# Patient Record
Sex: FEMALE | Race: WHITE | NOT HISPANIC OR LATINO | Employment: OTHER | URBAN - METROPOLITAN AREA
[De-identification: names, ages, dates, MRNs, and addresses within clinical notes are randomized per-mention and may not be internally consistent; named-entity substitution may affect disease eponyms.]

---

## 2017-03-25 DIAGNOSIS — G89.29 CHRONIC BILATERAL LOW BACK PAIN WITHOUT SCIATICA: ICD-10-CM

## 2017-03-25 DIAGNOSIS — M54.50 CHRONIC BILATERAL LOW BACK PAIN WITHOUT SCIATICA: ICD-10-CM

## 2017-03-27 RX ORDER — CELECOXIB 200 MG/1
CAPSULE ORAL
Qty: 90 CAPSULE | Refills: 1 | Status: SHIPPED | OUTPATIENT
Start: 2017-03-27 | End: 2017-09-28

## 2017-03-27 NOTE — TELEPHONE ENCOUNTER
Prescription approved per [facility] Refill Protocol.  Nadia Bass, RN  Triage Nurse

## 2017-03-27 NOTE — TELEPHONE ENCOUNTER
CELEBREX 200MG      Last Written Prescription Date: 10/3/2016  Last Quantity: 90, # refills: 1  Last Office Visit with [facility], [facility] or [facility] prescribing provider: 3/25/2017  Next 5 appointments (look out 90 days)     Apr 13, 2017  8:40 AM CDT   MyChart Long with Griselda Yoder MD   [facility] ([facility]    [address] [place] [id number] [phone]                   Creatinine   Date Value Ref Range Status   10/03/2016 0.68 0.52 - 1.04 mg/dL Final     Lab Results   Component Value Date    AST 13 04/18/2016     Lab Results   Component Value Date    ALT 36 04/18/2016     BP Readings from Last 3 Encounters:   12/27/16 142/80   11/23/16 126/78   10/16/16 128/80

## 2017-04-05 ENCOUNTER — TELEPHONE (OUTPATIENT)
Dept: PEDIATRICS | Facility: CLINIC | Age: 52
End: 2017-04-05

## 2017-04-05 NOTE — TELEPHONE ENCOUNTER
Third attempt to reach patient to schedule Colonoscopy. Not Scheduled at [facility]. Left Messages.

## 2017-04-11 DIAGNOSIS — I10 BENIGN HYPERTENSION: ICD-10-CM

## 2017-04-11 NOTE — TELEPHONE ENCOUNTER
LOSARTAN 50MG      Last Written Prescription Date: 6/7/2016  Last Fill Quantity: 180, # refills: 2  Last Office Visit with G, P or [facility] prescribing provider: 12/28/2016  Next 5 appointments (look out 90 days)     Apr 13, 2017  8:40 AM CDT   MyChart Long with Griselda Yoder MD   [facility] ([facility]    [address] [place] [id number] [phone]                   Potassium   Date Value Ref Range Status   10/03/2016 3.9 3.4 - 5.3 mmol/L Final     Creatinine   Date Value Ref Range Status   10/03/2016 0.68 0.52 - 1.04 mg/dL Final     BP Readings from Last 3 Encounters:   12/27/16 142/80   11/23/16 126/78   10/16/16 128/80

## 2017-04-12 RX ORDER — LOSARTAN POTASSIUM 50 MG/1
TABLET ORAL
Qty: 180 TABLET | Refills: 1 | Status: SHIPPED | OUTPATIENT
Start: 2017-04-12 | End: 2017-07-06

## 2017-04-12 NOTE — TELEPHONE ENCOUNTER
Prescription approved per [facility] Refill Protocol.  Nadia Bass, RN  Triage Nurse

## 2017-04-13 ENCOUNTER — OFFICE VISIT (OUTPATIENT)
Dept: PEDIATRICS | Facility: CLINIC | Age: 52
End: 2017-04-13
Payer: COMMERCIAL

## 2017-04-13 VITALS
DIASTOLIC BLOOD PRESSURE: 97 MMHG | TEMPERATURE: 98 F | WEIGHT: 286.2 LBS | HEART RATE: 69 BPM | BODY MASS INDEX: 46.19 KG/M2 | SYSTOLIC BLOOD PRESSURE: 152 MMHG

## 2017-04-13 DIAGNOSIS — Z79.4 TYPE 2 DIABETES MELLITUS WITHOUT COMPLICATION, WITH LONG-TERM CURRENT USE OF INSULIN (H): Primary | ICD-10-CM

## 2017-04-13 DIAGNOSIS — E78.5 HYPERLIPIDEMIA LDL GOAL <100: ICD-10-CM

## 2017-04-13 DIAGNOSIS — E11.9 TYPE 2 DIABETES MELLITUS WITHOUT COMPLICATION, WITH LONG-TERM CURRENT USE OF INSULIN (H): Primary | ICD-10-CM

## 2017-04-13 DIAGNOSIS — Z12.11 SCREEN FOR COLON CANCER: ICD-10-CM

## 2017-04-13 DIAGNOSIS — E66.01 MORBID OBESITY WITH BMI OF 40.0-44.9, ADULT (H): ICD-10-CM

## 2017-04-13 DIAGNOSIS — I10 ESSENTIAL HYPERTENSION: ICD-10-CM

## 2017-04-13 DIAGNOSIS — F33.0 MAJOR DEPRESSIVE DISORDER, RECURRENT EPISODE, MILD (H): ICD-10-CM

## 2017-04-13 DIAGNOSIS — F41.9 ANXIETY: ICD-10-CM

## 2017-04-13 LAB
ALBUMIN SERPL-MCNC: 3.4 G/DL (ref 3.4–5)
ALP SERPL-CCNC: 81 U/L (ref 40–150)
ALT SERPL W P-5'-P-CCNC: 26 U/L (ref 0–50)
ANION GAP SERPL CALCULATED.3IONS-SCNC: 9 MMOL/L (ref 3–14)
AST SERPL W P-5'-P-CCNC: 11 U/L (ref 0–45)
BILIRUB SERPL-MCNC: 0.4 MG/DL (ref 0.2–1.3)
BUN SERPL-MCNC: 9 MG/DL (ref 7–30)
CALCIUM SERPL-MCNC: 8.7 MG/DL (ref 8.5–10.1)
CHLORIDE SERPL-SCNC: 106 MMOL/L (ref 94–109)
CHOLEST SERPL-MCNC: 166 MG/DL
CO2 SERPL-SCNC: 26 MMOL/L (ref 20–32)
CREAT SERPL-MCNC: 0.55 MG/DL (ref 0.52–1.04)
CREAT UR-MCNC: 126 MG/DL
GFR SERPL CREATININE-BSD FRML MDRD: ABNORMAL ML/MIN/1.7M2
GLUCOSE SERPL-MCNC: 141 MG/DL (ref 70–99)
HBA1C MFR BLD: 8.4 % (ref 4.3–6)
HDLC SERPL-MCNC: 44 MG/DL
LDLC SERPL CALC-MCNC: 104 MG/DL
MICROALBUMIN UR-MCNC: 7 MG/L
MICROALBUMIN/CREAT UR: 5.93 MG/G CR (ref 0–25)
NONHDLC SERPL-MCNC: 122 MG/DL
POTASSIUM SERPL-SCNC: 3.9 MMOL/L (ref 3.4–5.3)
PROT SERPL-MCNC: 6.9 G/DL (ref 6.8–8.8)
SODIUM SERPL-SCNC: 141 MMOL/L (ref 133–144)
TRIGL SERPL-MCNC: 92 MG/DL

## 2017-04-13 PROCEDURE — 99207 C FOOT EXAM  NO CHARGE: CPT | Performed by: INTERNAL MEDICINE

## 2017-04-13 PROCEDURE — 83036 HEMOGLOBIN GLYCOSYLATED A1C: CPT | Performed by: INTERNAL MEDICINE

## 2017-04-13 PROCEDURE — 36415 COLL VENOUS BLD VENIPUNCTURE: CPT | Performed by: INTERNAL MEDICINE

## 2017-04-13 PROCEDURE — 80053 COMPREHEN METABOLIC PANEL: CPT | Performed by: INTERNAL MEDICINE

## 2017-04-13 PROCEDURE — 99214 OFFICE O/P EST MOD 30 MIN: CPT | Performed by: INTERNAL MEDICINE

## 2017-04-13 PROCEDURE — 80061 LIPID PANEL: CPT | Performed by: INTERNAL MEDICINE

## 2017-04-13 PROCEDURE — 82043 UR ALBUMIN QUANTITATIVE: CPT | Performed by: INTERNAL MEDICINE

## 2017-04-13 NOTE — PROGRESS NOTES
"  SUBJECTIVE:                                                    Johnna Caballero is a 51 year old female who presents to clinic today for the following health issues    Diabetes Follow-up      Patient is checking blood sugars: not at all    Diabetic concerns: None     Symptoms of hypoglycemia (low blood sugar): none     Paresthesias (numbness or burning in feet) or sores: No     Date of last diabetic eye exam: May 2016     Hypertension Follow-up      Outpatient blood pressures are not being checked.    Low Salt Diet: no added salt       Amount of exercise or physical activity: 4-5 days/week for an average of 30-60 minutes    Problems taking medications regularly: No    Medication side effects: none    Diet: low salt    1) DM:  Not checking sugars at home.  Hasn't been walking as much as usual due to arthritis.  Diet has been \"so so\".  Has not switched to the basaglar, just picked it up.  Has been using lantus.  No low blood sugars.    2) mood has been much better, has been off lexapro and trazodone.  No significant anxiety or depression symptoms.    3) htn:  Not checking bp at home.  No headaches.  No chest pain or shortness of breath.      Problem list and histories reviewed & adjusted, as indicated.  Additional history: as documented    Patient Active Problem List   Diagnosis     Allergic rhinitis     Esophageal reflux     Hyperlipidemia LDL goal <100     Long term current use of insulin (H)     Morbid obesity with BMI of 40.0-44.9, adult (H)     Hematoma - postoperative     Surgery aftercare     Vitamin D deficiency disease     S/P total knee arthroplasty     Type 2 diabetes mellitus without complication (H)     Essential hypertension     Bilateral low back pain without sciatica     Major depressive disorder, recurrent episode, mild (H)     Anxiety     Latex allergy status     Chronic pain syndrome     Intermittent asthma, uncomplicated     Past Surgical History:   Procedure Laterality Date     ARTHROPLASTY KNEE  " 7/15/2014    Procedure: ARTHROPLASTY KNEE;  Surgeon: Chapin Paul MD;  Location: [facility] OR     BACK SURGERY       C NONSPECIFIC PROCEDURE      laparoscopy x6     C NONSPECIFIC PROCEDURE  93    laparotomy x2 ovarian cyst     C NONSPECIFIC PROCEDURE  0293    oophorectomy lt side - cyst     C NONSPECIFIC PROCEDURE  0395    laminectomy L4-5. S1 herniated disc     C NONSPECIFIC PROCEDURE  96    cholecystectomy     C NONSPECIFIC PROCEDURE      ganglion cysts l wrist, rt palm     C NONSPECIFIC PROCEDURE      cyst removal back x2     C NONSPECIFIC PROCEDURE  10/02    rt thumb fusion, ganglion cyst removal     C NONSPECIFIC PROCEDURE  1/08    remove heel spur, excise exostosis     CHOLECYSTECTOMY       EXCISE LESION LOWER EXTREMITY  11/20/2012    Procedure: EXCISE LESION LOWER EXTREMITY;  EXCISION LIPOMA RIGHT HIP ;  Surgeon: Jazmin Bautista MD;  Location:  [facility]     EXCISE LESION LOWER EXTREMITY  11/23/2012    Procedure: EXCISE LESION LOWER EXTREMITY;  EXCISE LESION LOWER EXTREMITY  EVACUATE RIGHT HIP HEMATOMA;  Surgeon: Jazmin Bautista MD;  Location: [facility] OR     GYN SURGERY       HC EXPLORATION ANKLE JOINT  1/2006     HC PART EXCIS PLANTAR FASCIA  12/2006     IRRIGATION AND DEBRIDEMENT HIP, COMBINED  12/15/2012    Procedure: COMBINED IRRIGATION AND DEBRIDEMENT HIP;   evacuation hematoma, scar revision right hip;  Surgeon: Jazmin Bautista MD;  Location:  OR     wisdom teeth[         Social History   Substance Use Topics     Smoking status: Never Smoker     Smokeless tobacco: Never Used     Alcohol use 0.0 oz/week     0 Standard drinks or equivalent per week      Comment: 1 drink per year or less     Family History   Problem Relation Age of Onset     Adopted: Yes     Respiratory Brother      1/2 sib     Psychotic Disorder Mother      depression, chronic fatigue     Psychotic Disorder Brother      bipolar     C.A.D. Maternal Grandfather      Breast Cancer Maternal Grandmother      DIABETES Maternal  Uncle            Reviewed and updated as needed this visit by clinical staff       Reviewed and updated as needed this visit by Provider         ROS:  Constitutional, HEENT, cardiovascular, pulmonary, gi and gu systems are negative, except as otherwise noted.    OBJECTIVE:                                                    BP (!) 152/97  Pulse 69  Temp 98  F (36.7  C) (Tympanic)  Wt 286 lb 3.2 oz (129.8 kg)  BMI 46.19 kg/m2  Body mass index is 46.19 kg/(m^2).  GENERAL: healthy, alert and no distress  EYES: Eyes grossly normal to inspection, PERRL and conjunctivae and sclerae normal  HENT: ear canals and TM's normal, nose and mouth without ulcers or lesions  NECK: no adenopathy, no asymmetry, masses, or scars and thyroid normal to palpation  RESP: lungs clear to auscultation - no rales, rhonchi or wheezes  CV: regular rate and rhythm, normal S1 S2, no S3 or S4, no murmur, click or rub, no peripheral edema and peripheral pulses strong  ABDOMEN: soft, nontender, no hepatosplenomegaly, no masses and bowel sounds normal  MS: no gross musculoskeletal defects noted, no edema  Diabetic foot exam: normal DP and PT pulses, no trophic changes or ulcerative lesions, normal sensory exam and normal monofilament exam    Diagnostic Test Results:  none      ASSESSMENT/PLAN:                                                    (E11.9,  Z79.4) Type 2 diabetes mellitus without complication, with long-term current use of insulin (H)  (primary encounter diagnosis)  -a1c elevated  -increase basaglar by 2 units q3 days to get fasting sugars below 140  -call with final dose of basaglar- may need early refill  -no hypoglycemia  -on ARB, metformin, victoza, basaglar, asa  -pt refuses statin   Plan: FOOT EXAM, Hemoglobin A1c, TSH with free T4         reflex          (I10) Essential hypertension  -bp elevated today, recheck by me slightly improved (158/84) but still elevated  -pt to check bp at home and send me her log in 2 weeks   -if still  high will add hctz to losartan     (E78.5) Hyperlipidemia LDL goal <100  -fasting labs today  -pt has refused statin in the past    (F33.0) Major depressive disorder, recurrent episode, mild (H)  -controlled, not on medications     (F41.9) Anxiety  -controlled, not on medications     (E66.01,  Z68.41) Morbid obesity with BMI of 40.0-44.9, adult (H)  -discussed diet, exercise and weight loss     (Z12.11) Screen for colon cancer  Plan: GASTROENTEROLOGY ADULT REF PROCEDURE ONLY        FUTURE APPOINTMENTS:       - Follow-up visit in 3 months, scheduled.      Griselda Yoder MD  [facility]

## 2017-04-13 NOTE — PATIENT INSTRUCTIONS
Increase basaglar to 16 U for 3 days.  If fasting blood sugars are not below 140 then go up by another 2 u every 3 days until fasting sugars are 140.      Check bps at home and send me log in 2 weeks.

## 2017-04-13 NOTE — NURSING NOTE
"Chief Complaint   Patient presents with     Diabetes       Initial BP (!) 152/97  Pulse 69  Temp 98  F (36.7  C) (Tympanic)  Wt 286 lb 3.2 oz (129.8 kg)  BMI 46.19 kg/m2 Estimated body mass index is 46.19 kg/(m^2) as calculated from the following:    Height as of 12/27/16: 5' 6\" (1.676 m).    Weight as of this encounter: 286 lb 3.2 oz (129.8 kg).  Medication Reconciliation: complete    "

## 2017-04-13 NOTE — MR AVS SNAPSHOT
After Visit Summary   4/13/2017    Johnna Caballero    MRN: [id number]           Patient Information     Date Of Birth          1965        Visit Information        Provider Department      4/13/2017 8:40 AM Griselda Yoder MD [facility] [place]        Today's Diagnoses     Type 2 diabetes mellitus without complication, with long-term current use of insulin (H)    -  1    Essential hypertension        Hyperlipidemia LDL goal <100        Major depressive disorder, recurrent episode, mild (H)        Anxiety        Morbid obesity with BMI of 40.0-44.9, adult (H)        Screen for colon cancer          Care Instructions    Increase basaglar to 16 U for 3 days.  If fasting blood sugars are not below 140 then go up by another 2 u every 3 days until fasting sugars are 140.      Check bps at home and send me log in 2 weeks.           Follow-ups after your visit        Additional Services     GASTROENTEROLOGY ADULT REF PROCEDURE ONLY       Last Lab Result: Creatinine (mg/dL)       Date                     Value                 10/03/2016               0.68             ----------  Body mass index is 46.19 kg/(m^2).      Patient will be contacted to schedule procedure.     Please be aware that coverage of these services is subject to the terms and limitations of your health insurance plan.  Call member services at your health plan with any benefit or coverage questions.  Any procedures must be performed at a [place] facility OR coordinated by your clinic's referral office.    Please bring the following with you to your appointment:    (1) Any X-Rays, CTs or MRIs which have been performed.  Contact the facility where they were done to arrange for  prior to your scheduled appointment.    (2) List of current medications   (3) This referral request   (4) Any documents/labs given to you for this referral                  Your next 10 appointments already scheduled     Apr 26, 2017   Procedure  with Chapin Leal MD   [facility] ([facility])    [address]  [facility] [address] [phone]           [facility] is located at [address]              Future tests that were ordered for you today     Open Future Orders        Priority Expected Expires Ordered    Hemoglobin A1c Routine  8/13/2017 4/13/2017    TSH with free T4 reflex Routine  8/13/2017 4/13/2017            Who to contact     If you have questions or need follow up information about today's clinic visit or your schedule please contact [facility] directly at [phone].  Normal or non-critical lab and imaging results will be communicated to you by MyChart, letter or phone within 4 business days after the clinic has received the results. If you do not hear from us within 7 days, please contact the clinic through [employer] or phone. If you have a critical or abnormal lab result, we will notify you by phone as soon as possible.  Submit refill requests through [employer] or call your pharmacy and they will forward the refill request to us. Please allow 3 business days for your refill to be completed.          Additional Information About Your Visit        [employer] Information     [employer] gives you secure access to your electronic health record. If you see a primary care provider, you can also send messages to your care team and make appointments. If you have questions, please call your primary care clinic.  If you do not have a primary care provider, please call [phone] and they will assist you.        Care EveryWhere ID     This is your Care EveryWhere ID. This could be used by other organizations to access your [place] medical records  [id number]        Your Vitals Were     Pulse Temperature BMI (Body Mass Index)             69 98  F (36.7  C) (Tympanic) 46.19 kg/m2          Blood Pressure from Last 3 Encounters:   04/13/17 (!) 152/97   12/27/16 142/80    11/23/16 126/78    Weight from Last 3 Encounters:   04/13/17 286 lb 3.2 oz (129.8 kg)   12/27/16 270 lb 9 oz (122.7 kg)   11/23/16 270 lb (122.5 kg)              We Performed the Following     Comprehensive metabolic panel     FOOT EXAM     GASTROENTEROLOGY ADULT REF PROCEDURE ONLY     Hemoglobin A1c     Lipid panel reflex to direct LDL     Microalbumin quantitative random urine        Primary Care Provider Office Phone # Fax #    Griselda Yoder [phone]       [facility] [address] [place] [address]        Thank you!     Thank you for choosing [facility]  for your care. Our goal is always to provide you with excellent care. Hearing back from our patients is one way we can continue to improve our services. Please take a few minutes to complete the written survey that you may receive in the mail after your visit with us. Thank you!             Your Updated Medication List - Protect others around you: Learn how to safely use, store and throw away your medicines at www.disposemymeds.org.          This list is accurate as of: 4/13/17  9:10 AM.  Always use your most recent med list.                   Brand Name Dispense Instructions for use    albuterol 108 (90 BASE) MCG/ACT Inhaler    PROAIR HFA/PROVENTIL HFA/VENTOLIN HFA    1 Inhaler    Inhale 2 puffs into the lungs every 4 hours as needed for shortness of breath / dyspnea or wheezing       aspirin 81 MG tablet     30 tablet    Take 1 tablet (81 mg) by mouth daily       blood glucose monitoring lancets     2 Box    Use to test blood sugar 1-2 times daily or as directed.       blood glucose monitoring meter device kit    no brand specified    1 kit    Use to test blood sugar 1-2 times daily or as directed.       blood glucose monitoring test strip    ACCU-CHEK GABI    200 each    Use to test blood sugars 1-2x daily or as directed.       BREO ELLIPTA 100-25 MCG/INH oral inhaler   Generic drug:   fluticasone-vilanterol      Inhale 1 puff into the lungs daily       celecoxib 200 MG capsule    celeBREX    90 capsule    TAKE ONE CAPSULE BY MOUTH EVERY DAY       cetirizine 10 MG tablet    zyrTEC    30 tablet    Take 1 tablet (10 mg) by mouth 2 times daily       EPINEPHrine 0.3 MG/0.3ML injection     2 mL    Inject 0.3 mLs (0.3 mg) into the muscle once as needed for anaphylaxis       hydrocortisone 2.5 % cream    PROCTOCREAM-HC    60 g    Apply 2 x daily for not more than 14 days       * insulin glargine 100 UNIT/ML injection    LANTUS SOLOSTAR    3 Month    Inject 12 Units Subcutaneous At Bedtime       * insulin glargine 100 UNIT/ML injection     3 mL    Inject 14 Units Subcutaneous At Bedtime       Insulin Pen Needle 31G X 4 MM Misc     180 each    1 each 2 times daily Uses with Lantus and Victoza twice daily       KRILL OIL PO      Take 1 tablet by mouth       LANsoprazole 15 MG CR capsule    PREVACID    90 capsule    Take 1 capsule (15 mg) by mouth daily Take 30-60 minutes before a meal.       liraglutide 18 MG/3ML soln    VICTOZA    3 Month    Inject 1.8 mg Subcutaneous daily       LORazepam 0.5 MG tablet    ATIVAN    30 tablet    Take 1-2 tablets (0.5-1 mg) by mouth every 4 hours as needed for anxiety       losartan 50 MG tablet    COZAAR    180 tablet    TAKE TWO TABLETS BY MOUTH EVERY DAY       metFORMIN 1000 MG tablet    GLUCOPHAGE    180 tablet    Take 1 tablet (1,000 mg) by mouth 2 times daily (with meals)       montelukast 10 MG tablet    SINGULAIR    90 tablet    Take 1 tablet (10 mg) by mouth At Bedtime       multivitamin, therapeutic with minerals Tabs tablet      Take 1 tablet by mouth 2 times daily       order for DME     1 each    Equipment being ordered: TENS unit       oxyCODONE-acetaminophen 5-325 MG per tablet    PERCOCET    90 tablet    Take 1-2 tablets by mouth every 4 hours as needed for pain       STATIN NOT PRESCRIBED (INTENTIONAL)     0 each    1 each 2 times daily Statin not  prescribed intentionally due to Refusal by patient       traZODone 50 MG tablet    DESYREL    180 tablet    Take 1-2 tablets ( mg) by mouth nightly as needed for sleep       vitamin B complex with vitamin C Tabs tablet      Take 1 tablet by mouth daily       vitamin D 2000 UNITS tablet     30 tablet    Take 1 tablet by mouth daily       * Notice:  This list has 2 medication(s) that are the same as other medications prescribed for you. Read the directions carefully, and ask your doctor or other care provider to review them with you.

## 2017-04-26 ENCOUNTER — HOSPITAL ENCOUNTER (OUTPATIENT)
Facility: CLINIC | Age: 52
Discharge: HOME | End: 2017-04-26
Attending: INTERNAL MEDICINE | Admitting: INTERNAL MEDICINE
Payer: COMMERCIAL

## 2017-04-26 VITALS
HEIGHT: 67 IN | RESPIRATION RATE: 14 BRPM | SYSTOLIC BLOOD PRESSURE: 155 MMHG | DIASTOLIC BLOOD PRESSURE: 91 MMHG | OXYGEN SATURATION: 99 %

## 2017-04-26 LAB
COLONOSCOPY: NORMAL
GLUCOSE BLDC GLUCOMTR-MCNC: 154 MG/DL (ref 70–99)

## 2017-04-26 PROCEDURE — 25000125 ZZHC RX 250: Performed by: INTERNAL MEDICINE

## 2017-04-26 PROCEDURE — 25000128 H RX IP 250 OP 636: Performed by: INTERNAL MEDICINE

## 2017-04-26 PROCEDURE — G0121 COLON CA SCRN NOT HI RSK IND: HCPCS | Performed by: INTERNAL MEDICINE

## 2017-04-26 PROCEDURE — 45378 DIAGNOSTIC COLONOSCOPY: CPT | Performed by: INTERNAL MEDICINE

## 2017-04-26 PROCEDURE — G0500 MOD SEDAT ENDO SERVICE >5YRS: HCPCS | Performed by: INTERNAL MEDICINE

## 2017-04-26 PROCEDURE — 82962 GLUCOSE BLOOD TEST: CPT

## 2017-04-26 RX ORDER — FENTANYL CITRATE 50 UG/ML
INJECTION, SOLUTION INTRAMUSCULAR; INTRAVENOUS PRN
Status: DISCONTINUED | OUTPATIENT
Start: 2017-04-26 | End: 2017-04-26 | Stop reason: HOSPADM

## 2017-04-26 RX ORDER — ONDANSETRON 2 MG/ML
4 INJECTION INTRAMUSCULAR; INTRAVENOUS EVERY 6 HOURS PRN
Status: DISCONTINUED | OUTPATIENT
Start: 2017-04-26 | End: 2017-04-26 | Stop reason: HOSPADM

## 2017-04-26 RX ORDER — FLUMAZENIL 0.1 MG/ML
0.2 INJECTION, SOLUTION INTRAVENOUS
Status: DISCONTINUED | OUTPATIENT
Start: 2017-04-26 | End: 2017-04-26 | Stop reason: HOSPADM

## 2017-04-26 RX ORDER — ONDANSETRON 4 MG/1
4 TABLET, ORALLY DISINTEGRATING ORAL EVERY 6 HOURS PRN
Status: DISCONTINUED | OUTPATIENT
Start: 2017-04-26 | End: 2017-04-26 | Stop reason: HOSPADM

## 2017-04-26 RX ORDER — ONDANSETRON 2 MG/ML
4 INJECTION INTRAMUSCULAR; INTRAVENOUS
Status: DISCONTINUED | OUTPATIENT
Start: 2017-04-26 | End: 2017-04-26 | Stop reason: HOSPADM

## 2017-04-26 RX ORDER — NALOXONE HYDROCHLORIDE 0.4 MG/ML
.1-.4 INJECTION, SOLUTION INTRAMUSCULAR; INTRAVENOUS; SUBCUTANEOUS
Status: DISCONTINUED | OUTPATIENT
Start: 2017-04-26 | End: 2017-04-26 | Stop reason: HOSPADM

## 2017-04-26 RX ORDER — LIDOCAINE 40 MG/G
CREAM TOPICAL
Status: DISCONTINUED | OUTPATIENT
Start: 2017-04-26 | End: 2017-04-26 | Stop reason: HOSPADM

## 2017-04-26 NOTE — DISCHARGE INSTRUCTIONS
Understanding Diverticulosis and Diverticulitis     Pouches or diverticula usually occur in the lower part of the colon called the sigmoid.      Diverticulitis occurs when the pouches become inflamed.     The colon (large intestine) is the last part of the digestive tract. It absorbs water from stool and changes it from a liquid to a solid. In certain cases, small pouches called diverticula can form in the colon wall. This condition is called diverticulosis. The pouches can become infected. If this happens, it becomes a more serious problem called diverticulitis. These problems can be painful. But they can be managed.   Managing Your Condition  Diet changes or taking medications are often tried first. These may be enough to bring relief. If the case is bad, surgery may be done. You and your doctor can discuss the plan that is best for you.  If You Have Diverticulosis  Diet changes are often enough to control symptoms. The main changes are adding fiber (roughage) and drinking more water. Fiber absorbs water as it travels through your colon. This helps your stool stay soft and move smoothly. Water helps this process. If needed, you may be told to take over-the-counter stool softeners. To help relieve pain, antispasmodic medications may be prescribed.  If You Have Diverticulitis  Treatment depends on how bad your symptoms are.  For mild symptoms: You may be put on a liquid diet for a short time. You may also be prescribed antibiotics. If these two steps relieve your symptoms, you may then be prescribed a high-fiber diet. If you still have symptoms, your doctor will discuss further treatment options with you.  For severe symptoms: You may need to be admitted to the hospital. There, you can be given IV antibiotics and fluids. Once symptoms are under control, the above treatments may be tried. If these don t control your condition, your doctor may discuss the option of having surgery with you.  [place] to Colon  Health  Help keep your colon healthy with a diet that includes plenty of high-fiber fruits, vegetables, and whole grains. Drink plenty of liquids like water and juice. Your doctor may also recommend avoiding seeds and nuts.          [id number] Randal Viveros, [address] [place] [address], [place] [address]. All rights reserved. This information is not intended as a substitute for professional medical care. Always follow your healthcare professional's instructions.      HIGH FIBER DIET  Fiber is present in all fruits, vegetables, cereals and grains. Fiber passes through the body undigested. A high fiber diet helps food move through the intestinal tract. The added bulk is helpful in preventing constipation. In people with diverticulosis it serves to clean out the pouches along the colon wall while preventing new ones from forming. A high fiber diet also reduces the risk of colon cancer, decreases blood cholesterol and prevents high blood sugar in people with diabetes.    The foods listed below are high in fiber and should be included in your diet. If you are not used to high fiber foods, start with 1 or 2 foods from this list. Every 3-4 days add a new one to your diet until you are eating 4 high fiber foods per day. This should give you 20-35 Gm of fiber/day. It is also important to drink a lot of water when you are on this diet (6-8 glasses a day). Water causes the fiber to swell and increases the benefit.    FOODS HIGH IN DIETARY FIBER:  BREADS: Made with 100% whole wheat flour; deepika, wheat or rye crackers; tortillas, bran muffins  CEREALS: Whole grain cereal with bran (Chex, Raisin Bran, Corn Bran), oatmeal, rolled oats, granola, wheat flakes, brown rice  NUTS: Any nuts  FRUITS: All fresh fruits along with edible skins, (bananas, citrus fruit, mangoes, pears, prunes, raisins, apples, pineapple, apricot, melon, jams and marmalades), fruit juices (especially prune juice)  VEGETABLES: All types, preferably raw or lightly  cooked: especially, celery, eggplant, potatoes, spinach, broccoli, brussel sprouts, winter squash, carrots, cauliflower, soybeans, lentils, fresh and dried beans of all kinds  OTHER: Popcorn, any spices      [id number] Randal Viveros, [address] [place] [address], [place] [address]. All rights reserved. This information is not intended as a substitute for professional medical care. Always follow your healthcare professional's instructions.

## 2017-04-26 NOTE — H&P
Pre-Endoscopy History and Physical     Johnna Caballero MRN# [id number]   YOB: 1965 Age: 51 year old     Date of Procedure: 4/26/2017  Primary care provider: Griselda Yoder  Type of Endoscopy: Colonoscopy with possible biopsy, possible polypectomy  Reason for Procedure: screen  Type of Anesthesia Anticipated: Conscious Sedation    HPI:    Johnna is a 51 year old female who will be undergoing the above procedure.      A history and physical has been performed. The patient's medications and allergies have been reviewed. The risks and benefits of the procedure and the sedation options and risks were discussed with the patient.  All questions were answered and informed consent was obtained.      She denies a personal or family history of anesthesia complications or bleeding disorders.     Patient Active Problem List   Diagnosis     Allergic rhinitis     Esophageal reflux     Hyperlipidemia LDL goal <100     Long term current use of insulin (H)     Morbid obesity with BMI of 40.0-44.9, adult (H)     Hematoma - postoperative     Surgery aftercare     Vitamin D deficiency disease     S/P total knee arthroplasty     Type 2 diabetes mellitus without complication (H)     Essential hypertension     Bilateral low back pain without sciatica     Major depressive disorder, recurrent episode, mild (H)     Anxiety     Latex allergy status     Chronic pain syndrome     Intermittent asthma, uncomplicated        Past Medical History:   Diagnosis Date     Actinic keratosis      Allergic rhinitis, cause unspecified      Anemia      Arthritis      Asthma     no meds x2 yrs     chronic back pain      Chronic infection     MRSA-nasal     Chronic pain     back     Esophageal reflux      fibrocystic breast changes      Gastro-oesophageal reflux disease      Heart murmur     mitral insuffiency     Hx of Fen-Phen use      Hypertension      migraines      Mild intermittent asthma     rare, with dog or exercise      Moderate major depression (H) 3/28/2012     MRSA (methicillin resistant Staphylococcus aureus) 6/27/2014    Nares     Need for desensitization to allergens      Obesity, unspecified      Other and unspecified hyperlipidemia      Temporomandibular joint disorders, unspecified      Thrombosis of leg     incisional area right hip     Type II or unspecified type diabetes mellitus without mention of complication, not stated as uncontrolled         Past Surgical History:   Procedure Laterality Date     ARTHROPLASTY KNEE  7/15/2014    Procedure: ARTHROPLASTY KNEE;  Surgeon: Chapin Paul MD;  Location: [facility] OR     BACK SURGERY       C NONSPECIFIC PROCEDURE      laparoscopy x6     C NONSPECIFIC PROCEDURE  93    laparotomy x2 ovarian cyst     C NONSPECIFIC PROCEDURE  0293    oophorectomy lt side - cyst     C NONSPECIFIC PROCEDURE  0395    laminectomy L4-5. S1 herniated disc     C NONSPECIFIC PROCEDURE  96    cholecystectomy     C NONSPECIFIC PROCEDURE      ganglion cysts l wrist, rt palm     C NONSPECIFIC PROCEDURE      cyst removal back x2     C NONSPECIFIC PROCEDURE  10/02    rt thumb fusion, ganglion cyst removal     C NONSPECIFIC PROCEDURE  1/08    remove heel spur, excise exostosis     CHOLECYSTECTOMY       EXCISE LESION LOWER EXTREMITY  11/20/2012    Procedure: EXCISE LESION LOWER EXTREMITY;  EXCISION LIPOMA RIGHT HIP ;  Surgeon: Jazmin Bautista MD;  Location: [facility]     EXCISE LESION LOWER EXTREMITY  11/23/2012    Procedure: EXCISE LESION LOWER EXTREMITY;  EXCISE LESION LOWER EXTREMITY  EVACUATE RIGHT HIP HEMATOMA;  Surgeon: Jazmin Bautista MD;  Location:  OR     GYN SURGERY       HC EXPLORATION ANKLE JOINT  1/2006     HC PART EXCIS PLANTAR FASCIA  12/2006     IRRIGATION AND DEBRIDEMENT HIP, COMBINED  12/15/2012    Procedure: COMBINED IRRIGATION AND DEBRIDEMENT HIP;   evacuation hematoma, scar revision right hip;  Surgeon: Jazmin Bautista MD;  Location:  OR     wisdom teeth[          Social History   Substance Use Topics     Smoking status: Never Smoker     Smokeless tobacco: Never Used     Alcohol use 0.0 oz/week     0 Standard drinks or equivalent per week      Comment: 1 drink per year or less       Family History   Problem Relation Age of Onset     Adopted: Yes     Respiratory Brother      1/2 sib     Psychotic Disorder Mother      depression, chronic fatigue     Psychotic Disorder Brother      bipolar     C.A.D. Maternal Grandfather      Breast Cancer Maternal Grandmother      DIABETES Maternal Uncle        Prior to Admission medications    Medication Sig Start Date End Date Taking? Authorizing Provider   losartan (COZAAR) 50 MG tablet TAKE TWO TABLETS BY MOUTH EVERY DAY 4/12/17  Yes Griselda Yoder MD   celecoxib (CELEBREX) 200 MG capsule TAKE ONE CAPSULE BY MOUTH EVERY DAY 3/27/17  Yes Linda Durant MD   insulin glargine (BASAGLAR) 100 UNIT/ML injection Inject 14 Units Subcutaneous At Bedtime 1/6/17  Yes Griselda Yoder MD   oxyCODONE-acetaminophen (PERCOCET) 5-325 MG per tablet Take 1-2 tablets by mouth every 4 hours as needed for pain 12/28/16  Yes Griselda Yoder MD   aspirin 81 MG tablet Take 1 tablet (81 mg) by mouth daily 11/23/16  Yes Griselda Yoder MD   KRILL OIL PO Take 1 tablet by mouth   Yes Reported, Patient   vitamin  B complex with vitamin C (VITAMIN  B COMPLEX) TABS Take 1 tablet by mouth daily 9/19/16  Yes    montelukast (SINGULAIR) 10 MG tablet Take 1 tablet (10 mg) by mouth At Bedtime 9/19/16  Yes    cetirizine (ZYRTEC) 10 MG tablet Take 1 tablet (10 mg) by mouth 2 times daily 9/19/16  Yes    metFORMIN (GLUCOPHAGE) 1000 MG tablet Take 1 tablet (1,000 mg) by mouth 2 times daily (with meals) 7/28/16  Yes Griselda Yoder MD   liraglutide (VICTOZA) 18 MG/3ML soln Inject 1.8 mg Subcutaneous daily 7/28/16  Yes Griselda Yoder MD   LANsoprazole (PREVACID) 15 MG capsule Take 1 capsule (15 mg) by mouth  daily Take 30-60 minutes before a meal. 7/28/16  Yes Griselda Yoder MD   hydrocortisone (PROCTOCREAM-HC) 2.5 % rectal cream Apply 2 x daily for not more than 14 days 10/13/14  Yes Linda Durant MD   multivitamin, therapeutic with minerals (THERA-VIT-M) TABS Take 1 tablet by mouth 2 times daily    Yes Unknown, Entered By History   fluticasone - vilanterol (BREO ELLIPTA) 100-25 MCG/INH oral inhaler Inhale 1 puff into the lungs daily    Reported, Patient   albuterol (PROAIR HFA/PROVENTIL HFA/VENTOLIN HFA) 108 (90 BASE) MCG/ACT Inhaler Inhale 2 puffs into the lungs every 4 hours as needed for shortness of breath / dyspnea or wheezing 12/28/16   Griselda Yoder MD   blood glucose monitoring (ACCU-CHEK GABI) test strip Use to test blood sugars 1-2x daily or as directed. 12/28/16   Griselda Yoder MD   EPINEPHrine 0.3 MG/0.3ML injection Inject 0.3 mLs (0.3 mg) into the muscle once as needed for anaphylaxis 12/28/16   Griselda Yoder MD   traZODone (DESYREL) 50 MG tablet Take 1-2 tablets ( mg) by mouth nightly as needed for sleep 10/14/16   Griselda Yoder MD   Cholecalciferol (VITAMIN D) 2000 UNITS tablet Take 1 tablet by mouth daily 9/19/16      LORazepam (ATIVAN) 0.5 MG tablet Take 1-2 tablets (0.5-1 mg) by mouth every 4 hours as needed for anxiety 9/14/16   Griselda Yoder MD   Insulin Pen Needle 31G X 4 MM MISC 1 each 2 times daily Uses with Lantus and Victoza twice daily 7/28/16   Griselda Yoder MD   blood glucose monitoring (NO BRAND SPECIFIED) meter device kit Use to test blood sugar 1-2 times daily or as directed. 4/29/16   Griselda Yoder MD   blood glucose monitoring (ACCU-CHEK MULTICLIX) lancets Use to test blood sugar 1-2 times daily or as directed. 2/11/16   Griselda Yoder MD   STATIN NOT PRESCRIBED, INTENTIONAL, 1 each 2 times daily Statin not prescribed intentionally due to Refusal by patient 1/14/15    "Griselda Yoder MD   ORDER FOR DME Equipment being ordered: TENS unit 12/24/14   Griselda Yoder MD       Allergies   Allergen Reactions     Eggs      Allergy found in testing     Hornets      wasps     Latex Anaphylaxis     hives  -  able to use BP cuff     Lipitor [Hmg-Coa-R Inhibitors] Cramps     Muscle cramps with statin     Metoclopramide Hcl Difficulty breathing     Neurontin [Gabapentin] Hives        REVIEW OF SYSTEMS:   5 point ROS negative except as noted above in HPI, including Gen., Resp., CV, GI &  system review.    PHYSICAL EXAM:   Ht 1.702 m (5' 7\") Estimated body mass index is 46.19 kg/(m^2) as calculated from the following:    Height as of 12/27/16: 1.676 m (5' 6\").    Weight as of 4/13/17: 129.8 kg (286 lb 3.2 oz).   GENERAL APPEARANCE: alert, and oriented  MENTAL STATUS: alert  AIRWAY EXAM: Mallampatti Class I (visualization of the soft palate, fauces, uvula, anterior and posterior pillars)  RESP: lungs clear to auscultation - no rales, rhonchi or wheezes  CV: regular rates and rhythm  DIAGNOSTICS:    Not indicated    IMPRESSION   ASA Class 2 - Mild systemic disease    PLAN:   Plan for Colonoscopy with possible biopsy, possible polypectomy. We discussed the risks, benefits and alternatives and the patient wished to proceed.    The above has been forwarded to the consulting provider.      Signed Electronically by: Chapin Leal  April 26, 2017          "

## 2017-04-26 NOTE — LETTER
April 5, 2017      Johnna Caballero  [address]   [facility] [id number]          Dear Johnna Caballero,      Thank you for choosing [facility]. You are scheduled for the following service(s).   Date:      Wednesday, April 26    Procedure:   Colonoscopy   Doctor:       Mel      Arrival Time:    9am   *check in at Emergency/Endoscopy desk*  Procedure Time:    9:30am  Location:   [facility]       Endoscopy Department, First Floor (Enter through ER Doors) *        [address] [place], [place] [address]     [phone] or [phone] () to reschedule      Colonoscopy is the most accurate test to detect colon polyps and colon cancer, and the only test where polyps can be removed. During this procedure, a doctor examines the lining of your large intestine and rectum through a flexible tube.  MIRALAX GATORADE PREP  Purchase the following supplies at your local pharmacy:  2 ( two)  - Bisacodyl tablets   (Dulcolax  laxative NOT Dulcolax  stool softener) each tablet contains 5 mg of bisacodyl  1 (one) -- 8.3 ounce bottle of Polyethylene Glycol (PEG) 3350 Powder   (MiraLAX , SmoothLAX , ClearLAX  or generic equivalent)  64 oz. Gatorade  (No red colored flavors)  Regular Gatorade, Gatorade G2 , Powerade , Powerade Zero  or Pedialyte  are acceptable. Red flavors are not allowed; all other colors (yellow, green, orange, purple, blue) are okay. It is also okay to buy two 2.12 oz packets of powdered Gatorade that can be mixed with water to a total volume of 64 oz of liquid.  1 (one) - 10 oz. bottle Magnesium Citrate (No red colored flavors)  It is also okay for you to use a 0.5 ounce package of powdered magnesium citrate (17 grams) mixed with 10 ounces of water.  PREPARATION FOR COLONOSCOPY  Transportation:  You must arrange for a ride for the day of your procedure with a responsible adult. A taxi ride is not an option unless you are accompanied by a responsible  adult. If you fail to arrange transportation with a responsible adult, your procedure will be cancelled and rescheduled.  7 days before:    Discontinue fiber supplements and medications containing iron. This includes multivitamins with iron, Metamucil and Fibercon.   3 days before:    Begin a Low-Fiber Diet. A low fiber diet helps make the cleanout more effective.     Examples of a low fiber diet include (but are not limited to): White bread, white rice, pasta, crackers, fish, chicken, eggs, ground beef, creamy peanut butter, cooked/steamed/boiled vegetables, canned fruit, bananas, melons, milk, plain yogurt cheese, salad dressing and other condiments.     The following are not allowed on a low fiber diet: Seeds, nuts, popcorn, bran, whole wheat, corn, quinoa, raw fruits and vegetables, berries and dried fruit, beans and lentils.  2 days before:    Continue Low Fiber Diet.     Drink at least 8 glasses of water throughout the day.     Stop eating solid foods at 11:45 pm.  1 day before:    Begin Clear Liquid Diet. (Clear liquids include things you can see through).     Examples of a clear liquid diet include: Water, tea (no milk or non-dairy creamer), clear broth or bouillon, Gatorade, Pedialyte or Powerade, carbonated and non-carbonated soft drinks(Sprite , 7-Up , Gingerale ), strained fruit juices without pulp (apple, white grape, white cranberry), Jello-O  and popsicles     The following are not allowed on a clear liquid diet: Red liquids, alcoholic beverages, coffee, dairy products, protein shakes, cream broths, juice with pulp and chewing tobacco.    At noon: Take 2(two) Bisacodyl (Dulcolax) tablets     Between 4-6pm: Drink Miralax - Gatorade preparation, mix 1(one) bottle of Miralax with 64 oz. of Gatorade in a large pitcher.    Drink 1(one) - 8 oz. glass every 15 minutes thereafter until the mixture is gone.  Colon Cleansing Tips: Drink adequate amounts of fluid before and after your colon cleansing to prevent  dehydration. Stay near a toilet because you will have diarrhea. Even if you are sitting on the toilet, continue to drink the cleansing solution every 15 minutes. If you feel nauseous or vomit, rinse your mouth with water, take a 15 to 30-minute break and then continue drinking the solution. You will be uncomfortable until the stool has flushed from your colon (in about 2-4 hours). You may feel chilled.        Day of your procedure  You may take all of your morning medications including blood pressure medications, blood thinners (if you have not been instructed to stop these by our office), methadone, anti-seizure medications with sips of water 3 hours prior to your procedure or earlier. Do not take insulin or vitamins prior to your procedure.  Continue the Clear Liquid Diet. Avoid red liquids, alcoholic beverages, coffee, dairy products, protein shakes, and chewing tobacco.    4 hours prior: Drink 10 oz magnesium citrate    3 hours prior:     STOP consuming all liquids     Do not take anything by mouth during this time.     Allow extra time to travel to your procedure as you may need to stop and use a restroom along the way.  You are ready for the procedure, if you followed all instructions and your stool is no longer formed, but clear or yellow liquid. If you are unsure whether your colon is clean, please call our office at [phone] you leave for your appointment.  Canceling or Rescheduling  your appointment:   If you must cancel or reschedule your appointment, please call [phone] as soon as possible.  Bring the following to your procedure:    Insurance Card / Photo ID     List of Current Medications including over-the-counter medications and supplements     Bring your rescue inhaler if you currently use one to control asthma                          DIRECTIONS  From the north ([place], [place], [place])  Take [address] south, exit to [address]. Get into the left hand isaías, turn left (east),  go one-half mile to [address]. Turn left (north) on [address]. Go north to first stoplight, take a right on the newly constructed road, [address] and follow it to the Emergency entrance.    From the south ([facility])  Take 35 north to the east split, [address], and exit to [address]. Turn left (west) on [address] to [address]. Turn right (north) on [address]. Go north to first stoplight, take a right on the newly constructed road, [address] and follow it to the Emergency entrance.    From the east via [address] ([facility])  Take [address] south to [address] exit. Turn right on [address]. Go west to [address]. Turn right (north) on [address], go to the first stoplight, take a right and follow the newly constructed road, [address], to the Emergency entrance.    From the east via [address] ([facility])  Take [address] west to [address]. Turn left (south) on [address] to [address]. Turn left (east) on [address] and follow the newly constructed road to the Emergency entrance.    From the west via [address] ([place])  ? Take [address] east to [address]. Turn right (south) on [address] to [address].  Turn left (east) on [address] and follow the newly constructed road to the Emergency entrance.          PRE-PROCEDURE CHECKLIST    If you have diabetes, ask your regular doctor for diet and medication restrictions.  If you take a medication to thin your blood (such as Coumadin or Lovenox) and have not already discussed this please call your primary doctor to advice on holding this medication  If you take aspirin or Plavix,  you may continue to do so.  If you are or may be pregnant, please discuss the risks and benefits of this procedure with your doctor.  You must arrange for a ride for the day of your exam. If you fail to arrange transportation with a responsible adult, your procedure will need  to be cancelled and rescheduled. Taxi ,Bus and Medical transport are not acceptable unless you have a responsible adult that you know & trust with you.  Please arrange for this  to be able to pick you up in our department, approximately one hour after your scheduled procedure, if they are not able to stay with you.  *If you must cancel or reschedule your appointment, please call [facility] at [phone].*      What happens during a colonoscopy?   Plan to spend up to two hours, starting at registration time, at the endoscopy center the day of your procedure. The exam itself takes about 15 minutes to complete, and recovery 30 minutes.      Before the exam:    You will change into a gown.    Your medical history will be reviewed with you and you will be given a consent form to sign.     A nurse will insert an intravenous (IV) line into your hand or arm.  During the exam:     Medicine will be given through the IV line to help you relax and feel drowsy.     Your heart rate and oxygen levels will be monitored. If your blood pressure is low, you may be given fluids through the IV line.     The doctor will insert a flexible hollow tube, called a colonoscope, into your rectum. The scope will be advanced slowly through the large intestine (colon).    You may have a feeling of pressure or fullness.     If an abnormal tissue, or a polyp are found, the doctor may remove it through the endoscope for closer examination, or biopsy. Tissue removal is painless.      What happens after the exam?         Your doctor will talk with you about the initial results of your exam.     The doctor will prepare a full report for the physician who referred you for the colonoscopy.     You may feel bloated after the procedure. This is normal.     Medication given during the exam will prohibit you from driving for the rest of the day.     Following the exam, you may resume your normal diet. Your first meal should be light, no  greasy foods. Avoid alcohol until the next day.     You may resume your regular activities the day after the procedure.     A nurse will provide you with complete discharge instructions before you leave the endoscopy center. Be sure to ask the nurse for specific instructions if you take blood thinners such as aspirin, Coumadin or Plavix.     Any tissue samples removed during the exam will be sent to a lab for evaluation. It may take 5-7 working days for you to be notified of the results.       Low-Fiber Diet      A low-fiber diet limits the amount of food waste that has to move through the large intestine.    Foods Recommended Foods to Avoid   Breads, Cereal, Rice and Pasta:   White bread, rolls, biscuits, and croissant, mely toast   Waffles, Algerian toast, and pancakes   White rice, noodles, pasta, macaroni and peeled cooked potatoes   Plain crackers, Saltines   Cooked cereals: farina, Cream of Rice   Cold cereals: Puffed Rice, Rice Krispies, Corn Flakes, and Special K Breads, Cereal, Rice and Pasta:   Breads or rolls with nuts, seeds or fruit   Whole wheat, pumpernickel, rye breads and cornbread   Potatoes with skin, brown or wild rice, and kasha (buckwheat)     Vegetables:    Tender cooked and canned vegetables without seeds: carrots, asparagus tips, green or wax beans, pumpkin, spinach, lima beans Vegetables:   Raw or steamed vegetables   Vegetables with seeds   Sauerkraut   Winter squash, peas, broccoli, [place] sprouts, cabbage, onions, cauliflower, baked beans, peas and corn     Fruits:   Strained fruit juice   canned fruit, except pineapple   ripe bananas   melons Fruits:   prunes and prune juice   raw or dried fruit   all berries, figs, dates and raisins     Milk/Dairy:   milk, plain or flavored   yogurt, custard, and ice cream   cheese and cottage cheese Milk/Dairy:   Yogurt with nuts or seeds   Meat and other proteins:   ground, wellcooked tender beef, lamb, ham, veal, pork, fish, poultry, and organ  meats   eggs   peanut butter without nuts  Fats, Snack, Sweets, Condiments, and Beverages:   Margarine, butter, oils, mayonnaise, sour cream, and salad dressing   Plain gravies   Sugar, clear jelly, honey, and syrup   Spices, cooked herbs, bouillon, broth, and soups made with allowed vegetables   Coffee, tea, and carbonated drinks   Plain cakes and cookies   Gelatin, plain puddings, custard, ice cream, sherbet, Popsicles   Hard candy or pretzels   Ketchup, mustard   Meat and other proteins:   tough, fibrous meats with gristle   dry beans, peas, and lentils   peanut butter with nuts   Tofu  Fats, Snack, Sweets, Condiments, and Beverages:   Nuts, seeds, and coconut   Jam, marmalade, and preserves   Pickles, olives, relish, and horseradish   All desserts containing nuts, seeds, dried fruit, coconut, or made from whole grains or bran   Candy made with nuts or seeds   popcorn

## 2017-04-26 NOTE — LETTER
April 5, 2017      Johnna Caballero  [address]   [place] [facility] [address]        Dear Johnna Caballero,      Thank you for choosing [facility]. You are scheduled for the following service(s).   Date:      Wednesday, April 26    Procedure:   Colonoscopy   Doctor:       Mel       Arrival Time:    9am  *check in at Emergency/Endoscopy desk*  Procedure Time:    9:30am    Location:   [facility]       Endoscopy Department, First Floor (Enter through ER Doors) *        [address] [place], [place] [address]     [phone] or [phone] () to reschedule      Colonoscopy is the most accurate test to detect colon polyps and colon cancer, and the only test where polyps can be removed. During this procedure, a doctor examines the lining of your large intestine and rectum through a flexible tube.            MIRALAX GATORADE PREP  Purchase the following supplies at your local pharmacy:  2 ( two)  - Bisacodyl tablets   (Dulcolax  laxative NOT Dulcolax  stool softener) each tablet contains 5 mg of bisacodyl  1 (one) -- 8.3 ounce bottle of Polyethylene Glycol (PEG) 3350 Powder   (MiraLAX , SmoothLAX , ClearLAX  or generic equivalent)  64 oz. Gatorade  (No red colored flavors)  Regular Gatorade, Gatorade G2 , Powerade , Powerade Zero  or Pedialyte  are acceptable. Red flavors are not allowed; all other colors (yellow, green, orange, purple, blue) are okay. It is also okay to buy two 2.12 oz packets of powdered Gatorade that can be mixed with water to a total volume of 64 oz of liquid.  1 (one) - 10 oz. bottle Magnesium Citrate (No red colored flavors)  It is also okay for you to use a 0.5 ounce package of powdered magnesium citrate (17 grams) mixed with 10 ounces of water.  PREPARATION FOR COLONOSCOPY  Transportation:  You must arrange for a ride for the day of your procedure with a responsible adult. A taxi ride is not an option unless you are accompanied by a  responsible adult. If you fail to arrange transportation with a responsible adult, your procedure will be cancelled and rescheduled.  7 days before:    Discontinue fiber supplements and medications containing iron. This includes multivitamins with iron, Metamucil and Fibercon.   3 days before:    Begin a Low-Fiber Diet. A low fiber diet helps make the cleanout more effective.     Examples of a low fiber diet include (but are not limited to): White bread, white rice, pasta, crackers, fish, chicken, eggs, ground beef, creamy peanut butter, cooked/steamed/boiled vegetables, canned fruit, bananas, melons, milk, plain yogurt cheese, salad dressing and other condiments.     The following are not allowed on a low fiber diet: Seeds, nuts, popcorn, bran, whole wheat, corn, quinoa, raw fruits and vegetables, berries and dried fruit, beans and lentils.      2 days before:    Continue Low Fiber Diet.     Drink at least 8 glasses of water throughout the day.     Stop eating solid foods at 11:45 pm.  1 day before:    Begin Clear Liquid Diet. (Clear liquids include things you can see through).     Examples of a clear liquid diet include: Water, tea (no milk or non-dairy creamer), clear broth or bouillon, Gatorade, Pedialyte or Powerade, carbonated and non-carbonated soft drinks(Sprite , 7-Up , Gingerale ), strained fruit juices without pulp (apple, white grape, white cranberry), Jello-O  and popsicles     The following are not allowed on a clear liquid diet: Red liquids, alcoholic beverages, coffee, dairy products, protein shakes, cream broths, juice with pulp and chewing tobacco.    At noon: Take 2(two) Bisacodyl (Dulcolax) tablets     Between 4-6pm: Drink Miralax - Gatorade preparation, mix 1(one) bottle of Miralax with 64 oz. of Gatorade in a large pitcher.    Drink 1(one) - 8 oz. glass every 15 minutes thereafter until the mixture is gone.  Colon Cleansing Tips: Drink adequate amounts of fluid before and after your colon  cleansing to prevent dehydration. Stay near a toilet because you will have diarrhea. Even if you are sitting on the toilet, continue to drink the cleansing solution every 15 minutes. If you feel nauseous or vomit, rinse your mouth with water, take a 15 to 30-minute break and then continue drinking the solution. You will be uncomfortable until the stool has flushed from your colon (in about 2-4 hours). You may feel chilled.  Day of your procedure  You may take all of your morning medications including blood pressure medications, blood thinners (if you have not been instructed to stop these by our office), methadone, anti-seizure medications with sips of water 3 hours prior to your procedure or earlier. Do not take insulin or vitamins prior to your procedure.  Continue the Clear Liquid Diet. Avoid red liquids, alcoholic beverages, coffee, dairy products, protein shakes, and chewing tobacco.    4 hours prior: Drink 10 oz magnesium citrate    3 hours prior:     STOP consuming all liquids     Do not take anything by mouth during this time.     Allow extra time to travel to your procedure as you may need to stop and use a restroom along the way.  You are ready for the procedure, if you followed all instructions and your stool is no longer formed, but clear or yellow liquid. If you are unsure whether your colon is clean, please call our office at [phone] you leave for your appointment.  Canceling or Rescheduling  your appointment:   If you must cancel or reschedule your appointment, please call [phone] as soon as possible.  Bring the following to your procedure:    Insurance Card / Photo ID     List of Current Medications including over-the-counter medications and supplements     Bring your rescue inhaler if you currently use one to control asthma                              DIRECTIONS  From the north ([place], [place], [place])  Take [address] south, exit [address]. Get into the left hand  isa[address], turn left (east), go one-half mile to [address]. Turn left (north) on [address]. Go north to first stoplight, take a right on the newly constructed road, [address] and follow it to the Emergency entrance.    From the south ([facility])  Take 35 north to the east split, [address], and exit to [address]. Turn left (west) on [address]  to [address]. Turn right (north) on [address]. Go north to first stoplight, take a right on the newly constructed road, [address] and follow it to the Emergency entrance.    From the east via [address] ([facility])  Take [address] south to [address]  exit. Turn right on [address]. Go west to [address]. Turn right (north) on [address], go to the first stoplight, take a right and follow the newly constructed road, [address], to the Emergency entrance.    From the east via [address] ([facility])  Take [address] west to [address]. Turn left (south) on [address] to [address]. Turn left (east) on [address] and follow the newly constructed road to the Emergency entrance.    From the west via [address] ([place])  ? Take [address] east to [address]. Turn right (south) on [address] to [address].  Turn left (east) on [address] and follow the newly constructed road to the Emergency entrance.          PRE-PROCEDURE CHECKLIST    If you have diabetes, ask your regular doctor for diet and medication restrictions.  If you take a medication to thin your blood (such as Coumadin or Lovenox) and have not already discussed this please call your primary doctor to advice on holding this medication  If you take aspirin or Plavix,  you may continue to do so.  If you are or may be pregnant, please discuss the risks and benefits of this procedure with your doctor.  You must arrange for a ride for the day of your exam. If you fail to arrange transportation with a responsible adult,  your procedure will need to be cancelled and rescheduled. Taxi ,Bus and Medical transport are not acceptable unless you have a responsible adult that you know & trust with you.  Please arrange for this  to be able to pick you up in our department, approximately one hour after your scheduled procedure, if they are not able to stay with you.  *If you must cancel or reschedule your appointment, please call Endoscopy [facility] at [phone].*      What happens during a colonoscopy?   Plan to spend up to two hours, starting at registration time, at the endoscopy center the day of your procedure. The exam itself takes about 15 minutes to complete, and recovery 30 minutes.      Before the exam:    You will change into a gown.    Your medical history will be reviewed with you and you will be given a consent form to sign.     A nurse will insert an intravenous (IV) line into your hand or arm.  During the exam:     Medicine will be given through the IV line to help you relax and feel drowsy.     Your heart rate and oxygen levels will be monitored. If your blood pressure is low, you may be given fluids through the IV line.     The doctor will insert a flexible hollow tube, called a colonoscope, into your rectum. The scope will be advanced slowly through the large intestine (colon).    You may have a feeling of pressure or fullness.     If an abnormal tissue, or a polyp are found, the doctor may remove it through the endoscope for closer examination, or biopsy. Tissue removal is painless.      What happens after the exam?         Your doctor will talk with you about the initial results of your exam.     The doctor will prepare a full report for the physician who referred you for the colonoscopy.     You may feel bloated after the procedure. This is normal.     Medication given during the exam will prohibit you from driving for the rest of the day.     Following the exam, you may resume your normal diet. Your first meal  should be light, no greasy foods. Avoid alcohol until the next day.     You may resume your regular activities the day after the procedure.     A nurse will provide you with complete discharge instructions before you leave the endoscopy center. Be sure to ask the nurse for specific instructions if you take blood thinners such as aspirin, Coumadin or Plavix.     Any tissue samples removed during the exam will be sent to a lab for evaluation. It may take 5-7 working days for you to be notified of the results.       Low-Fiber Diet      A low-fiber diet limits the amount of food waste that has to move through the large intestine.    Foods Recommended Foods to Avoid   Breads, Cereal, Rice and Pasta:   White bread, rolls, biscuits, and croissant, mely toast   Waffles, Greek toast, and pancakes   White rice, noodles, pasta, macaroni and peeled cooked potatoes   Plain crackers, Saltines   Cooked cereals: farina, Cream of Rice   Cold cereals: Puffed Rice, Rice Krispies, Corn Flakes, and Special K Breads, Cereal, Rice and Pasta:   Breads or rolls with nuts, seeds or fruit   Whole wheat, pumpernickel, rye breads and cornbread   Potatoes with skin, brown or wild rice, and kasha (buckwheat)     Vegetables:    Tender cooked and canned vegetables without seeds: carrots, asparagus tips, green or wax beans, pumpkin, spinach, lima beans Vegetables:   Raw or steamed vegetables   Vegetables with seeds   Sauerkraut   Winter squash, peas, broccoli, [place] sprouts, cabbage, onions, cauliflower, baked beans, peas and corn     Fruits:   Strained fruit juice   canned fruit, except pineapple   ripe bananas   melons Fruits:   prunes and prune juice   raw or dried fruit   all berries, figs, dates and raisins     Milk/Dairy:   milk, plain or flavored   yogurt, custard, and ice cream   cheese and cottage cheese Milk/Dairy:   Yogurt with nuts or seeds   Meat and other proteins:   ground, wellcooked tender beef, lamb, ham, veal, pork, fish,  poultry, and organ meats   eggs   peanut butter without nuts  Fats, Snack, Sweets, Condiments, and Beverages:   Margarine, butter, oils, mayonnaise, sour cream, and salad dressing   Plain gravies   Sugar, clear jelly, honey, and syrup   Spices, cooked herbs, bouillon, broth, and soups made with allowed vegetables   Coffee, tea, and carbonated drinks   Plain cakes and cookies   Gelatin, plain puddings, custard, ice cream, sherbet, Popsicles   Hard candy or pretzels   Ketchup, mustard   Meat and other proteins:   tough, fibrous meats with gristle   dry beans, peas, and lentils   peanut butter with nuts   Tofu  Fats, Snack, Sweets, Condiments, and Beverages:   Nuts, seeds, and coconut   Jam, marmalade, and preserves   Pickles, olives, relish, and horseradish   All desserts containing nuts, seeds, dried fruit, coconut, or made from whole grains or bran   Candy made with nuts or seeds   popcorn

## 2017-05-30 ENCOUNTER — MYC MEDICAL ADVICE (OUTPATIENT)
Dept: PEDIATRICS | Facility: CLINIC | Age: 52
End: 2017-05-30

## 2017-05-30 DIAGNOSIS — M54.9 UPPER BACK PAIN ON LEFT SIDE: ICD-10-CM

## 2017-05-30 DIAGNOSIS — M54.2 NECK PAIN: ICD-10-CM

## 2017-05-30 DIAGNOSIS — M25.512 ACUTE PAIN OF LEFT SHOULDER: Primary | ICD-10-CM

## 2017-05-30 NOTE — TELEPHONE ENCOUNTER
"Patient is asking for a referral for physical therapy for left shoulder pain, neck and back pain.  This was recommended by her chiropractor since patient has tingling and pain in the left hand and a pinched nerve can be causing it.     This was not addressed at appointment-message to patient asking detailed-duration of symptoms.  Patient asking for a referral to [facility]\" in [place].  Awaiting patient's reply.    Maya Hoff RN  Message handled by Nurse Triage.    "

## 2017-05-31 NOTE — TELEPHONE ENCOUNTER
Patient replied with the fax number.  Routing to station B nurse pool.    Maya Hoff RN  Message handled by Nurse Triage.

## 2017-05-31 NOTE — TELEPHONE ENCOUNTER
"Please review-patient is asking for a referral to \"[facility]\" in [place] for pain in the left shoulder, neck and back x 2 week. States she is doing too much lifting at work and is a side sleepier, which is aggravating the pain.  Ok for the referral?    Maya Hoff RN  Message handled by Nurse Triage.      "

## 2017-06-05 ENCOUNTER — TRANSFERRED RECORDS (OUTPATIENT)
Dept: HEALTH INFORMATION MANAGEMENT | Facility: CLINIC | Age: 52
End: 2017-06-05

## 2017-06-19 ENCOUNTER — MYC MEDICAL ADVICE (OUTPATIENT)
Dept: PEDIATRICS | Facility: CLINIC | Age: 52
End: 2017-06-19

## 2017-06-19 ENCOUNTER — TRANSFERRED RECORDS (OUTPATIENT)
Dept: HEALTH INFORMATION MANAGEMENT | Facility: CLINIC | Age: 52
End: 2017-06-19

## 2017-06-29 NOTE — PROGRESS NOTES
[facility]  [address]  [address]  [place] [address]  [phone]  Dept: [phone]    PRE-OP EVALUATION:  Today's date: 2017    Johnna Caballero (: 1965) presents for pre-operative evaluation assessment as requested by Dr. Hung Mejia.  She requires evaluation and anesthesia risk assessment prior to undergoing surgery/procedure for treatment of knee pain .  Proposed procedure: right knee replacement    Date of Surgery/ Procedure: 2017  Time of Surgery/ Procedure: 2:30 pm  Hospital/Surgical Facility: [facility]  Fax number for surgical facility: [phone]  Primary Physician: Griselda Yoder  Type of Anesthesia Anticipated: General    Patient has a Health Care Directive or Living Will:  YES     Preop Questions 2017   1.  Do you have a history of heart attack, stroke, stent, bypass or surgery on an artery in the head, neck, heart or legs? No   2.  Do you ever have any pain or discomfort in your chest? No   3.  Do you have a history of  Heart Failure? No   4.   Are you troubled by shortness of breath when:  walking on a level surface, or up a slight hill, or at night? No   5.  Do you currently have a cold, bronchitis or other respiratory infection? No   6.  Do you have a cough, shortness of breath, or wheezing? No   7.  Do you sometimes get pains in the calves of your legs when you walk? No   8. Do you or anyone in your family have previous history of blood clots? No   9.  Do you or does anyone in your family have a serious bleeding problem such as prolonged bleeding following surgeries or cuts? No   10. Have you ever had problems with anemia or been told to take iron pills? YES - in the past from 1 surgery   11. Have you had any abnormal blood loss such as black, tarry or bloody stools, or abnormal vaginal bleeding? No   12. Have you ever had a blood transfusion? No   13. Have you or any of your relatives ever had problems with  anesthesia? No   14. Do you have sleep apnea, excessive snoring or daytime drowsiness? No   15. Do you have any prosthetic heart valves? No   16. Do you have prosthetic joints? YES - left knee   17. Is there any chance that you may be pregnant? No         HPI:                                                      Brief HPI related to upcoming procedure: chronic right knee pain       DIABETES - Patient has a longstanding history of DiabetesType Type II . Patient is being treated with oral agents and GLP-1 agonist and denies significant side effects. Control has been fair.  a1c 4/2017 was 8.4. Sugars at home running 120 fasting, 200 post prandial.                                                                                                           .  HYPERTENSION - Patient has longstanding history of mod-severe HTN , currently denies any symptoms referable to elevated blood pressure. Specifically denies chest pain, palpitations, dyspnea, orthopnea, PND or peripheral edema. Blood pressure readings have been in normal range. Current medication regimen is as listed below. Patient denies any side effects of medication.    Bp at home running 140/82, 150/90                                                                                                                                                                                      .  HYPERLIPIDEMIA - Patient has a long history of significant Hyperlipidemia requiring medication for treatment with recent good control. Patient reports no problems or side effects with the medication.                                                                                                                                                       .  DEPRESSION - Patient has a long history of Depression of moderate severity requiring medication for control with recent symptoms being stable.                                                                                                                                                        .  ASTHMA - Patient has a longstanding history of mild Asthma . Patient has been doing well overall  and continues on medication regimen consisting of albuterol without adverse reactions or side effects.                                                                                                                                               .    MEDICAL HISTORY:                                                      Patient Active Problem List    Diagnosis Date Noted     Chronic pain syndrome 11/23/2016     Priority: High     Patient is followed by Griselda Yoder MD for ongoing prescription of pain medication.  All refills should be approved by this provider, or covering partner.    Medication(s): percocet 5/325.   Maximum quantity per month: 90  Clinic visit frequency required: Q 6  months     Controlled substance agreement:  Encounter-Level CSA:     There are no encounter-level csa.        Pain Clinic evaluation in the past: No    DIRE Total Score(s):  No flowsheet data found.    Last [facility] website verification:  none   [email or web address]         Type 2 diabetes mellitus without complication (H) 10/02/2015     Priority: High     Hematoma - postoperative 11/23/2012     Priority: High     Problem list name updated by automated process. Provider to review and confirm       Hyperlipidemia LDL goal <100 02/10/2010     Priority: High     Intermittent asthma, uncomplicated 12/28/2016     Priority: Medium     Latex allergy status 10/03/2016     Priority: Medium     Anxiety 05/10/2016     Priority: Medium     Major depressive disorder, recurrent episode, mild (H) 01/05/2016     Priority: Medium     Typically seasonal     celexa- low libido  wellbutrin- didn't work   paxil- didn't work        Essential hypertension 10/04/2015     Priority: Medium     Morbid obesity with BMI of 40.0-44.9, adult (H) 09/18/2012     Priority: Medium     Allergic rhinitis  03/25/2003     Priority: Medium     Problem list name updated by automated process. Provider to review       Bilateral low back pain without sciatica 10/25/2015     Priority: Low     Vitamin D deficiency disease 03/06/2013     Priority: Low     Surgery aftercare 12/16/2012     Priority: Low     IMO update changed this record. Please review for accuracy       Long term current use of insulin (H) 09/18/2012     Priority: Low     Esophageal reflux 03/25/2003     Priority: Low     S/P total knee arthroplasty 07/15/2014      Past Medical History:   Diagnosis Date     Actinic keratosis      Allergic rhinitis, cause unspecified      Anemia      Arthritis      Asthma     no meds x2 yrs     chronic back pain      Chronic infection     MRSA-nasal     Chronic pain     back     Esophageal reflux      fibrocystic breast changes      Gastro-oesophageal reflux disease      Heart murmur     mitral insuffiency     Hx of Fen-Phen use      Hypertension      migraines      Mild intermittent asthma     rare, with dog or exercise     Moderate major depression (H) 3/28/2012     MRSA (methicillin resistant Staphylococcus aureus) 6/27/2014    Nares     Need for desensitization to allergens      Obesity, unspecified      Other and unspecified hyperlipidemia      Temporomandibular joint disorders, unspecified      Thrombosis of leg     incisional area right hip     Type II or unspecified type diabetes mellitus without mention of complication, not stated as uncontrolled      Past Surgical History:   Procedure Laterality Date     ARTHROPLASTY KNEE  7/15/2014    Procedure: ARTHROPLASTY KNEE;  Surgeon: Chapin Paul MD;  Location: [facility] OR     BACK SURGERY       C NONSPECIFIC PROCEDURE      laparoscopy x6     C NONSPECIFIC PROCEDURE  93    laparotomy x2 ovarian cyst     C NONSPECIFIC PROCEDURE  0293    oophorectomy lt side - cyst     C NONSPECIFIC PROCEDURE  0395    laminectomy L4-5. S1 herniated disc     C NONSPECIFIC PROCEDURE  96     cholecystectomy     C NONSPECIFIC PROCEDURE      ganglion cysts l wrist, rt palm     C NONSPECIFIC PROCEDURE      cyst removal back x2     C NONSPECIFIC PROCEDURE  10/02    rt thumb fusion, ganglion cyst removal     C NONSPECIFIC PROCEDURE  1/08    remove heel spur, excise exostosis     CHOLECYSTECTOMY       COLONOSCOPY N/A 4/26/2017    Procedure: COLONOSCOPY;  COLONOSCOPY;  Surgeon: Chapin Leal MD;  Location: [facility] GI     EXCISE LESION LOWER EXTREMITY  11/20/2012    Procedure: EXCISE LESION LOWER EXTREMITY;  EXCISION LIPOMA RIGHT HIP ;  Surgeon: Jazmin Bautista MD;  Location:  [facility]     EXCISE LESION LOWER EXTREMITY  11/23/2012    Procedure: EXCISE LESION LOWER EXTREMITY;  EXCISE LESION LOWER EXTREMITY  EVACUATE RIGHT HIP HEMATOMA;  Surgeon: Jazmin Bautista MD;  Location: [facility] OR     GYN SURGERY       HC EXPLORATION ANKLE JOINT  1/2006     HC PART EXCIS PLANTAR FASCIA  12/2006     IRRIGATION AND DEBRIDEMENT HIP, COMBINED  12/15/2012    Procedure: COMBINED IRRIGATION AND DEBRIDEMENT HIP;   evacuation hematoma, scar revision right hip;  Surgeon: Jazmin Bautista MD;  Location: [facility] OR     wisdom teeth[       Current Outpatient Prescriptions   Medication Sig Dispense Refill     losartan (COZAAR) 50 MG tablet TAKE TWO TABLETS BY MOUTH EVERY  tablet 1     celecoxib (CELEBREX) 200 MG capsule TAKE ONE CAPSULE BY MOUTH EVERY DAY 90 capsule 1     insulin glargine (BASAGLAR) 100 UNIT/ML injection Inject 14 Units Subcutaneous At Bedtime 3 mL 11     fluticasone - vilanterol (BREO ELLIPTA) 100-25 MCG/INH oral inhaler Inhale 1 puff into the lungs daily       oxyCODONE-acetaminophen (PERCOCET) 5-325 MG per tablet Take 1-2 tablets by mouth every 4 hours as needed for pain 90 tablet 0     albuterol (PROAIR HFA/PROVENTIL HFA/VENTOLIN HFA) 108 (90 BASE) MCG/ACT Inhaler Inhale 2 puffs into the lungs every 4 hours as needed for shortness of breath / dyspnea or wheezing 1 Inhaler 1     blood glucose  monitoring (ACCU-CHEK GABI) test strip Use to test blood sugars 1-2x daily or as directed. 200 each 3     EPINEPHrine 0.3 MG/0.3ML injection Inject 0.3 mLs (0.3 mg) into the muscle once as needed for anaphylaxis 2 mL 0     aspirin 81 MG tablet Take 1 tablet (81 mg) by mouth daily 30 tablet      traZODone (DESYREL) 50 MG tablet Take 1-2 tablets ( mg) by mouth nightly as needed for sleep 180 tablet 3     KRILL OIL PO Take 1 tablet by mouth       vitamin  B complex with vitamin C (VITAMIN  B COMPLEX) TABS Take 1 tablet by mouth daily  0     Cholecalciferol (VITAMIN D) 2000 UNITS tablet Take 1 tablet by mouth daily 30 tablet      montelukast (SINGULAIR) 10 MG tablet Take 1 tablet (10 mg) by mouth At Bedtime 90 tablet 3     cetirizine (ZYRTEC) 10 MG tablet Take 1 tablet (10 mg) by mouth 2 times daily 30 tablet 1     LORazepam (ATIVAN) 0.5 MG tablet Take 1-2 tablets (0.5-1 mg) by mouth every 4 hours as needed for anxiety 30 tablet 0     metFORMIN (GLUCOPHAGE) 1000 MG tablet Take 1 tablet (1,000 mg) by mouth 2 times daily (with meals) 180 tablet 3     liraglutide (VICTOZA) 18 MG/3ML soln Inject 1.8 mg Subcutaneous daily 3 Month 3     Insulin Pen Needle 31G X 4 MM MISC 1 each 2 times daily Uses with Lantus and Victoza twice daily 180 each 3     LANsoprazole (PREVACID) 15 MG capsule Take 1 capsule (15 mg) by mouth daily Take 30-60 minutes before a meal. 90 capsule 3     blood glucose monitoring (NO BRAND SPECIFIED) meter device kit Use to test blood sugar 1-2 times daily or as directed. 1 kit 0     blood glucose monitoring (ACCU-CHEK MULTICLIX) lancets Use to test blood sugar 1-2 times daily or as directed. 2 Box 3     STATIN NOT PRESCRIBED, INTENTIONAL, 1 each 2 times daily Statin not prescribed intentionally due to Refusal by patient 0 each 0     ORDER FOR DME Equipment being ordered: TENS unit 1 each 0     hydrocortisone (PROCTOCREAM-HC) 2.5 % rectal cream Apply 2 x daily for not more than 14 days 60 g 5      "multivitamin, therapeutic with minerals (THERA-VIT-M) TABS Take 1 tablet by mouth 2 times daily        OTC products: Aspirin and NSAIDS    Allergies   Allergen Reactions     Eggs      Allergy found in testing     Hornets      wasps     Latex Anaphylaxis     hives  -  able to use BP cuff     Lipitor [Hmg-Coa-R Inhibitors] Cramps     Muscle cramps with statin     Metoclopramide Hcl Difficulty breathing     Neurontin [Gabapentin] Hives      Latex Allergy: YES: Precautions to take: no contact     Social History   Substance Use Topics     Smoking status: Never Smoker     Smokeless tobacco: Never Used     Alcohol use 0.0 oz/week     0 Standard drinks or equivalent per week      Comment: 1 drink per year or less     History   Drug Use No       REVIEW OF SYSTEMS:                                                    C: NEGATIVE for fever, chills, change in weight  I: NEGATIVE for worrisome rashes, moles or lesions  E: NEGATIVE for vision changes or irritation  E/M: NEGATIVE for ear, mouth and throat problems  R: NEGATIVE for significant cough or SOB  B: NEGATIVE for masses, tenderness or discharge  CV: NEGATIVE for chest pain, palpitations or peripheral edema  GI: NEGATIVE for nausea, abdominal pain, heartburn, or change in bowel habits  : NEGATIVE for frequency, dysuria, or hematuria  M: NEGATIVE for significant arthralgias or myalgia  N: NEGATIVE for weakness, dizziness or paresthesias  E: NEGATIVE for temperature intolerance, skin/hair changes  H: NEGATIVE for bleeding problems  P: NEGATIVE for changes in mood or affect    EXAM:                                                    BP (!) 158/92 (BP Location: Right arm, Patient Position: Chair, Cuff Size: Adult Large)  Pulse 80  Temp 97.7  F (36.5  C) (Tympanic)  Ht 5' 7\" (1.702 m)  Wt 278 lb 1 oz (126.1 kg)  SpO2 99%  BMI 43.55 kg/m2    GENERAL APPEARANCE: healthy, alert and no distress     EYES: EOMI, PERRL     HENT: ear canals and TM's normal and nose and mouth " without ulcers or lesions     NECK: no adenopathy, no asymmetry, masses, or scars and thyroid normal to palpation     RESP: lungs clear to auscultation - no rales, rhonchi or wheezes     CV: regular rates and rhythm, normal S1 S2, no S3 or S4 and no murmur, click or rub     ABDOMEN:  soft, nontender, no HSM or masses and bowel sounds normal     MS: extremities normal- no gross deformities noted, no evidence of inflammation in joints, FROM in all extremities.     SKIN: no suspicious lesions or rashes     NEURO: Normal strength and tone, sensory exam grossly normal, mentation intact and speech normal     PSYCH: mentation appears normal. and affect normal/bright     LYMPHATICS: No axillary, cervical, or supraclavicular nodes    DIAGNOSTICS:                                                      EKG: appears normal, NSR, normal axis, normal intervals, no acute ST/T changes c/w ischemia, no LVH by voltage criteria, unchanged from previous tracings  Hemoglobin (indicated for history of anemia or procedure with significant blood loss such as tonsillectomy, major intraperitoneal surgery, vascular surgery, major spine surgery, total joint replacement)  Serum Potassium  Hemoglobin A1C  Labs Drawn and in Process:   Unresulted Labs Ordered in the Past 30 Days of this Admission     No orders found from 5/7/2017 to 7/7/2017.          Recent Labs   Lab Test  04/13/17   0816  11/23/16   0927  10/16/16   1937  10/03/16   1557   07/15/15   0824   08/05/14   1503  07/18/14   0800  07/17/14   0633   HGB   --    --   13.1   --    --   13.4   --   13.0   --   10.2*   PLT   --    --   299   --    --    --    --   494*   --    --    INR   --    --    --    --    --    --    --    --   1.59*  1.62*   NA  141   --    --   136   < >  137   < >   --    --    --    POTASSIUM  3.9   --    --   3.9   < >  3.9   < >   --    --    --    CR  0.55   --    --   0.68   < >  0.58   < >   --    --    --    A1C  8.4*  7.5*   --    --    < >  7.8*   < >   --     --    --     < > = values in this interval not displayed.        IMPRESSION:                                                    Reason for surgery/procedure: chronic right knee pain   Diagnosis/reason for consult:  Pre op     The proposed surgical procedure is considered INTERMEDIATE risk.    REVISED CARDIAC RISK INDEX  The patient has the following serious cardiovascular risks for perioperative complications such as (MI, PE, VFib and 3  AV Block):  No serious cardiac risks  INTERPRETATION: 0 risks: Class I (very low risk - 0.4% complication rate)    The patient has the following additional risks for perioperative complications:    Moderate  tolerance to opioid analgesics due to chronic oxycodone use      ICD-10-CM    1. Preop general physical exam Z01.818 EKG 12-lead complete w/read - Clinics   2. Chronic pain of right knee M25.561     G89.29    3. Essential hypertension I10    4. Type 2 diabetes mellitus without complication (H) E11.9 metFORMIN (GLUCOPHAGE) 1000 MG tablet     liraglutide (VICTOZA) 18 MG/3ML soln   5. Gastroesophageal reflux disease without esophagitis K21.9 LANsoprazole (PREVACID) 15 MG CR capsule   6. External hemorrhoids K64.4 hydrocortisone (PROCTOCREAM-HC) 2.5 % cream   7. Uncontrolled type 2 diabetes mellitus without complication, with long-term current use of insulin (H) E11.65 blood glucose monitoring (ACCU-CHEK GABI) test strip    Z79.4    8. Bilateral hand pain M79.641 oxyCODONE-acetaminophen (PERCOCET) 5-325 MG per tablet    M79.642        RECOMMENDATIONS:                                                      --Consult hospital rounder / IM to assist post-op medical management    Meds:      Stop celebrex, ibuprofen, advil, naproxyn, aleve and aspirin 7 days prior to surgery    Hold Losartan, oxycodone, lorazepam, metformin, victoza and all vitamins the day of surgery.       Hypertension- bp not controlled.  Change losartan to losartan hctz 100/25 daily.  Recheck bp at diabetes visit  in 1 month.      Latex allergy- latex free room      Pulmonary Risk  Incentive spirometry post op  Respiratory Therapy (Respiratory Care IP Consult)  post op        --Patient is to take all scheduled medications on the day of surgery EXCEPT for modifications listed below.    Diabetes Medication Use    -----Hold usual  oral diabetic meds (e.g. Metformin, Actos, Glipizide) while NPO.       Anticoagulant or Antiplatelet Medication Use  ASPIRIN: Discontinue ASA 7-10 days prior to procedure to reduce bleeding risk.  It should be resumed post-operatively.        ACE Inhibitor or Angiotensin Receptor Blocker (ARB) Use  Ace inhibitor or Angiotensin Receptor Blocker (ARB) and should HOLD this medication for the 24 hours prior to surgery.      APPROVAL GIVEN to proceed with proposed procedure, without further diagnostic evaluation       Signed Electronically by: Griselda Yoder MD    Copy of this evaluation report is provided to requesting physician.    [facility] Preop Guidelines

## 2017-06-29 NOTE — PATIENT INSTRUCTIONS
Stop celebrex, ibuprofen, advil, naproxyn, aleve and aspirin 7 days prior to surgery    Hold Losartan, oxycodone, lorazepam, metformin, victoza and all vitamins the day of surgery.             Before Your Surgery      Call your surgeon if there is any change in your health. This includes signs of a cold or flu (such as a sore throat, runny nose, cough, rash or fever).    Do not smoke, drink alcohol or take over the counter medicine (unless your surgeon or primary care doctor tells you to) for the 24 hours before and after surgery.    If you take prescribed drugs: Follow your doctor s orders about which medicines to take and which to stop until after surgery.    Eating and drinking prior to surgery: follow the instructions from your surgeon    Take a shower or bath the night before surgery. Use the soap your surgeon gave you to gently clean your skin. If you do not have soap from your surgeon, use your regular soap. Do not shave or scrub the surgery site.  Wear clean pajamas and have clean sheets on your bed.

## 2017-07-06 ENCOUNTER — OFFICE VISIT (OUTPATIENT)
Dept: PEDIATRICS | Facility: CLINIC | Age: 52
End: 2017-07-06
Payer: COMMERCIAL

## 2017-07-06 VITALS
HEART RATE: 80 BPM | BODY MASS INDEX: 43.64 KG/M2 | HEIGHT: 67 IN | DIASTOLIC BLOOD PRESSURE: 92 MMHG | TEMPERATURE: 97.7 F | SYSTOLIC BLOOD PRESSURE: 158 MMHG | OXYGEN SATURATION: 99 % | WEIGHT: 278.06 LBS

## 2017-07-06 DIAGNOSIS — K64.4 EXTERNAL HEMORRHOIDS: ICD-10-CM

## 2017-07-06 DIAGNOSIS — Z01.818 PREOP GENERAL PHYSICAL EXAM: Primary | ICD-10-CM

## 2017-07-06 DIAGNOSIS — Z79.4 TYPE 2 DIABETES MELLITUS WITHOUT COMPLICATION, WITH LONG-TERM CURRENT USE OF INSULIN (H): ICD-10-CM

## 2017-07-06 DIAGNOSIS — M79.642 BILATERAL HAND PAIN: ICD-10-CM

## 2017-07-06 DIAGNOSIS — F41.9 ANXIETY: ICD-10-CM

## 2017-07-06 DIAGNOSIS — M79.641 BILATERAL HAND PAIN: ICD-10-CM

## 2017-07-06 DIAGNOSIS — I10 ESSENTIAL HYPERTENSION: ICD-10-CM

## 2017-07-06 DIAGNOSIS — G89.29 CHRONIC PAIN OF RIGHT KNEE: ICD-10-CM

## 2017-07-06 DIAGNOSIS — E11.9 TYPE 2 DIABETES MELLITUS WITHOUT COMPLICATION, WITH LONG-TERM CURRENT USE OF INSULIN (H): ICD-10-CM

## 2017-07-06 DIAGNOSIS — K21.9 GASTROESOPHAGEAL REFLUX DISEASE WITHOUT ESOPHAGITIS: ICD-10-CM

## 2017-07-06 DIAGNOSIS — M25.50 MULTIPLE JOINT PAIN: ICD-10-CM

## 2017-07-06 DIAGNOSIS — M25.561 CHRONIC PAIN OF RIGHT KNEE: ICD-10-CM

## 2017-07-06 LAB
ANION GAP SERPL CALCULATED.3IONS-SCNC: 9 MMOL/L (ref 3–14)
BUN SERPL-MCNC: 7 MG/DL (ref 7–30)
CALCIUM SERPL-MCNC: 8.4 MG/DL (ref 8.5–10.1)
CHLORIDE SERPL-SCNC: 105 MMOL/L (ref 94–109)
CO2 SERPL-SCNC: 24 MMOL/L (ref 20–32)
CREAT SERPL-MCNC: 0.52 MG/DL (ref 0.52–1.04)
CRP SERPL-MCNC: 5.8 MG/L (ref 0–8)
ERYTHROCYTE [SEDIMENTATION RATE] IN BLOOD BY WESTERGREN METHOD: 9 MM/H (ref 0–30)
GFR SERPL CREATININE-BSD FRML MDRD: ABNORMAL ML/MIN/1.7M2
GLUCOSE SERPL-MCNC: 138 MG/DL (ref 70–99)
HBA1C MFR BLD: 7.8 % (ref 4.3–6)
HGB BLD-MCNC: 13.3 G/DL (ref 11.7–15.7)
POTASSIUM SERPL-SCNC: 4 MMOL/L (ref 3.4–5.3)
SODIUM SERPL-SCNC: 138 MMOL/L (ref 133–144)
TSH SERPL DL<=0.005 MIU/L-ACNC: 1.14 MU/L (ref 0.4–4)

## 2017-07-06 PROCEDURE — 84443 ASSAY THYROID STIM HORMONE: CPT | Performed by: INTERNAL MEDICINE

## 2017-07-06 PROCEDURE — 36415 COLL VENOUS BLD VENIPUNCTURE: CPT | Performed by: INTERNAL MEDICINE

## 2017-07-06 PROCEDURE — 83036 HEMOGLOBIN GLYCOSYLATED A1C: CPT | Performed by: INTERNAL MEDICINE

## 2017-07-06 PROCEDURE — 85018 HEMOGLOBIN: CPT | Performed by: INTERNAL MEDICINE

## 2017-07-06 PROCEDURE — 93000 ELECTROCARDIOGRAM COMPLETE: CPT | Performed by: INTERNAL MEDICINE

## 2017-07-06 PROCEDURE — 99215 OFFICE O/P EST HI 40 MIN: CPT | Performed by: INTERNAL MEDICINE

## 2017-07-06 PROCEDURE — 80048 BASIC METABOLIC PNL TOTAL CA: CPT | Performed by: INTERNAL MEDICINE

## 2017-07-06 PROCEDURE — 85652 RBC SED RATE AUTOMATED: CPT | Performed by: INTERNAL MEDICINE

## 2017-07-06 PROCEDURE — 86140 C-REACTIVE PROTEIN: CPT | Performed by: INTERNAL MEDICINE

## 2017-07-06 RX ORDER — OXYCODONE AND ACETAMINOPHEN 5; 325 MG/1; MG/1
1-2 TABLET ORAL EVERY 4 HOURS PRN
Qty: 90 TABLET | Refills: 0 | Status: SHIPPED | OUTPATIENT
Start: 2017-07-06 | End: 2017-10-10

## 2017-07-06 RX ORDER — LORAZEPAM 0.5 MG/1
.5-1 TABLET ORAL EVERY 4 HOURS PRN
Qty: 30 TABLET | Refills: 0 | Status: SHIPPED | OUTPATIENT
Start: 2017-07-06 | End: 2018-11-20

## 2017-07-06 RX ORDER — MECOBALAMIN 5000 MCG
15 TABLET,DISINTEGRATING ORAL DAILY
Qty: 90 CAPSULE | Refills: 3 | Status: SHIPPED | OUTPATIENT
Start: 2017-07-06 | End: 2018-04-25

## 2017-07-06 RX ORDER — LOSARTAN POTASSIUM AND HYDROCHLOROTHIAZIDE 25; 100 MG/1; MG/1
1 TABLET ORAL DAILY
Qty: 30 TABLET | Refills: 1 | Status: SHIPPED | OUTPATIENT
Start: 2017-07-06 | End: 2017-08-30

## 2017-07-06 RX ORDER — LIRAGLUTIDE 6 MG/ML
1.8 INJECTION SUBCUTANEOUS DAILY
Status: CANCELLED | OUTPATIENT
Start: 2017-07-06

## 2017-07-06 NOTE — MR AVS SNAPSHOT
After Visit Summary   7/6/2017    Johnna Caballero    MRN: [id number]           Patient Information     Date Of Birth          1965        Visit Information        Provider Department      7/6/2017 8:40 AM Griselda Yoder MD [facility]        Today's Diagnoses     Preop general physical exam    -  1    Chronic pain of right knee        Essential hypertension        Type 2 diabetes mellitus without complication, with long-term current use of insulin (H)        Gastroesophageal reflux disease without esophagitis        External hemorrhoids        Bilateral hand pain        Anxiety        Multiple joint pain          Care Instructions    Stop celebrex, ibuprofen, advil, naproxyn, aleve and aspirin 7 days prior to surgery    Hold Losartan, oxycodone, lorazepam, metformin, victoza and all vitamins the day of surgery.             Before Your Surgery      Call your surgeon if there is any change in your health. This includes signs of a cold or flu (such as a sore throat, runny nose, cough, rash or fever).    Do not smoke, drink alcohol or take over the counter medicine (unless your surgeon or primary care doctor tells you to) for the 24 hours before and after surgery.    If you take prescribed drugs: Follow your doctor s orders about which medicines to take and which to stop until after surgery.    Eating and drinking prior to surgery: follow the instructions from your surgeon    Take a shower or bath the night before surgery. Use the soap your surgeon gave you to gently clean your skin. If you do not have soap from your surgeon, use your regular soap. Do not shave or scrub the surgery site.  Wear clean pajamas and have clean sheets on your bed.           Follow-ups after your visit        Your next 10 appointments already scheduled     Jul 13, 2017  9:00 AM CDT   [facility]    [address]  [address]   [phone]           Please do not eat 10-12 hours before your appointment if you are coming in fasting for labs on lipids, cholesterol, or glucose (sugar). Does not apply to pregnant women.  Water with medications is okay. Do not drink coffee or other fluids.  If you have concerns about taking  your medications, please send a message by clicking on Secure Messaging, Message Your Care Team.            Jul 13, 2017  9:20 AM CDT   MyChart Long with Linda Durant MD   [facility]    [address] [place] [address]  [address]  [facility] [address] [phone]              Who to contact     If you have questions or need follow up information about today's clinic visit or your schedule please contact [facility] directly at [phone].  Normal or non-critical lab and imaging results will be communicated to you by MyChart, letter or phone within 4 business days after the clinic has received the results. If you do not hear from us within 7 days, please contact the clinic through [employer] or phone. If you have a critical or abnormal lab result, we will notify you by phone as soon as possible.  Submit refill requests through [employer] or call your pharmacy and they will forward the refill request to us. Please allow 3 business days for your refill to be completed.          Additional Information About Your Visit        MyChart Information     [employer] gives you secure access to your electronic health record. If you see a primary care provider, you can also send messages to your care team and make appointments. If you have questions, please call your primary care clinic.  If you do not have a primary care provider, please call [phone] and they will assist you.        Care EveryWhere ID     This is your Care EveryWhere ID. This could be used by other organizations to access your [place] medical records  [id number]        Your Vitals Were     Pulse  "Temperature Height Pulse Oximetry BMI (Body Mass Index)       80 97.7  F (36.5  C) (Tympanic) 5' 7\" (1.702 m) 99% 43.55 kg/m2        Blood Pressure from Last 3 Encounters:   06/29/17 (!) 158/92   04/26/17 (!) 155/91   04/13/17 (!) 152/97    Weight from Last 3 Encounters:   06/29/17 278 lb 1 oz (126.1 kg)   04/13/17 286 lb 3.2 oz (129.8 kg)   12/27/16 270 lb 9 oz (122.7 kg)              We Performed the Following     Asthma Action Plan (AAP)     Basic metabolic panel     CRP inflammation     EKG 12-lead complete w/read - Clinics     Erythrocyte sedimentation rate auto     Hemoglobin A1c     HEMOGLOBIN     TSH with free T4 reflex          Today's Medication Changes          These changes are accurate as of: 7/6/17  9:31 AM.  If you have any questions, ask your nurse or doctor.               Start taking these medicines.        Dose/Directions    losartan-hydrochlorothiazide 100-25 MG per tablet   Commonly known as:  HYZAAR   Used for:  Essential hypertension   Started by:  Griselda Yoder MD        Dose:  1 tablet   Take 1 tablet by mouth daily   Quantity:  30 tablet   Refills:  1         Stop taking these medicines if you haven't already. Please contact your care team if you have questions.     losartan 50 MG tablet   Commonly known as:  COZAAR   Stopped by:  Griselda Yoder MD                Where to get your medicines      These medications were sent to [facility] [address] [address]     Phone:  [phone]     blood glucose monitoring test strip    hydrocortisone 2.5 % cream    LANsoprazole 15 MG CR capsule    losartan-hydrochlorothiazide 100-25 MG per tablet         Some of these will need a paper prescription and others can be bought over the counter.  Ask your nurse if you have questions.     Bring a paper prescription for each of these medications     LORazepam 0.5 MG tablet    oxyCODONE-acetaminophen 5-325 MG per " tablet                Primary Care Provider Office Phone # Fax #    Griselda Yoder [phone]       [facility] [address] [place] [address]        Equal Access to Services     HERMES PARKER : Hadii eddy ku jennifero Sosylvieali, waaxda luqadaha, qaybta kaalmada adeegyada, shola mathew laDilciacolleen campos. So [facility] [place] [phone].    ATENCIÓN: Si habla español, tiene a subramanian disposición servicios gratuitos de asistencia lingüística. Llame al [phone].    We comply with applicable federal civil rights laws and [place] laws. We do not discriminate on the basis of race, color, national origin, age, disability sex, sexual orientation or gender identity.            Thank you!     Thank you for choosing [facility]  for your care. Our goal is always to provide you with excellent care. Hearing back from our patients is one way we can continue to improve our services. Please take a few minutes to complete the written survey that you may receive in the mail after your visit with us. Thank you!             Your Updated Medication List - Protect others around you: Learn how to safely use, store and throw away your medicines at www.disposemymeds.org.          This list is accurate as of: 7/6/17  9:31 AM.  Always use your most recent med list.                   Brand Name Dispense Instructions for use Diagnosis    albuterol 108 (90 BASE) MCG/ACT Inhaler    PROAIR HFA/PROVENTIL HFA/VENTOLIN HFA    1 Inhaler    Inhale 2 puffs into the lungs every 4 hours as needed for shortness of breath / dyspnea or wheezing    Intermittent asthma, uncomplicated       aspirin 81 MG tablet     30 tablet    Take 1 tablet (81 mg) by mouth daily        blood glucose monitoring lancets     2 Box    Use to test blood sugar 1-2 times daily or as directed.    DM (diabetes mellitus), type 2, uncontrolled (H)       blood glucose monitoring meter device kit    no brand specified    1 kit     Use to test blood sugar 1-2 times daily or as directed.    Type 2 diabetes mellitus without complication (H)       blood glucose monitoring test strip    ACCU-CHEK GABI    200 each    Use to test blood sugars 1-2x daily or as directed.    Type 2 diabetes mellitus without complication, with long-term current use of insulin (H)       BREO ELLIPTA 100-25 MCG/INH oral inhaler   Generic drug:  fluticasone-vilanterol      Inhale 1 puff into the lungs daily        celecoxib 200 MG capsule    celeBREX    90 capsule    TAKE ONE CAPSULE BY MOUTH EVERY DAY    Chronic bilateral low back pain without sciatica       cetirizine 10 MG tablet    zyrTEC    30 tablet    Take 1 tablet (10 mg) by mouth 2 times daily        EPINEPHrine 0.3 MG/0.3ML injection     2 mL    Inject 0.3 mLs (0.3 mg) into the muscle once as needed for anaphylaxis    Hornet sting, accidental or unintentional, subsequent encounter       hydrocortisone 2.5 % cream    PROCTOCREAM-HC    60 g    Apply 2 x daily for not more than 14 days    External hemorrhoids       insulin glargine 100 UNIT/ML injection     3 mL    Inject 14 Units Subcutaneous At Bedtime    Type 2 diabetes mellitus without complication, with long-term current use of insulin (H)       Insulin Pen Needle 31G X 4 MM Misc     180 each    1 each 2 times daily Uses with Lantus and Victoza twice daily    Type 2 diabetes mellitus without complication (H)       KRILL OIL PO      Take 1 tablet by mouth        LANsoprazole 15 MG CR capsule    PREVACID    90 capsule    Take 1 capsule (15 mg) by mouth daily Take 30-60 minutes before a meal.    Gastroesophageal reflux disease without esophagitis       liraglutide 18 MG/3ML soln    VICTOZA    3 Month    Inject 1.8 mg Subcutaneous daily    Type 2 diabetes mellitus without complication (H)       LORazepam 0.5 MG tablet    ATIVAN    30 tablet    Take 1-2 tablets (0.5-1 mg) by mouth every 4 hours as needed for anxiety    Anxiety       losartan-hydrochlorothiazide  100-25 MG per tablet    HYZAAR    30 tablet    Take 1 tablet by mouth daily    Essential hypertension       metFORMIN 1000 MG tablet    GLUCOPHAGE    180 tablet    Take 1 tablet (1,000 mg) by mouth 2 times daily (with meals)    Type 2 diabetes mellitus without complication (H)       montelukast 10 MG tablet    SINGULAIR    90 tablet    Take 1 tablet (10 mg) by mouth At Bedtime        multivitamin, therapeutic with minerals Tabs tablet      Take 1 tablet by mouth 2 times daily        order for DME     1 each    Equipment being ordered: TENS unit    Chronic back pain, Bilateral hand pain       oxyCODONE-acetaminophen 5-325 MG per tablet    PERCOCET    90 tablet    Take 1-2 tablets by mouth every 4 hours as needed for pain    Bilateral hand pain       STATIN NOT PRESCRIBED (INTENTIONAL)     0 each    1 each 2 times daily Statin not prescribed intentionally due to Refusal by patient    Hyperlipidemia LDL goal <100       traZODone 50 MG tablet    DESYREL    180 tablet    Take 1-2 tablets ( mg) by mouth nightly as needed for sleep    Persistent insomnia       vitamin B complex with vitamin C Tabs tablet      Take 1 tablet by mouth daily        vitamin D 2000 UNITS tablet     30 tablet    Take 1 tablet by mouth daily

## 2017-07-06 NOTE — NURSING NOTE
"Chief Complaint   Patient presents with     Pre-Op Exam       Initial BP (!) 158/92 (BP Location: Right arm, Patient Position: Chair, Cuff Size: Adult Large)  Pulse 80  Temp 97.7  F (36.5  C) (Tympanic)  Ht 5' 7\" (1.702 m)  Wt 278 lb 1 oz (126.1 kg)  SpO2 99%  BMI 43.55 kg/m2 Estimated body mass index is 43.55 kg/(m^2) as calculated from the following:    Height as of this encounter: 5' 7\" (1.702 m).    Weight as of this encounter: 278 lb 1 oz (126.1 kg).  Medication Reconciliation: complete   Cristal Ramirez CMA    "

## 2017-07-06 NOTE — LETTER
My Asthma Action Plan  Name: Johnna Caballero   YOB: 1965  Date: 7/6/2017   My doctor: Griselda Yoder MD   My clinic: [facility]        My Control Medicine: flonase  My Rescue Medicine: Albuterol (Proair/Ventolin/Proventil) inhaler n/a   My Asthma Severity: intermittent  Avoid your asthma triggers: seasonal               GREEN ZONE   Good Control    I feel good    No cough or wheeze    Can work, sleep and play without asthma symptoms       Take your asthma control medicine every day.     1. If exercise triggers your asthma, take your rescue medication    15 minutes before exercise or sports, and    During exercise if you have asthma symptoms  2. Spacer to use with inhaler: If you have a spacer, make sure to use it with your inhaler             YELLOW ZONE Getting Worse  I have ANY of these:    I do not feel good    Cough or wheeze    Chest feels tight    Wake up at night   1. Keep taking your Green Zone medications  2. Start taking your rescue medicine:    every 20 minutes for up to 1 hour. Then every 4 hours for 24-48 hours.  3. If you stay in the Yellow Zone for more than 12-24 hours, contact your doctor.  4. If you do not return to the Green Zone in 12-24 hours or you get worse, start taking your oral steroid medicine if prescribed by your provider.           RED ZONE Medical Alert - Get Help  I have ANY of these:    I feel awful    Medicine is not helping    Breathing getting harder    Trouble walking or talking    Nose opens wide to breathe       1. Take your rescue medicine NOW  2. If your provider has prescribed an oral steroid medicine, start taking it NOW  3. Call your doctor NOW  4. If you are still in the Red Zone after 20 minutes and you have not reached your doctor:    Take your rescue medicine again and    Call 911 or go to the emergency room right away    See your regular doctor within 2 weeks of an Emergency Room or Urgent Care visit for follow-up treatment.         Electronically signed by: Cristal Ramirez, July 6, 2017    Annual Reminders:  Meet with Asthma Educator,  Flu Shot in the Fall, consider Pneumonia Vaccination for patients with asthma (aged 19 and older).    Pharmacy:    [facility], [facility], [place] - [address]  WRITTEN PRESCRIPTION REQUESTED                    Asthma Triggers  How To Control Things That Make Your Asthma Worse    Triggers are things that make your asthma worse.  Look at the list below to help you find your triggers and what you can do about them.  You can help prevent asthma flare-ups by staying away from your triggers.      Trigger                                                          What you can do   Cigarette Smoke  Tobacco smoke can make asthma worse. Do not allow smoking in your home, car or around you.  Be sure no one smokes at a child s day care or school.  If you smoke, ask your health care provider for ways to help you quit.  Ask family members to quit too.  Ask your health care provider for a referral to Q[employer] Plan to help you quit smoking, or call 8-882-968-PLAN.     Colds, Flu, Bronchitis  These are common triggers of asthma. Wash your hands often.  Don t touch your eyes, nose or mouth.  Get a flu shot every year.     Dust Mites  These are tiny bugs that live in cloth or carpet. They are too small to see. Wash sheets and blankets in hot water every week.   Encase pillows and mattress in dust mite proof covers.  Avoid having carpet if you can. If you have carpet, vacuum weekly.   Use a dust mask and HEPA vacuum.   Pollen and Outdoor Mold  Some people are allergic to trees, grass, or weed pollen, or molds. Try to keep your windows closed.  Limit time out doors when pollen count is high.   Ask you health care provider about taking medicine during allergy season.     Animal Dander  Some people are allergic to skin flakes, urine or saliva from pets with fur or feathers. Keep pets with fur or feathers out of your  home.    If you can t keep the pet outdoors, then keep the pet out of your bedroom.  Keep the bedroom door closed.  Keep pets off cloth furniture and away from stuffed toys.     Mice, Rats, and Cockroaches  Some people are allergic to the waste from these pests.   Cover food and garbage.  Clean up spills and food crumbs.  Store grease in the refrigerator.   Keep food out of the bedroom.   Indoor Mold  This can be a trigger if your home has high moisture. Fix leaking faucets, pipes, or other sources of water.   Clean moldy surfaces.  Dehumidify basement if it is damp and smelly.   Smoke, Strong Odors, and Sprays  These can reduce air quality. Stay away from strong odors and sprays, such as perfume, powder, hair spray, paints, smoke incense, paint, cleaning products, candles and new carpet.   Exercise or Sports  Some people with asthma have this trigger. Be active!  Ask your doctor about taking medicine before sports or exercise to prevent symptoms.    Warm up for 5-10 minutes before and after sports or exercise.     Other Triggers of Asthma  Cold air:  Cover your nose and mouth with a scarf.  Sometimes laughing or crying can be a trigger.  Some medicines and food can trigger asthma.

## 2017-07-13 ENCOUNTER — OFFICE VISIT (OUTPATIENT)
Dept: PEDIATRICS | Facility: CLINIC | Age: 52
End: 2017-07-13
Payer: COMMERCIAL

## 2017-07-13 VITALS
DIASTOLIC BLOOD PRESSURE: 80 MMHG | HEIGHT: 67 IN | TEMPERATURE: 97 F | OXYGEN SATURATION: 98 % | HEART RATE: 86 BPM | BODY MASS INDEX: 42.53 KG/M2 | WEIGHT: 271 LBS | SYSTOLIC BLOOD PRESSURE: 116 MMHG

## 2017-07-13 DIAGNOSIS — E11.65 TYPE 2 DIABETES MELLITUS WITH HYPERGLYCEMIA, WITH LONG-TERM CURRENT USE OF INSULIN (H): Primary | ICD-10-CM

## 2017-07-13 DIAGNOSIS — Z79.4 TYPE 2 DIABETES MELLITUS WITH HYPERGLYCEMIA, WITH LONG-TERM CURRENT USE OF INSULIN (H): Primary | ICD-10-CM

## 2017-07-13 DIAGNOSIS — I10 ESSENTIAL HYPERTENSION: ICD-10-CM

## 2017-07-13 DIAGNOSIS — Z79.4 LONG TERM CURRENT USE OF INSULIN (H): ICD-10-CM

## 2017-07-13 DIAGNOSIS — E66.01 MORBID OBESITY WITH BMI OF 40.0-44.9, ADULT (H): ICD-10-CM

## 2017-07-13 PROCEDURE — 99214 OFFICE O/P EST MOD 30 MIN: CPT | Performed by: PEDIATRICS

## 2017-07-13 NOTE — NURSING NOTE
"Chief Complaint   Patient presents with     Diabetes     Hypertension     Lipids       Initial /80 (BP Location: Right arm, Patient Position: Right side, Cuff Size: Adult Large)  Pulse 86  Temp 97  F (36.1  C) (Tympanic)  Ht 5' 7\" (1.702 m)  Wt 271 lb (122.9 kg)  SpO2 98%  BMI 42.44 kg/m2 Estimated body mass index is 42.44 kg/(m^2) as calculated from the following:    Height as of this encounter: 5' 7\" (1.702 m).    Weight as of this encounter: 271 lb (122.9 kg).  Medication Reconciliation: complete  "

## 2017-07-13 NOTE — PROGRESS NOTES
SUBJECTIVE:                                                    Johnna Caballero is a 51 year old female who presents to clinic today for the following health issues:      Diabetes Follow-up      Patient is checking blood sugars: not at all    Diabetic concerns: None     Symptoms of hypoglycemia (low blood sugar): none     Paresthesias (numbness or burning in feet) or sores: No     Date of last diabetic eye exam: 06/2017    A1C 8.4% last week.   Increased efforts around weight loss and has increased basaglar insulin to 16-18 units each night without side effect.  Lost 7 lbs since last week - working on weight loss.  Limited walking due to knee pain    Planned knee surgery next week.      No recent lows.   Prefers not to check blood sugars.    Busy building a new house.      Cholesterol follow up      Rate your low fat/cholesterol diet?: good    Taking statin?  No    Other lipid medications/supplements?:  None    Declines starting statin    Hypertension Follow-up      Outpatient blood pressures are not being checked.    Low Salt Diet: no added salt      Amount of exercise or physical activity: None     Problems taking medications regularly: No    Medication side effects: none    Diet: low salt and low fat/cholesterol    Blood pressure high at preop last week - changed to losartan-hctz combination with excellent control in bp.  No chest pain, palpitations, new edema.       Allergies - injections since September - have been helping. Allergic to hymenoptera - just got stung.      Problem list and histories reviewed & adjusted, as indicated.  Additional history: as documented    Reviewed and updated as needed this visit by clinical staff  Tobacco  Allergies  Med Hx  Surg Hx  Fam Hx  Soc Hx      Reviewed and updated as needed this visit by Provider         ROS:  Constitutional, HEENT, cardiovascular, pulmonary, gi and  systems are negative, except as otherwise noted.    OBJECTIVE:     /80 (BP Location: Right  "arm, Patient Position: Right side, Cuff Size: Adult Large)  Pulse 86  Temp 97  F (36.1  C) (Tympanic)  Ht 5' 7\" (1.702 m)  Wt 271 lb (122.9 kg)  SpO2 98%  BMI 42.44 kg/m2  Body mass index is 42.44 kg/(m^2).  GENERAL: healthy, alert and no distress  RESP: lungs clear to auscultation - no rales, rhonchi or wheezes  CV: regular rate and rhythm, normal S1 S2, no S3 or S4, no murmur, click or rub, no peripheral edema and peripheral pulses strong    Diagnostic Test Results:  Reviewed from recent preop:  Component      Latest Ref Rng & Units 7/6/2017   Sodium      133 - 144 mmol/L 138   Potassium      3.4 - 5.3 mmol/L 4.0   Chloride      94 - 109 mmol/L 105   Carbon Dioxide      20 - 32 mmol/L 24   Anion Gap      3 - 14 mmol/L 9   Glucose      70 - 99 mg/dL 138 (H)   Urea Nitrogen      7 - 30 mg/dL 7   Creatinine      0.52 - 1.04 mg/dL 0.52   GFR Estimate      >60 mL/min/1.7m2 >90 . . .   GFR Estimate If Black      >60 mL/min/1.7m2 >90 . . .   Calcium      8.5 - 10.1 mg/dL 8.4 (L)   Hemoglobin A1C      4.3 - 6.0 % 7.8 (H)   TSH      0.40 - 4.00 mU/L 1.14   Sed Rate      0 - 30 mm/h 9   CRP Inflammation      0.0 - 8.0 mg/L 5.8   Hemoglobin      11.7 - 15.7 g/dL 13.3       ASSESSMENT/PLAN:       ICD-10-CM    1. Type 2 diabetes mellitus with hyperglycemia, with long-term current use of insulin (H) E11.65 Not under optimal control - basaglar insulin titrated upward.  Further titration difficult to do safely without blood sugar checks.  Continue current higher dose of basaglar for now in addition to victoza and metformin    Declines statin therapy.    Z79.4    2. Long term current use of insulin (H) Z79.4 No lipodystrophy, encouraged blood sugar monitoring   3. Essential hypertension I10 Continue improved on losartan-hctz in place of losartan.  Change made last week.  COntinue new mediation - patient to alert us with need for refills.  BP will also be monitored closely while inpatient for upcoming surgery   4. Morbid " obesity with BMI of 40.0-44.9, adult (H) E66.01 Encouraged patient in her weight loss efforts - down 7 lbs from last week    Z68.41        Follow up in 3 months    Linda Durant MD  [facility]

## 2017-07-13 NOTE — MR AVS SNAPSHOT
After Visit Summary   7/13/2017    Johnna Caballero    MRN: [id number]           Patient Information     Date Of Birth          1965        Visit Information        Provider Department      7/13/2017 9:20 AM Linda Durant MD [facility]        Today's Diagnoses     Type 2 diabetes mellitus without complication, with long-term current use of insulin (H)    -  1    Long term current use of insulin (H)        Essential hypertension          Care Instructions    Continue your current meds - your blood pressure looks great with you new regimen    Keep on the higher dose of lantus - 16 to 18 units at night.  Watch for lows.    Follow up in 3 months.    Let me know when you need refills.          Follow-ups after your visit        Who to contact     If you have questions or need follow up information about today's clinic visit or your schedule please contact [facility] directly at [phone].  Normal or non-critical lab and imaging results will be communicated to you by MyChart, letter or phone within 4 business days after the clinic has received the results. If you do not hear from us within 7 days, please contact the clinic through [employer] or phone. If you have a critical or abnormal lab result, we will notify you by phone as soon as possible.  Submit refill requests through [employer] or call your pharmacy and they will forward the refill request to us. Please allow 3 business days for your refill to be completed.          Additional Information About Your Visit        MyChart Information     [employer] gives you secure access to your electronic health record. If you see a primary care provider, you can also send messages to your care team and make appointments. If you have questions, please call your primary care clinic.  If you do not have a primary care provider, please call [phone] and they will assist you.        Care EveryWhere ID     This is your Care EveryWhere  "ID. This could be used by other organizations to access your [place] medical records  [id number]        Your Vitals Were     Pulse Temperature Height Pulse Oximetry BMI (Body Mass Index)       86 97  F (36.1  C) (Tympanic) 5' 7\" (1.702 m) 98% 42.44 kg/m2        Blood Pressure from Last 3 Encounters:   07/13/17 116/80   06/29/17 (!) 158/92   04/26/17 (!) 155/91    Weight from Last 3 Encounters:   07/13/17 271 lb (122.9 kg)   06/29/17 278 lb 1 oz (126.1 kg)   04/13/17 286 lb 3.2 oz (129.8 kg)              Today, you had the following     No orders found for display       Primary Care Provider Office Phone # Fax #    Griselda Yoder [phone]       [address] [place] [address]        Equal Access to Services     [facility]: Hadii aad ku hadasho Soomaali, waaxda luqadaha, qaybta kaalmada adeegyada, shola schmidtin ramon zaldivar . So [facility] [phone].    ATENCIÓN: Si habla esppaul, tiene a subramanian disposición servicios gratuitos de asistencia lingüística. Llame al [phone].    We comply with applicable federal civil rights laws and [place] laws. We do not discriminate on the basis of race, color, national origin, age, disability sex, sexual orientation or gender identity.            Thank you!     Thank you for choosing [facility]  for your care. Our goal is always to provide you with excellent care. Hearing back from our patients is one way we can continue to improve our services. Please take a few minutes to complete the written survey that you may receive in the mail after your visit with us. Thank you!             Your Updated Medication List - Protect others around you: Learn how to safely use, store and throw away your medicines at www.disposemymeds.org.          This list is accurate as of: 7/13/17 10:06 AM.  Always use your most recent med list.                   Brand Name Dispense Instructions for use Diagnosis    " albuterol 108 (90 BASE) MCG/ACT Inhaler    PROAIR HFA/PROVENTIL HFA/VENTOLIN HFA    1 Inhaler    Inhale 2 puffs into the lungs every 4 hours as needed for shortness of breath / dyspnea or wheezing    Intermittent asthma, uncomplicated       aspirin 81 MG tablet     30 tablet    Take 1 tablet (81 mg) by mouth daily        blood glucose monitoring lancets     2 Box    Use to test blood sugar 1-2 times daily or as directed.    DM (diabetes mellitus), type 2, uncontrolled (H)       blood glucose monitoring meter device kit    no brand specified    1 kit    Use to test blood sugar 1-2 times daily or as directed.    Type 2 diabetes mellitus without complication (H)       blood glucose monitoring test strip    ACCU-CHEK GABI    200 each    Use to test blood sugars 1-2x daily or as directed.    Type 2 diabetes mellitus without complication, with long-term current use of insulin (H)       BREO ELLIPTA 100-25 MCG/INH oral inhaler   Generic drug:  fluticasone-vilanterol      Inhale 1 puff into the lungs daily        celecoxib 200 MG capsule    celeBREX    90 capsule    TAKE ONE CAPSULE BY MOUTH EVERY DAY    Chronic bilateral low back pain without sciatica       cetirizine 10 MG tablet    zyrTEC    30 tablet    Take 1 tablet (10 mg) by mouth 2 times daily        EPINEPHrine 0.3 MG/0.3ML injection     2 mL    Inject 0.3 mLs (0.3 mg) into the muscle once as needed for anaphylaxis    Hornet sting, accidental or unintentional, subsequent encounter       hydrocortisone 2.5 % cream    PROCTOCREAM-HC    60 g    Apply 2 x daily for not more than 14 days    External hemorrhoids       insulin glargine 100 UNIT/ML injection     3 mL    Inject 14 Units Subcutaneous At Bedtime    Type 2 diabetes mellitus without complication, with long-term current use of insulin (H)       Insulin Pen Needle 31G X 4 MM Misc     180 each    1 each 2 times daily Uses with Lantus and Victoza twice daily    Type 2 diabetes mellitus without complication (H)        KRILL OIL PO      Take 1 tablet by mouth        LANsoprazole 15 MG CR capsule    PREVACID    90 capsule    Take 1 capsule (15 mg) by mouth daily Take 30-60 minutes before a meal.    Gastroesophageal reflux disease without esophagitis       liraglutide 18 MG/3ML soln    VICTOZA    3 Month    Inject 1.8 mg Subcutaneous daily    Type 2 diabetes mellitus without complication (H)       LORazepam 0.5 MG tablet    ATIVAN    30 tablet    Take 1-2 tablets (0.5-1 mg) by mouth every 4 hours as needed for anxiety    Anxiety       losartan-hydrochlorothiazide 100-25 MG per tablet    HYZAAR    30 tablet    Take 1 tablet by mouth daily    Essential hypertension       metFORMIN 1000 MG tablet    GLUCOPHAGE    180 tablet    Take 1 tablet (1,000 mg) by mouth 2 times daily (with meals)    Type 2 diabetes mellitus without complication (H)       montelukast 10 MG tablet    SINGULAIR    90 tablet    Take 1 tablet (10 mg) by mouth At Bedtime        multivitamin, therapeutic with minerals Tabs tablet      Take 1 tablet by mouth 2 times daily        order for DME     1 each    Equipment being ordered: TENS unit    Chronic back pain, Bilateral hand pain       oxyCODONE-acetaminophen 5-325 MG per tablet    PERCOCET    90 tablet    Take 1-2 tablets by mouth every 4 hours as needed for pain    Bilateral hand pain       STATIN NOT PRESCRIBED (INTENTIONAL)     0 each    1 each 2 times daily Statin not prescribed intentionally due to Refusal by patient    Hyperlipidemia LDL goal <100       traZODone 50 MG tablet    DESYREL    180 tablet    Take 1-2 tablets ( mg) by mouth nightly as needed for sleep    Persistent insomnia       vitamin B complex with vitamin C Tabs tablet      Take 1 tablet by mouth daily        vitamin D 2000 UNITS tablet     30 tablet    Take 1 tablet by mouth daily

## 2017-07-13 NOTE — PATIENT INSTRUCTIONS
Continue your current meds - your blood pressure looks great with you new regimen    Keep on the higher dose of lantus - 16 to 18 units at night.  Watch for lows.    Follow up in 3 months.    Let me know when you need refills.

## 2017-07-14 ASSESSMENT — ASTHMA QUESTIONNAIRES: ACT_TOTALSCORE: 25

## 2017-07-19 ENCOUNTER — TELEPHONE (OUTPATIENT)
Dept: PEDIATRICS | Facility: CLINIC | Age: 52
End: 2017-07-19

## 2017-07-19 NOTE — TELEPHONE ENCOUNTER
[facility].    Fax#: [phone]    -zrsh    Lorenza CHRIS RN, BSN, PHN  [employer] RN

## 2017-08-01 ENCOUNTER — TRANSFERRED RECORDS (OUTPATIENT)
Dept: HEALTH INFORMATION MANAGEMENT | Facility: CLINIC | Age: 52
End: 2017-08-01

## 2017-08-02 ENCOUNTER — TRANSFERRED RECORDS (OUTPATIENT)
Dept: HEALTH INFORMATION MANAGEMENT | Facility: CLINIC | Age: 52
End: 2017-08-02

## 2017-08-02 LAB
CREAT SERPL-MCNC: 0.78 MG/DL (ref 0.57–1.11)
GFR SERPL CREATININE-BSD FRML MDRD: >60 ML/MIN/1.73M2
GLUCOSE SERPL-MCNC: 287 MG/DL (ref 65–100)
POTASSIUM SERPL-SCNC: 3.2 MMOL/L (ref 3.5–5)

## 2017-08-15 ENCOUNTER — TELEPHONE (OUTPATIENT)
Dept: PEDIATRICS | Facility: CLINIC | Age: 52
End: 2017-08-15

## 2017-08-15 NOTE — TELEPHONE ENCOUNTER
"Patient calling to ask for a PA for a CPM unit.  Ortho provider has refused to order it.  He is recommending another surgery.  Knee was replaced 3 weeks ago.  10 days ago had a \"major hematoma\"  Feels that a CPM at night would improve this.    Call [employer] at [phone]  ID #-[id number]     Dr. Yoder may not order a CPM unit-she would defer to Ortho.  I advised patient that insurance may require this be done thru Ortho.  Patient is requesting that Dr. Yoder review this request.    JULIETA Mascorro RN    "

## 2017-08-15 NOTE — TELEPHONE ENCOUNTER
LM with information below and to contact Ortho.  Advised to all with any other questions or concerns.  JULIETA Mascorro RN

## 2017-08-30 ENCOUNTER — OFFICE VISIT (OUTPATIENT)
Dept: PEDIATRICS | Facility: CLINIC | Age: 52
End: 2017-08-30
Payer: COMMERCIAL

## 2017-08-30 VITALS
TEMPERATURE: 97.9 F | HEIGHT: 67 IN | HEART RATE: 94 BPM | OXYGEN SATURATION: 96 % | SYSTOLIC BLOOD PRESSURE: 118 MMHG | DIASTOLIC BLOOD PRESSURE: 60 MMHG | WEIGHT: 268.8 LBS | BODY MASS INDEX: 42.19 KG/M2

## 2017-08-30 DIAGNOSIS — Z96.651 S/P TOTAL KNEE REPLACEMENT, RIGHT: Primary | ICD-10-CM

## 2017-08-30 DIAGNOSIS — G89.4 CHRONIC PAIN SYNDROME: ICD-10-CM

## 2017-08-30 DIAGNOSIS — E87.6 HYPOKALEMIA: ICD-10-CM

## 2017-08-30 DIAGNOSIS — Z79.4 TYPE 2 DIABETES MELLITUS WITHOUT COMPLICATION, WITH LONG-TERM CURRENT USE OF INSULIN (H): ICD-10-CM

## 2017-08-30 DIAGNOSIS — I10 ESSENTIAL HYPERTENSION: ICD-10-CM

## 2017-08-30 DIAGNOSIS — E11.9 TYPE 2 DIABETES MELLITUS WITHOUT COMPLICATION, WITH LONG-TERM CURRENT USE OF INSULIN (H): ICD-10-CM

## 2017-08-30 PROCEDURE — 99214 OFFICE O/P EST MOD 30 MIN: CPT | Performed by: INTERNAL MEDICINE

## 2017-08-30 PROCEDURE — 80048 BASIC METABOLIC PNL TOTAL CA: CPT | Performed by: INTERNAL MEDICINE

## 2017-08-30 PROCEDURE — 36415 COLL VENOUS BLD VENIPUNCTURE: CPT | Performed by: INTERNAL MEDICINE

## 2017-08-30 RX ORDER — LOSARTAN POTASSIUM AND HYDROCHLOROTHIAZIDE 25; 100 MG/1; MG/1
1 TABLET ORAL DAILY
Qty: 90 TABLET | Refills: 3 | Status: SHIPPED | OUTPATIENT
Start: 2017-08-30 | End: 2018-08-14

## 2017-08-30 NOTE — PROGRESS NOTES
SUBJECTIVE:   Johnan Caballero is a 51 year old female who presents to clinic today for the following health issues:    From 8/3/2017- 8/6/2017 Bhakti was seen at [facility] for evaluation and treatment of post surgical right knee hematoma. She was first treated with Valium 5 mg PO for muscle spasm and oral oxycodone 10 mg PO without improvement. Her symptoms improved after Valium 2.5 mg intravenously. A CT and ultrasound were performed to rule out any abscess, expanding hematoma or DVT. No surgeries or drainage was performed due to risk of infection. Hematoma was noted on CT and patient was prescribed Valium as well as MS contin and oxycodone. She was recommended to keep her knee in an immobilizer for 10 days after discharge and to follow up with her orthopedic doctor.     Bhakti presents into the clinic to follow up after her recent hospitalization for pain management that has lasted for 6 weeks. She still reports to have lots of pain despite taking Oxycodone (5mg every 6 hours) and MS Contin. Oxycodone seemed to work the best in alleviating her pain. She reports finishing these medications about a week ago but still takes the Valium at night to relax her muscles. Patient reports a large amount of scarring has formed in her knee. She does not like touching her knee due to the pain, and she occasionally rubs her knee to alleviate the pain. She would like to talk to a physician about potentially removing the scar tissue. Johnna is still working with physical therapy for her knee replacement and she has been making progress. Her right leg is still larger due to lymphedema.     Hospital Follow-up Visit:    Hospital/Nursing Home/IP Rehab Facility: [facility]  Date of Admission: 7/21/17 (first time) 8/2/17 (second time)  Date of Discharge: 7/24/17 (first time)  8/5/17 (second time)  Reason(s) for Admission: Knee replacement surgery            Problems taking medications regularly:  None        Medication changes since discharge: Valium       Problems adhering to non-medication therapy:  None    Summary of hospitalization: See outside records, reviewed and scanned  Diagnostic Tests/Treatments reviewed.  Follow up needed: none  Other Healthcare Providers Involved in Patient s Care:         Physical Therapy and orthopedics  Update since discharge: improved.     Post Discharge Medication Reconciliation: discharge medications reconciled, continue medications without change.  Plan of care communicated with patient     Coding guidelines for this visit:  Type of Medical   Decision Making Face-to-Face Visit       within 7 Days of discharge Face-to-Face Visit        within 14 days of discharge   Moderate Complexity 17733 [id number]   High Complexity 38295 58828          Problem list and histories reviewed & adjusted, as indicated.  Additional history: as documented    Patient Active Problem List   Diagnosis     Allergic rhinitis     Esophageal reflux     Hyperlipidemia LDL goal <100     Long term current use of insulin (H)     Morbid obesity with BMI of 40.0-44.9, adult (H)     Hematoma - postoperative     Surgery aftercare     Vitamin D deficiency disease     S/P total knee arthroplasty     Type 2 diabetes mellitus without complication (H)     Essential hypertension     Bilateral low back pain without sciatica     Major depressive disorder, recurrent episode, mild (H)     Anxiety     Latex allergy status     Chronic pain syndrome     Intermittent asthma, uncomplicated     Past Surgical History:   Procedure Laterality Date     ARTHROPLASTY KNEE  7/15/2014    Procedure: ARTHROPLASTY KNEE;  Surgeon: Chapin Paul MD;  Location: [facility] OR     BACK SURGERY       C NONSPECIFIC PROCEDURE      laparoscopy x6     C NONSPECIFIC PROCEDURE  93    laparotomy x2 ovarian cyst     C NONSPECIFIC PROCEDURE  0293    oophorectomy lt side - cyst     C NONSPECIFIC PROCEDURE  0395    laminectomy L4-5. S1 herniated disc     C NONSPECIFIC  PROCEDURE  96    cholecystectomy     C NONSPECIFIC PROCEDURE      ganglion cysts l wrist, rt palm     C NONSPECIFIC PROCEDURE      cyst removal back x2     C NONSPECIFIC PROCEDURE  10/02    rt thumb fusion, ganglion cyst removal     C NONSPECIFIC PROCEDURE  1/08    remove heel spur, excise exostosis     CHOLECYSTECTOMY       COLONOSCOPY N/A 4/26/2017    Procedure: COLONOSCOPY;  COLONOSCOPY;  Surgeon: Chapin Leal MD;  Location: [facility] GI     EXCISE LESION LOWER EXTREMITY  11/20/2012    Procedure: EXCISE LESION LOWER EXTREMITY;  EXCISION LIPOMA RIGHT HIP ;  Surgeon: Jazmin Bautista MD;  Location:  [facility]     EXCISE LESION LOWER EXTREMITY  11/23/2012    Procedure: EXCISE LESION LOWER EXTREMITY;  EXCISE LESION LOWER EXTREMITY  EVACUATE RIGHT HIP HEMATOMA;  Surgeon: Jazmin Bautista MD;  Location: [facility] OR     GYN SURGERY       HC EXPLORATION ANKLE JOINT  1/2006     HC PART EXCIS PLANTAR FASCIA  12/2006     IRRIGATION AND DEBRIDEMENT HIP, COMBINED  12/15/2012    Procedure: COMBINED IRRIGATION AND DEBRIDEMENT HIP;   evacuation hematoma, scar revision right hip;  Surgeon: Jazmin Bautista MD;  Location: [facility] OR     wisdom teeth[         Social History   Substance Use Topics     Smoking status: Never Smoker     Smokeless tobacco: Never Used     Alcohol use 0.0 oz/week     0 Standard drinks or equivalent per week      Comment: 1 drink per year or less     Family History   Problem Relation Age of Onset     Adopted: Yes     Respiratory Brother      1/2 sib     Psychotic Disorder Mother      depression, chronic fatigue     Psychotic Disorder Brother      bipolar     C.A.D. Maternal Grandfather      Breast Cancer Maternal Grandmother      DIABETES Maternal Uncle          Current Outpatient Prescriptions   Medication Sig Dispense Refill     losartan-hydrochlorothiazide (HYZAAR) 100-25 MG per tablet Take 1 tablet by mouth daily 90 tablet 3     oxyCODONE-acetaminophen (PERCOCET) 5-325 MG per tablet Take 1-2  tablets by mouth every 4 hours as needed for pain 90 tablet 0     LORazepam (ATIVAN) 0.5 MG tablet Take 1-2 tablets (0.5-1 mg) by mouth every 4 hours as needed for anxiety 30 tablet 0     LANsoprazole (PREVACID) 15 MG CR capsule Take 1 capsule (15 mg) by mouth daily Take 30-60 minutes before a meal. 90 capsule 3     hydrocortisone (PROCTOCREAM-HC) 2.5 % cream Apply 2 x daily for not more than 14 days 60 g 5     blood glucose monitoring (ACCU-CHEK GABI) test strip Use to test blood sugars 1-2x daily or as directed. 200 each 3     celecoxib (CELEBREX) 200 MG capsule TAKE ONE CAPSULE BY MOUTH EVERY DAY 90 capsule 1     insulin glargine (BASAGLAR) 100 UNIT/ML injection Inject 14 Units Subcutaneous At Bedtime 3 mL 11     fluticasone - vilanterol (BREO ELLIPTA) 100-25 MCG/INH oral inhaler Inhale 1 puff into the lungs daily       albuterol (PROAIR HFA/PROVENTIL HFA/VENTOLIN HFA) 108 (90 BASE) MCG/ACT Inhaler Inhale 2 puffs into the lungs every 4 hours as needed for shortness of breath / dyspnea or wheezing 1 Inhaler 1     EPINEPHrine 0.3 MG/0.3ML injection Inject 0.3 mLs (0.3 mg) into the muscle once as needed for anaphylaxis 2 mL 0     aspirin 81 MG tablet Take 1 tablet (81 mg) by mouth daily 30 tablet      traZODone (DESYREL) 50 MG tablet Take 1-2 tablets ( mg) by mouth nightly as needed for sleep 180 tablet 3     KRILL OIL PO Take 1 tablet by mouth       vitamin  B complex with vitamin C (VITAMIN  B COMPLEX) TABS Take 1 tablet by mouth daily  0     Cholecalciferol (VITAMIN D) 2000 UNITS tablet Take 1 tablet by mouth daily 30 tablet      montelukast (SINGULAIR) 10 MG tablet Take 1 tablet (10 mg) by mouth At Bedtime 90 tablet 3     cetirizine (ZYRTEC) 10 MG tablet Take 1 tablet (10 mg) by mouth 2 times daily 30 tablet 1     metFORMIN (GLUCOPHAGE) 1000 MG tablet Take 1 tablet (1,000 mg) by mouth 2 times daily (with meals) 180 tablet 3     liraglutide (VICTOZA) 18 MG/3ML soln Inject 1.8 mg Subcutaneous daily 3 Month  "3     Insulin Pen Needle 31G X 4 MM MISC 1 each 2 times daily Uses with Lantus and Victoza twice daily 180 each 3     blood glucose monitoring (NO BRAND SPECIFIED) meter device kit Use to test blood sugar 1-2 times daily or as directed. 1 kit 0     blood glucose monitoring (ACCU-CHEK MULTICLIX) lancets Use to test blood sugar 1-2 times daily or as directed. 2 Box 3     STATIN NOT PRESCRIBED, INTENTIONAL, 1 each 2 times daily Statin not prescribed intentionally due to Refusal by patient 0 each 0     ORDER FOR DME Equipment being ordered: TENS unit 1 each 0     multivitamin, therapeutic with minerals (THERA-VIT-M) TABS Take 1 tablet by mouth 2 times daily        [DISCONTINUED] losartan-hydrochlorothiazide (HYZAAR) 100-25 MG per tablet Take 1 tablet by mouth daily 30 tablet 1     Allergies   Allergen Reactions     Eggs      Allergy found in testing     Hornets      wasps     Latex Anaphylaxis     hives  -  able to use BP cuff     Lipitor [Hmg-Coa-R Inhibitors] Cramps     Muscle cramps with statin     Metoclopramide Hcl Difficulty breathing     Neurontin [Gabapentin] Hives     Reviewed and updated as needed this visit by clinical staff  Tobacco  Allergies  Med Hx  Surg Hx  Fam Hx  Soc Hx      Reviewed and updated as needed this visit by Provider         ROS:  Constitutional, HEENT, cardiovascular, pulmonary, gi and gu systems are negative, except as otherwise noted.    This document serves as a record of the services and decisions personally performed and made by Griselda Yoder MD. It was created on her behalf by Jenifer Venegas and Lashanda Christianson, trained medical scribes. The creation of this document is based the provider's statements to the medical scribes.  August 30, 20172:30 PM   OBJECTIVE:   /60 (BP Location: Right arm, Patient Position: Chair, Cuff Size: Adult Large)  Pulse 94  Temp 97.9  F (36.6  C) (Oral)  Ht 1.702 m (5' 7\")  Wt 121.9 kg (268 lb 12.8 oz)  SpO2 96%  BMI 42.1 kg/m2  Body mass " index is 42.1 kg/(m^2).  GENERAL: healthy, alert and no distress  RESP: lungs clear to auscultation - no rales, rhonchi or wheezes  CV: 2/6 systolic murmur right upper sternal boarder, no click or rub, no peripheral edema and peripheral pulses strong  MS: no gross musculoskeletal defects noted, well healed scar on right knee; no erythema; not warm to touch   PSYCH: mentation appears normal, affect normal/bright    Diagnostic Test Results:  No results found for this or any previous visit (from the past 24 hour(s)).    ASSESSMENT/PLAN:     (Z96.651) S/P total knee replacement, right  (primary encounter diagnosis)  --Since immobilization of R Leg after hospital stay due to Hematoma, she reports an increase in scar tissue and decreased mobility.    --She saw orthopedic specialist this morning, who did not recommend further surgery at this time.   --She sees a physical therapist, and reports improvement in right knee ROM (89 degrees).  -recommend continuing to work with PT and orthopedist to improve mobility and pain.        (G89.4) Chronic pain syndrome  --She would like to make sure she has pain medications during her upcoming trip to [place].   --she saw orthopedic specialist this morning who sent in a prescription to her pharmacy for oxycodone. Instructed patient to check with her pharmacy to make sure oxycodone prescription has been received. Call me if there is no prescription availible at the pharmacy.  Plan: will not refill percocet while taking oxycodone.  Has prescription at pharmacy for oxycodone.      (I10) Essential hypertension  --She was switched from Losartan to losartan-HCTZ 100-25 mg qday Spring 2017 which has been controlling HTN.  -lab at outside hospital revealed mild hypokalemia with K of 3.2.  (see Hypokalemia below)   --Blood pressure was good today   Plan: Basic metabolic panel,         losartan-hydrochlorothiazide (HYZAAR) 100-25 MG        per tablet          (E87.6) Hypokalemia  --She was  switched from Losartan to losartan-HCTZ 100-25 mg qday Spring 2017.   --Discussed low potassium levels could be due to the addition of the hydrochlorothiazide or it could be due to R knee replacement and hematoma of R leg.    --Potassium levels will be recked today, and if levels are still low we will start patient on a potassium supplement and see if potassium levels improve as her knee continues to heal. If potassium levels are remain low at her next follow up, then we may reevaluate whether or not she should continue on the HCTZ.   Plan: Basic metabolic panel      (E11.9,  Z79.4) Type 2 diabetes mellitus without complication, with long-term current use of insulin (H)  -- Continue with current medications   --Schedule an appointment in October for diabetes follow up   Plan: Recheck A1C     Schedule a visit for October for diabetes follow up with a1c; pt will schedule online.      The information in this document, created by the medical scribe for me, accurately reflects the services I personally performed and the decisions made by me. I have reviewed and approved this document for accuracy prior to leaving the patient care area.  Griselda Yoder MD  [facility]

## 2017-08-30 NOTE — PATIENT INSTRUCTIONS
Check with pharmacy that your oxycodone prescription has been received.     Call me if there is no prescription availible at the pharmacy.    You will have blood work done today (postassium levels).

## 2017-08-30 NOTE — NURSING NOTE
"Chief Complaint   Patient presents with     Hospital F/U       Initial /60 (BP Location: Right arm, Patient Position: Chair, Cuff Size: Adult Large)  Pulse 94  Temp 97.9  F (36.6  C) (Oral)  Ht 5' 7\" (1.702 m)  Wt 268 lb 12.8 oz (121.9 kg)  SpO2 96%  BMI 42.1 kg/m2 Estimated body mass index is 42.1 kg/(m^2) as calculated from the following:    Height as of this encounter: 5' 7\" (1.702 m).    Weight as of this encounter: 268 lb 12.8 oz (121.9 kg).  Medication Reconciliation: complete   Gracie Lyn MA      "

## 2017-08-30 NOTE — MR AVS SNAPSHOT
After Visit Summary   8/30/2017    Johnna Caballero    MRN: [id number]           Patient Information     Date Of Birth          1965        Visit Information        Provider Department      8/30/2017 1:20 PM Griselda Yoder MD [facility]        Today's Diagnoses     S/P total knee replacement, right    -  1    Chronic pain syndrome        Essential hypertension        Hypokalemia        Type 2 diabetes mellitus without complication, with long-term current use of insulin (H)          Care Instructions    Check with pharmacy that your oxycodone prescription has been received.     Call me if there is no prescription availible at the pharmacy.    You will have blood work done today (postassium levels).           Follow-ups after your visit        Who to contact     If you have questions or need follow up information about today's clinic visit or your schedule please contact [facility] directly at [phone].  Normal or non-critical lab and imaging results will be communicated to you by MyChart, letter or phone within 4 business days after the clinic has received the results. If you do not hear from us within 7 days, please contact the clinic through [employer] or phone. If you have a critical or abnormal lab result, we will notify you by phone as soon as possible.  Submit refill requests through [employer] or call your pharmacy and they will forward the refill request to us. Please allow 3 business days for your refill to be completed.          Additional Information About Your Visit        MyChart Information     [employer] gives you secure access to your electronic health record. If you see a primary care provider, you can also send messages to your care team and make appointments. If you have questions, please call your primary care clinic.  If you do not have a primary care provider, please call [phone] and they will assist you.        Care EveryWhere ID     This is  "your Care EveryWhere ID. This could be used by other organizations to access your [facility] medical records  [id number]        Your Vitals Were     Pulse Temperature Height Pulse Oximetry BMI (Body Mass Index)       94 97.9  F (36.6  C) (Oral) 5' 7\" (1.702 m) 96% 42.1 kg/m2        Blood Pressure from Last 3 Encounters:   08/30/17 118/60   07/13/17 116/80   06/29/17 (!) 158/92    Weight from Last 3 Encounters:   08/30/17 268 lb 12.8 oz (121.9 kg)   07/13/17 271 lb (122.9 kg)   06/29/17 278 lb 1 oz (126.1 kg)              We Performed the Following     Basic metabolic panel          Where to get your medicines      These medications were sent to [facility] [address] [facility] [place]  [address] [address]     Phone:  [phone]     losartan-hydrochlorothiazide 100-25 MG per tablet          Primary Care Provider Office Phone # Fax #    Griselda Yoder [phone]       [facility] [address] [place] [address]        Equal Access to Services     [facility]: Hadii eddy rodriguezo Soanshul, waaxda luqadaha, qaybta kaalmada adeegyada, shola campos. So [facility] [phone].    ATENCIÓN: Si habla español, tiene a subramanian disposición servicios gratuitos de asistencia lingüística. Biancaame al [phone].    We comply with applicable federal civil rights laws and [place] laws. We do not discriminate on the basis of race, color, national origin, age, disability sex, sexual orientation or gender identity.            Thank you!     Thank you for choosing [facility]  for your care. Our goal is always to provide you with excellent care. Hearing back from our patients is one way we can continue to improve our services. Please take a few minutes to complete the written survey that you may receive in the mail after your visit with us. Thank you!             Your Updated Medication List - Protect others around you: Learn how " to safely use, store and throw away your medicines at www.disposemymeds.org.          This list is accurate as of: 8/30/17  2:12 PM.  Always use your most recent med list.                   Brand Name Dispense Instructions for use Diagnosis    albuterol 108 (90 BASE) MCG/ACT Inhaler    PROAIR HFA/PROVENTIL HFA/VENTOLIN HFA    1 Inhaler    Inhale 2 puffs into the lungs every 4 hours as needed for shortness of breath / dyspnea or wheezing    Intermittent asthma, uncomplicated       aspirin 81 MG tablet     30 tablet    Take 1 tablet (81 mg) by mouth daily        blood glucose monitoring lancets     2 Box    Use to test blood sugar 1-2 times daily or as directed.    DM (diabetes mellitus), type 2, uncontrolled (H)       blood glucose monitoring meter device kit    no brand specified    1 kit    Use to test blood sugar 1-2 times daily or as directed.    Type 2 diabetes mellitus without complication (H)       blood glucose monitoring test strip    ACCU-CHEK GABI    200 each    Use to test blood sugars 1-2x daily or as directed.    Type 2 diabetes mellitus without complication, with long-term current use of insulin (H)       BREO ELLIPTA 100-25 MCG/INH oral inhaler   Generic drug:  fluticasone-vilanterol      Inhale 1 puff into the lungs daily        celecoxib 200 MG capsule    celeBREX    90 capsule    TAKE ONE CAPSULE BY MOUTH EVERY DAY    Chronic bilateral low back pain without sciatica       cetirizine 10 MG tablet    zyrTEC    30 tablet    Take 1 tablet (10 mg) by mouth 2 times daily        EPINEPHrine 0.3 MG/0.3ML injection 2-pack    EPIPEN/ADRENACLICK/or ANY BX GENERIC EQUIV    2 mL    Inject 0.3 mLs (0.3 mg) into the muscle once as needed for anaphylaxis    Hornet sting, accidental or unintentional, subsequent encounter       hydrocortisone 2.5 % cream    PROCTOCREAM-HC    60 g    Apply 2 x daily for not more than 14 days    External hemorrhoids       insulin glargine 100 UNIT/ML injection     3 mL    Inject 14  Units Subcutaneous At Bedtime    Type 2 diabetes mellitus without complication, with long-term current use of insulin (H)       Insulin Pen Needle 31G X 4 MM Misc     180 each    1 each 2 times daily Uses with Lantus and Victoza twice daily    Type 2 diabetes mellitus without complication (H)       KRILL OIL PO      Take 1 tablet by mouth        LANsoprazole 15 MG CR capsule    PREVACID    90 capsule    Take 1 capsule (15 mg) by mouth daily Take 30-60 minutes before a meal.    Gastroesophageal reflux disease without esophagitis       liraglutide 18 MG/3ML soln    VICTOZA    3 Month    Inject 1.8 mg Subcutaneous daily    Type 2 diabetes mellitus without complication (H)       LORazepam 0.5 MG tablet    ATIVAN    30 tablet    Take 1-2 tablets (0.5-1 mg) by mouth every 4 hours as needed for anxiety    Anxiety       losartan-hydrochlorothiazide 100-25 MG per tablet    HYZAAR    90 tablet    Take 1 tablet by mouth daily    Essential hypertension       metFORMIN 1000 MG tablet    GLUCOPHAGE    180 tablet    Take 1 tablet (1,000 mg) by mouth 2 times daily (with meals)    Type 2 diabetes mellitus without complication (H)       montelukast 10 MG tablet    SINGULAIR    90 tablet    Take 1 tablet (10 mg) by mouth At Bedtime        multivitamin, therapeutic with minerals Tabs tablet      Take 1 tablet by mouth 2 times daily        order for DME     1 each    Equipment being ordered: TENS unit    Chronic back pain, Bilateral hand pain       oxyCODONE-acetaminophen 5-325 MG per tablet    PERCOCET    90 tablet    Take 1-2 tablets by mouth every 4 hours as needed for pain    Bilateral hand pain       STATIN NOT PRESCRIBED (INTENTIONAL)     0 each    1 each 2 times daily Statin not prescribed intentionally due to Refusal by patient    Hyperlipidemia LDL goal <100       traZODone 50 MG tablet    DESYREL    180 tablet    Take 1-2 tablets ( mg) by mouth nightly as needed for sleep    Persistent insomnia       vitamin B complex  with vitamin C Tabs tablet      Take 1 tablet by mouth daily        vitamin D 2000 UNITS tablet     30 tablet    Take 1 tablet by mouth daily

## 2017-08-31 DIAGNOSIS — E11.9 TYPE 2 DIABETES MELLITUS WITHOUT COMPLICATION (H): ICD-10-CM

## 2017-08-31 LAB
ANION GAP SERPL CALCULATED.3IONS-SCNC: 11 MMOL/L (ref 3–14)
BUN SERPL-MCNC: 10 MG/DL (ref 7–30)
CALCIUM SERPL-MCNC: 9 MG/DL (ref 8.5–10.1)
CHLORIDE SERPL-SCNC: 101 MMOL/L (ref 94–109)
CO2 SERPL-SCNC: 25 MMOL/L (ref 20–32)
CREAT SERPL-MCNC: 0.66 MG/DL (ref 0.52–1.04)
GFR SERPL CREATININE-BSD FRML MDRD: >90 ML/MIN/1.7M2
GLUCOSE SERPL-MCNC: 165 MG/DL (ref 70–99)
POTASSIUM SERPL-SCNC: 3.9 MMOL/L (ref 3.4–5.3)
SODIUM SERPL-SCNC: 137 MMOL/L (ref 133–144)

## 2017-09-01 NOTE — TELEPHONE ENCOUNTER
Insulin Pen Needle 31G X 4 MM MISC      Last Written Prescription Date: 7/28/2016  Last Fill Quantity: 180,  # refills: 3   Last Office Visit with [facility], [facility] or [facility] prescribing provider: 8/30/2017

## 2017-09-05 RX ORDER — PEN NEEDLE, DIABETIC 32GX 5/32"
NEEDLE, DISPOSABLE MISCELLANEOUS
Qty: 180 EACH | Refills: 1 | Status: SHIPPED | OUTPATIENT
Start: 2017-09-05 | End: 2018-04-25

## 2017-09-22 ENCOUNTER — OFFICE VISIT (OUTPATIENT)
Dept: PEDIATRICS | Facility: CLINIC | Age: 52
End: 2017-09-22
Payer: COMMERCIAL

## 2017-09-22 VITALS
DIASTOLIC BLOOD PRESSURE: 70 MMHG | TEMPERATURE: 98.1 F | OXYGEN SATURATION: 98 % | HEIGHT: 67 IN | BODY MASS INDEX: 40.81 KG/M2 | HEART RATE: 80 BPM | SYSTOLIC BLOOD PRESSURE: 118 MMHG | WEIGHT: 260 LBS

## 2017-09-22 DIAGNOSIS — M25.511 STERNOCLAVICULAR JOINT PAIN, RIGHT: ICD-10-CM

## 2017-09-22 DIAGNOSIS — Z79.4 TYPE 2 DIABETES MELLITUS WITHOUT COMPLICATION, WITH LONG-TERM CURRENT USE OF INSULIN (H): ICD-10-CM

## 2017-09-22 DIAGNOSIS — E11.9 TYPE 2 DIABETES MELLITUS WITHOUT COMPLICATION, WITH LONG-TERM CURRENT USE OF INSULIN (H): ICD-10-CM

## 2017-09-22 DIAGNOSIS — M25.561 ACUTE PAIN OF RIGHT KNEE: Primary | ICD-10-CM

## 2017-09-22 PROCEDURE — 99214 OFFICE O/P EST MOD 30 MIN: CPT | Performed by: INTERNAL MEDICINE

## 2017-09-22 RX ORDER — CYCLOBENZAPRINE HCL 10 MG
5-10 TABLET ORAL 3 TIMES DAILY PRN
Qty: 30 TABLET | Refills: 1 | Status: SHIPPED | OUTPATIENT
Start: 2017-09-22 | End: 2017-10-10

## 2017-09-22 NOTE — PROGRESS NOTES
SUBJECTIVE:   Johnna Caballero is a 52 year old female who presents to clinic today for the following health issues:    Bhakti presents to the clinic with a PMHx of post op complication hematoma of her right knee for right knee cap pain.  She called Dr. Izaguirre who determined hematoma was gone, told her to rest, elevate and ice and call in one week. Patient reports she fell last Wednesday. Since then pain has been keeping her up at night and pain comes after she stands but relieves after walking about 20 steps. She does not go upstairs. Patient used 2, 5 mg Valium. Tylenol does not alleviate sx. Physical therapy is concerned her knee is warm. She would like her leg amputated.      She has a growth on her left arm and is seeing a shoulder dr about it. She is also concerned about a potential tumor on her collar bone.             Musculoskeletal problem/pain- Hematoma on right leg      Duration: 2 months ago, surgery     Description  Location: right knee, pain radiating all through knee     Intensity:  6/10    Accompanying signs and symptoms: warmth and swelling    History  Previous similar problem: no   Previous evaluation:  none    Precipitating or alleviating factors:  Trauma or overuse: YES- recent surgery   Aggravating factors include: sitting, standing, walking, climbing stairs and exercise    Therapies tried and outcome: Had some Rx meds, which have worked.             Problem list and histories reviewed & adjusted, as indicated.  Additional history: as documented    Patient Active Problem List   Diagnosis     Allergic rhinitis     Esophageal reflux     Hyperlipidemia LDL goal <100     Long term current use of insulin (H)     Morbid obesity with BMI of 40.0-44.9, adult (H)     Hematoma - postoperative     Surgery aftercare     Vitamin D deficiency disease     S/P total knee arthroplasty     Type 2 diabetes mellitus without complication (H)     Essential hypertension     Bilateral low back pain without sciatica      Major depressive disorder, recurrent episode, mild (H)     Anxiety     Latex allergy status     Chronic pain syndrome     Intermittent asthma, uncomplicated     Past Surgical History:   Procedure Laterality Date     ARTHROPLASTY KNEE  7/15/2014    Procedure: ARTHROPLASTY KNEE;  Surgeon: Chapin Paul MD;  Location: [facility] OR     BACK SURGERY       C NONSPECIFIC PROCEDURE      laparoscopy x6     C NONSPECIFIC PROCEDURE  93    laparotomy x2 ovarian cyst     C NONSPECIFIC PROCEDURE  0293    oophorectomy lt side - cyst     C NONSPECIFIC PROCEDURE  0395    laminectomy L4-5. S1 herniated disc     C NONSPECIFIC PROCEDURE  96    cholecystectomy     C NONSPECIFIC PROCEDURE      ganglion cysts l wrist, rt palm     C NONSPECIFIC PROCEDURE      cyst removal back x2     C NONSPECIFIC PROCEDURE  10/02    rt thumb fusion, ganglion cyst removal     C NONSPECIFIC PROCEDURE  1/08    remove heel spur, excise exostosis     CHOLECYSTECTOMY       COLONOSCOPY N/A 4/26/2017    Procedure: COLONOSCOPY;  COLONOSCOPY;  Surgeon: Chapin Leal MD;  Location:  GI     EXCISE LESION LOWER EXTREMITY  11/20/2012    Procedure: EXCISE LESION LOWER EXTREMITY;  EXCISION LIPOMA RIGHT HIP ;  Surgeon: Jazmin Bautista MD;  Location:  [facility]     EXCISE LESION LOWER EXTREMITY  11/23/2012    Procedure: EXCISE LESION LOWER EXTREMITY;  EXCISE LESION LOWER EXTREMITY  EVACUATE RIGHT HIP HEMATOMA;  Surgeon: Jazmin Bautista MD;  Location:  OR     GYN SURGERY       HC EXPLORATION ANKLE JOINT  1/2006     HC PART EXCIS PLANTAR FASCIA  12/2006     IRRIGATION AND DEBRIDEMENT HIP, COMBINED  12/15/2012    Procedure: COMBINED IRRIGATION AND DEBRIDEMENT HIP;   evacuation hematoma, scar revision right hip;  Surgeon: Jazmin Bautista MD;  Location:  OR     wisdom teeth[         Social History   Substance Use Topics     Smoking status: Never Smoker     Smokeless tobacco: Never Used     Alcohol use 0.0 oz/week     0 Standard drinks or  equivalent per week      Comment: 1 drink per year or less     Family History   Problem Relation Age of Onset     Adopted: Yes     Respiratory Brother      1/2 sib     Psychotic Disorder Mother      depression, chronic fatigue     Psychotic Disorder Brother      bipolar     C.A.D. Maternal Grandfather      Breast Cancer Maternal Grandmother      DIABETES Maternal Uncle          Current Outpatient Prescriptions   Medication Sig Dispense Refill     BD VALDO U/F 32G X 4 MM insulin pen needle USE ONE PEN NEEDLE TWICE DAILY (WITH LANTUS AND VICTOZA) 180 each 1     losartan-hydrochlorothiazide (HYZAAR) 100-25 MG per tablet Take 1 tablet by mouth daily 90 tablet 3     oxyCODONE-acetaminophen (PERCOCET) 5-325 MG per tablet Take 1-2 tablets by mouth every 4 hours as needed for pain 90 tablet 0     LORazepam (ATIVAN) 0.5 MG tablet Take 1-2 tablets (0.5-1 mg) by mouth every 4 hours as needed for anxiety 30 tablet 0     LANsoprazole (PREVACID) 15 MG CR capsule Take 1 capsule (15 mg) by mouth daily Take 30-60 minutes before a meal. 90 capsule 3     hydrocortisone (PROCTOCREAM-HC) 2.5 % cream Apply 2 x daily for not more than 14 days 60 g 5     blood glucose monitoring (ACCU-CHEK GABI) test strip Use to test blood sugars 1-2x daily or as directed. 200 each 3     celecoxib (CELEBREX) 200 MG capsule TAKE ONE CAPSULE BY MOUTH EVERY DAY 90 capsule 1     insulin glargine (BASAGLAR) 100 UNIT/ML injection Inject 14 Units Subcutaneous At Bedtime 3 mL 11     fluticasone - vilanterol (BREO ELLIPTA) 100-25 MCG/INH oral inhaler Inhale 1 puff into the lungs daily       albuterol (PROAIR HFA/PROVENTIL HFA/VENTOLIN HFA) 108 (90 BASE) MCG/ACT Inhaler Inhale 2 puffs into the lungs every 4 hours as needed for shortness of breath / dyspnea or wheezing 1 Inhaler 1     EPINEPHrine 0.3 MG/0.3ML injection Inject 0.3 mLs (0.3 mg) into the muscle once as needed for anaphylaxis 2 mL 0     aspirin 81 MG tablet Take 1 tablet (81 mg) by mouth daily 30 tablet       traZODone (DESYREL) 50 MG tablet Take 1-2 tablets ( mg) by mouth nightly as needed for sleep 180 tablet 3     KRILL OIL PO Take 1 tablet by mouth       vitamin  B complex with vitamin C (VITAMIN  B COMPLEX) TABS Take 1 tablet by mouth daily  0     Cholecalciferol (VITAMIN D) 2000 UNITS tablet Take 1 tablet by mouth daily 30 tablet      montelukast (SINGULAIR) 10 MG tablet Take 1 tablet (10 mg) by mouth At Bedtime 90 tablet 3     cetirizine (ZYRTEC) 10 MG tablet Take 1 tablet (10 mg) by mouth 2 times daily 30 tablet 1     metFORMIN (GLUCOPHAGE) 1000 MG tablet Take 1 tablet (1,000 mg) by mouth 2 times daily (with meals) 180 tablet 3     liraglutide (VICTOZA) 18 MG/3ML soln Inject 1.8 mg Subcutaneous daily 3 Month 3     blood glucose monitoring (NO BRAND SPECIFIED) meter device kit Use to test blood sugar 1-2 times daily or as directed. 1 kit 0     blood glucose monitoring (ACCU-CHEK MULTICLIX) lancets Use to test blood sugar 1-2 times daily or as directed. 2 Box 3     STATIN NOT PRESCRIBED, INTENTIONAL, 1 each 2 times daily Statin not prescribed intentionally due to Refusal by patient 0 each 0     ORDER FOR DME Equipment being ordered: TENS unit 1 each 0     multivitamin, therapeutic with minerals (THERA-VIT-M) TABS Take 1 tablet by mouth 2 times daily        Allergies   Allergen Reactions     Eggs      Allergy found in testing     Hornets      wasps     Latex Anaphylaxis     hives  -  able to use BP cuff     Lipitor [Hmg-Coa-R Inhibitors] Cramps     Muscle cramps with statin     Metoclopramide Hcl Difficulty breathing     Neurontin [Gabapentin] Hives     Recent Labs   Lab Test  08/30/17   1412  07/06/17   0935  04/13/17   0816  11/23/16   0927   04/18/16   0717   10/02/15   0931   07/15/15   0824  04/13/15   0803   A1C   --   7.8*  8.4*  7.5*   < >  8.4*   < >   --    < >  7.8*  8.1*   LDL   --    --   104*   --    --   99   --    --    --    --   88   HDL   --    --   44*   --    --   39*   --    --    --  "   --   37*   TRIG   --    --   92   --    --   164*   --    --    --    --   173*   ALT   --    --   26   --    --   36   --   30   --   29  33   CR  0.66  0.52  0.55   --    < >  0.62   --   0.64   --   0.58  0.61   GFRESTIMATED  >90  >90  Non African American GFR Calc    >90  Non  GFR Calc     --    < >  >90  Non  GFR Calc     --   >90  Non  GFR Calc     --   >90  Non  GFR Calc    >90  Non  GFR Calc     GFRESTBLACK  >90  >90  African American GFR Calc    >90   GFR Calc     --    < >  >90   GFR Calc     --   >90   GFR Calc     --   >90   GFR Calc    >90   GFR Calc     POTASSIUM  3.9  4.0  3.9   --    < >  4.2   --   4.4   --   3.9  4.1   TSH   --   1.14   --    --    --    --    --    --    --   1.98   --     < > = values in this interval not displayed.            Reviewed and updated as needed this visit by clinical staffTobacco  Allergies  Med Hx  Surg Hx  Fam Hx  Soc Hx      Reviewed and updated as needed this visit by Provider         ROS:  Constitutional, HEENT, cardiovascular, pulmonary, GI, , musculoskeletal, neuro, skin, endocrine and psych systems are negative, except as otherwise noted.    MS: POSITIVE for right knee pain, hematoma     This document serves as a record of the services and decisions personally performed and made by Griselda Yoder MD. It was created on her behalf by Lashanda Christianson, a trained medical scribe. The creation of this document is based the provider's statements to the medical scribe.    Lashanda Christianson September 22, 2017 10:22 AM  OBJECTIVE:   /70 (BP Location: Right arm, Patient Position: Sitting, Cuff Size: Adult Large)  Pulse 80  Temp 98.1  F (36.7  C) (Tympanic)  Ht 1.702 m (5' 7\")  Wt 117.9 kg (260 lb)  SpO2 98%  BMI 40.72 kg/m2  Body mass index is 40.72 kg/(m^2).  GENERAL: healthy, alert and no " distress  RESP: lungs clear to auscultation - no rales, rhonchi or wheezes  CV: regular rate and rhythm, normal S1 S2, no S3 or S4, no murmur, click or rub  MS: no gross musculoskeletal defects noted, well  healed scar over right knee;  tender over right knee with swelling and mild erythema normal ROM non tender in calf. Pt limping with antalgic gait.  Tender with mild swelling at SC joint on the right, no erythema.        Diagnostic Test Results:  none     ASSESSMENT/PLAN:     (M25.561) Acute pain of right knee  (primary encounter diagnosis)  -discussed with patient that these are post op complications and should be managed by her orthopedist  -pt is very frustrated with her orthopedist and doesn't feel he is listening to her  -discussed options of a second opinion or pushing dr. Izaguirre; she could do both  -will do trial of flexeril instead of valium for muscle spasm  -pt is out of pain medication; would prefer that if she needs pain medication that it come from only one provider   Plan: cyclobenzaprine (FLEXERIL) 10 MG tablet            (M25.511) Sternoclavicular joint pain, right  -discussed benign nature of disease with patient  -continue antiinflammatory- is on celebrex regularly       (E11.9,  Z79.4) Type 2 diabetes mellitus without complication, with long-term current use of insulin (H)  -- Blood work scheduled   -- Follow up in 2 weeks for re check   Plan: HEMOGLOBIN A1C           Follow up on October 10th or as needed.     The information in this document, created by the medical scribe for me, accurately reflects the services I personally performed and the decisions made by me. I have reviewed and approved this document for accuracy prior to leaving the patient care area.  Griselda Yoder MD  [facility]

## 2017-09-22 NOTE — MR AVS SNAPSHOT
After Visit Summary   9/22/2017    Johnna Caballero    MRN: [id number]           Patient Information     Date Of Birth          1965        Visit Information        Provider Department      9/22/2017 10:00 AM Griselda Yoder MD [facility] [place]        Today's Diagnoses     Acute pain of right knee    -  1    Sternoclavicular joint pain, right        Type 2 diabetes mellitus without complication, with long-term current use of insulin (H)          Care Instructions    2 options:     1. Get a second opinion on your knee to determine if they need more imaging or testing. This will be difficult since it will be on a case by case basis.     2. Go back to your regular doctor and continue to push him about what is going on.     You can do both if you would like.     You can put ice on your collarbone to relieve pain.     Scheduled diabetes follow up on October 10th at 9:20 for lab and 9:40 with me.     Take your images to your orthopedist           Follow-ups after your visit        Your next 10 appointments already scheduled     Oct 10, 2017  9:20 AM CDT   LAB with [facility] LAB   [facility] [place] ([facility])    [address] [place] [id number] [phone]           Patient must bring picture ID. Patient should be prepared to give a urine specimen  Please do not eat 10-12 hours before your appointment if you are coming in fasting for labs on lipids, cholesterol, or glucose (sugar). Pregnant women should follow their Care Team instructions. Water with medications is okay. Do not drink coffee or other fluids. If you have concerns about taking  your medications, please ask at office or if scheduling via [employer], send a message by clicking on Secure Messaging, Message Your Care Team.            Oct 10, 2017  9:40 AM CDT   Office Visit with Griselda Yoder MD   [facility] [place] ([facility])    [address] [place] [address]  "Drive  [address]  [place] [address] [phone]           Bring a current list of meds and any records pertaining to this visit. For Physicals, please bring immunization records and any forms needing to be filled out. Please arrive 10 minutes early to complete paperwork.              Future tests that were ordered for you today     Open Future Orders        Priority Expected Expires Ordered    HEMOGLOBIN A1C Routine  10/22/2017 9/22/2017            Who to contact     If you have questions or need follow up information about today's clinic visit or your schedule please contact [facility] directly at [phone].  Normal or non-critical lab and imaging results will be communicated to you by [employer], letter or phone within 4 business days after the clinic has received the results. If you do not hear from us within 7 days, please contact the clinic through [employer] or phone. If you have a critical or abnormal lab result, we will notify you by phone as soon as possible.  Submit refill requests through [employer] or call your pharmacy and they will forward the refill request to us. Please allow 3 business days for your refill to be completed.          Additional Information About Your Visit        MyChart Information     [employer] gives you secure access to your electronic health record. If you see a primary care provider, you can also send messages to your care team and make appointments. If you have questions, please call your primary care clinic.  If you do not have a primary care provider, please call [phone] and they will assist you.        Care EveryWhere ID     This is your Care EveryWhere ID. This could be used by other organizations to access your [facility] [place] medical records  [id number]        Your Vitals Were     Pulse Temperature Height Pulse Oximetry BMI (Body Mass Index)       80 98.1  F (36.7  C) (Tympanic) 5' 7\" (1.702 m) 98% 40.72 kg/m2        Blood Pressure from Last 3 Encounters: "   09/22/17 118/70   08/30/17 118/60   07/13/17 116/80    Weight from Last 3 Encounters:   09/22/17 260 lb (117.9 kg)   08/30/17 268 lb 12.8 oz (121.9 kg)   07/13/17 271 lb (122.9 kg)                 Today's Medication Changes          These changes are accurate as of: 9/22/17 10:45 AM.  If you have any questions, ask your nurse or doctor.               Start taking these medicines.        Dose/Directions    cyclobenzaprine 10 MG tablet   Commonly known as:  FLEXERIL   Used for:  Acute pain of right knee   Started by:  Griselda Yoder MD        Dose:  5-10 mg   Take 0.5-1 tablets (5-10 mg) by mouth 3 times daily as needed for muscle spasms   Quantity:  30 tablet   Refills:  1            Where to get your medicines      These medications were sent to [facility] [address]  [address] [facility] [address]     Phone:  [phone]     cyclobenzaprine 10 MG tablet                Primary Care Provider Office Phone # Fax #    Griselda Yoder [phone] [address] [place] [address]        Equal Access to Services     [facility] AH: Hadii aad ku hadasho Soomaali, waaxda luqadaha, qaybta kaalmada adeegyada, waxay roxanain hayserjion eunice campos. So [facility] [phone].    ATENCIÓN: Si habla español, tiene a subramanian disposición servicios gratuitos de asistencia lingüística. ame al [phone].    We comply with applicable federal civil rights laws and [place] laws. We do not discriminate on the basis of race, color, national origin, age, disability sex, sexual orientation or gender identity.            Thank you!     Thank you for choosing [facility]  for your care. Our goal is always to provide you with excellent care. Hearing back from our patients is one way we can continue to improve our services. Please take a few minutes to complete the written survey that you may receive in the mail after your  visit with us. Thank you!             Your Updated Medication List - Protect others around you: Learn how to safely use, store and throw away your medicines at www.disposemymeds.org.          This list is accurate as of: 9/22/17 10:45 AM.  Always use your most recent med list.                   Brand Name Dispense Instructions for use Diagnosis    albuterol 108 (90 BASE) MCG/ACT Inhaler    PROAIR HFA/PROVENTIL HFA/VENTOLIN HFA    1 Inhaler    Inhale 2 puffs into the lungs every 4 hours as needed for shortness of breath / dyspnea or wheezing    Intermittent asthma, uncomplicated       aspirin 81 MG tablet     30 tablet    Take 1 tablet (81 mg) by mouth daily        BD VALDO U/F 32G X 4 MM   Generic drug:  insulin pen needle     180 each    USE ONE PEN NEEDLE TWICE DAILY (WITH LANTUS AND VICTOZA)    Type 2 diabetes mellitus without complication (H)       blood glucose monitoring lancets     2 Box    Use to test blood sugar 1-2 times daily or as directed.    DM (diabetes mellitus), type 2, uncontrolled (H)       blood glucose monitoring meter device kit    no brand specified    1 kit    Use to test blood sugar 1-2 times daily or as directed.    Type 2 diabetes mellitus without complication (H)       blood glucose monitoring test strip    ACCU-CHEK GABI    200 each    Use to test blood sugars 1-2x daily or as directed.    Type 2 diabetes mellitus without complication, with long-term current use of insulin (H)       BREO ELLIPTA 100-25 MCG/INH oral inhaler   Generic drug:  fluticasone-vilanterol      Inhale 1 puff into the lungs daily        celecoxib 200 MG capsule    celeBREX    90 capsule    TAKE ONE CAPSULE BY MOUTH EVERY DAY    Chronic bilateral low back pain without sciatica       cetirizine 10 MG tablet    zyrTEC    30 tablet    Take 1 tablet (10 mg) by mouth 2 times daily        cyclobenzaprine 10 MG tablet    FLEXERIL    30 tablet    Take 0.5-1 tablets (5-10 mg) by mouth 3 times daily as needed for muscle spasms     Acute pain of right knee       EPINEPHrine 0.3 MG/0.3ML injection 2-pack    EPIPEN/ADRENACLICK/or ANY BX GENERIC EQUIV    2 mL    Inject 0.3 mLs (0.3 mg) into the muscle once as needed for anaphylaxis    Hornet sting, accidental or unintentional, subsequent encounter       hydrocortisone 2.5 % cream    PROCTOCREAM-HC    60 g    Apply 2 x daily for not more than 14 days    External hemorrhoids       insulin glargine 100 UNIT/ML injection     3 mL    Inject 14 Units Subcutaneous At Bedtime    Type 2 diabetes mellitus without complication, with long-term current use of insulin (H)       KRILL OIL PO      Take 1 tablet by mouth        LANsoprazole 15 MG CR capsule    PREVACID    90 capsule    Take 1 capsule (15 mg) by mouth daily Take 30-60 minutes before a meal.    Gastroesophageal reflux disease without esophagitis       liraglutide 18 MG/3ML soln    VICTOZA    3 Month    Inject 1.8 mg Subcutaneous daily    Type 2 diabetes mellitus without complication (H)       LORazepam 0.5 MG tablet    ATIVAN    30 tablet    Take 1-2 tablets (0.5-1 mg) by mouth every 4 hours as needed for anxiety    Anxiety       losartan-hydrochlorothiazide 100-25 MG per tablet    HYZAAR    90 tablet    Take 1 tablet by mouth daily    Essential hypertension       metFORMIN 1000 MG tablet    GLUCOPHAGE    180 tablet    Take 1 tablet (1,000 mg) by mouth 2 times daily (with meals)    Type 2 diabetes mellitus without complication (H)       montelukast 10 MG tablet    SINGULAIR    90 tablet    Take 1 tablet (10 mg) by mouth At Bedtime        multivitamin, therapeutic with minerals Tabs tablet      Take 1 tablet by mouth 2 times daily        order for DME     1 each    Equipment being ordered: TENS unit    Chronic back pain, Bilateral hand pain       oxyCODONE-acetaminophen 5-325 MG per tablet    PERCOCET    90 tablet    Take 1-2 tablets by mouth every 4 hours as needed for pain    Bilateral hand pain       STATIN NOT PRESCRIBED (INTENTIONAL)     0  each    1 each 2 times daily Statin not prescribed intentionally due to Refusal by patient    Hyperlipidemia LDL goal <100       traZODone 50 MG tablet    DESYREL    180 tablet    Take 1-2 tablets ( mg) by mouth nightly as needed for sleep    Persistent insomnia       vitamin B complex with vitamin C Tabs tablet      Take 1 tablet by mouth daily        vitamin D 2000 UNITS tablet     30 tablet    Take 1 tablet by mouth daily

## 2017-09-22 NOTE — PATIENT INSTRUCTIONS
2 options:     1. Get a second opinion on your knee to determine if they need more imaging or testing. This will be difficult since it will be on a case by case basis.     2. Go back to your regular doctor and continue to push him about what is going on.     You can do both if you would like.     You can put ice on your collarbone to relieve pain.     Scheduled diabetes follow up on October 10th at 9:20 for lab and 9:40 with me.     Take your images to your orthopedist

## 2017-09-22 NOTE — NURSING NOTE
"Chief Complaint   Patient presents with     Leg Pain       Initial /70 (BP Location: Right arm, Patient Position: Sitting, Cuff Size: Adult Large)  Pulse 80  Temp 98.1  F (36.7  C) (Tympanic)  Ht 5' 7\" (1.702 m)  Wt 260 lb (117.9 kg)  SpO2 98%  BMI 40.72 kg/m2 Estimated body mass index is 40.72 kg/(m^2) as calculated from the following:    Height as of this encounter: 5' 7\" (1.702 m).    Weight as of this encounter: 260 lb (117.9 kg).  Medication Reconciliation: complete     Janice Castrejon      "

## 2017-09-24 DIAGNOSIS — E11.9 TYPE 2 DIABETES MELLITUS WITHOUT COMPLICATION, WITH LONG-TERM CURRENT USE OF INSULIN (H): ICD-10-CM

## 2017-09-24 DIAGNOSIS — Z79.4 TYPE 2 DIABETES MELLITUS WITHOUT COMPLICATION, WITH LONG-TERM CURRENT USE OF INSULIN (H): ICD-10-CM

## 2017-09-24 DIAGNOSIS — E11.9 TYPE 2 DIABETES MELLITUS WITHOUT COMPLICATION (H): ICD-10-CM

## 2017-09-25 NOTE — TELEPHONE ENCOUNTER
liraglutide (VICTOZA) 18 MG/3ML soln         Last Written Prescription Date: 7/28/2016  Last Fill Quantity: 3, # refills: 3  Last Office Visit with G, P or [facility] prescribing provider:  9/22/2017   Next 5 appointments (look out 90 days)     Oct 10, 2017  9:40 AM CDT   Office Visit with Griselda Yoder MD   [facility]    [address] [place] [address]  [address]  [facility] [id number] [phone]                   BP Readings from Last 3 Encounters:   09/22/17 118/70   08/30/17 118/60   07/13/17 116/80     Lab Results   Component Value Date    MICROL 7 04/13/2017     Lab Results   Component Value Date    UMALCR 5.93 04/13/2017     Creatinine   Date Value Ref Range Status   08/30/2017 0.66 0.52 - 1.04 mg/dL Final   ]  GFR Estimate   Date Value Ref Range Status   08/30/2017 >90 >60 mL/min/1.7m2 Final     Comment:     Non  GFR Calc   07/06/2017 >90  Non  GFR Calc   >60 mL/min/1.7m2 Final   04/13/2017 >90  Non  GFR Calc   >60 mL/min/1.7m2 Final     GFR Estimate If Black   Date Value Ref Range Status   08/30/2017 >90 >60 mL/min/1.7m2 Final     Comment:      GFR Calc   07/06/2017 >90   GFR Calc   >60 mL/min/1.7m2 Final   04/13/2017 >90   GFR Calc   >60 mL/min/1.7m2 Final     Lab Results   Component Value Date    CHOL 166 04/13/2017     Lab Results   Component Value Date    HDL 44 04/13/2017     Lab Results   Component Value Date     04/13/2017     Lab Results   Component Value Date    TRIG 92 04/13/2017     Lab Results   Component Value Date    CHOLHDLRATIO 4.3 04/13/2015     Lab Results   Component Value Date    AST 11 04/13/2017     Lab Results   Component Value Date    ALT 26 04/13/2017     Lab Results   Component Value Date    A1C 7.8 07/06/2017    A1C 8.4 04/13/2017    A1C 7.5 11/23/2016    A1C 7.6 07/28/2016    A1C 8.4 04/18/2016     Potassium   Date Value Ref  Range Status   08/30/2017 3.9 3.4 - 5.3 mmol/L Final         Duplicate.     insulin glargine (BASAGLAR) 100 UNIT/ML injection was filled on /6/2017, qty 3 ml with 11 refills.

## 2017-09-26 RX ORDER — LIRAGLUTIDE 6 MG/ML
INJECTION SUBCUTANEOUS
Qty: 27 ML | Refills: 0 | Status: SHIPPED | OUTPATIENT
Start: 2017-09-26 | End: 2017-10-10

## 2017-09-26 RX ORDER — INSULIN GLARGINE 100 [IU]/ML
INJECTION, SOLUTION SUBCUTANEOUS
Qty: 15 ML | Refills: 0 | Status: SHIPPED | OUTPATIENT
Start: 2017-09-26 | End: 2017-10-10

## 2017-09-28 DIAGNOSIS — M54.50 CHRONIC BILATERAL LOW BACK PAIN WITHOUT SCIATICA: ICD-10-CM

## 2017-09-28 DIAGNOSIS — G89.29 CHRONIC BILATERAL LOW BACK PAIN WITHOUT SCIATICA: ICD-10-CM

## 2017-09-29 RX ORDER — CELECOXIB 200 MG/1
CAPSULE ORAL
Qty: 90 CAPSULE | Refills: 1 | Status: SHIPPED | OUTPATIENT
Start: 2017-09-29 | End: 2018-03-25

## 2017-09-29 NOTE — TELEPHONE ENCOUNTER
celecoxib (CELEBREX) 200 MG capsule      Last Written Prescription Date: 3/27/2017  Last Quantity: 90, # refills: 1  Last Office Visit with G, P or [facility] prescribing provider: 9/22/2017  Next 5 appointments (look out 90 days)     Oct 10, 2017  9:40 AM CDT   Office Visit with Griselda Yoder MD   [facility] ([facility])    [address] [place] [id number] [phone]                   Creatinine   Date Value Ref Range Status   08/30/2017 0.66 0.52 - 1.04 mg/dL Final     Lab Results   Component Value Date    AST 11 04/13/2017     Lab Results   Component Value Date    ALT 26 04/13/2017     BP Readings from Last 3 Encounters:   09/22/17 118/70   08/30/17 118/60   07/13/17 116/80

## 2017-10-01 DIAGNOSIS — E11.9 TYPE 2 DIABETES MELLITUS WITHOUT COMPLICATION (H): ICD-10-CM

## 2017-10-01 NOTE — TELEPHONE ENCOUNTER
metFORMIN (GLUCOPHAGE) 1000 MG tablet     Last Written Prescription Date: 07/28/2016  Last Quantity: 180, # refills: 3  Last Office Visit with G, P or [facility] prescribing provider: 09/22/2017   Next 5 appointments (look out 90 days)     Oct 10, 2017  9:40 AM CDT   Office Visit with Griselda Yoder MD   [facility] ([facility]    [address] [place] [id number] [phone]                   TSH   Date Value Ref Range Status   07/06/2017 1.14 0.40 - 4.00 mU/L Final

## 2017-10-03 ENCOUNTER — TRANSFERRED RECORDS (OUTPATIENT)
Dept: HEALTH INFORMATION MANAGEMENT | Facility: CLINIC | Age: 52
End: 2017-10-03

## 2017-10-03 NOTE — TELEPHONE ENCOUNTER
Prescription approved per [facility] Refill Protocol.  Upcoming appointment with PCP.  Nadia Bass, RN  Triage Nurse

## 2017-10-06 ENCOUNTER — TRANSFERRED RECORDS (OUTPATIENT)
Dept: HEALTH INFORMATION MANAGEMENT | Facility: CLINIC | Age: 52
End: 2017-10-06

## 2017-10-10 ENCOUNTER — OFFICE VISIT (OUTPATIENT)
Dept: PEDIATRICS | Facility: CLINIC | Age: 52
End: 2017-10-10
Payer: COMMERCIAL

## 2017-10-10 VITALS
DIASTOLIC BLOOD PRESSURE: 78 MMHG | BODY MASS INDEX: 40.95 KG/M2 | HEART RATE: 84 BPM | TEMPERATURE: 96.8 F | SYSTOLIC BLOOD PRESSURE: 112 MMHG | OXYGEN SATURATION: 98 % | HEIGHT: 67 IN | WEIGHT: 260.9 LBS

## 2017-10-10 DIAGNOSIS — E78.5 HYPERLIPIDEMIA LDL GOAL <100: ICD-10-CM

## 2017-10-10 DIAGNOSIS — M79.642 BILATERAL HAND PAIN: ICD-10-CM

## 2017-10-10 DIAGNOSIS — M79.641 BILATERAL HAND PAIN: ICD-10-CM

## 2017-10-10 DIAGNOSIS — J45.20 INTERMITTENT ASTHMA, UNCOMPLICATED: ICD-10-CM

## 2017-10-10 DIAGNOSIS — G47.00 PERSISTENT INSOMNIA: ICD-10-CM

## 2017-10-10 DIAGNOSIS — Z79.4 TYPE 2 DIABETES MELLITUS WITHOUT COMPLICATION, WITH LONG-TERM CURRENT USE OF INSULIN (H): Primary | ICD-10-CM

## 2017-10-10 DIAGNOSIS — E11.9 TYPE 2 DIABETES MELLITUS WITHOUT COMPLICATION, WITH LONG-TERM CURRENT USE OF INSULIN (H): ICD-10-CM

## 2017-10-10 DIAGNOSIS — Z79.4 TYPE 2 DIABETES MELLITUS WITHOUT COMPLICATION, WITH LONG-TERM CURRENT USE OF INSULIN (H): ICD-10-CM

## 2017-10-10 DIAGNOSIS — E11.9 TYPE 2 DIABETES MELLITUS WITHOUT COMPLICATION, WITH LONG-TERM CURRENT USE OF INSULIN (H): Primary | ICD-10-CM

## 2017-10-10 DIAGNOSIS — I10 ESSENTIAL HYPERTENSION: ICD-10-CM

## 2017-10-10 LAB — HBA1C MFR BLD: 7.8 % (ref 4.3–6)

## 2017-10-10 PROCEDURE — 36415 COLL VENOUS BLD VENIPUNCTURE: CPT | Performed by: INTERNAL MEDICINE

## 2017-10-10 PROCEDURE — 83036 HEMOGLOBIN GLYCOSYLATED A1C: CPT | Performed by: INTERNAL MEDICINE

## 2017-10-10 PROCEDURE — 99214 OFFICE O/P EST MOD 30 MIN: CPT | Performed by: INTERNAL MEDICINE

## 2017-10-10 RX ORDER — LIRAGLUTIDE 6 MG/ML
1.8 INJECTION SUBCUTANEOUS DAILY
Qty: 27 ML | Refills: 3 | Status: SHIPPED | OUTPATIENT
Start: 2017-10-10 | End: 2018-04-25

## 2017-10-10 RX ORDER — OXYCODONE AND ACETAMINOPHEN 5; 325 MG/1; MG/1
1-2 TABLET ORAL EVERY 4 HOURS PRN
Qty: 90 TABLET | Refills: 0 | Status: SHIPPED | OUTPATIENT
Start: 2017-10-10 | End: 2018-04-25

## 2017-10-10 RX ORDER — INSULIN GLARGINE 100 [IU]/ML
INJECTION, SOLUTION SUBCUTANEOUS
Qty: 15 ML | Refills: 3 | Status: SHIPPED | OUTPATIENT
Start: 2017-10-10 | End: 2018-04-25

## 2017-10-10 RX ORDER — TRAZODONE HYDROCHLORIDE 50 MG/1
50-100 TABLET, FILM COATED ORAL
Qty: 180 TABLET | Refills: 3 | Status: SHIPPED | OUTPATIENT
Start: 2017-10-10 | End: 2018-09-10

## 2017-10-10 RX ORDER — MONTELUKAST SODIUM 10 MG/1
10 TABLET ORAL AT BEDTIME
Qty: 90 TABLET | Refills: 3 | Status: SHIPPED | OUTPATIENT
Start: 2017-10-10 | End: 2018-11-06

## 2017-10-10 ASSESSMENT — PATIENT HEALTH QUESTIONNAIRE - PHQ9: SUM OF ALL RESPONSES TO PHQ QUESTIONS 1-9: 2

## 2017-10-10 NOTE — MR AVS SNAPSHOT
After Visit Summary   10/10/2017    Johnna Caballero    MRN: [id number]           Patient Information     Date Of Birth          1965        Visit Information        Provider Department      10/10/2017 9:40 AM Griselda Yoder MD [facility]        Today's Diagnoses     Type 2 diabetes mellitus without complication, with long-term current use of insulin (H)    -  1    Essential hypertension        Persistent insomnia        Bilateral hand pain        Intermittent asthma, uncomplicated        Hyperlipidemia LDL goal <100          Care Instructions    I need you to start checking your sugars at home and  send me you fasting sugars and sugars after eating largest meal for 2 weeks; go up on your [place] and  tell me if your sugars get low.     Exercise regularly 4-5 days a week, do this in the pool and bike on the off days.     While you are on vacation I need you to have your A1C done in 3 months. Send me their fax number and I will send in the order.     You may want to think about having a sleep study done for sleep apnea.     Appointment scheduled for April 10th at 9:00 AM; lab appointment on April 6th at 8:30 AM- you need to be fasting.             Follow-ups after your visit        Your next 10 appointments already scheduled     Apr 06, 2018  8:30 AM CDT   LAB with [facility]   [facility]    [address] [address] [phone]           Patient must bring picture ID. Patient should be prepared to give a urine specimen  Please do not eat 10-12 hours before your appointment if you are coming in fasting for labs on lipids, cholesterol, or glucose (sugar). Pregnant women should follow their Care Team instructions. Water with medications is okay. Do not drink coffee or other fluids. If you have concerns about taking  your medications, please ask at office or if scheduling via Outspark, send a message by clicking on  Secure Messaging, Message Your Care Team.            Apr 10, 2018  9:00 AM CDT   Office Visit with Griselda Yoder MD   [facility] [place] ([facility]    [address]  [address]  [place] [address] [phone]           Bring a current list of meds and any records pertaining to this visit. For Physicals, please bring immunization records and any forms needing to be filled out. Please arrive 10 minutes early to complete paperwork.              Future tests that were ordered for you today     Open Future Orders        Priority Expected Expires Ordered    **Lipid panel reflex to direct LDL FUTURE 1yr Routine 9/10/2018 10/10/2018 10/10/2017    Comprehensive metabolic panel Routine  5/10/2018 10/10/2017    Albumin Random Urine Quantitative with Creat Ratio Routine  5/10/2018 10/10/2017    Hemoglobin A1c Routine  5/10/2018 10/10/2017    Hemoglobin A1c Routine  2/7/2018 10/10/2017            Who to contact     If you have questions or need follow up information about today's clinic visit or your schedule please contact [facility] directly at [phone].  Normal or non-critical lab and imaging results will be communicated to you by Lingodahart, letter or phone within 4 business days after the clinic has received the results. If you do not hear from us within 7 days, please contact the clinic through SIPXt or phone. If you have a critical or abnormal lab result, we will notify you by phone as soon as possible.  Submit refill requests through Itineris or call your pharmacy and they will forward the refill request to us. Please allow 3 business days for your refill to be completed.          Additional Information About Your Visit        Lingodahart Information     Itineris gives you secure access to your electronic health record. If you see a primary care provider, you can also send messages to your care team and make appointments. If you have questions, please call your  "primary care clinic.  If you do not have a primary care provider, please call [phone] and they will assist you.        Care EveryWhere ID     This is your Care EveryWhere ID. This could be used by other organizations to access your [facility] medical records  [id number]        Your Vitals Were     Pulse Temperature Height Pulse Oximetry BMI (Body Mass Index)       84 96.8  F (36  C) (Tympanic) 5' 7\" (1.702 m) 98% 40.86 kg/m2        Blood Pressure from Last 3 Encounters:   10/10/17 112/78   09/22/17 118/70   08/30/17 118/60    Weight from Last 3 Encounters:   10/10/17 260 lb 14.4 oz (118.3 kg)   09/22/17 260 lb (117.9 kg)   08/30/17 268 lb 12.8 oz (121.9 kg)                 Today's Medication Changes          These changes are accurate as of: 10/10/17 10:41 AM.  If you have any questions, ask your nurse or doctor.               These medicines have changed or have updated prescriptions.        Dose/Directions    BASAGLAR 100 UNIT/ML injection   This may have changed:  See the new instructions.   Used for:  Type 2 diabetes mellitus without complication, with long-term current use of insulin (H)   Changed by:  Griselda Yoder MD        INJECT 16 UNITS SUBCUTANEOUSLY EVERY EVENING   Quantity:  15 mL   Refills:  3       liraglutide 18 MG/3ML soln   Commonly known as:  VICTOZA PEN   This may have changed:  See the new instructions.   Used for:  Type 2 diabetes mellitus without complication, with long-term current use of insulin (H)   Changed by:  Griselda Yoder MD        Dose:  1.8 mg   Inject 1.8 mg Subcutaneous daily   Quantity:  27 mL   Refills:  3         Stop taking these medicines if you haven't already. Please contact your care team if you have questions.     cyclobenzaprine 10 MG tablet   Commonly known as:  FLEXERIL   Stopped by:  Griselda Yoder MD                Where to get your medicines      These medications were sent to [facility], [place] - [place] - " [address] [place] [address]     Phone:  [phone]     BASAGLAR 100 UNIT/ML injection    liraglutide 18 MG/3ML soln    montelukast 10 MG tablet    traZODone 50 MG tablet         Some of these will need a paper prescription and others can be bought over the counter.  Ask your nurse if you have questions.     Bring a paper prescription for each of these medications     oxyCODONE-acetaminophen 5-325 MG per tablet                Primary Care Provider Office Phone # Fax #    Griselda Yoder [phone] [address] [place] [address]        Equal Access to Services     [facility]: Hadii eddy Joshi, waaxda luac, qaybta kaalmada willard, shola zaldivar . So [facility] [phone].    ATENCIÓN: Si habla español, tiene a subramanian disposición servicios gratuitos de asistencia lingüística. [facility] [phone].    We comply with applicable federal civil rights laws and [place] laws. We do not discriminate on the basis of race, color, national origin, age, disability, sex, sexual orientation, or gender identity.            Thank you!     Thank you for choosing [facility]  for your care. Our goal is always to provide you with excellent care. Hearing back from our patients is one way we can continue to improve our services. Please take a few minutes to complete the written survey that you may receive in the mail after your visit with us. Thank you!             Your Updated Medication List - Protect others around you: Learn how to safely use, store and throw away your medicines at www.disposemymeds.org.          This list is accurate as of: 10/10/17 10:41 AM.  Always use your most recent med list.                   Brand Name Dispense Instructions for use Diagnosis    albuterol 108 (90 BASE) MCG/ACT Inhaler    PROAIR HFA/PROVENTIL HFA/VENTOLIN HFA    1 Inhaler    Inhale 2 puffs into the lungs every 4  hours as needed for shortness of breath / dyspnea or wheezing    Intermittent asthma, uncomplicated       aspirin 81 MG tablet     30 tablet    Take 1 tablet (81 mg) by mouth daily        BASAGLAR 100 UNIT/ML injection     15 mL    INJECT 16 UNITS SUBCUTANEOUSLY EVERY EVENING    Type 2 diabetes mellitus without complication, with long-term current use of insulin (H)       BD VALDO U/F 32G X 4 MM   Generic drug:  insulin pen needle     180 each    USE ONE PEN NEEDLE TWICE DAILY (WITH LANTUS AND VICTOZA)    Type 2 diabetes mellitus without complication (H)       blood glucose monitoring lancets     2 Box    Use to test blood sugar 1-2 times daily or as directed.    DM (diabetes mellitus), type 2, uncontrolled (H)       blood glucose monitoring meter device kit    no brand specified    1 kit    Use to test blood sugar 1-2 times daily or as directed.    Type 2 diabetes mellitus without complication (H)       blood glucose monitoring test strip    ACCU-CHEK GABI    200 each    Use to test blood sugars 1-2x daily or as directed.    Type 2 diabetes mellitus without complication, with long-term current use of insulin (H)       BREO ELLIPTA 100-25 MCG/INH oral inhaler   Generic drug:  fluticasone-vilanterol      Inhale 1 puff into the lungs daily        celecoxib 200 MG capsule    celeBREX    90 capsule    TAKE ONE CAPSULE BY MOUTH EVERY DAY    Chronic bilateral low back pain without sciatica       cetirizine 10 MG tablet    zyrTEC    30 tablet    Take 1 tablet (10 mg) by mouth 2 times daily        diazepam 5 MG tablet    VALIUM    30 tablet    Take 1 tablet (5 mg) by mouth every 6 hours as needed for anxiety or sleep    Muscle spasm       EPINEPHrine 0.3 MG/0.3ML injection 2-pack    EPIPEN/ADRENACLICK/or ANY BX GENERIC EQUIV    2 mL    Inject 0.3 mLs (0.3 mg) into the muscle once as needed for anaphylaxis    Hornet sting, accidental or unintentional, subsequent encounter       hydrocortisone 2.5 % cream    [facility]     60 g    Apply 2 x daily for not more than 14 days    External hemorrhoids       KRILL OIL PO      Take 1 tablet by mouth        LANsoprazole 15 MG CR capsule    PREVACID    90 capsule    Take 1 capsule (15 mg) by mouth daily Take 30-60 minutes before a meal.    Gastroesophageal reflux disease without esophagitis       liraglutide 18 MG/3ML soln    VICTOZA PEN    27 mL    Inject 1.8 mg Subcutaneous daily    Type 2 diabetes mellitus without complication, with long-term current use of insulin (H)       LORazepam 0.5 MG tablet    ATIVAN    30 tablet    Take 1-2 tablets (0.5-1 mg) by mouth every 4 hours as needed for anxiety    Anxiety       losartan-hydrochlorothiazide 100-25 MG per tablet    HYZAAR    90 tablet    Take 1 tablet by mouth daily    Essential hypertension       metFORMIN 1000 MG tablet    GLUCOPHAGE    180 tablet    TAKE ONE TABLET BY MOUTH TWICE A DAY WITH MEALS    Type 2 diabetes mellitus without complication (H)       montelukast 10 MG tablet    SINGULAIR    90 tablet    Take 1 tablet (10 mg) by mouth At Bedtime    Intermittent asthma, uncomplicated       multivitamin, therapeutic with minerals Tabs tablet      Take 1 tablet by mouth 2 times daily        order for DME     1 each    Equipment being ordered: TENS unit    Chronic back pain, Bilateral hand pain       oxyCODONE-acetaminophen 5-325 MG per tablet    PERCOCET    90 tablet    Take 1-2 tablets by mouth every 4 hours as needed for pain    Bilateral hand pain       STATIN NOT PRESCRIBED (INTENTIONAL)     0 each    1 each 2 times daily Statin not prescribed intentionally due to Refusal by patient    Hyperlipidemia LDL goal <100       traZODone 50 MG tablet    DESYREL    180 tablet    Take 1-2 tablets ( mg) by mouth nightly as needed for sleep    Persistent insomnia       vitamin B complex with vitamin C Tabs tablet      Take 1 tablet by mouth daily        vitamin D 2000 UNITS tablet     30 tablet    Take 1 tablet by mouth daily

## 2017-10-10 NOTE — PROGRESS NOTES
"  SUBJECTIVE:   Johnna Caballero is a 52 year old female who presents to clinic today for the following health issues:    Bhakti has a complex PMHx who presents to the clinic for a diabetes follow up. She is currently taking Metformin 1000 MG BID, Victoza 18 MG q.d., and Basaglar 100 unit/mL for diabetes control.     Patient reports since her last visit she is still having issues with Dr. Mejia. Reports she has  been trying to schedule an appointment and was advised to go to . She went to  for an MRI who told her she needed to see Dr. Mejia. MRI results revealed a minor intralateral tendon strain, tendinitis and a endochondroma. She believes she has another endochondroma in her left arm that has doubled in size in the past 6 years; her hematoma has completely healed.      She reports she has not been checking her sugars at home and does not want to start. States she has not had a good past 3 months and attributes this to her high A1c (7.8). Reports only a few episodes of low blood sugars. She saw a diabetes educator 2 years ago and does not want to see a diabetes educator.     BP was 112/78 in clinic. Patient reports no side effects of BP medication. Weight was 260 lbs in clinic. She states she does water aerobics 3 days a week for exercise. Of note, patient is leaving to [place] for 4 months in 6 weeks, she only has an allergist there.     Patient reports that when she takes Trazodone in [place] she stops taking it since she gets \"cotton mouth\". She is wondering why she needs to be on an Asprin and the difference between Percocet and Oxycodone.       Diabetes Follow-up      Patient is checking blood sugars: not at all    Diabetic concerns: None     Symptoms of hypoglycemia (low blood sugar): shaky, dizzy, weak     Paresthesias (numbness or burning in feet) or sores: No     Date of last diabetic eye exam: 06/05/2017        Amount of exercise or physical activity: None    Problems taking medications regularly: " No    Medication side effects: none  Diet: carbohydrate counting          Problem list and histories reviewed & adjusted, as indicated.  Additional history: as documented    Patient Active Problem List   Diagnosis     Allergic rhinitis     Esophageal reflux     Hyperlipidemia LDL goal <100     Long term current use of insulin (H)     Morbid obesity with BMI of 40.0-44.9, adult (H)     Hematoma - postoperative     Surgery aftercare     Vitamin D deficiency disease     S/P total knee arthroplasty     Type 2 diabetes mellitus without complication (H)     Essential hypertension     Bilateral low back pain without sciatica     Major depressive disorder, recurrent episode, mild (H)     Anxiety     Latex allergy status     Chronic pain syndrome     Intermittent asthma, uncomplicated     Past Surgical History:   Procedure Laterality Date     ARTHROPLASTY KNEE  7/15/2014    Procedure: ARTHROPLASTY KNEE;  Surgeon: Chapin Paul MD;  Location: [facility] OR     BACK SURGERY       C NONSPECIFIC PROCEDURE      laparoscopy x6     C NONSPECIFIC PROCEDURE  93    laparotomy x2 ovarian cyst     C NONSPECIFIC PROCEDURE  0293    oophorectomy lt side - cyst     C NONSPECIFIC PROCEDURE  0395    laminectomy L4-5. S1 herniated disc     C NONSPECIFIC PROCEDURE  96    cholecystectomy     C NONSPECIFIC PROCEDURE      ganglion cysts l wrist, rt palm     C NONSPECIFIC PROCEDURE      cyst removal back x2     C NONSPECIFIC PROCEDURE  10/02    rt thumb fusion, ganglion cyst removal     C NONSPECIFIC PROCEDURE  1/08    remove heel spur, excise exostosis     CHOLECYSTECTOMY       COLONOSCOPY N/A 4/26/2017    Procedure: COLONOSCOPY;  COLONOSCOPY;  Surgeon: Chapin Leal MD;  Location: [facility]     EXCISE LESION LOWER EXTREMITY  11/20/2012    Procedure: EXCISE LESION LOWER EXTREMITY;  EXCISION LIPOMA RIGHT HIP ;  Surgeon: Jazmin Bautista MD;  Location:  [facility]     EXCISE LESION LOWER EXTREMITY  11/23/2012    Procedure: EXCISE LESION LOWER  EXTREMITY;  EXCISE LESION LOWER EXTREMITY  EVACUATE RIGHT HIP HEMATOMA;  Surgeon: Jazmin Bautista MD;  Location: [facility] OR     GYN SURGERY       HC EXPLORATION ANKLE JOINT  1/2006     HC PART EXCIS PLANTAR FASCIA  12/2006     IRRIGATION AND DEBRIDEMENT HIP, COMBINED  12/15/2012    Procedure: COMBINED IRRIGATION AND DEBRIDEMENT HIP;   evacuation hematoma, scar revision right hip;  Surgeon: Jazmin Bautista MD;  Location: [facility] OR     wisdom teeth[         Social History   Substance Use Topics     Smoking status: Never Smoker     Smokeless tobacco: Never Used     Alcohol use 0.0 oz/week     0 Standard drinks or equivalent per week      Comment: 1 drink per year or less     Family History   Problem Relation Age of Onset     Adopted: Yes     Respiratory Brother      1/2 sib     Psychotic Disorder Mother      depression, chronic fatigue     Psychotic Disorder Brother      bipolar     C.A.D. Maternal Grandfather      Breast Cancer Maternal Grandmother      DIABETES Maternal Uncle          Current Outpatient Prescriptions   Medication Sig Dispense Refill     metFORMIN (GLUCOPHAGE) 1000 MG tablet TAKE ONE TABLET BY MOUTH TWICE A DAY WITH MEALS 180 tablet 1     diazepam (VALIUM) 5 MG tablet Take 1 tablet (5 mg) by mouth every 6 hours as needed for anxiety or sleep 30 tablet 0     celecoxib (CELEBREX) 200 MG capsule TAKE ONE CAPSULE BY MOUTH EVERY DAY 90 capsule 1     VICTOZA PEN 18 MG/3ML soln INJECT 1.8MG SUBCUTANEOUSLY DAILY 27 mL 0     BASAGLAR 100 UNIT/ML injection INJECT 14UNITS SUBCUTANEOUSLY EVERY EVENING 15 mL 0     cyclobenzaprine (FLEXERIL) 10 MG tablet Take 0.5-1 tablets (5-10 mg) by mouth 3 times daily as needed for muscle spasms 30 tablet 1     BD VALDO U/F 32G X 4 MM insulin pen needle USE ONE PEN NEEDLE TWICE DAILY (WITH LANTUS AND VICTOZA) 180 each 1     losartan-hydrochlorothiazide (HYZAAR) 100-25 MG per tablet Take 1 tablet by mouth daily 90 tablet 3     oxyCODONE-acetaminophen (PERCOCET) 5-325 MG  per tablet Take 1-2 tablets by mouth every 4 hours as needed for pain 90 tablet 0     LORazepam (ATIVAN) 0.5 MG tablet Take 1-2 tablets (0.5-1 mg) by mouth every 4 hours as needed for anxiety 30 tablet 0     LANsoprazole (PREVACID) 15 MG CR capsule Take 1 capsule (15 mg) by mouth daily Take 30-60 minutes before a meal. 90 capsule 3     hydrocortisone (PROCTOCREAM-HC) 2.5 % cream Apply 2 x daily for not more than 14 days 60 g 5     blood glucose monitoring (ACCU-CHEK GABI) test strip Use to test blood sugars 1-2x daily or as directed. 200 each 3     fluticasone - vilanterol (BREO ELLIPTA) 100-25 MCG/INH oral inhaler Inhale 1 puff into the lungs daily       albuterol (PROAIR HFA/PROVENTIL HFA/VENTOLIN HFA) 108 (90 BASE) MCG/ACT Inhaler Inhale 2 puffs into the lungs every 4 hours as needed for shortness of breath / dyspnea or wheezing 1 Inhaler 1     EPINEPHrine 0.3 MG/0.3ML injection Inject 0.3 mLs (0.3 mg) into the muscle once as needed for anaphylaxis 2 mL 0     aspirin 81 MG tablet Take 1 tablet (81 mg) by mouth daily 30 tablet      traZODone (DESYREL) 50 MG tablet Take 1-2 tablets ( mg) by mouth nightly as needed for sleep 180 tablet 3     KRILL OIL PO Take 1 tablet by mouth       vitamin  B complex with vitamin C (VITAMIN  B COMPLEX) TABS Take 1 tablet by mouth daily  0     Cholecalciferol (VITAMIN D) 2000 UNITS tablet Take 1 tablet by mouth daily 30 tablet      montelukast (SINGULAIR) 10 MG tablet Take 1 tablet (10 mg) by mouth At Bedtime 90 tablet 3     cetirizine (ZYRTEC) 10 MG tablet Take 1 tablet (10 mg) by mouth 2 times daily 30 tablet 1     blood glucose monitoring (NO BRAND SPECIFIED) meter device kit Use to test blood sugar 1-2 times daily or as directed. 1 kit 0     blood glucose monitoring (ACCU-CHEK MULTICLIX) lancets Use to test blood sugar 1-2 times daily or as directed. 2 Box 3     STATIN NOT PRESCRIBED, INTENTIONAL, 1 each 2 times daily Statin not prescribed intentionally due to Refusal by  patient 0 each 0     ORDER FOR DME Equipment being ordered: TENS unit 1 each 0     multivitamin, therapeutic with minerals (THERA-VIT-M) TABS Take 1 tablet by mouth 2 times daily        Allergies   Allergen Reactions     Eggs      Allergy found in testing     Hornets      wasps     Latex Anaphylaxis     hives  -  able to use BP cuff     Lipitor [Hmg-Coa-R Inhibitors] Cramps     Muscle cramps with statin     Metoclopramide Hcl Difficulty breathing     Neurontin [Gabapentin] Hives     Recent Labs   Lab Test  10/10/17   0925  08/30/17   1412 08/02/17 07/06/17   0935  04/13/17   0816   04/18/16   0717   10/02/15   0931   07/15/15   0824  04/13/15   0803   A1C  7.8*   --    --   7.8*  8.4*   < >  8.4*   < >   --    < >  7.8*  8.1*   LDL   --    --    --    --   104*   --   99   --    --    --    --   88   HDL   --    --    --    --   44*   --   39*   --    --    --    --   37*   TRIG   --    --    --    --   92   --   164*   --    --    --    --   173*   ALT   --    --    --    --   26   --   36   --   30   --   29  33   CR   --   0.66  0.78  0.52  0.55   < >  0.62   --   0.64   --   0.58  0.61   GFRESTIMATED   --   >90  >60  >90  Non  GFR Calc    >90  Non  GFR Calc     < >  >90  Non  GFR Calc     --   >90  Non  GFR Calc     --   >90  Non  GFR Calc    >90  Non  GFR Calc     GFRESTBLACK   --   >90  >60  >90  African American GFR Calc    >90   GFR Calc     < >  >90   GFR Calc     --   >90   GFR Calc     --   >90   GFR Calc    >90   GFR Calc     POTASSIUM   --   3.9  3.2*  4.0  3.9   < >  4.2   --   4.4   --   3.9  4.1   TSH   --    --    --   1.14   --    --    --    --    --    --   1.98   --     < > = values in this interval not displayed.      BP Readings from Last 3 Encounters:   10/10/17 112/78   09/22/17 118/70   08/30/17 118/60    Wt Readings  "from Last 3 Encounters:   10/10/17 118.3 kg (260 lb 14.4 oz)   09/22/17 117.9 kg (260 lb)   08/30/17 121.9 kg (268 lb 12.8 oz)            Reviewed and updated as needed this visit by clinical staff     Reviewed and updated as needed this visit by Provider         ROS:  Constitutional, HEENT, cardiovascular, pulmonary, GI, , musculoskeletal, neuro, skin, endocrine and psych systems are negative, except as otherwise noted.      This document serves as a record of the services and decisions personally performed and made by Griselda Yoder MD. It was created on her behalf by Lashanda Christianson, a trained medical scribe. The creation of this document is based the provider's statements to the medical scribe.    Lashanda Christianson October 10, 2017 10:10 AM  OBJECTIVE:   /78 (BP Location: Right arm, Patient Position: Sitting, Cuff Size: Adult Large)  Pulse 84  Temp 96.8  F (36  C) (Tympanic)  Ht 1.702 m (5' 7\")  Wt 118.3 kg (260 lb 14.4 oz)  SpO2 98%  BMI 40.86 kg/m2  Body mass index is 40.86 kg/(m^2).  GENERAL: healthy, alert and no distress  HENT: ear canals and TM's normal, nose and mouth without ulcers or lesions  RESP: lungs clear to auscultation - no rales, rhonchi or wheezes  CV: regular rate and rhythm, normal S1 S2, no S3 or S4, no murmur, click or rub, no peripheral edema   ABDOMEN: soft, nontender, no hepatosplenomegaly, no masses and bowel sounds normal  PSYCH: mentation appears normal, affect normal/bright  Diabetic foot exam: normal DP and PT pulses, no trophic changes or ulcerative lesions, normal sensory exam and normal monofilament exam    ASSESSMENT/PLAN:   (E11.9,  Z79.4) Type 2 diabetes mellitus without complication, with long-term current use of insulin (H)  (primary encounter diagnosis)  -on victoza, basaglar and metformin.  Also on ARB and ASA.  Has refused statin despite discussion of guidelines and need for CV risk reduction.   -- discussed the need to control sugars; goal a1c is under 7; " pt has not been there in a couple of years  -- patient will start checking blood sugars and home and send to me in 2 weeks.  -- Increase basaglar to 16 units   -- discussed increased exercise, in the pool and on the bike  -- patient declined flu shot today   Plan: liraglutide (VICTOZA PEN) 18 MG/3ML soln,         BASAGLAR 100 UNIT/ML injection           (I10) Essential hypertension  -- BP in clinic was normal   -on ARB and hctz, tolerating well.  Renal function normal.  No change to medications     (G47.00) Persistent insomnia  -- patient will discontinue medication when in [place] due to dry mouth  -- discussed there are no other options due to medication interactions  -- advised patient to think about sleep study for GORGE- pt refusing at this time  -- patient will continue with trazadone  Plan: traZODone (DESYREL) 50 MG tablet           (M79.641,  M79.642) Bilateral hand pain  -- on chronic narcotics, has narcotic agreement on file   Plan: oxyCODONE-acetaminophen (PERCOCET) 5-325 MG per        tablet           (J45.20) Intermittent asthma, uncomplicated  -- well controlled currently  -no change to medications   Plan: montelukast (SINGULAIR) 10 MG tablet               Follow up with me via mychart with sugars, as pt going to [place] for 4 months.  Will need a1c while in [place]- pt to call with fax number for lab so that we can send orders.      The information in this document, created by the medical scribe for me, accurately reflects the services I personally performed and the decisions made by me. I have reviewed and approved this document for accuracy prior to leaving the patient care area.  Griselda Yoder MD  [facility]

## 2017-10-10 NOTE — NURSING NOTE
"Chief Complaint   Patient presents with     Diabetes       Initial /78 (BP Location: Right arm, Patient Position: Sitting, Cuff Size: Adult Large)  Pulse 84  Temp 96.8  F (36  C) (Tympanic)  Ht 5' 7\" (1.702 m)  Wt 260 lb 14.4 oz (118.3 kg)  SpO2 98%  BMI 40.86 kg/m2 Estimated body mass index is 40.86 kg/(m^2) as calculated from the following:    Height as of this encounter: 5' 7\" (1.702 m).    Weight as of this encounter: 260 lb 14.4 oz (118.3 kg).  Medication Reconciliation: complete     Janice Castrejon      "

## 2017-10-10 NOTE — PATIENT INSTRUCTIONS
I need you to start checking your sugars at home and  send me you fasting sugars and sugars after eating largest meal for 2 weeks; go up on your [place] and  tell me if your sugars get low.     Exercise regularly 4-5 days a week, do this in the pool and bike on the off days.     While you are on vacation I need you to have your A1C done in 3 months. Send me their fax number and I will send in the order.     You may want to think about having a sleep study done for sleep apnea.     Appointment scheduled for April 10th at 9:00 AM; lab appointment on April 6th at 8:30 AM- you need to be fasting.

## 2017-11-06 ENCOUNTER — TRANSFERRED RECORDS (OUTPATIENT)
Dept: HEALTH INFORMATION MANAGEMENT | Facility: CLINIC | Age: 52
End: 2017-11-06

## 2017-11-09 ENCOUNTER — TRANSFERRED RECORDS (OUTPATIENT)
Dept: HEALTH INFORMATION MANAGEMENT | Facility: CLINIC | Age: 52
End: 2017-11-09

## 2017-11-14 ENCOUNTER — TRANSFERRED RECORDS (OUTPATIENT)
Dept: HEALTH INFORMATION MANAGEMENT | Facility: CLINIC | Age: 52
End: 2017-11-14

## 2017-11-20 ENCOUNTER — TRANSFERRED RECORDS (OUTPATIENT)
Dept: HEALTH INFORMATION MANAGEMENT | Facility: CLINIC | Age: 52
End: 2017-11-20

## 2017-11-27 ENCOUNTER — TRANSFERRED RECORDS (OUTPATIENT)
Dept: HEALTH INFORMATION MANAGEMENT | Facility: CLINIC | Age: 52
End: 2017-11-27

## 2017-12-04 ENCOUNTER — TELEPHONE (OUTPATIENT)
Dept: PHARMACY | Facility: CLINIC | Age: 52
End: 2017-12-04

## 2017-12-04 NOTE — TELEPHONE ENCOUNTER
We have attempted to contact this patient two times to set up a MTM follow up appointment and were unsuccessful. Contact attempts were made via [employer]. We will no longer continue to contact this patient to schedule a visit at this time. Please refer back to MTM if you believe this patient would continue to benefit from our services.     Thank you!    Montse Prieto, PharmD [facility]  Medication Therapy Management Practitioner   #[phone]

## 2017-12-19 DIAGNOSIS — Z79.4 TYPE 2 DIABETES MELLITUS WITHOUT COMPLICATION, WITH LONG-TERM CURRENT USE OF INSULIN (H): ICD-10-CM

## 2017-12-19 DIAGNOSIS — E11.9 TYPE 2 DIABETES MELLITUS WITHOUT COMPLICATION (H): ICD-10-CM

## 2017-12-19 DIAGNOSIS — E11.9 TYPE 2 DIABETES MELLITUS WITHOUT COMPLICATION, WITH LONG-TERM CURRENT USE OF INSULIN (H): ICD-10-CM

## 2017-12-21 RX ORDER — LIRAGLUTIDE 6 MG/ML
INJECTION SUBCUTANEOUS
Qty: 27 ML | Refills: 3 | Status: SHIPPED | OUTPATIENT
Start: 2017-12-21 | End: 2018-06-25

## 2017-12-21 RX ORDER — INSULIN GLARGINE 100 [IU]/ML
INJECTION, SOLUTION SUBCUTANEOUS
Qty: 15 ML | Refills: 3 | Status: SHIPPED | OUTPATIENT
Start: 2017-12-21 | End: 2018-04-25

## 2017-12-21 NOTE — TELEPHONE ENCOUNTER
Requested Prescriptions   Signed Prescriptions Disp Refills     BASAGLAR 100 UNIT/ML injection 15 mL 3     Sig: INJECT 14UNITS SUBCUTANEOUSLY EVERY EVENING    Long Acting Insulin Protocol Passed    12/20/2017 11:35 AM       Passed - Blood pressure less than 140/90 in past 6 months    BP Readings from Last 3 Encounters:   10/10/17 112/78   09/22/17 118/70   08/30/17 118/60            Passed - LDL on file in past 12 months    Recent Labs   Lab Test  04/13/17   0816   LDL  104*            Passed - Microalbumin on file in past 12 months    Recent Labs   Lab Test  04/13/17   0816   MICROL  7   UMALCR  5.93            Passed - Serum creatinine on file in past 12 months    Recent Labs   Lab Test  08/30/17   1412   CR  0.66            Passed - HgbA1C in past 3 or 6 months    Recent Labs   Lab Test  10/10/17   0925   A1C  7.8*            Passed - Patient is age 18 or older       Passed - Recent visit with authorizing provider's specialty in past 6 months    Patient had office visit in the last 6 months or has a visit in the next 30 days with authorizing provider.  See chart review.             VICTOZA PEN 18 MG/3ML soln 27 mL 3     Sig: INJECT 1.8MG SUBCUTANEOUSLY DAILY    GLP-1 Agonists Protocol Passed    12/20/2017 11:35 AM       Passed - Blood pressure less than 140/90 in past 6 months    BP Readings from Last 3 Encounters:   10/10/17 112/78   09/22/17 118/70   08/30/17 118/60          Passed - LDL on file in past 12 months    Recent Labs   Lab Test  04/13/17   0816   LDL  104*            Passed - Microalbumin on file in past 12 months    Recent Labs   Lab Test  04/13/17   0816   MICROL  7   UMALCR  5.93            Passed - HgbA1C in past 3 or 6 months    Recent Labs   Lab Test  10/10/17   0925   A1C  7.8*            Passed - Patient is age 18 or older       Passed - No active pregnancy on record       Passed - A normal serum creatinine on file in past 12 months    Recent Labs   Lab Test  08/30/17   1412   CR  0.66             Passed - No positive pregnancy test in past 12 months       Passed - Recent visit with authorizing provider's specialty in past 6 months    IV to PO - Antibiotics     None              Prescription approved per [facility] Refill Protocol.  Mail order pharmacy requesting.   Nadia Bass, RN  Triage Nurse

## 2018-01-20 ENCOUNTER — HEALTH MAINTENANCE LETTER (OUTPATIENT)
Age: 53
End: 2018-01-20

## 2018-03-13 DIAGNOSIS — E11.9 TYPE 2 DIABETES MELLITUS WITHOUT COMPLICATION (H): ICD-10-CM

## 2018-03-13 NOTE — TELEPHONE ENCOUNTER
Requested Prescriptions   Pending Prescriptions Disp Refills     metFORMIN (GLUCOPHAGE) 1000 MG tablet [Pharmacy Med Name: METFORMIN HCL 1000MG TABS]    Last Written Prescription Date:  10/3/2017  Last Fill Quantity: 180,  # refills: 1   Last office visit: 10/10/2017 with prescribing provider:  Griselda Yoder     Future Office Visit:   Next 5 appointments (look out 90 days)     Apr 10, 2018  9:00 AM CDT   Pre-Op physical with Griselda Yoder MD   [facility]    [address] [place] [address]  [address]  [facility] [address]   [phone]                  180 tablet 0     Sig: TAKE ONE TABLET BY MOUTH TWICE A DAY WITH MEALS    Biguanide Agents Passed    3/13/2018 10:24 AM       Passed - Blood pressure less than 140/90 in past 6 months    BP Readings from Last 3 Encounters:   10/10/17 112/78   09/22/17 118/70   08/30/17 118/60                Passed - Patient has documented LDL within the past 12 mos.    Recent Labs   Lab Test  04/13/17   0816   LDL  104*            Passed - Patient has had a Microalbumin in the past 12 mos.    Recent Labs   Lab Test  04/13/17   0816   MICROL  7   UMALCR  5.93            Passed - Patient is age 10 or older       Passed - Patient has documented A1c within the specified period of time.    Recent Labs   Lab Test  10/10/17   0925   A1C  7.8*            Passed - Patient's CR is NOT>1.4 OR Patient's EGFR is NOT<45 within past 12 mos.    Recent Labs   Lab Test  08/30/17   1412   GFRESTIMATED  >90   GFRESTBLACK  >90       Recent Labs   Lab Test  08/30/17   1412   CR  0.66            Passed - Patient does NOT have a diagnosis of CHF.       Passed - Patient is not pregnant       Passed - Patient has not had a positive pregnancy test within the past 12 mos.        Passed - Recent (6 mo) or future (30 days) visit within the authorizing provider's specialty    Patient had office visit in the last 6 months or has a visit in the next 30 days  "with authorizing provider or within the authorizing provider's specialty.  See \"Patient Info\" tab in inbasket, or \"Choose Columns\" in Meds & Orders section of the refill encounter.              "

## 2018-03-19 NOTE — TELEPHONE ENCOUNTER
Medication is being filled for 1 time refill only due to:  Patient needs to be seen because needs recheck.   Jodi Andrews RN

## 2018-03-25 DIAGNOSIS — G89.29 CHRONIC BILATERAL LOW BACK PAIN WITHOUT SCIATICA: ICD-10-CM

## 2018-03-25 DIAGNOSIS — M54.50 CHRONIC BILATERAL LOW BACK PAIN WITHOUT SCIATICA: ICD-10-CM

## 2018-03-27 NOTE — TELEPHONE ENCOUNTER
Routing refill request to provider for review/approval because:  Labs not current:  Due for cbc and will be due for alt/ast - has appt scheduled for 4/10/18

## 2018-03-28 RX ORDER — CELECOXIB 200 MG/1
CAPSULE ORAL
Qty: 30 CAPSULE | Refills: 0 | Status: SHIPPED | OUTPATIENT
Start: 2018-03-28 | End: 2019-02-12

## 2018-04-06 DIAGNOSIS — E11.9 TYPE 2 DIABETES MELLITUS WITHOUT COMPLICATION, WITH LONG-TERM CURRENT USE OF INSULIN (H): ICD-10-CM

## 2018-04-06 DIAGNOSIS — I10 ESSENTIAL HYPERTENSION: ICD-10-CM

## 2018-04-06 DIAGNOSIS — Z79.4 TYPE 2 DIABETES MELLITUS WITHOUT COMPLICATION, WITH LONG-TERM CURRENT USE OF INSULIN (H): ICD-10-CM

## 2018-04-06 DIAGNOSIS — E78.5 HYPERLIPIDEMIA LDL GOAL <100: ICD-10-CM

## 2018-04-06 LAB
ALBUMIN SERPL-MCNC: 3.4 G/DL (ref 3.4–5)
ALP SERPL-CCNC: 77 U/L (ref 40–150)
ALT SERPL W P-5'-P-CCNC: 27 U/L (ref 0–50)
ANION GAP SERPL CALCULATED.3IONS-SCNC: 8 MMOL/L (ref 3–14)
AST SERPL W P-5'-P-CCNC: 22 U/L (ref 0–45)
BILIRUB SERPL-MCNC: 0.4 MG/DL (ref 0.2–1.3)
BUN SERPL-MCNC: 6 MG/DL (ref 7–30)
CALCIUM SERPL-MCNC: 9 MG/DL (ref 8.5–10.1)
CHLORIDE SERPL-SCNC: 102 MMOL/L (ref 94–109)
CHOLEST SERPL-MCNC: 167 MG/DL
CO2 SERPL-SCNC: 28 MMOL/L (ref 20–32)
CREAT SERPL-MCNC: 0.58 MG/DL (ref 0.52–1.04)
GFR SERPL CREATININE-BSD FRML MDRD: >90 ML/MIN/1.7M2
GLUCOSE SERPL-MCNC: 175 MG/DL (ref 70–99)
HBA1C MFR BLD: 8.5 % (ref 0–6.4)
HDLC SERPL-MCNC: 31 MG/DL
LDLC SERPL CALC-MCNC: 104 MG/DL
NONHDLC SERPL-MCNC: 136 MG/DL
POTASSIUM SERPL-SCNC: 4 MMOL/L (ref 3.4–5.3)
PROT SERPL-MCNC: 7 G/DL (ref 6.8–8.8)
SODIUM SERPL-SCNC: 138 MMOL/L (ref 133–144)
TRIGL SERPL-MCNC: 160 MG/DL

## 2018-04-06 PROCEDURE — 36415 COLL VENOUS BLD VENIPUNCTURE: CPT | Performed by: FAMILY MEDICINE

## 2018-04-06 PROCEDURE — 80061 LIPID PANEL: CPT | Performed by: FAMILY MEDICINE

## 2018-04-06 PROCEDURE — 80053 COMPREHEN METABOLIC PANEL: CPT | Performed by: FAMILY MEDICINE

## 2018-04-06 PROCEDURE — 83036 HEMOGLOBIN GLYCOSYLATED A1C: CPT | Performed by: FAMILY MEDICINE

## 2018-04-11 ENCOUNTER — TRANSFERRED RECORDS (OUTPATIENT)
Dept: HEALTH INFORMATION MANAGEMENT | Facility: CLINIC | Age: 53
End: 2018-04-11

## 2018-04-25 ENCOUNTER — OFFICE VISIT (OUTPATIENT)
Dept: PEDIATRICS | Facility: CLINIC | Age: 53
End: 2018-04-25
Payer: COMMERCIAL

## 2018-04-25 VITALS
HEART RATE: 76 BPM | WEIGHT: 281 LBS | SYSTOLIC BLOOD PRESSURE: 128 MMHG | TEMPERATURE: 98 F | OXYGEN SATURATION: 98 % | BODY MASS INDEX: 44.1 KG/M2 | DIASTOLIC BLOOD PRESSURE: 82 MMHG | HEIGHT: 67 IN

## 2018-04-25 DIAGNOSIS — M79.642 BILATERAL HAND PAIN: ICD-10-CM

## 2018-04-25 DIAGNOSIS — G89.29 CHRONIC BILATERAL LOW BACK PAIN WITHOUT SCIATICA: ICD-10-CM

## 2018-04-25 DIAGNOSIS — M54.50 CHRONIC BILATERAL LOW BACK PAIN WITHOUT SCIATICA: ICD-10-CM

## 2018-04-25 DIAGNOSIS — M79.641 BILATERAL HAND PAIN: ICD-10-CM

## 2018-04-25 DIAGNOSIS — E11.9 TYPE 2 DIABETES MELLITUS WITHOUT COMPLICATION, WITH LONG-TERM CURRENT USE OF INSULIN (H): Primary | ICD-10-CM

## 2018-04-25 DIAGNOSIS — K21.9 GASTROESOPHAGEAL REFLUX DISEASE, ESOPHAGITIS PRESENCE NOT SPECIFIED: ICD-10-CM

## 2018-04-25 DIAGNOSIS — E66.01 MORBID OBESITY WITH BMI OF 40.0-44.9, ADULT (H): ICD-10-CM

## 2018-04-25 DIAGNOSIS — Z12.31 VISIT FOR SCREENING MAMMOGRAM: ICD-10-CM

## 2018-04-25 DIAGNOSIS — K64.4 EXTERNAL HEMORRHOIDS: ICD-10-CM

## 2018-04-25 DIAGNOSIS — Z79.4 TYPE 2 DIABETES MELLITUS WITHOUT COMPLICATION, WITH LONG-TERM CURRENT USE OF INSULIN (H): Primary | ICD-10-CM

## 2018-04-25 DIAGNOSIS — M89.8X8 STERNAL MASS: ICD-10-CM

## 2018-04-25 PROCEDURE — 99214 OFFICE O/P EST MOD 30 MIN: CPT | Performed by: PEDIATRICS

## 2018-04-25 RX ORDER — LANSOPRAZOLE 30 MG/1
30 CAPSULE, DELAYED RELEASE ORAL DAILY
Qty: 90 CAPSULE | Refills: 1 | Status: SHIPPED | OUTPATIENT
Start: 2018-04-25 | End: 2018-10-03

## 2018-04-25 RX ORDER — LIRAGLUTIDE 6 MG/ML
1.8 INJECTION SUBCUTANEOUS DAILY
Qty: 27 ML | Refills: 3 | Status: SHIPPED | OUTPATIENT
Start: 2018-04-25 | End: 2019-04-15

## 2018-04-25 RX ORDER — OXYCODONE AND ACETAMINOPHEN 5; 325 MG/1; MG/1
1-2 TABLET ORAL EVERY 4 HOURS PRN
Qty: 90 TABLET | Refills: 0 | Status: SHIPPED | OUTPATIENT
Start: 2018-04-25 | End: 2018-06-25

## 2018-04-25 RX ORDER — INSULIN GLARGINE 100 [IU]/ML
INJECTION, SOLUTION SUBCUTANEOUS
Qty: 20 ML | Refills: 3 | Status: SHIPPED | OUTPATIENT
Start: 2018-04-25 | End: 2018-06-25

## 2018-04-25 NOTE — PATIENT INSTRUCTIONS
Increase your prevacid dose back to 30mg    Trial of changing basaglar to AM and increase by 2 units every few days to a max of 26 units    Watch for lows - test blood sugars     Refills waiting for you    We meet again in one month

## 2018-04-25 NOTE — MR AVS SNAPSHOT
After Visit Summary   4/25/2018    Johnna Caballero    MRN: [id number]           Patient Information     Date Of Birth          1965        Visit Information        Provider Department      4/25/2018 3:40 PM Linda Durant MD [facility]        Today's Diagnoses     Gastroesophageal reflux disease, esophagitis presence not specified    -  1    External hemorrhoids        Bilateral hand pain        Type 2 diabetes mellitus without complication, with long-term current use of insulin (H)        Chronic bilateral low back pain without sciatica        Visit for screening mammogram        Sternal mass          Care Instructions    Increase your prevacid dose back to 30mg    Trial of changing basaglar to AM and increase by 2 units every few days to a max of 26 units    Watch for lows - test blood sugars     Refills waiting for you    We meet again in one month          Follow-ups after your visit        Your next 10 appointments already scheduled     May 21, 2018  8:00 AM CDT   Pre-Op physical with Linda Durant MD   [facility] ([facility])    [address] [place] [address]  [address]  [facility] [id number] [phone]              Future tests that were ordered for you today     Open Future Orders        Priority Expected Expires Ordered    CT Chest w/o Contrast Routine  4/25/2019 4/25/2018    *MA Screening Digital Bilateral Routine  4/25/2019 4/25/2018            Who to contact     If you have questions or need follow up information about today's clinic visit or your schedule please contact [facility] directly at [phone].  Normal or non-critical lab and imaging results will be communicated to you by MyChart, letter or phone within 4 business days after the clinic has received the results. If you do not hear from us within 7 days, please contact the clinic through MyChart or phone. If you have a critical or abnormal lab result, we will  "notify you by phone as soon as possible.  Submit refill requests through [employer] or call your pharmacy and they will forward the refill request to us. Please allow 3 business days for your refill to be completed.          Additional Information About Your Visit        [facility] Information     [employer] gives you secure access to your electronic health record. If you see a primary care provider, you can also send messages to your care team and make appointments. If you have questions, please call your primary care clinic.  If you do not have a primary care provider, please call [phone] and they will assist you.        Care EveryWhere ID     This is your Care EveryWhere ID. This could be used by other organizations to access your [facility] medical records  [id number]        Your Vitals Were     Pulse Temperature Height Pulse Oximetry BMI (Body Mass Index)       76 98  F (36.7  C) (Tympanic) 5' 7\" (1.702 m) 98% 44.01 kg/m2        Blood Pressure from Last 3 Encounters:   04/25/18 128/82   10/10/17 112/78   09/22/17 118/70    Weight from Last 3 Encounters:   04/25/18 281 lb (127.5 kg)   10/10/17 260 lb 14.4 oz (118.3 kg)   09/22/17 260 lb (117.9 kg)                 Today's Medication Changes          These changes are accurate as of 4/25/18  4:23 PM.  If you have any questions, ask your nurse or doctor.               These medicines have changed or have updated prescriptions.        Dose/Directions    BASAGLAR 100 UNIT/ML injection   This may have changed:  additional instructions   Used for:  Type 2 diabetes mellitus without complication, with long-term current use of insulin (H)   Changed by:  Linda Durant MD        INJECT 26 UNITS subcutaneously every morning   Quantity:  20 mL   Refills:  3       insulin pen needle 32G X 4 MM   Commonly known as:  BD VALDO U/F   This may have changed:  See the new instructions.   Used for:  Type 2 diabetes mellitus without complication, with long-term current use of " insulin (H)   Changed by:  Linda Durant MD        Use two pen needles daily or as directed.   Quantity:  180 each   Refills:  1       LANsoprazole 30 MG CR capsule   Commonly known as:  PREVACID   This may have changed:    - medication strength  - how much to take   Used for:  Gastroesophageal reflux disease, esophagitis presence not specified   Changed by:  Linda Durant MD        Dose:  30 mg   Take 1 capsule (30 mg) by mouth daily Take 30-60 minutes before a meal.   Quantity:  90 capsule   Refills:  1       metFORMIN 1000 MG tablet   Commonly known as:  GLUCOPHAGE   This may have changed:  See the new instructions.   Used for:  Type 2 diabetes mellitus without complication, with long-term current use of insulin (H)   Changed by:  Linda Durant MD        Dose:  1000 mg   Take 1 tablet (1,000 mg) by mouth 2 times daily (with meals)   Quantity:  180 tablet   Refills:  3            Where to get your medicines      These medications were sent to [facility] [place] [facility] [address] [address]  [address] [address], [facility] [address]     Phone:  [phone]     BASAGLAR 100 UNIT/ML injection    blood glucose monitoring test strip    liraglutide 18 MG/3ML soln         These medications were sent to [place] - [place] - [address]  [address] [facility] [address]     Phone:  [phone]     hydrocortisone 2.5 % cream    insulin pen needle 32G X 4 MM    LANsoprazole 30 MG CR capsule    metFORMIN 1000 MG tablet         Some of these will need a paper prescription and others can be bought over the counter.  Ask your nurse if you have questions.     Bring a paper prescription for each of these medications     oxyCODONE-acetaminophen 5-325 MG per tablet               Information about OPIOIDS     PRESCRIPTION OPIOIDS: WHAT YOU NEED TO KNOW   You have a prescription for an opioid (narcotic) pain medicine. Opioids can cause  addiction. If you have a history of chemical dependency of any type, you are at a higher risk of becoming addicted to opioids. Only take this medicine after all other options have been tried. Take it for as short a time and as few doses as possible.     Do not:    Drive. If you drive while taking these medicines, you could be arrested for driving under the influence (DUI).    Operate heavy machinery    Do any other dangerous activities while taking these medicines.     Drink any alcohol while taking these medicines.      Take with any other medicines that contain acetaminophen. Read all labels carefully. Look for the word  acetaminophen  or  Tylenol.  Ask your pharmacist if you have questions or are unsure.    Store your pills in a secure place, locked if possible. We will not replace any lost or stolen medicine. If you don t finish your medicine, please throw away (dispose) as directed by your pharmacist. The [place] [employer]ution [employer] has more information about safe disposal: [email or web address]-medications    All opioids tend to cause constipation. Drink plenty of water and eat foods that have a lot of fiber, such as fruits, vegetables, prune juice, apple juice and high-fiber cereal. Take a laxative (Miralax, milk of magnesia, Colace, Senna) if you don t move your bowels at least every other day.          Primary Care Provider Office Phone # Fax #    Griselda Yoder [phone] [address] [place] [address]        Equal Access to Services     [facility] AH: Hadii eddy rodriguezo Soanshul, waaxda luqadaha, qaybta kaalmada willard, shola campos. So [facility] [phone].    ATENCIÓN: Si habla español, tiene a subramanian disposición servicios gratuitos de asistencia lingüística. Llame al [phone].    We comply with applicable federal civil rights laws and [place] laws. We do not discriminate on the  basis of race, color, national origin, age, disability, sex, sexual orientation, or gender identity.            Thank you!     Thank you for choosing [facility]  for your care. Our goal is always to provide you with excellent care. Hearing back from our patients is one way we can continue to improve our services. Please take a few minutes to complete the written survey that you may receive in the mail after your visit with us. Thank you!             Your Updated Medication List - Protect others around you: Learn how to safely use, store and throw away your medicines at www.disposemymeds.org.          This list is accurate as of 4/25/18  4:23 PM.  Always use your most recent med list.                   Brand Name Dispense Instructions for use Diagnosis    albuterol 108 (90 Base) MCG/ACT Inhaler    PROAIR HFA/PROVENTIL HFA/VENTOLIN HFA    1 Inhaler    Inhale 2 puffs into the lungs every 4 hours as needed for shortness of breath / dyspnea or wheezing    Intermittent asthma, uncomplicated       aspirin 81 MG tablet     30 tablet    Take 1 tablet (81 mg) by mouth daily        BASAGLAR 100 UNIT/ML injection     20 mL    INJECT 26 UNITS subcutaneously every morning    Type 2 diabetes mellitus without complication, with long-term current use of insulin (H)       blood glucose monitoring lancets     2 Box    Use to test blood sugar 1-2 times daily or as directed.    DM (diabetes mellitus), type 2, uncontrolled (H)       blood glucose monitoring meter device kit    no brand specified    1 kit    Use to test blood sugar 1-2 times daily or as directed.    Type 2 diabetes mellitus without complication (H)       blood glucose monitoring test strip    ACCU-CHEK GABI    200 each    Use to test blood sugars 1-2x daily or as directed.    Type 2 diabetes mellitus without complication, with long-term current use of insulin (H)       BREO ELLIPTA 100-25 MCG/INH oral inhaler   Generic drug:  fluticasone-vilanterol       Inhale 1 puff into the lungs daily        celecoxib 200 MG capsule    celeBREX    30 capsule    TAKE ONE CAPSULE BY MOUTH EVERY DAY    Chronic bilateral low back pain without sciatica       cetirizine 10 MG tablet    zyrTEC    30 tablet    Take 1 tablet (10 mg) by mouth 2 times daily        diazepam 5 MG tablet    VALIUM    30 tablet    Take 1 tablet (5 mg) by mouth every 6 hours as needed for anxiety or sleep    Muscle spasm       EPINEPHrine 0.3 MG/0.3ML injection 2-pack    EPIPEN/ADRENACLICK/or ANY BX GENERIC EQUIV    2 mL    Inject 0.3 mLs (0.3 mg) into the muscle once as needed for anaphylaxis    Hornet sting, accidental or unintentional, subsequent encounter       hydrocortisone 2.5 % cream    PROCTOCREAM-HC    60 g    Apply 2 x daily for not more than 14 days    External hemorrhoids       insulin pen needle 32G X 4 MM    BD VALDO U/F    180 each    Use two pen needles daily or as directed.    Type 2 diabetes mellitus without complication, with long-term current use of insulin (H)       KRILL OIL PO      Take 1 tablet by mouth        LANsoprazole 30 MG CR capsule    PREVACID    90 capsule    Take 1 capsule (30 mg) by mouth daily Take 30-60 minutes before a meal.    Gastroesophageal reflux disease, esophagitis presence not specified       LORazepam 0.5 MG tablet    ATIVAN    30 tablet    Take 1-2 tablets (0.5-1 mg) by mouth every 4 hours as needed for anxiety    Anxiety       losartan-hydrochlorothiazide 100-25 MG per tablet    HYZAAR    90 tablet    Take 1 tablet by mouth daily    Essential hypertension       metFORMIN 1000 MG tablet    GLUCOPHAGE    180 tablet    Take 1 tablet (1,000 mg) by mouth 2 times daily (with meals)    Type 2 diabetes mellitus without complication, with long-term current use of insulin (H)       montelukast 10 MG tablet    SINGULAIR    90 tablet    Take 1 tablet (10 mg) by mouth At Bedtime    Intermittent asthma, uncomplicated       multivitamin, therapeutic with minerals Tabs tablet       Take 1 tablet by mouth 2 times daily        order for DME     1 each    Equipment being ordered: TENS unit    Chronic back pain, Bilateral hand pain       oxyCODONE-acetaminophen 5-325 MG per tablet    PERCOCET    90 tablet    Take 1-2 tablets by mouth every 4 hours as needed for pain    Bilateral hand pain       STATIN NOT PRESCRIBED (INTENTIONAL)     0 each    1 each 2 times daily Statin not prescribed intentionally due to Refusal by patient    Hyperlipidemia LDL goal <100       traZODone 50 MG tablet    DESYREL    180 tablet    Take 1-2 tablets ( mg) by mouth nightly as needed for sleep    Persistent insomnia       * VICTOZA PEN 18 MG/3ML soln   Generic drug:  liraglutide     27 mL    INJECT 1.8MG SUBCUTANEOUSLY DAILY    Type 2 diabetes mellitus without complication (H)       * liraglutide 18 MG/3ML soln    VICTOZA PEN    27 mL    Inject 1.8 mg Subcutaneous daily    Type 2 diabetes mellitus without complication, with long-term current use of insulin (H)       vitamin B complex with vitamin C Tabs tablet      Take 1 tablet by mouth daily        vitamin D 2000 units tablet     30 tablet    Take 1 tablet by mouth daily        * Notice:  This list has 2 medication(s) that are the same as other medications prescribed for you. Read the directions carefully, and ask your doctor or other care provider to review them with you.

## 2018-04-25 NOTE — PROGRESS NOTES
SUBJECTIVE:   Johnna Caballero is a 52 year old female who presents to clinic today for the following health issues:      Diabetes Follow-up      Patient is checking blood sugars: not at all    Diabetic concerns: None and other - A1C     Symptoms of hypoglycemia (low blood sugar): about once per month     Paresthesias (numbness or burning in feet) or sores: No     Date of last diabetic eye exam: 06/2017    Poor control, declines to check blood sugars.  Has had a low about once per month in the morning before eating, taking her basaglar at night.      Hyperlipidemia Follow-Up      Rate your low fat/cholesterol diet?: poor    Taking statin?  Yes, no muscle aches from statin    Other lipid medications/supplements?:  none    Hypertension Follow-up      Outpatient blood pressures are not being checked.    Low Salt Diet: no added salt    BP Readings from Last 2 Encounters:   04/25/18 128/82   10/10/17 112/78     Hemoglobin A1C (%)   Date Value   04/06/2018 8.5 (H)   10/10/2017 7.8 (H)     LDL Cholesterol Calculated (mg/dL)   Date Value   04/06/2018 104 (H)   04/13/2017 104 (H)         Problems taking medications regularly: No    Medication side effects: none    Diet: low salt and low fat/cholesterol      S/p knee replacement - difficulty with rehab this winter - slowly improving.  Spent winter in [place] - had hoped to do more swimming/water aerobics, but classes there were full.       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    Chronic hand pain - on percoset - last script in October.  Pain agreement on file.  Also using celebrex.    Anticipating shoulder replacement sometime this year, next surgery is for right arm.    HTN - has been well controlled    Right clavicle has been more pronounced and tender over last year - getting larger slowly over time.  No overlying skin changes.      Problem list and histories reviewed &  "adjusted, as indicated.  Additional history: as documented      Reviewed and updated as needed this visit by clinical staff  Tobacco  Allergies  Meds  Med Hx  Surg Hx  Fam Hx  Soc Hx      Reviewed and updated as needed this visit by Provider         ROS:  Constitutional, endo, cardiovascular, pulmonary, gi and msksystems are negative, except as otherwise noted.    OBJECTIVE:     /82 (BP Location: Right arm, Patient Position: Right side, Cuff Size: Adult Large)  Pulse 76  Temp 98  F (36.7  C) (Tympanic)  Ht 5' 7\" (1.702 m)  Wt 281 lb (127.5 kg)  SpO2 98%  BMI 44.01 kg/m2  Body mass index is 44.01 kg/(m^2).  GENERAL: alert, no distress and obese  CHEST: right sternoclavicular joint enlarged and tender with fullness in anterior right chest, no palpable supraclavicular or cervical nodes  RESP: lungs clear to auscultation - no rales, rhonchi or wheezes  CV: regular rate and rhythm, normal S1 S2, no S3 or S4, no murmur, click or rub, no peripheral edema and peripheral pulses strong  Diabetic foot exam: normal DP and PT pulses, no trophic changes or ulcerative lesions and reduced sensation over ball of right foot (after back surgery)    Diagnostic Test Results:  Results for orders placed or performed in visit on 04/06/18   Hemoglobin A1c   Result Value Ref Range    Hemoglobin A1C 8.5 (H) 0 - 6.4 %   **Lipid panel reflex to direct LDL FUTURE 1yr   Result Value Ref Range    Cholesterol 167 <200 mg/dL    Triglycerides 160 (H) <150 mg/dL    HDL Cholesterol 31 (L) >49 mg/dL    LDL Cholesterol Calculated 104 (H) <100 mg/dL    Non HDL Cholesterol 136 (H) <130 mg/dL   Comprehensive metabolic panel   Result Value Ref Range    Sodium 138 133 - 144 mmol/L    Potassium 4.0 3.4 - 5.3 mmol/L    Chloride 102 94 - 109 mmol/L    Carbon Dioxide 28 20 - 32 mmol/L    Anion Gap 8 3 - 14 mmol/L    Glucose 175 (H) 70 - 99 mg/dL    Urea Nitrogen 6 (L) 7 - 30 mg/dL    Creatinine 0.58 0.52 - 1.04 mg/dL    GFR Estimate >90 >60 " mL/min/1.7m2    GFR Estimate If Black >90 >60 mL/min/1.7m2    Calcium 9.0 8.5 - 10.1 mg/dL    Bilirubin Total 0.4 0.2 - 1.3 mg/dL    Albumin 3.4 3.4 - 5.0 g/dL    Protein Total 7.0 6.8 - 8.8 g/dL    Alkaline Phosphatase 77 40 - 150 U/L    ALT 27 0 - 50 U/L    AST 22 0 - 45 U/L       ASSESSMENT/PLAN:         ICD-10-CM    1. Type 2 diabetes mellitus without complication, with long-term current use of insulin (H) E11.9 insulin pen needle (BD VALDO U/F) 32G X 4 MM    Z79.4 metFORMIN (GLUCOPHAGE) 1000 MG tablet     BASAGLAR 100 UNIT/ML injection     liraglutide (VICTOZA PEN) 18 MG/3ML soln     blood glucose monitoring (ACCU-CHEK GABI) test strip    Reviewed importance of testing blood sugars.  Plan increase in basaglar by 2 units every few days to max of 26 units and transition of basaglar to morning dosing based on pattern of lows.  Follow up with me in 1 month (for pre-op).  Continue metformin and victoza.    2. Morbid obesity with BMI of 40.0-44.9, adult (H) E66.01 Weight up over winter - reviewed weight graphs.  Encouraged exercise.    Z68.41    3. Chronic bilateral low back pain without sciatica M54.5 Using percoset intermittently, pain agreement in place    G89.29    4. Bilateral hand pain M79.641 oxyCODONE-acetaminophen (PERCOCET) 5-325 MG per tablet  See above    M79.642    5. Gastroesophageal reflux disease, esophagitis presence not specified K21.9 LANsoprazole (PREVACID) 30 MG CR capsule  Worsening symptoms with weight gain - increase back to 30mg dosing.  Follow up in 1 month - sooner with worsening   6. Visit for screening mammogram Z12.31 *MA Screening Digital Bilateral   7. Sternal mass M89.9 CT Chest w/o Contrast  Plan imaging, most suspicious for arthritis of sternoclavicular joint, with numerous endochondromas, image for this or other mass.   8. External hemorrhoids K64.4 hydrocortisone (PROCTOCREAM-HC) 2.5 % cream       See Patient Instructions    Linda Durant MD  [facility]

## 2018-05-21 ENCOUNTER — RADIANT APPOINTMENT (OUTPATIENT)
Dept: MAMMOGRAPHY | Facility: CLINIC | Age: 53
End: 2018-05-21
Attending: PEDIATRICS
Payer: COMMERCIAL

## 2018-05-21 ENCOUNTER — OFFICE VISIT (OUTPATIENT)
Dept: PEDIATRICS | Facility: CLINIC | Age: 53
End: 2018-05-21
Payer: COMMERCIAL

## 2018-05-21 VITALS
BODY MASS INDEX: 44.1 KG/M2 | TEMPERATURE: 97.6 F | HEART RATE: 79 BPM | WEIGHT: 281 LBS | HEIGHT: 67 IN | OXYGEN SATURATION: 97 % | DIASTOLIC BLOOD PRESSURE: 74 MMHG | SYSTOLIC BLOOD PRESSURE: 124 MMHG

## 2018-05-21 DIAGNOSIS — J01.10 ACUTE NON-RECURRENT FRONTAL SINUSITIS: ICD-10-CM

## 2018-05-21 DIAGNOSIS — E66.01 MORBID OBESITY WITH BMI OF 40.0-44.9, ADULT (H): ICD-10-CM

## 2018-05-21 DIAGNOSIS — Z12.31 VISIT FOR SCREENING MAMMOGRAM: ICD-10-CM

## 2018-05-21 DIAGNOSIS — Z79.4 TYPE 2 DIABETES MELLITUS WITHOUT COMPLICATION, WITH LONG-TERM CURRENT USE OF INSULIN (H): ICD-10-CM

## 2018-05-21 DIAGNOSIS — I10 ESSENTIAL HYPERTENSION: ICD-10-CM

## 2018-05-21 DIAGNOSIS — Z01.818 PREOP GENERAL PHYSICAL EXAM: Primary | ICD-10-CM

## 2018-05-21 DIAGNOSIS — E11.9 TYPE 2 DIABETES MELLITUS WITHOUT COMPLICATION, WITH LONG-TERM CURRENT USE OF INSULIN (H): ICD-10-CM

## 2018-05-21 DIAGNOSIS — Z91.040 LATEX ALLERGY STATUS: ICD-10-CM

## 2018-05-21 DIAGNOSIS — J45.20 INTERMITTENT ASTHMA, UNCOMPLICATED: ICD-10-CM

## 2018-05-21 DIAGNOSIS — F33.0 MAJOR DEPRESSIVE DISORDER, RECURRENT EPISODE, MILD (H): ICD-10-CM

## 2018-05-21 DIAGNOSIS — M77.8 RIGHT ELBOW TENDONITIS: ICD-10-CM

## 2018-05-21 DIAGNOSIS — G56.21 ULNAR NERVE ENTRAPMENT, RIGHT: ICD-10-CM

## 2018-05-21 LAB
ANION GAP SERPL CALCULATED.3IONS-SCNC: 10 MMOL/L (ref 3–14)
BUN SERPL-MCNC: 10 MG/DL (ref 7–30)
CALCIUM SERPL-MCNC: 8.8 MG/DL (ref 8.5–10.1)
CHLORIDE SERPL-SCNC: 100 MMOL/L (ref 94–109)
CO2 SERPL-SCNC: 25 MMOL/L (ref 20–32)
CREAT SERPL-MCNC: 0.69 MG/DL (ref 0.52–1.04)
GFR SERPL CREATININE-BSD FRML MDRD: 89 ML/MIN/1.7M2
GLUCOSE SERPL-MCNC: 238 MG/DL (ref 70–99)
HBA1C MFR BLD: 9 % (ref 0–5.6)
POTASSIUM SERPL-SCNC: 3.6 MMOL/L (ref 3.4–5.3)
SODIUM SERPL-SCNC: 135 MMOL/L (ref 133–144)

## 2018-05-21 PROCEDURE — 77067 SCR MAMMO BI INCL CAD: CPT | Mod: TC

## 2018-05-21 PROCEDURE — 99215 OFFICE O/P EST HI 40 MIN: CPT | Performed by: PEDIATRICS

## 2018-05-21 PROCEDURE — 80048 BASIC METABOLIC PNL TOTAL CA: CPT | Performed by: PEDIATRICS

## 2018-05-21 PROCEDURE — 36415 COLL VENOUS BLD VENIPUNCTURE: CPT | Performed by: PEDIATRICS

## 2018-05-21 PROCEDURE — 83036 HEMOGLOBIN GLYCOSYLATED A1C: CPT | Performed by: PEDIATRICS

## 2018-05-21 NOTE — MR AVS SNAPSHOT
After Visit Summary   5/21/2018    Johnna Caballero    MRN: [id number]           Patient Information     Date Of Birth          1965        Visit Information        Provider Department      5/21/2018 8:00 AM Linda Durant MD [facility]'s Diagnoses     Preop general physical exam    -  1    Ulnar nerve entrapment, right        Right elbow tendonitis        Type 2 diabetes mellitus without complication, with long-term current use of insulin (H)        Essential hypertension        Acute non-recurrent frontal sinusitis          Care Instructions    Stop celebrex (check with [place]), ibuprofen, advil, naproxyn, aleve and aspirin 7 days prior to surgery     Hold Losartan-HCTZ, oxycodone, lorazepam, metformin, victoza and all vitamins the day of surgery.     Change your basaglar dose to 20 units    Labs today    Prescription for augmentin sent to your pharmacy - start if your sinuses are not better or are worse in the next few days    Restart your breo    Before Your Surgery      Call your surgeon if there is any change in your health. This includes signs of a cold or flu (such as a sore throat, runny nose, cough, rash or fever).    Do not smoke, drink alcohol or take over the counter medicine (unless your surgeon or primary care doctor tells you to) for the 24 hours before and after surgery.    If you take prescribed drugs: Follow your doctor s orders about which medicines to take and which to stop until after surgery.    Eating and drinking prior to surgery: follow the instructions from your surgeon    Take a shower or bath the night before surgery. Use the soap your surgeon gave you to gently clean your skin. If you do not have soap from your surgeon, use your regular soap. Do not shave or scrub the surgery site.  Wear clean pajamas and have clean sheets on your bed.           Follow-ups after your visit        Your next 10 appointments already scheduled     May 21,  "2018  9:00 AM CDT   MA SCREENING DIGITAL BILATERAL with EAMA1   [facility] ([facility])    [address] ,[address]  [facility] [address] [phone]           Do not use any powder, lotion or deodorant under your arms or on your breast. If you do, we will ask you to remove it before your exam.  Wear comfortable, two-piece clothing.  If you have any allergies, tell your care team.  Bring any previous mammograms from other facilities or have them mailed to the breast center. Three-dimensional (3D) mammograms are available at [place] locations in [facility], [place], [place], [facility], [place], [place], and [place]. [facility] locations include [place] and [facility] in [place]. Benefits of 3D mammograms include: - Improved rate of cancer detection - Decreases your chance of having to go back for more tests, which means fewer: - \"False-positive\" results (This means that there is an abnormal area but it isn't cancer.) - Invasive testing procedures, such as a biopsy or surgery - Can provide clearer images of the breast if you have dense breast tissue. 3D mammography is an optional exam that anyone can have with a 2D mammogram. It doesn't replace or take the place of a 2D mammogram. 2D mammograms remain an effective screening test for all women.  Not all insurance companies cover the cost of a 3D mammogram. Check with your insurance.            May 21, 2018  2:00 PM CDT   CT CHEST W/O CONTRAST with RSCCCT1   [facility] ([facility])    [address]  [facility] [id number] [phone]           Please bring any scans or X-rays taken at other hospitals, if similar tests were done. Also bring a list of your medicines, including vitamins, minerals and over-the-counter drugs. It is safest to leave personal items at home.  Be sure to tell your doctor:   If you have any allergies.   If " "there s any chance you are pregnant.   If you are breastfeeding.  You do not need to do anything special to prepare for this exam.  Please wear loose clothing, such as a sweat suit or jogging clothes. Avoid snaps, zippers and other metal. We may ask you to undress and put on a hospital gown.              Who to contact     If you have questions or need follow up information about today's clinic visit or your schedule please contact [facility] directly at [phone].  Normal or non-critical lab and imaging results will be communicated to you by qcuehart, letter or phone within 4 business days after the clinic has received the results. If you do not hear from us within 7 days, please contact the clinic through [employer] or phone. If you have a critical or abnormal lab result, we will notify you by phone as soon as possible.  Submit refill requests through [employer] or call your pharmacy and they will forward the refill request to us. Please allow 3 business days for your refill to be completed.          Additional Information About Your Visit        Medicast Information     [employer]ast gives you secure access to your electronic health record. If you see a primary care provider, you can also send messages to your care team and make appointments. If you have questions, please call your primary care clinic.  If you do not have a primary care provider, please call [phone] and they will assist you.        Care EveryWhere ID     This is your Care EveryWhere ID. This could be used by other organizations to access your [facility] medical records  [id number]        Your Vitals Were     Pulse Temperature Height Pulse Oximetry BMI (Body Mass Index)       79 97.6  F (36.4  C) (Tympanic) 5' 7\" (1.702 m) 97% 44.01 kg/m2        Blood Pressure from Last 3 Encounters:   05/21/18 124/74   04/25/18 128/82   10/10/17 112/78    Weight from Last 3 Encounters:   05/21/18 281 lb (127.5 kg)   04/25/18 281 lb (127.5 kg)   10/10/17 260 " lb 14.4 oz (118.3 kg)              We Performed the Following     Basic metabolic panel     Hemoglobin A1c          Today's Medication Changes          These changes are accurate as of 5/21/18  8:35 AM.  If you have any questions, ask your nurse or doctor.               Start taking these medicines.        Dose/Directions    amoxicillin-clavulanate 875-125 MG per tablet   Commonly known as:  AUGMENTIN   Used for:  Acute non-recurrent frontal sinusitis   Started by:  Linda Durant MD        Dose:  1 tablet   Take 1 tablet by mouth 2 times daily   Quantity:  20 tablet   Refills:  0            Where to get your medicines      These medications were sent to [facility], [place] - [address]  [address] [address]     Phone:  [phone]     amoxicillin-clavulanate 875-125 MG per tablet                Primary Care Provider Office Phone # Fax #    Linda Durant [phone] [address] [place] [address]        Equal Access to Services     [facility] AH: Hadii aad ku hadasho Soomaali, waaxda luqadaha, qaybta kaalmada adeegyada, waxay idiin hayaan adeeg kharacristian la'colleen . So [facility] [phone].    ATENCIÓN: Si habla español, tiene a subramanian disposición servicios gratuitos de asistencia lingüística. [facility] [phone].    We comply with applicable federal civil rights laws and [place] laws. We do not discriminate on the basis of race, color, national origin, age, disability, sex, sexual orientation, or gender identity.            Thank you!     Thank you for choosing [facility]  for your care. Our goal is always to provide you with excellent care. Hearing back from our patients is one way we can continue to improve our services. Please take a few minutes to complete the written survey that you may receive in the mail after your visit with us. Thank you!             Your Updated Medication List - Protect others  around you: Learn how to safely use, store and throw away your medicines at www.disposemymeds.org.          This list is accurate as of 5/21/18  8:35 AM.  Always use your most recent med list.                   Brand Name Dispense Instructions for use Diagnosis    albuterol 108 (90 Base) MCG/ACT Inhaler    PROAIR HFA/PROVENTIL HFA/VENTOLIN HFA    1 Inhaler    Inhale 2 puffs into the lungs every 4 hours as needed for shortness of breath / dyspnea or wheezing    Intermittent asthma, uncomplicated       amoxicillin-clavulanate 875-125 MG per tablet    AUGMENTIN    20 tablet    Take 1 tablet by mouth 2 times daily    Acute non-recurrent frontal sinusitis       aspirin 81 MG tablet     30 tablet    Take 1 tablet (81 mg) by mouth daily        BASAGLAR 100 UNIT/ML injection     20 mL    INJECT 26 UNITS subcutaneously every morning    Type 2 diabetes mellitus without complication, with long-term current use of insulin (H)       blood glucose monitoring lancets     2 Box    Use to test blood sugar 1-2 times daily or as directed.    DM (diabetes mellitus), type 2, uncontrolled (H)       blood glucose monitoring meter device kit    no brand specified    1 kit    Use to test blood sugar 1-2 times daily or as directed.    Type 2 diabetes mellitus without complication (H)       blood glucose monitoring test strip    ACCU-CHEK GABI    200 each    Use to test blood sugars 1-2x daily or as directed.    Type 2 diabetes mellitus without complication, with long-term current use of insulin (H)       BREO ELLIPTA 100-25 MCG/INH oral inhaler   Generic drug:  fluticasone-vilanterol      Inhale 1 puff into the lungs daily        celecoxib 200 MG capsule    celeBREX    30 capsule    TAKE ONE CAPSULE BY MOUTH EVERY DAY    Chronic bilateral low back pain without sciatica       cetirizine 10 MG tablet    zyrTEC    30 tablet    Take 1 tablet (10 mg) by mouth 2 times daily        diazepam 5 MG tablet    VALIUM    30 tablet    Take 1 tablet (5  mg) by mouth every 6 hours as needed for anxiety or sleep    Muscle spasm       EPINEPHrine 0.3 MG/0.3ML injection 2-pack    EPIPEN/ADRENACLICK/or ANY BX GENERIC EQUIV    2 mL    Inject 0.3 mLs (0.3 mg) into the muscle once as needed for anaphylaxis    Hornet sting, accidental or unintentional, subsequent encounter       hydrocortisone 2.5 % cream    PROCTOCREAM-HC    60 g    Apply 2 x daily for not more than 14 days    External hemorrhoids       insulin pen needle 32G X 4 MM    BD VALDO U/F    180 each    Use two pen needles daily or as directed.    Type 2 diabetes mellitus without complication, with long-term current use of insulin (H)       KRILL OIL PO      Take 1 tablet by mouth        LANsoprazole 30 MG CR capsule    PREVACID    90 capsule    Take 1 capsule (30 mg) by mouth daily Take 30-60 minutes before a meal.    Gastroesophageal reflux disease, esophagitis presence not specified       LORazepam 0.5 MG tablet    ATIVAN    30 tablet    Take 1-2 tablets (0.5-1 mg) by mouth every 4 hours as needed for anxiety    Anxiety       losartan-hydrochlorothiazide 100-25 MG per tablet    HYZAAR    90 tablet    Take 1 tablet by mouth daily    Essential hypertension       metFORMIN 1000 MG tablet    GLUCOPHAGE    180 tablet    Take 1 tablet (1,000 mg) by mouth 2 times daily (with meals)    Type 2 diabetes mellitus without complication, with long-term current use of insulin (H)       montelukast 10 MG tablet    SINGULAIR    90 tablet    Take 1 tablet (10 mg) by mouth At Bedtime    Intermittent asthma, uncomplicated       multivitamin, therapeutic with minerals Tabs tablet      Take 1 tablet by mouth 2 times daily        order for DME     1 each    Equipment being ordered: TENS unit    Chronic back pain, Bilateral hand pain       oxyCODONE-acetaminophen 5-325 MG per tablet    PERCOCET    90 tablet    Take 1-2 tablets by mouth every 4 hours as needed for pain    Bilateral hand pain       STATIN NOT PRESCRIBED (INTENTIONAL)      0 each    1 each 2 times daily Statin not prescribed intentionally due to Refusal by patient    Hyperlipidemia LDL goal <100       traZODone 50 MG tablet    DESYREL    180 tablet    Take 1-2 tablets ( mg) by mouth nightly as needed for sleep    Persistent insomnia       * VICTOZA PEN 18 MG/3ML soln   Generic drug:  liraglutide     27 mL    INJECT 1.8MG SUBCUTANEOUSLY DAILY    Type 2 diabetes mellitus without complication (H)       * liraglutide 18 MG/3ML soln    VICTOZA PEN    27 mL    Inject 1.8 mg Subcutaneous daily    Type 2 diabetes mellitus without complication, with long-term current use of insulin (H)       vitamin B complex with vitamin C Tabs tablet      Take 1 tablet by mouth daily        vitamin D 2000 units tablet     30 tablet    Take 1 tablet by mouth daily        * Notice:  This list has 2 medication(s) that are the same as other medications prescribed for you. Read the directions carefully, and ask your doctor or other care provider to review them with you.

## 2018-05-21 NOTE — PROGRESS NOTES
[facility]  [address]  [address]  [place] [address]  [phone]  Dept: [phone]    PRE-OP EVALUATION:  Today's date: 2018    Johnna Caballero (: 1965) presents for pre-operative evaluation assessment as requested by Dr. Jean Akhtar.  She requires evaluation and anesthesia risk assessment prior to undergoing surgery/procedure for treatment of Right Elbow chronic injury, nerve entrapment.    Fax number for surgical facility: ([facility])   Primary Physician: Linda Durant  Type of Anesthesia Anticipated: to be determined    Patient has a Health Care Directive or Living Will:  YES on file     Preop Questions 2018   Who is doing your surgery? jean akhtar   What are you having done? elbow clean out   Date of Surgery/Procedure: [id number]   Facility or Hospital where procedure/surgery will be performed: [facility]   1.  Do you have a history of Heart attack, stroke, stent, coronary bypass surgery, or other heart surgery? No   2.  Do you ever have any pain or discomfort in your chest? No   3.  Do you have a history of  Heart Failure? No   4.   Are you troubled by shortness of breath when:  walking on a level surface, or up a slight hill, or at night? No   5.  Do you currently have a cold, bronchitis or other respiratory infection? YES - current cold    6.  Do you have a cough, shortness of breath, or wheezing? YES - cough    7.  Do you sometimes get pains in the calves of your legs when you walk? No   8. Do you or anyone in your family have previous history of blood clots? No   9.  Do you or does anyone in your family have a serious bleeding problem such as prolonged bleeding following surgeries or cuts? No   10. Have you ever had problems with anemia or been told to take iron pills? No   11. Have you had any abnormal blood loss such as black, tarry or bloody stools, or abnormal vaginal bleeding? No   12. Have you ever had a blood transfusion? No    13. Have you or any of your relatives ever had problems with anesthesia? No   14. Do you have sleep apnea, excessive snoring or daytime drowsiness? No   15. Do you have any prosthetic heart valves? No   16. Do you have prosthetic joints? YES - bilateral knees    17. Is there any chance that you may be pregnant? No         HPI:     HPI related to upcoming procedure: right elbow tendonitis and ulnar nerve entrapment requiring surgical management.      DIABETES - Patient has a longstanding history of DiabetesType Type II . Patient is being treated with oral agents, insulin,  and GLP-1 agonist and denies significant side effects. Control has been fair.  A1c 4/2018 was 8.5%.  No recent lows.   Increased dose of basaglar at last visit.  Infrequent blood sugar checks at home.                                                                                                           .  HYPERTENSION - Patient has longstanding history of HTN , currently denies any symptoms referable to elevated blood pressure. Specifically denies chest pain, palpitations, dyspnea, orthopnea, PND or peripheral edema. Blood pressure readings have been in normal range. Current medication regimen is as listed below. Patient denies any side effects of medication.                                                                                                                          .  ASTHMA - Patient has a longstanding history of mild Asthma . Patient has been doing well overall  and continues on medication regimen consisting of albuterol, breo without adverse reactions or side effects.                                                                                                                                               .   Chronic pain, on oxycodone at low dose for joint pain.    URI symptoms over the last 5 days - nasal congestion and sinus pressure, cough - productive.  Feeling feverish.  No difficulty with breathing or wheezing    See  problem list for active medical problems.  Problems all longstanding and stable, except as noted/documented.  See ROS for pertinent symptoms related to these conditions.                                                                                                                                                          .    MEDICAL HISTORY:     Patient Active Problem List    Diagnosis Date Noted     Chronic pain syndrome 11/23/2016     Priority: High     Patient is followed by Griselda Yoder MD for ongoing prescription of pain medication.  All refills should be approved by this provider, or covering partner.    Medication(s): percocet 5/325.   Maximum quantity per month: 90  Clinic visit frequency required: Q 6  months     Controlled substance agreement:  Encounter-Level CSA:     There are no encounter-level csa.        Pain Clinic evaluation in the past: No    DIRE Total Score(s):  No flowsheet data found.    Last [facility] website verification:  none   [email or web address]         Type 2 diabetes mellitus without complication (H) 10/02/2015     Priority: High     Hematoma - postoperative 11/23/2012     Priority: High     Problem list name updated by automated process. Provider to review and confirm       Hyperlipidemia LDL goal <100 02/10/2010     Priority: High     Intermittent asthma, uncomplicated 12/28/2016     Priority: Medium     Latex allergy status 10/03/2016     Priority: Medium     Anxiety 05/10/2016     Priority: Medium     Major depressive disorder, recurrent episode, mild (H) 01/05/2016     Priority: Medium     Typically seasonal     celexa- low libido  wellbutrin- didn't work   paxil- didn't work        Essential hypertension 10/04/2015     Priority: Medium     S/P total knee arthroplasty 07/15/2014     Priority: Medium     Morbid obesity with BMI of 40.0-44.9, adult (H) 09/18/2012     Priority: Medium     Allergic rhinitis 03/25/2003     Priority: Medium     Problem list name updated  by automated process. Provider to review       Bilateral low back pain without sciatica 10/25/2015     Priority: Low     Vitamin D deficiency disease 03/06/2013     Priority: Low     Surgery aftercare 12/16/2012     Priority: Low     IMO update changed this record. Please review for accuracy       Long term current use of insulin (H) 09/18/2012     Priority: Low     Esophageal reflux 03/25/2003     Priority: Low      Past Medical History:   Diagnosis Date     Actinic keratosis      Allergic rhinitis, cause unspecified      Anemia      Arthritis      Asthma     no meds x2 yrs     chronic back pain      Chronic infection     MRSA-nasal     Chronic pain     back     Esophageal reflux      fibrocystic breast changes      Gastro-oesophageal reflux disease      Heart murmur     mitral insuffiency     Hx of Fen-Phen use      Hypertension      migraines      Mild intermittent asthma     rare, with dog or exercise     Moderate major depression (H) 3/28/2012     MRSA (methicillin resistant Staphylococcus aureus) 6/27/2014    Nares     Need for desensitization to allergens      Obesity, unspecified      Other and unspecified hyperlipidemia      Temporomandibular joint disorders, unspecified      Thrombosis of leg     incisional area right hip     Type II or unspecified type diabetes mellitus without mention of complication, not stated as uncontrolled      Past Surgical History:   Procedure Laterality Date     ARTHROPLASTY KNEE  7/15/2014    Procedure: ARTHROPLASTY KNEE;  Surgeon: Chapin Paul MD;  Location: [facility] OR     BACK SURGERY       C NONSPECIFIC PROCEDURE      laparoscopy x6     C NONSPECIFIC PROCEDURE  93    laparotomy x2 ovarian cyst     C NONSPECIFIC PROCEDURE  0293    oophorectomy lt side - cyst     C NONSPECIFIC PROCEDURE  0395    laminectomy L4-5. S1 herniated disc     C NONSPECIFIC PROCEDURE  96    cholecystectomy     C NONSPECIFIC PROCEDURE      ganglion cysts l wrist, rt palm     C NONSPECIFIC PROCEDURE       cyst removal back x2     C NONSPECIFIC PROCEDURE  10/02    rt thumb fusion, ganglion cyst removal     C NONSPECIFIC PROCEDURE  1/08    remove heel spur, excise exostosis     CHOLECYSTECTOMY       COLONOSCOPY N/A 4/26/2017    Procedure: COLONOSCOPY;  COLONOSCOPY;  Surgeon: Chapin Leal MD;  Location: [facility] GI     EXCISE LESION LOWER EXTREMITY  11/20/2012    Procedure: EXCISE LESION LOWER EXTREMITY;  EXCISION LIPOMA RIGHT HIP ;  Surgeon: Jazmin Bautista MD;  Location: [facility]     EXCISE LESION LOWER EXTREMITY  11/23/2012    Procedure: EXCISE LESION LOWER EXTREMITY;  EXCISE LESION LOWER EXTREMITY  EVACUATE RIGHT HIP HEMATOMA;  Surgeon: Jazmin Bautista MD;  Location: [facility] OR     GYN SURGERY       HC EXPLORATION ANKLE JOINT  1/2006     HC PART EXCIS PLANTAR FASCIA  12/2006     IRRIGATION AND DEBRIDEMENT HIP, COMBINED  12/15/2012    Procedure: COMBINED IRRIGATION AND DEBRIDEMENT HIP;   evacuation hematoma, scar revision right hip;  Surgeon: Jazmin Bautista MD;  Location: [facility] OR     wisdom teeth[       Current Outpatient Prescriptions   Medication Sig Dispense Refill     albuterol (PROAIR HFA/PROVENTIL HFA/VENTOLIN HFA) 108 (90 BASE) MCG/ACT Inhaler Inhale 2 puffs into the lungs every 4 hours as needed for shortness of breath / dyspnea or wheezing 1 Inhaler 1     amoxicillin-clavulanate (AUGMENTIN) 875-125 MG per tablet Take 1 tablet by mouth 2 times daily 20 tablet 0     aspirin 81 MG tablet Take 1 tablet (81 mg) by mouth daily 30 tablet      BASAGLAR 100 UNIT/ML injection INJECT 26 UNITS subcutaneously every morning 20 mL 3     blood glucose monitoring (ACCU-CHEK GABI) test strip Use to test blood sugars 1-2x daily or as directed. 200 each 3     blood glucose monitoring (ACCU-CHEK MULTICLIX) lancets Use to test blood sugar 1-2 times daily or as directed. 2 Box 3     blood glucose monitoring (NO BRAND SPECIFIED) meter device kit Use to test blood sugar 1-2 times daily or as directed. 1 kit 0      celecoxib (CELEBREX) 200 MG capsule TAKE ONE CAPSULE BY MOUTH EVERY DAY 30 capsule 0     cetirizine (ZYRTEC) 10 MG tablet Take 1 tablet (10 mg) by mouth 2 times daily 30 tablet 1     Cholecalciferol (VITAMIN D) 2000 UNITS tablet Take 1 tablet by mouth daily 30 tablet      diazepam (VALIUM) 5 MG tablet Take 1 tablet (5 mg) by mouth every 6 hours as needed for anxiety or sleep 30 tablet 0     EPINEPHrine 0.3 MG/0.3ML injection Inject 0.3 mLs (0.3 mg) into the muscle once as needed for anaphylaxis 2 mL 0     fluticasone - vilanterol (BREO ELLIPTA) 100-25 MCG/INH oral inhaler Inhale 1 puff into the lungs daily       hydrocortisone (PROCTOCREAM-HC) 2.5 % cream Apply 2 x daily for not more than 14 days 60 g 5     insulin pen needle (BD VALDO U/F) 32G X 4 MM Use two pen needles daily or as directed. 180 each 1     KRILL OIL PO Take 1 tablet by mouth       LANsoprazole (PREVACID) 30 MG CR capsule Take 1 capsule (30 mg) by mouth daily Take 30-60 minutes before a meal. 90 capsule 1     liraglutide (VICTOZA PEN) 18 MG/3ML soln Inject 1.8 mg Subcutaneous daily 27 mL 3     LORazepam (ATIVAN) 0.5 MG tablet Take 1-2 tablets (0.5-1 mg) by mouth every 4 hours as needed for anxiety 30 tablet 0     losartan-hydrochlorothiazide (HYZAAR) 100-25 MG per tablet Take 1 tablet by mouth daily 90 tablet 3     metFORMIN (GLUCOPHAGE) 1000 MG tablet Take 1 tablet (1,000 mg) by mouth 2 times daily (with meals) 180 tablet 3     montelukast (SINGULAIR) 10 MG tablet Take 1 tablet (10 mg) by mouth At Bedtime 90 tablet 3     multivitamin, therapeutic with minerals (THERA-VIT-M) TABS Take 1 tablet by mouth 2 times daily        ORDER FOR DME Equipment being ordered: TENS unit 1 each 0     oxyCODONE-acetaminophen (PERCOCET) 5-325 MG per tablet Take 1-2 tablets by mouth every 4 hours as needed for pain 90 tablet 0     STATIN NOT PRESCRIBED, INTENTIONAL, 1 each 2 times daily Statin not prescribed intentionally due to Refusal by patient 0 each 0     traZODone  "(DESYREL) 50 MG tablet Take 1-2 tablets ( mg) by mouth nightly as needed for sleep 180 tablet 3     VICTOZA PEN 18 MG/3ML soln INJECT 1.8MG SUBCUTANEOUSLY DAILY 27 mL 3     vitamin  B complex with vitamin C (VITAMIN  B COMPLEX) TABS Take 1 tablet by mouth daily  0     OTC products: Aspirin and NSAIDS    Allergies   Allergen Reactions     Eggs      Allergy found in testing     Hornets      wasps     Latex Anaphylaxis     hives  -  able to use BP cuff     Lipitor [Hmg-Coa-R Inhibitors] Cramps     Muscle cramps with statin     Metoclopramide Hcl Difficulty breathing     Neurontin [Gabapentin] Hives      Latex Allergy: YES: Precautions to take: no contact    Social History   Substance Use Topics     Smoking status: Never Smoker     Smokeless tobacco: Never Used     Alcohol use 0.0 oz/week     0 Standard drinks or equivalent per week      Comment: 1 drink per year or less     History   Drug Use No       REVIEW OF SYSTEMS:   Constitutional, neuro, ENT, endocrine, pulmonary, cardiac, gastrointestinal, genitourinary, musculoskeletal, integument and psychiatric systems are negative, except as otherwise noted.    EXAM:   /74 (BP Location: Right arm, Patient Position: Right side, Cuff Size: Adult Large)  Pulse 79  Temp 97.6  F (36.4  C) (Tympanic)  Ht 5' 7\" (1.702 m)  Wt 281 lb (127.5 kg)  SpO2 97%  BMI 44.01 kg/m2    GENERAL APPEARANCE: healthy, alert and no distress     EYES: EOMI, PERRL     HENT: TM fluid right, frontal sinus tenderness bilateral, maxillary sinus tenderness bilateral, erythematous posterior OP without exudate, erythematous and edematous nasal mucosa     NECK: no adenopathy, no asymmetry, masses, or scars and thyroid normal to palpation     RESP: lungs clear to auscultation - no rales, rhonchi or wheezes     CV: regular rates and rhythm, normal S1 S2, no S3 or S4 and no murmur, click or rub     ABDOMEN:  soft, nontender, no HSM or masses and bowel sounds normal     MS: no acute synovitis, " climbs up on exam table unassisted, normal gait     SKIN: no suspicious lesions or rashes     NEURO: Normal strength and tone, sensory exam grossly normal, mentation intact and speech normal     PSYCH: mentation appears normal. and affect normal/bright     LYMPHATICS: No cervical adenopathy    DIAGNOSTICS:   EK2017 - NSR, normal intervals, doesn't meet criteria for LVH, no ST-T wave changes to suggest ischemia    Recent Labs   Lab Test  18   0831  10/10/17   0925  17   1412   17   0935   10/16/16   1937   14   1503  14   0800  14   0633      --    --    --    --   13.3   --   13.1   < >  13.0   --   10.2*   PLTHGB   --    --    --    --    --    --   299   --   494*   --    --    INR   --    --    --    --    --    --    --    --    --   1.59*  1.62*   NA  138   --   137   --   138   < >   --    < >   --    --    --    POTASSIUM  4.0   --   3.9   < >  4.0   < >   --    < >   --    --    --    CR  0.58   --   0.66   < >  0.52   < >   --    < >   --    --    --    A1C  8.5*  7.8*   --    --   7.8*   < >   --    < >   --    --    --     < > = values in this interval not displayed.       Labs received :  Component      Latest Ref Rng & Units 2018   Sodium      133 - 144 mmol/L 135   Potassium      3.4 - 5.3 mmol/L 3.6   Chloride      94 - 109 mmol/L 100   Carbon Dioxide      20 - 32 mmol/L 25   Anion Gap      3 - 14 mmol/L 10   Glucose      70 - 99 mg/dL 238 (H)   Urea Nitrogen      7 - 30 mg/dL 10   Creatinine      0.52 - 1.04 mg/dL 0.69   GFR Estimate      >60 mL/min/1.7m2 89   GFR Estimate If Black      >60 mL/min/1.7m2 >90   Calcium      8.5 - 10.1 mg/dL 8.8   Hemoglobin A1C      0 - 5.6 % 9.0 (H)     IMPRESSION:   Reason for surgery/procedure: elbow surgery for tendonitis, ulnar nerve entrapment    The proposed surgical procedure is considered INTERMEDIATE risk.    REVISED CARDIAC RISK INDEX  The patient has the following serious cardiovascular risks for  perioperative complications such as (MI, PE, VFib and 3  AV Block):  Diabetes Mellitus (on Insulin)  INTERPRETATION: 1 risks: Class II (low risk - 0.9% complication rate)    The patient has the following additional risks for perioperative complications:  Morbid obesity  Moderate tolerance to opioid analgesics      ICD-10-CM    1. Preop general physical exam Z01.818 Hemoglobin A1c     Basic metabolic panel   2. Ulnar nerve entrapment, right G56.21    3. Right elbow tendonitis M77.8    4. Type 2 diabetes mellitus without complication, with long-term current use of insulin (H) E11.9     Z79.4    5. Essential hypertension I10    6. Acute non-recurrent frontal sinusitis J01.10 amoxicillin-clavulanate (AUGMENTIN) 875-125 MG per tablet    Plan to treat sinus infection if not starting to show improvement over the next few days       RECOMMENDATIONS:       Cardiovascular Risk  Performs 4 METs exercise without symptoms (Walk on level ground at 15 minutes per mile (4 miles/hour)) .     Pulmonary Risk  Patient to restart her Breo inhaler (hadn't been taking it most recently), use albuterol as needed.  Will treat current URI as sinus infection if not improving over the next several days.      Diabetes Medication Use  -----Hold usual oral and non-insulin diabetic meds (e.g. Metformin, Actos, Glipizide) while NPO.   -----Take 80% of long acting insulin (e.g. Lantus, NPH) while NPO (fasting)  -----Check blood sugar q 4 hours while NPO  -----Encouraged to increase blood sugar checks, optimize sugars prior to surgery, blood sugar has been trending upward with most recent HgbA1C of 8.5% prior to today    Anticoagulant or Antiplatelet Medication Use  -- hold celebrex, aspirin, ibuprofen, aleve for 7 days prior to procedure        ACE Inhibitor or Angiotensin Receptor Blocker (ARB) Use  Ace inhibitor or Angiotensin Receptor Blocker (ARB) and should HOLD this medication for the 24 hours prior to surgery.            APPROVAL NOT GIVEN FOR  PROCEDURE - results of lab work returned showing increase in HgbA1C to 9.0% and blood sugar of 238.   Recommend intensive blood sugar control and improvement in these values prior to undergoing elective procedure.     Recommendations to postpone procedure communicated to Dr Akhtar's office - message left for care coordinator on 5/22/18.    Signed Electronically by: Linda Durant MD    Copy of this evaluation report is provided to requesting physician.    [facility] Preop Guidelines    Revised Cardiac Risk Index

## 2018-05-21 NOTE — PATIENT INSTRUCTIONS
Stop celebrex (check with [facility]), ibuprofen, advil, naproxyn, aleve and aspirin 7 days prior to surgery     Hold Losartan-HCTZ, oxycodone, lorazepam, metformin, victoza and all vitamins the day of surgery.     Change your basaglar dose to 20 units    Labs today    Prescription for augmentin sent to your pharmacy - start if your sinuses are not better or are worse in the next few days    Restart your breo    Before Your Surgery      Call your surgeon if there is any change in your health. This includes signs of a cold or flu (such as a sore throat, runny nose, cough, rash or fever).    Do not smoke, drink alcohol or take over the counter medicine (unless your surgeon or primary care doctor tells you to) for the 24 hours before and after surgery.    If you take prescribed drugs: Follow your doctor s orders about which medicines to take and which to stop until after surgery.    Eating and drinking prior to surgery: follow the instructions from your surgeon    Take a shower or bath the night before surgery. Use the soap your surgeon gave you to gently clean your skin. If you do not have soap from your surgeon, use your regular soap. Do not shave or scrub the surgery site.  Wear clean pajamas and have clean sheets on your bed.

## 2018-05-29 ENCOUNTER — MYC MEDICAL ADVICE (OUTPATIENT)
Dept: PEDIATRICS | Facility: CLINIC | Age: 53
End: 2018-05-29

## 2018-05-29 DIAGNOSIS — E11.9 TYPE II DIABETES MELLITUS (H): Primary | ICD-10-CM

## 2018-05-30 ENCOUNTER — MYC MEDICAL ADVICE (OUTPATIENT)
Dept: PEDIATRICS | Facility: CLINIC | Age: 53
End: 2018-05-30

## 2018-05-30 ENCOUNTER — HOSPITAL ENCOUNTER (OUTPATIENT)
Dept: CT IMAGING | Facility: CLINIC | Age: 53
Discharge: HOME | End: 2018-05-30
Attending: PEDIATRICS | Admitting: PEDIATRICS
Payer: COMMERCIAL

## 2018-05-30 DIAGNOSIS — M89.8X8 STERNAL MASS: ICD-10-CM

## 2018-05-30 DIAGNOSIS — J01.00 ACUTE NON-RECURRENT MAXILLARY SINUSITIS: Primary | ICD-10-CM

## 2018-05-30 PROCEDURE — 71250 CT THORAX DX C-: CPT

## 2018-05-30 RX ORDER — DOXYCYCLINE 100 MG/1
100 CAPSULE ORAL 2 TIMES DAILY
Qty: 20 CAPSULE | Refills: 0 | Status: SHIPPED | OUTPATIENT
Start: 2018-05-30 | End: 2018-06-25

## 2018-05-30 NOTE — TELEPHONE ENCOUNTER
I sent a new prescription for a new antibiotic, doxycycline, to the patient's pharmacy - please let her know.  She should be careful with sun exposure on this antibiotic.  Please also tell her thank you for the blood sugars!      Linda Durant MD  Internal Medicine - Pediatrics

## 2018-05-30 NOTE — TELEPHONE ENCOUNTER
Pt requesting a future Hgb A1C order to be placed in her chart.  She has a Diabetes follow up appt on 6/25/18.    Future order placed.    Florecita Godwin RN

## 2018-05-30 NOTE — TELEPHONE ENCOUNTER
Spoke with the pt by phone.  She states that she finished the treatment of Augmentin for her sinus infection.  She is still experiencing sinus pressure and a productive cough, with yellow phlegm.  Denies SOB and fever.  She is currently taking Zyrtec, Singulair, Flonase, Breo and Albuterol daily.  The pt uses the [facility] in [place].  Please advise.    She states the blood sugar readings are just for 's information.  She has a f/u Diabetes appt on 6/25/18.    Florecita Godwin RN

## 2018-05-30 NOTE — TELEPHONE ENCOUNTER
Called patient to notify of below. Patient stated understanding and had no further questions or concerns.    Kimber WORKMAN Triage RN

## 2018-06-01 ENCOUNTER — MYC MEDICAL ADVICE (OUTPATIENT)
Dept: PEDIATRICS | Facility: CLINIC | Age: 53
End: 2018-06-01

## 2018-06-04 ENCOUNTER — TRANSFERRED RECORDS (OUTPATIENT)
Dept: HEALTH INFORMATION MANAGEMENT | Facility: CLINIC | Age: 53
End: 2018-06-04

## 2018-06-08 DIAGNOSIS — E11.9 TYPE 2 DIABETES MELLITUS WITHOUT COMPLICATION (H): ICD-10-CM

## 2018-06-08 NOTE — TELEPHONE ENCOUNTER
Not due for a refill.     metFORMIN (GLUCOPHAGE) 1000 MG tablet was filled on 4/25/2018, qty 180 with 3 refills.

## 2018-06-12 NOTE — TELEPHONE ENCOUNTER
Rx written on 4/25/18 #180 with 3 refills.  Should be on file at pharmacy.    Florecita Godwin RN

## 2018-06-25 ENCOUNTER — OFFICE VISIT (OUTPATIENT)
Dept: PEDIATRICS | Facility: CLINIC | Age: 53
End: 2018-06-25
Payer: COMMERCIAL

## 2018-06-25 VITALS
TEMPERATURE: 97.4 F | HEIGHT: 67 IN | OXYGEN SATURATION: 97 % | BODY MASS INDEX: 41.44 KG/M2 | HEART RATE: 87 BPM | DIASTOLIC BLOOD PRESSURE: 80 MMHG | SYSTOLIC BLOOD PRESSURE: 116 MMHG | WEIGHT: 264 LBS

## 2018-06-25 DIAGNOSIS — E11.9 TYPE II DIABETES MELLITUS (H): ICD-10-CM

## 2018-06-25 DIAGNOSIS — E11.65 TYPE 2 DIABETES MELLITUS WITH HYPERGLYCEMIA, WITH LONG-TERM CURRENT USE OF INSULIN (H): Primary | ICD-10-CM

## 2018-06-25 DIAGNOSIS — M79.642 BILATERAL HAND PAIN: ICD-10-CM

## 2018-06-25 DIAGNOSIS — M79.641 BILATERAL HAND PAIN: ICD-10-CM

## 2018-06-25 DIAGNOSIS — I10 ESSENTIAL HYPERTENSION: ICD-10-CM

## 2018-06-25 DIAGNOSIS — E66.01 MORBID OBESITY WITH BMI OF 40.0-44.9, ADULT (H): ICD-10-CM

## 2018-06-25 DIAGNOSIS — Z79.4 TYPE 2 DIABETES MELLITUS WITH HYPERGLYCEMIA, WITH LONG-TERM CURRENT USE OF INSULIN (H): Primary | ICD-10-CM

## 2018-06-25 DIAGNOSIS — G89.4 CHRONIC PAIN SYNDROME: ICD-10-CM

## 2018-06-25 LAB — HBA1C MFR BLD: 8.7 % (ref 0–5.6)

## 2018-06-25 PROCEDURE — 83036 HEMOGLOBIN GLYCOSYLATED A1C: CPT | Performed by: PEDIATRICS

## 2018-06-25 PROCEDURE — 99214 OFFICE O/P EST MOD 30 MIN: CPT | Performed by: PEDIATRICS

## 2018-06-25 PROCEDURE — 36415 COLL VENOUS BLD VENIPUNCTURE: CPT | Performed by: PEDIATRICS

## 2018-06-25 RX ORDER — INSULIN GLARGINE 100 [IU]/ML
INJECTION, SOLUTION SUBCUTANEOUS
Qty: 24 ML | Refills: 3 | Status: ON HOLD | OUTPATIENT
Start: 2018-06-25 | End: 2018-11-09

## 2018-06-25 RX ORDER — OXYCODONE AND ACETAMINOPHEN 5; 325 MG/1; MG/1
1-2 TABLET ORAL EVERY 4 HOURS PRN
Qty: 90 TABLET | Refills: 0 | Status: SHIPPED | OUTPATIENT
Start: 2018-06-25 | End: 2018-09-10

## 2018-06-25 NOTE — LETTER
My Depression Action Plan  Name: Johnna Caballero   Date of Birth 1965  Date: 6/25/2018    My doctor: Linda Durant   My clinic: [address]  [address]  [facility] [address] [phone]          GREEN    ZONE   Good Control    What it looks like:     Things are going generally well. You have normal up s and down s. You may even feel depressed from time to time, but bad moods usually last less than a day.   What you need to do:  1. Continue to care for yourself (see self care plan)  2. Check your depression survival kit and update it as needed  3. Follow your physician s recommendations including any medication.  4. Do not stop taking medication unless you consult with your physician first.           YELLOW         ZONE Getting Worse    What it looks like:     Depression is starting to interfere with your life.     It may be hard to get out of bed; you may be starting to isolate yourself from others.    Symptoms of depression are starting to last most all day and this has happened for several days.     You may have suicidal thoughts but they are not constant.   What you need to do:     1. Call your care team, your response to treatment will improve if you keep your care team informed of your progress. Yellow periods are signs an adjustment may need to be made.     2. Continue your self-care, even if you have to fake it!    3. Talk to someone in your support network    4. Open up your depression survival kit           RED    ZONE Medical Alert - Get Help    What it looks like:     Depression is seriously interfering with your life.     You may experience these or other symptoms: You can t get out of bed most days, can t work or engage in other necessary activities, you have trouble taking care of basic hygiene, or basic responsibilities, thoughts of suicide or death that will not go away, self-injurious behavior.     What you need to do:  1. Call  your care team and request a same-day appointment. If they are not available (weekends or after hours) call your local crisis line, emergency room or 911.            Depression Self Care Plan / Survival Kit    Self-Care for Depression  Here s the deal. Your body and mind are really not as separate as most people think.  What you do and think affects how you feel and how you feel influences what you do and think. This means if you do things that people who feel good do, it will help you feel better.  Sometimes this is all it takes.  There is also a place for medication and therapy depending on how severe your depression is, so be sure to consult with your medical provider and/ or Behavioral Health Consultant if your symptoms are worsening or not improving.     In order to better manage my stress, I will:    Exercise  Get some form of exercise, every day. This will help reduce pain and release endorphins, the  feel good  chemicals in your brain. This is almost as good as taking antidepressants!  This is not the same as joining a gym and then never going! (they count on that by the way ) It can be as simple as just going for a walk or doing some gardening, anything that will get you moving.      Hygiene   Maintain good hygiene (Get out of bed in the morning, Make your bed, Brush your teeth, Take a shower, and Get dressed like you were going to work, even if you are unemployed).  If your clothes don't fit try to get ones that do.    Diet  I will strive to eat foods that are good for me, drink plenty of water, and avoid excessive sugar, caffeine, alcohol, and other mood-altering substances.  Some foods that are helpful in depression are: complex carbohydrates, B vitamins, flaxseed, fish or fish oil, fresh fruits and vegetables.    Psychotherapy  I agree to participate in Individual Therapy (if recommended).    Medication  If prescribed medications, I agree to take them.  Missing doses can result in serious side effects.   I understand that drinking alcohol, or other illicit drug use, may cause potential side effects.  I will not stop my medication abruptly without first discussing it with my provider.    Staying Connected With Others  I will stay in touch with my friends, family members, and my primary care provider/team.    Use your imagination  Be creative.  We all have a creative side; it doesn t matter if it s oil painting, sand castles, or mud pies! This will also kick up the endorphins.    Witness Beauty  (AKA stop and smell the roses) Take a look outside, even in mid-winter. Notice colors, textures. Watch the squirrels and birds.     Service to others  Be of service to others.  There is always someone else in need.  By helping others we can  get out of ourselves  and remember the really important things.  This also provides opportunities for practicing all the other parts of the program.    Humor  Laugh and be silly!  Adjust your TV habits for less news and crime-drama and more comedy.    Control your stress  Try breathing deep, massage therapy, biofeedback, and meditation. Find time to relax each day.     My support system    Clinic Contact:  Phone number:    Contact 1:  Phone number:    Contact 2:  Phone number:    [employer]ond[employer]ational/:  Phone number:    Therapist:  Phone number:    [facility] crisis center:    Phone number:    Other community support:  Phone number:

## 2018-06-25 NOTE — PROGRESS NOTES
SUBJECTIVE:   Johnna Caballero is a 52 year old female who presents to clinic today for the following health issues:      Diabetes Follow-up    Patient is checking blood sugars: once daily.  Results are as follows:         am - average of 120-140    Diabetic concerns: other - control of sugars and A1C     Symptoms of hypoglycemia (low blood sugar): none     Paresthesias (numbness or burning in feet) or sores: No     Date of last diabetic eye exam: 06/04/18    264 am at last visit for average am sugar  130-140 most recently    Increased am basaglar to 22-24 units, pm dosing to 12 units.    Walking daily - up to 3 miles.    Has changed eating habits significantly - now monitoring carbs.  Weight down 15 lbs since last visit.  Wt Readings from Last 4 Encounters:   06/25/18 264 lb (119.7 kg)   05/21/18 281 lb (127.5 kg)   04/25/18 281 lb (127.5 kg)   10/10/17 260 lb 14.4 oz (118.3 kg)     Had to cancel recent surgery due to elevated A1C - rescheduling elbow surgery.      Struggling with pain related to her elbow - needs refill of percoset today - pain contract in place.   Working to reschedule surgery for time when her blood sugar will be controlled.        Hyperlipidemia Follow-Up      Rate your low fat/cholesterol diet?: good    Taking statin?  No    Other lipid medications/supplements?:  none    Hypertension Follow-up      Outpatient blood pressures are not being checked.    Low Salt Diet: no added salt    BP Readings from Last 2 Encounters:   06/25/18 116/80   05/21/18 124/74     Hemoglobin A1C (%)   Date Value   06/25/2018 8.7 (H)   05/21/2018 9.0 (H)     LDL Cholesterol Calculated (mg/dL)   Date Value   04/06/2018 104 (H)   04/13/2017 104 (H)         Problems taking medications regularly: No    Medication side effects: none    Diet: regular (no restrictions)      Problem list and histories reviewed & adjusted, as indicated.  Additional history: as documented      Reviewed and updated as needed this visit by  "clinical staff  Tobacco  Allergies  Meds  Med Hx  Surg Hx  Fam Hx  Soc Hx      Reviewed and updated as needed this visit by Provider         ROS:  Constitutional, cardiovascular, pulmonary, gi and msk systems are negative, except as otherwise noted.    OBJECTIVE:     /80 (BP Location: Right arm, Patient Position: Right side, Cuff Size: Adult Large)  Pulse 87  Temp 97.4  F (36.3  C) (Tympanic)  Ht 5' 7\" (1.702 m)  Wt 264 lb (119.7 kg)  SpO2 97%  BMI 41.35 kg/m2  Body mass index is 41.35 kg/(m^2).  GENERAL: healthy, alert and no distress  LYMPH: no cervical, supraclavicular, axillary, or inguinal adenopathy    Diagnostic Test Results:  Results for orders placed or performed in visit on 06/25/18 (from the past 24 hour(s))   **A1C FUTURE anytime   Result Value Ref Range    Hemoglobin A1C 8.7 (H) 0 - 5.6 %       ASSESSMENT/PLAN:         ICD-10-CM    1. Type 2 diabetes mellitus with hyperglycemia, with long-term current use of insulin (H) E11.65 DEPRESSION ACTION PLAN (DAP)    Z79.4 BASAGLAR 100 UNIT/ML injection     **A1C FUTURE anytime     **Comprehensive metabolic panel FUTURE anytime     Albumin Random Urine Quantitative with Creat Ratio      Continue with current medication dosing.  Has follow up with Dr Davila in endocrinology in 2 weeks.  Follow up with me in early-mid August.   2. Bilateral hand pain M79.641 oxyCODONE-acetaminophen (PERCOCET) 5-325 MG per tablet    M79.642    3. Chronic pain syndrome G89.4 Refilled pain medication today, pain contract in place   4. Morbid obesity with BMI of 40.0-44.9, adult (H) E66.01 Encouraged in her exercise and healthy eating habits - follow up in 6 weeks    Z68.41    5. Essential hypertension I10 Currently well controlled, watch for need to decrease medication dosing with weight loss if continues at this rate.  Patient to start measuring at home, alert me with dizziness.       See Patient Instructions    Linda Durant MD  [facility] [place]  "

## 2018-06-25 NOTE — PATIENT INSTRUCTIONS
Come back to see me in early to mid August    Keep your follow up with Dr Ellis    Alert me with any lows    Keep an eye on your blood pressure at home    Keep up the great work!

## 2018-06-25 NOTE — MR AVS SNAPSHOT
After Visit Summary   6/25/2018    Johnna Caballero    MRN: [id number]           Patient Information     Date Of Birth          1965        Visit Information        Provider Department      6/25/2018 11:20 AM [facility]        Today's Diagnoses     Type 2 diabetes mellitus without complication, with long-term current use of insulin (H)          Care Instructions    Come back to see me in early to mid August    Keep your follow up with Dr Ellis    Alert me with any lows    Keep an eye on your blood pressure at home    Keep up the great work!             Follow-ups after your visit        Future tests that were ordered for you today     Open Future Orders        Priority Expected Expires Ordered    **A1C FUTURE anytime Routine 6/25/2018 6/25/2019 6/25/2018    **Comprehensive metabolic panel FUTURE anytime Routine 6/25/2018 6/25/2019 6/25/2018    Albumin Random Urine Quantitative with Creat Ratio Routine 6/25/2018 6/25/2019 6/25/2018            Who to contact     If you have questions or need follow up information about today's clinic visit or your schedule please contact [facility] directly at [phone].  Normal or non-critical lab and imaging results will be communicated to you by MyChart, letter or phone within 4 business days after the clinic has received the results. If you do not hear from us within 7 days, please contact the clinic through [employer] or phone. If you have a critical or abnormal lab result, we will notify you by phone as soon as possible.  Submit refill requests through [employer] or call your pharmacy and they will forward the refill request to us. Please allow 3 business days for your refill to be completed.          Additional Information About Your Visit        MyChart Information     [employer] gives you secure access to your electronic health record. If you see a primary care provider, you can also send messages to your care  "team and make appointments. If you have questions, please call your primary care clinic.  If you do not have a primary care provider, please call [phone] and they will assist you.        Care EveryWhere ID     This is your Care EveryWhere ID. This could be used by other organizations to access your [facility] medical records  [id number]        Your Vitals Were     Pulse Temperature Height Pulse Oximetry BMI (Body Mass Index)       87 97.4  F (36.3  C) (Tympanic) 5' 7\" (1.702 m) 97% 41.35 kg/m2        Blood Pressure from Last 3 Encounters:   06/25/18 116/80   05/21/18 124/74   04/25/18 128/82    Weight from Last 3 Encounters:   06/25/18 264 lb (119.7 kg)   05/21/18 281 lb (127.5 kg)   04/25/18 281 lb (127.5 kg)              We Performed the Following     DEPRESSION ACTION PLAN (DAP)          Today's Medication Changes          These changes are accurate as of 6/25/18 12:07 PM.  If you have any questions, ask your nurse or doctor.               These medicines have changed or have updated prescriptions.        Dose/Directions    BASAGLAR 100 UNIT/ML injection   This may have changed:  additional instructions   Used for:  Type 2 diabetes mellitus without complication, with long-term current use of insulin (H)   Changed by:  Linda Durant MD        Inject 24 units q am and 12 units q pm   Quantity:  24 mL   Refills:  3            Where to get your medicines      These medications [facility] [place] [facility] [address] [place] [address]  [address] [facility] [address] [place] [address]     Phone:  [phone]     BASAGLAR 100 UNIT/ML injection                Primary Care Provider Office Phone # Fax #    Linda Durant [phone] [address] [place] [address]        Equal Access to Services     HERMES PARKER AH: Hadii aad ku hadasho Soomaali, waaxda luqadaha, qaybta kaalmada adeegyada, waxay jatinder zaldivar ah. So wa " [phone].    ATENCIÓN: Si deven bonds, tiene a subramanian disposición servicios gratuitos de asistencia lingüística. Yovani mercedes [phone].    We comply with applicable federal civil rights laws and [place] laws. We do not discriminate on the basis of race, color, national origin, age, disability, sex, sexual orientation, or gender identity.            Thank you!     Thank you for choosing [facility] [place]  for your care. Our goal is always to provide you with excellent care. Hearing back from our patients is one way we can continue to improve our services. Please take a few minutes to complete the written survey that you may receive in the mail after your visit with us. Thank you!             Your Updated Medication List - Protect others around you: Learn how to safely use, store and throw away your medicines at www.disposemymeds.org.          This list is accurate as of 6/25/18 12:07 PM.  Always use your most recent med list.                   Brand Name Dispense Instructions for use Diagnosis    albuterol 108 (90 Base) MCG/ACT Inhaler    PROAIR HFA/PROVENTIL HFA/VENTOLIN HFA    1 Inhaler    Inhale 2 puffs into the lungs every 4 hours as needed for shortness of breath / dyspnea or wheezing    Intermittent asthma, uncomplicated       aspirin 81 MG tablet     30 tablet    Take 1 tablet (81 mg) by mouth daily        BASAGLAR 100 UNIT/ML injection     24 mL    Inject 24 units q am and 12 units q pm    Type 2 diabetes mellitus without complication, with long-term current use of insulin (H)       blood glucose monitoring lancets     2 Box    Use to test blood sugar 1-2 times daily or as directed.    DM (diabetes mellitus), type 2, uncontrolled (H)       blood glucose monitoring meter device kit    no brand specified    1 kit    Use to test blood sugar 1-2 times daily or as directed.    Type 2 diabetes mellitus without complication (H)       blood glucose monitoring test strip    ACCU-CHEK GABI    200 each    Use  to test blood sugars 1-2x daily or as directed.    Type 2 diabetes mellitus without complication, with long-term current use of insulin (H)       BREO ELLIPTA 100-25 MCG/INH oral inhaler   Generic drug:  fluticasone-vilanterol      Inhale 1 puff into the lungs daily        celecoxib 200 MG capsule    celeBREX    30 capsule    TAKE ONE CAPSULE BY MOUTH EVERY DAY    Chronic bilateral low back pain without sciatica       cetirizine 10 MG tablet    zyrTEC    30 tablet    Take 1 tablet (10 mg) by mouth 2 times daily        diazepam 5 MG tablet    VALIUM    30 tablet    Take 1 tablet (5 mg) by mouth every 6 hours as needed for anxiety or sleep    Muscle spasm       EPINEPHrine 0.3 MG/0.3ML injection 2-pack    EPIPEN/ADRENACLICK/or ANY BX GENERIC EQUIV    2 mL    Inject 0.3 mLs (0.3 mg) into the muscle once as needed for anaphylaxis    Hornet sting, accidental or unintentional, subsequent encounter       hydrocortisone 2.5 % cream    PROCTOCREAM-HC    60 g    Apply 2 x daily for not more than 14 days    External hemorrhoids       insulin pen needle 32G X 4 MM    BD VALDO U/F    180 each    Use two pen needles daily or as directed.    Type 2 diabetes mellitus without complication, with long-term current use of insulin (H)       KRILL OIL PO      Take 1 tablet by mouth        LANsoprazole 30 MG CR capsule    PREVACID    90 capsule    Take 1 capsule (30 mg) by mouth daily Take 30-60 minutes before a meal.    Gastroesophageal reflux disease, esophagitis presence not specified       liraglutide 18 MG/3ML soln    VICTOZA PEN    27 mL    Inject 1.8 mg Subcutaneous daily    Type 2 diabetes mellitus without complication, with long-term current use of insulin (H)       LORazepam 0.5 MG tablet    ATIVAN    30 tablet    Take 1-2 tablets (0.5-1 mg) by mouth every 4 hours as needed for anxiety    Anxiety       losartan-hydrochlorothiazide 100-25 MG per tablet    HYZAAR    90 tablet    Take 1 tablet by mouth daily    Essential  hypertension       metFORMIN 1000 MG tablet    GLUCOPHAGE    180 tablet    Take 1 tablet (1,000 mg) by mouth 2 times daily (with meals)    Type 2 diabetes mellitus without complication, with long-term current use of insulin (H)       montelukast 10 MG tablet    SINGULAIR    90 tablet    Take 1 tablet (10 mg) by mouth At Bedtime    Intermittent asthma, uncomplicated       multivitamin, therapeutic with minerals Tabs tablet      Take 1 tablet by mouth 2 times daily        order for DME     1 each    Equipment being ordered: TENS unit    Chronic back pain, Bilateral hand pain       oxyCODONE-acetaminophen 5-325 MG per tablet    PERCOCET    90 tablet    Take 1-2 tablets by mouth every 4 hours as needed for pain    Bilateral hand pain       STATIN NOT PRESCRIBED (INTENTIONAL)     0 each    1 each 2 times daily Statin not prescribed intentionally due to Refusal by patient    Hyperlipidemia LDL goal <100       traZODone 50 MG tablet    DESYREL    180 tablet    Take 1-2 tablets ( mg) by mouth nightly as needed for sleep    Persistent insomnia       vitamin B complex with vitamin C Tabs tablet      Take 1 tablet by mouth daily        vitamin D 2000 units tablet     30 tablet    Take 1 tablet by mouth daily

## 2018-07-01 ENCOUNTER — TRANSFERRED RECORDS (OUTPATIENT)
Dept: HEALTH INFORMATION MANAGEMENT | Facility: CLINIC | Age: 53
End: 2018-07-01

## 2018-07-01 LAB — PAP SMEAR - HIM PATIENT REPORTED: NEGATIVE

## 2018-07-13 ENCOUNTER — OFFICE VISIT (OUTPATIENT)
Dept: PEDIATRICS | Facility: CLINIC | Age: 53
End: 2018-07-13
Payer: COMMERCIAL

## 2018-07-13 VITALS
DIASTOLIC BLOOD PRESSURE: 64 MMHG | OXYGEN SATURATION: 98 % | SYSTOLIC BLOOD PRESSURE: 110 MMHG | TEMPERATURE: 98 F | BODY MASS INDEX: 42.97 KG/M2 | WEIGHT: 273.8 LBS | HEART RATE: 72 BPM | HEIGHT: 67 IN

## 2018-07-13 DIAGNOSIS — E66.01 MORBID OBESITY WITH BMI OF 40.0-44.9, ADULT (H): ICD-10-CM

## 2018-07-13 DIAGNOSIS — Z01.818 PREOP GENERAL PHYSICAL EXAM: Primary | ICD-10-CM

## 2018-07-13 DIAGNOSIS — G89.4 CHRONIC PAIN SYNDROME: ICD-10-CM

## 2018-07-13 DIAGNOSIS — E11.9 TYPE 2 DIABETES MELLITUS WITHOUT COMPLICATION, WITH LONG-TERM CURRENT USE OF INSULIN (H): ICD-10-CM

## 2018-07-13 DIAGNOSIS — Z79.4 TYPE 2 DIABETES MELLITUS WITHOUT COMPLICATION, WITH LONG-TERM CURRENT USE OF INSULIN (H): ICD-10-CM

## 2018-07-13 DIAGNOSIS — I10 ESSENTIAL HYPERTENSION: ICD-10-CM

## 2018-07-13 PROCEDURE — 99215 OFFICE O/P EST HI 40 MIN: CPT | Performed by: INTERNAL MEDICINE

## 2018-07-13 PROCEDURE — 36415 COLL VENOUS BLD VENIPUNCTURE: CPT | Performed by: INTERNAL MEDICINE

## 2018-07-13 PROCEDURE — 93000 ELECTROCARDIOGRAM COMPLETE: CPT | Performed by: INTERNAL MEDICINE

## 2018-07-13 PROCEDURE — 80048 BASIC METABOLIC PNL TOTAL CA: CPT | Performed by: INTERNAL MEDICINE

## 2018-07-13 NOTE — PATIENT INSTRUCTIONS
No aspirin, ibuprofen, motrin, aleve, naprosyn or fish oil in the week before surgery. Stop asa, krill oil  and celebrex the week before surgery.      Hold metformin, victoza and losartan/hctz the day of surgery.    Take basaglar 19 U the morning of surgery.      Ok to take other medications the morning of surgery with a small sip of water.     Before Your Surgery      Call your surgeon if there is any change in your health. This includes signs of a cold or flu (such as a sore throat, runny nose, cough, rash or fever).    Do not smoke, drink alcohol or take over the counter medicine (unless your surgeon or primary care doctor tells you to) for the 24 hours before and after surgery.    If you take prescribed drugs: Follow your doctor s orders about which medicines to take and which to stop until after surgery.    Eating and drinking prior to surgery: follow the instructions from your surgeon    Take a shower or bath the night before surgery. Use the soap your surgeon gave you to gently clean your skin. If you do not have soap from your surgeon, use your regular soap. Do not shave or scrub the surgery site.  Wear clean pajamas and have clean sheets on your bed.

## 2018-07-13 NOTE — LETTER
My Asthma Action Plan  Name: Johnna Caballero   YOB: 1965  Date: 7/13/2018   My doctor: Griselda Yoder MD   My clinic: [facility]        My Control Medicine: None  My Rescue Medicine: Albuterol (Proair/Ventolin/Proventil) inhaler prn   My Asthma Severity: intermittent  Avoid your asthma triggers: smoke               GREEN ZONE   Good Control    I feel good    No cough or wheeze    Can work, sleep and play without asthma symptoms       Take your asthma control medicine every day.     1. If exercise triggers your asthma, take your rescue medication    15 minutes before exercise or sports, and    During exercise if you have asthma symptoms  2. Spacer to use with inhaler: If you have a spacer, make sure to use it with your inhaler             YELLOW ZONE Getting Worse  I have ANY of these:    I do not feel good    Cough or wheeze    Chest feels tight    Wake up at night   1. Keep taking your Green Zone medications  2. Start taking your rescue medicine:    every 20 minutes for up to 1 hour. Then every 4 hours for 24-48 hours.  3. If you stay in the Yellow Zone for more than 12-24 hours, contact your doctor.  4. If you do not return to the Green Zone in 12-24 hours or you get worse, start taking your oral steroid medicine if prescribed by your provider.           RED ZONE Medical Alert - Get Help  I have ANY of these:    I feel awful    Medicine is not helping    Breathing getting harder    Trouble walking or talking    Nose opens wide to breathe       1. Take your rescue medicine NOW  2. If your provider has prescribed an oral steroid medicine, start taking it NOW  3. Call your doctor NOW  4. If you are still in the Red Zone after 20 minutes and you have not reached your doctor:    Take your rescue medicine again and    Call 911 or go to the emergency room right away    See your regular doctor within 2 weeks of an Emergency Room or Urgent Care visit for follow-up treatment.           Annual Reminders:  Meet with Asthma Educator,  Flu Shot in the Fall, consider Pneumonia Vaccination for patients with asthma (aged 19 and older).    Pharmacy:    [facility] [facility] [place] [address]  WRITTEN PRESCRIPTION REQUESTED  [facility] - [place], [place] - [address]                      Asthma Triggers  How To Control Things That Make Your Asthma Worse    Triggers are things that make your asthma worse.  Look at the list below to help you find your triggers and what you can do about them.  You can help prevent asthma flare-ups by staying away from your triggers.      Trigger                                                          What you can do   Cigarette Smoke  Tobacco smoke can make asthma worse. Do not allow smoking in your home, car or around you.  Be sure no one smokes at a child s day care or school.  If you smoke, ask your health care provider for ways to help you quit.  Ask family members to quit too.  Ask your health care provider for a referral to Quit Plan to help you quit smoking, or call 9-727-080-PLAN.     Colds, Flu, Bronchitis  These are common triggers of asthma. Wash your hands often.  Don t touch your eyes, nose or mouth.  Get a flu shot every year.     Dust Mites  These are tiny bugs that live in cloth or carpet. They are too small to see. Wash sheets and blankets in hot water every week.   Encase pillows and mattress in dust mite proof covers.  Avoid having carpet if you can. If you have carpet, vacuum weekly.   Use a dust mask and HEPA vacuum.   Pollen and Outdoor Mold  Some people are allergic to trees, grass, or weed pollen, or molds. Try to keep your windows closed.  Limit time out doors when pollen count is high.   Ask you health care provider about taking medicine during allergy season.     Animal Dander  Some people are allergic to skin flakes, urine or saliva from pets with fur or feathers. Keep pets with  fur or feathers out of your home.    If you can t keep the pet outdoors, then keep the pet out of your bedroom.  Keep the bedroom door closed.  Keep pets off cloth furniture and away from stuffed toys.     Mice, Rats, and Cockroaches  Some people are allergic to the waste from these pests.   Cover food and garbage.  Clean up spills and food crumbs.  Store grease in the refrigerator.   Keep food out of the bedroom.   Indoor Mold  This can be a trigger if your home has high moisture. Fix leaking faucets, pipes, or other sources of water.   Clean moldy surfaces.  Dehumidify basement if it is damp and smelly.   Smoke, Strong Odors, and Sprays  These can reduce air quality. Stay away from strong odors and sprays, such as perfume, powder, hair spray, paints, smoke incense, paint, cleaning products, candles and new carpet.   Exercise or Sports  Some people with asthma have this trigger. Be active!  Ask your doctor about taking medicine before sports or exercise to prevent symptoms.    Warm up for 5-10 minutes before and after sports or exercise.     Other Triggers of Asthma  Cold air:  Cover your nose and mouth with a scarf.  Sometimes laughing or crying can be a trigger.  Some medicines and food can trigger asthma.

## 2018-07-13 NOTE — MR AVS SNAPSHOT
After Visit Summary   7/13/2018    Johnna Caballero    MRN: [id number]           Patient Information     Date Of Birth          1965        Visit Information        Provider Department      7/13/2018 2:20 PM Griselda Yoder MD [facility]        Today's Diagnoses     Preop general physical exam    -  1    Type 2 diabetes mellitus without complication, with long-term current use of insulin (H)        Chronic pain syndrome        Essential hypertension        Morbid obesity with BMI of 40.0-44.9, adult (H)          Care Instructions    No aspirin, ibuprofen, motrin, aleve, naprosyn or fish oil in the week before surgery. Stop asa, krill oil  and celebrex the week before surgery.      Hold metformin, victoza and losartan/hctz the day of surgery.    Take basaglar 19 U the morning of surgery.      Ok to take other medications the morning of surgery with a small sip of water.     Before Your Surgery      Call your surgeon if there is any change in your health. This includes signs of a cold or flu (such as a sore throat, runny nose, cough, rash or fever).    Do not smoke, drink alcohol or take over the counter medicine (unless your surgeon or primary care doctor tells you to) for the 24 hours before and after surgery.    If you take prescribed drugs: Follow your doctor s orders about which medicines to take and which to stop until after surgery.    Eating and drinking prior to surgery: follow the instructions from your surgeon    Take a shower or bath the night before surgery. Use the soap your surgeon gave you to gently clean your skin. If you do not have soap from your surgeon, use your regular soap. Do not shave or scrub the surgery site.  Wear clean pajamas and have clean sheets on your bed.           Follow-ups after your visit        Who to contact     If you have questions or need follow up information about today's clinic visit or your schedule please contact [facility]  "[facility] directly at [phone].  Normal or non-critical lab and imaging results will be communicated to you by MyChart, letter or phone within 4 business days after the clinic has received the results. If you do not hear from us within 7 days, please contact the clinic through [employer] or phone. If you have a critical or abnormal lab result, we will notify you by phone as soon as possible.  Submit refill requests through [employer] or call your pharmacy and they will forward the refill request to us. Please allow 3 business days for your refill to be completed.          Additional Information About Your Visit        [employer] Information     [employer] gives you secure access to your electronic health record. If you see a primary care provider, you can also send messages to your care team and make appointments. If you have questions, please call your primary care clinic.  If you do not have a primary care provider, please call [phone] and they will assist you.        Care EveryWhere ID     This is your Care EveryWhere ID. This could be used by other organizations to access your [facility] medical records  [id number]        Your Vitals Were     Pulse Temperature Height Pulse Oximetry BMI (Body Mass Index)       72 98  F (36.7  C) (Oral) 5' 7\" (1.702 m) 98% 42.88 kg/m2        Blood Pressure from Last 3 Encounters:   07/13/18 110/64   06/25/18 116/80   05/21/18 124/74    Weight from Last 3 Encounters:   07/13/18 273 lb 12.8 oz (124.2 kg)   06/25/18 264 lb (119.7 kg)   05/21/18 281 lb (127.5 kg)              We Performed the Following     Basic metabolic panel     EKG 12-lead complete w/read - Clinics        Primary Care Provider Office Phone # Fax #    Linda Durant [phone] [address] [place] [address]        Equal Access to Services     [facility] ELENA AH: Hadii aad ku hadasho Sosylvieali, waaxda luqadaha, qaybta kaalmada adekaitlinyada, shola campos. So " [facility] [phone].    ATENCIÓN: Si deven bonds, tiene a subramanian disposición servicios gratuitos de asistencia lingüística. Yovani mercedes [phone].    We comply with applicable federal civil rights laws and [place] laws. We do not discriminate on the basis of race, color, national origin, age, disability, sex, sexual orientation, or gender identity.            Thank you!     Thank you for choosing [facility]  for your care. Our goal is always to provide you with excellent care. Hearing back from our patients is one way we can continue to improve our services. Please take a few minutes to complete the written survey that you may receive in the mail after your visit with us. Thank you!             Your Updated Medication List - Protect others around you: Learn how to safely use, store and throw away your medicines at www.disposemymeds.org.          This list is accurate as of 7/13/18  2:53 PM.  Always use your most recent med list.                   Brand Name Dispense Instructions for use Diagnosis    albuterol 108 (90 Base) MCG/ACT Inhaler    PROAIR HFA/PROVENTIL HFA/VENTOLIN HFA    1 Inhaler    Inhale 2 puffs into the lungs every 4 hours as needed for shortness of breath / dyspnea or wheezing    Intermittent asthma, uncomplicated       aspirin 81 MG tablet     30 tablet    Take 1 tablet (81 mg) by mouth daily        BASAGLAR 100 UNIT/ML injection     24 mL    Inject 24 units q am and 12 units q pm    Type 2 diabetes mellitus with hyperglycemia, with long-term current use of insulin (H)       blood glucose monitoring lancets     2 Box    Use to test blood sugar 1-2 times daily or as directed.    DM (diabetes mellitus), type 2, uncontrolled (H)       blood glucose monitoring meter device kit    no brand specified    1 kit    Use to test blood sugar 1-2 times daily or as directed.    Type 2 diabetes mellitus without complication (H)       blood glucose monitoring test strip    ACCU-CHEK GABI    200 each     Use to test blood sugars 1-2x daily or as directed.    Type 2 diabetes mellitus without complication, with long-term current use of insulin (H)       BREO ELLIPTA 100-25 MCG/INH oral inhaler   Generic drug:  fluticasone-vilanterol      Inhale 1 puff into the lungs daily        celecoxib 200 MG capsule    celeBREX    30 capsule    TAKE ONE CAPSULE BY MOUTH EVERY DAY    Chronic bilateral low back pain without sciatica       cetirizine 10 MG tablet    zyrTEC    30 tablet    Take 1 tablet (10 mg) by mouth 2 times daily        diazepam 5 MG tablet    VALIUM    30 tablet    Take 1 tablet (5 mg) by mouth every 6 hours as needed for anxiety or sleep    Muscle spasm       EPINEPHrine 0.3 MG/0.3ML injection 2-pack    EPIPEN/ADRENACLICK/or ANY BX GENERIC EQUIV    2 mL    Inject 0.3 mLs (0.3 mg) into the muscle once as needed for anaphylaxis    Hornet sting, accidental or unintentional, subsequent encounter       hydrocortisone 2.5 % cream    PROCTOCREAM-HC    60 g    Apply 2 x daily for not more than 14 days    External hemorrhoids       insulin pen needle 32G X 4 MM    BD VALDO U/F    180 each    Use two pen needles daily or as directed.    Type 2 diabetes mellitus without complication, with long-term current use of insulin (H)       KRILL OIL PO      Take 1 tablet by mouth        LANsoprazole 30 MG CR capsule    PREVACID    90 capsule    Take 1 capsule (30 mg) by mouth daily Take 30-60 minutes before a meal.    Gastroesophageal reflux disease, esophagitis presence not specified       liraglutide 18 MG/3ML soln    VICTOZA PEN    27 mL    Inject 1.8 mg Subcutaneous daily    Type 2 diabetes mellitus without complication, with long-term current use of insulin (H)       LORazepam 0.5 MG tablet    ATIVAN    30 tablet    Take 1-2 tablets (0.5-1 mg) by mouth every 4 hours as needed for anxiety    Anxiety       losartan-hydrochlorothiazide 100-25 MG per tablet    HYZAAR    90 tablet    Take 1 tablet by mouth daily    Essential  hypertension       metFORMIN 1000 MG tablet    GLUCOPHAGE    180 tablet    Take 1 tablet (1,000 mg) by mouth 2 times daily (with meals)    Type 2 diabetes mellitus without complication, with long-term current use of insulin (H)       montelukast 10 MG tablet    SINGULAIR    90 tablet    Take 1 tablet (10 mg) by mouth At Bedtime    Intermittent asthma, uncomplicated       multivitamin, therapeutic with minerals Tabs tablet      Take 1 tablet by mouth 2 times daily        order for DME     1 each    Equipment being ordered: TENS unit    Chronic back pain, Bilateral hand pain       oxyCODONE-acetaminophen 5-325 MG per tablet    PERCOCET    90 tablet    Take 1-2 tablets by mouth every 4 hours as needed for pain    Bilateral hand pain       STATIN NOT PRESCRIBED (INTENTIONAL)     0 each    1 each 2 times daily Statin not prescribed intentionally due to Refusal by patient    Hyperlipidemia LDL goal <100       traZODone 50 MG tablet    DESYREL    180 tablet    Take 1-2 tablets ( mg) by mouth nightly as needed for sleep    Persistent insomnia       vitamin B complex with vitamin C Tabs tablet      Take 1 tablet by mouth daily        vitamin D 2000 units tablet     30 tablet    Take 1 tablet by mouth daily

## 2018-07-13 NOTE — PROGRESS NOTES
[facility]  [address]  [address]  [place] [address] [phone]  Dept: [phone]    PRE-OP EVALUATION:  Today's date: 2018    Johnna Caballero (: 1965) presents for pre-operative evaluation assessment as requested by Dr. Akhtar.  She requires evaluation and anesthesia risk assessment prior to undergoing surgery/procedure for treatment of Right elbow .    Proposed Surgery/ Procedure: right elbow ulnar nerve manipulation, tendon repair  Date of Surgery/ Procedure: 18  Time of Surgery/ Procedure: 830  Hospital/Surgical Facility: [facility]  Fax number for surgical facility: [phone]  Primary Physician: Linda Durant  Type of Anesthesia Anticipated: General    Patient has a Health Care Directive or Living Will:  YES copy in chart    1. NO - Do you have a history of heart attack, stroke, stent, bypass or surgery on an artery in the head, neck, heart or legs?  2. NO - Do you ever have any pain or discomfort in your chest?  3. NO - Do you have a history of  Heart Failure?  4. NO - Are you troubled by shortness of breath when: walking on the level, up a slight hill or at night?  5. NO - Do you currently have a cold, bronchitis or other respiratory infection?  6. NO - Do you have a cough, shortness of breath or wheezing?  7. NO - Do you sometimes get pains in the calves of your legs when you walk?  8. NO - DO YOU OR ANYONE IN YOUR FAMILY HAVE PREVIOUS HISTORY OF BLOOD CLOTS  9. NO - Do you or does anyone in your family have a serious bleeding problem such as prolonged bleeding following surgeries or cuts?  10. NO - Have you ever had problems with anemia or been told to take iron pills?  11. NO - Have you had any abnormal blood loss such as black, tarry or bloody stools, or abnormal vaginal bleeding?  12. NO - Have you ever had a blood transfusion?  13. NO - Have you or any of your relatives ever had problems with anesthesia?  14.  NO - Do you have sleep apnea, excessive snoring or daytime drowsiness?  15. NO - Do you have any prosthetic heart valves?  16. YES - DO YOU HAVE PROSTHETIC JOINTS? knees  17. NO - Is there any chance that you may be pregnant?      HPI:     HPI related to upcoming procedure: chronic elbow pain       ASTHMA - Patient has a longstanding history of moderate-severe Asthma . Patient has been doing well overall noting NO SYMPTOMS and continues on medication regimen consisting of albuterol prn  without adverse reactions or side effects.                                                                                                                                                 .  DIABETES - Patient has a longstanding history of DiabetesType Type II . Patient is being treated with diet, oral agents, exercise, SMBG, insulin injections and ASA and denies significant side effects. Control has been fair. Complicating factors include but are not limited to: hypertension, hyperlipidemia and morbid obesity .                                                                                                                                .  HYPERLIPIDEMIA - Patient has a long history of significant Hyperlipidemia for which she refuses medications.                                                                                                                                                        .  HYPERTENSION - Patient has longstanding history of HTN , currently denies any symptoms referable to elevated blood pressure. Specifically denies chest pain, palpitations, dyspnea, orthopnea, PND or peripheral edema. Blood pressure readings have been in normal range. Current medication regimen is as listed below. Patient denies any side effects of medication.                                                                                                                                                                                           .    MEDICAL HISTORY:     Patient Active Problem List    Diagnosis Date Noted     Chronic pain syndrome 11/23/2016     Priority: High     Patient is followed by Griselda Yoder MD for ongoing prescription of pain medication.  All refills should be approved by this provider, or covering partner.    Medication(s): percocet 5/325.   Maximum quantity per month: 90  Clinic visit frequency required: Q 6  months     Controlled substance agreement:  Encounter-Level CSA:     There are no encounter-level csa.        Pain Clinic evaluation in the past: No    DIRE Total Score(s):  No flowsheet data found.    Last [facility] website verification:  none   [email or web address]         Type 2 diabetes mellitus without complication (H) 10/02/2015     Priority: High     Hematoma - postoperative 11/23/2012     Priority: High     Problem list name updated by automated process. Provider to review and confirm       Hyperlipidemia LDL goal <100 02/10/2010     Priority: High     Intermittent asthma, uncomplicated 12/28/2016     Priority: Medium     Latex allergy status 10/03/2016     Priority: Medium     Anxiety 05/10/2016     Priority: Medium     Major depressive disorder, recurrent episode, mild (H) 01/05/2016     Priority: Medium     Typically seasonal     celexa- low libido  wellbutrin- didn't work   paxil- didn't work        Essential hypertension 10/04/2015     Priority: Medium     S/P total knee arthroplasty 07/15/2014     Priority: Medium     Morbid obesity with BMI of 40.0-44.9, adult (H) 09/18/2012     Priority: Medium     Allergic rhinitis 03/25/2003     Priority: Medium     Problem list name updated by automated process. Provider to review       Bilateral low back pain without sciatica 10/25/2015     Priority: Low     Vitamin D deficiency disease 03/06/2013     Priority: Low     Surgery aftercare 12/16/2012     Priority: Low     IMO update changed this record. Please review for accuracy       Long term current use  of insulin (H) 09/18/2012     Priority: Low     Esophageal reflux 03/25/2003     Priority: Low      Past Medical History:   Diagnosis Date     Actinic keratosis      Allergic rhinitis, cause unspecified      Anemia      Arthritis      Asthma     no meds x2 yrs     chronic back pain      Chronic infection     MRSA-nasal     Chronic pain     back     Esophageal reflux      fibrocystic breast changes      Gastro-oesophageal reflux disease      Heart murmur     mitral insuffiency     Hx of Fen-Phen use      Hypertension      migraines      Mild intermittent asthma     rare, with dog or exercise     Moderate major depression (H) 3/28/2012     MRSA (methicillin resistant Staphylococcus aureus) 6/27/2014    Nares     Need for desensitization to allergens      Obesity, unspecified      Other and unspecified hyperlipidemia      Temporomandibular joint disorders, unspecified      Thrombosis of leg     incisional area right hip     Type II or unspecified type diabetes mellitus without mention of complication, not stated as uncontrolled      Past Surgical History:   Procedure Laterality Date     ARTHROPLASTY KNEE  7/15/2014    Procedure: ARTHROPLASTY KNEE;  Surgeon: Chapin Paul MD;  Location: [facility] OR     BACK SURGERY       C NONSPECIFIC PROCEDURE      laparoscopy x6     C NONSPECIFIC PROCEDURE  93    laparotomy x2 ovarian cyst     C NONSPECIFIC PROCEDURE  0293    oophorectomy lt side - cyst     C NONSPECIFIC PROCEDURE  0395    laminectomy L4-5. S1 herniated disc     C NONSPECIFIC PROCEDURE  96    cholecystectomy     C NONSPECIFIC PROCEDURE      ganglion cysts l wrist, rt palm     C NONSPECIFIC PROCEDURE      cyst removal back x2     C NONSPECIFIC PROCEDURE  10/02    rt thumb fusion, ganglion cyst removal     C NONSPECIFIC PROCEDURE  1/08    remove heel spur, excise exostosis     CHOLECYSTECTOMY       COLONOSCOPY N/A 4/26/2017    Procedure: COLONOSCOPY;  COLONOSCOPY;  Surgeon: Chapin Leal MD;  Location:  GI      EXCISE LESION LOWER EXTREMITY  11/20/2012    Procedure: EXCISE LESION LOWER EXTREMITY;  EXCISION LIPOMA RIGHT HIP ;  Surgeon: Jazmin Bautista MD;  Location: [facility]     EXCISE LESION LOWER EXTREMITY  11/23/2012    Procedure: EXCISE LESION LOWER EXTREMITY;  EXCISE LESION LOWER EXTREMITY  EVACUATE RIGHT HIP HEMATOMA;  Surgeon: Jazmin Bautista MD;  Location: [facility] OR     GYN SURGERY       HC EXPLORATION ANKLE JOINT  1/2006     HC PART EXCIS PLANTAR FASCIA  12/2006     IRRIGATION AND DEBRIDEMENT HIP, COMBINED  12/15/2012    Procedure: COMBINED IRRIGATION AND DEBRIDEMENT HIP;   evacuation hematoma, scar revision right hip;  Surgeon: Jazmin Bauitsta MD;  Location: [facility] OR     wisdom teeth[       Current Outpatient Prescriptions   Medication Sig Dispense Refill     albuterol (PROAIR HFA/PROVENTIL HFA/VENTOLIN HFA) 108 (90 BASE) MCG/ACT Inhaler Inhale 2 puffs into the lungs every 4 hours as needed for shortness of breath / dyspnea or wheezing 1 Inhaler 1     aspirin 81 MG tablet Take 1 tablet (81 mg) by mouth daily 30 tablet      BASAGLAR 100 UNIT/ML injection Inject 24 units q am and 12 units q pm 24 mL 3     blood glucose monitoring (ACCU-CHEK GABI) test strip Use to test blood sugars 1-2x daily or as directed. 200 each 3     blood glucose monitoring (ACCU-CHEK MULTICLIX) lancets Use to test blood sugar 1-2 times daily or as directed. 2 Box 3     blood glucose monitoring (NO BRAND SPECIFIED) meter device kit Use to test blood sugar 1-2 times daily or as directed. 1 kit 0     celecoxib (CELEBREX) 200 MG capsule TAKE ONE CAPSULE BY MOUTH EVERY DAY 30 capsule 0     cetirizine (ZYRTEC) 10 MG tablet Take 1 tablet (10 mg) by mouth 2 times daily 30 tablet 1     Cholecalciferol (VITAMIN D) 2000 UNITS tablet Take 1 tablet by mouth daily 30 tablet      diazepam (VALIUM) 5 MG tablet Take 1 tablet (5 mg) by mouth every 6 hours as needed for anxiety or sleep 30 tablet 0     EPINEPHrine 0.3 MG/0.3ML injection Inject  0.3 mLs (0.3 mg) into the muscle once as needed for anaphylaxis 2 mL 0     fluticasone - vilanterol (BREO ELLIPTA) 100-25 MCG/INH oral inhaler Inhale 1 puff into the lungs daily       hydrocortisone (PROCTOCREAM-HC) 2.5 % cream Apply 2 x daily for not more than 14 days 60 g 5     insulin pen needle (BD VALDO U/F) 32G X 4 MM Use two pen needles daily or as directed. 180 each 1     KRILL OIL PO Take 1 tablet by mouth       LANsoprazole (PREVACID) 30 MG CR capsule Take 1 capsule (30 mg) by mouth daily Take 30-60 minutes before a meal. 90 capsule 1     liraglutide (VICTOZA PEN) 18 MG/3ML soln Inject 1.8 mg Subcutaneous daily 27 mL 3     LORazepam (ATIVAN) 0.5 MG tablet Take 1-2 tablets (0.5-1 mg) by mouth every 4 hours as needed for anxiety 30 tablet 0     losartan-hydrochlorothiazide (HYZAAR) 100-25 MG per tablet Take 1 tablet by mouth daily 90 tablet 3     metFORMIN (GLUCOPHAGE) 1000 MG tablet Take 1 tablet (1,000 mg) by mouth 2 times daily (with meals) 180 tablet 3     montelukast (SINGULAIR) 10 MG tablet Take 1 tablet (10 mg) by mouth At Bedtime 90 tablet 3     multivitamin, therapeutic with minerals (THERA-VIT-M) TABS Take 1 tablet by mouth 2 times daily        ORDER FOR DME Equipment being ordered: TENS unit 1 each 0     oxyCODONE-acetaminophen (PERCOCET) 5-325 MG per tablet Take 1-2 tablets by mouth every 4 hours as needed for pain 90 tablet 0     STATIN NOT PRESCRIBED, INTENTIONAL, 1 each 2 times daily Statin not prescribed intentionally due to Refusal by patient 0 each 0     traZODone (DESYREL) 50 MG tablet Take 1-2 tablets ( mg) by mouth nightly as needed for sleep 180 tablet 3     vitamin  B complex with vitamin C (VITAMIN  B COMPLEX) TABS Take 1 tablet by mouth daily  0     OTC products: None, except as noted above    Allergies   Allergen Reactions     Eggs      Allergy found in testing     Hornets      wasps     Latex Anaphylaxis     hives  -  able to use BP cuff     Lipitor [Hmg-Coa-R Inhibitors]  "Cramps     Muscle cramps with statin     Metoclopramide Hcl Difficulty breathing     Neurontin [Gabapentin] Hives      Latex Allergy: YES- NO LATEX     Social History   Substance Use Topics     Smoking status: Never Smoker     Smokeless tobacco: Never Used     Alcohol use 0.0 oz/week     0 Standard drinks or equivalent per week      Comment: 1 drink per year or less     History   Drug Use No       REVIEW OF SYSTEMS:   CONSTITUTIONAL: NEGATIVE for fever, chills, change in weight  INTEGUMENTARY/SKIN: NEGATIVE for worrisome rashes, moles or lesions  EYES: NEGATIVE for vision changes or irritation  ENT/MOUTH: NEGATIVE for ear, mouth and throat problems  RESP: NEGATIVE for significant cough or SOB  BREAST: NEGATIVE for masses, tenderness or discharge  CV: NEGATIVE for chest pain, palpitations or peripheral edema  GI: NEGATIVE for nausea, abdominal pain, heartburn, or change in bowel habits  : NEGATIVE for frequency, dysuria, or hematuria  MUSCULOSKELETAL: NEGATIVE for significant arthralgias or myalgia  NEURO: NEGATIVE for weakness, dizziness or paresthesias  ENDOCRINE: NEGATIVE for temperature intolerance, skin/hair changes  HEME: NEGATIVE for bleeding problems  PSYCHIATRIC: NEGATIVE for changes in mood or affect    EXAM:   /64 (BP Location: Right arm, Patient Position: Sitting, Cuff Size: Adult Large)  Pulse 72  Temp 98  F (36.7  C) (Oral)  Ht 5' 7\" (1.702 m)  Wt 273 lb 12.8 oz (124.2 kg)  SpO2 98%  BMI 42.88 kg/m2    GENERAL APPEARANCE: healthy, alert and no distress     EYES: EOMI, PERRL     HENT: ear canals and TM's normal and nose and mouth without ulcers or lesions     NECK: no adenopathy, no asymmetry, masses, or scars and thyroid normal to palpation     RESP: lungs clear to auscultation - no rales, rhonchi or wheezes     CV: regular rates and rhythm, normal S1 S2, no S3 or S4 and no murmur, click or rub     ABDOMEN:  soft, nontender, no HSM or masses and bowel sounds normal     MS: extremities " normal- no gross deformities noted, no evidence of inflammation in joints, FROM in all extremities.     SKIN: no suspicious lesions or rashes     NEURO: Normal strength and tone, sensory exam grossly normal, mentation intact and speech normal     PSYCH: mentation appears normal. and affect normal/bright     LYMPHATICS: No cervical adenopathy    DIAGNOSTICS:   EKG: appears normal, NSR, normal axis, normal intervals, no acute ST/T changes c/w ischemia, no LVH by voltage criteria, unchanged from previous tracings    Recent Labs   Lab Test  06/25/18   0955  05/21/18   0838  04/06/18   0831   07/06/17   0935   10/16/16   1937   08/05/14   1503  07/18/14   0800  07/17/14   0633   HGB   --    --    --    --   13.3   --   13.1   < >  13.0   --   10.2*   PLT   --    --    --    --    --    --   299   --   494*   --    --    INR   --    --    --    --    --    --    --    --    --   1.59*  1.62*   NA   --   135  138   < >  138   < >   --    < >   --    --    --    POTASSIUM   --   3.6  4.0   < >  4.0   < >   --    < >   --    --    --    CR   --   0.69  0.58   < >  0.52   < >   --    < >   --    --    --    A1C  8.7*  9.0*  8.5*   < >  7.8*   < >   --    < >   --    --    --     < > = values in this interval not displayed.        IMPRESSION:   Reason for surgery/procedure: elbow pain   Diagnosis/reason for consult: pre op     The proposed surgical procedure is considered INTERMEDIATE risk.    REVISED CARDIAC RISK INDEX  The patient has the following serious cardiovascular risks for perioperative complications such as (MI, PE, VFib and 3  AV Block):  Diabetes Mellitus (on Insulin)  INTERPRETATION: 1 risks: Class II (low risk - 0.9% complication rate)    The patient has the following additional risks for perioperative complications:  High tolerance to opioid analgesics due to chronic opioid use       ICD-10-CM    1. Preop general physical exam Z01.818    2. Type 2 diabetes mellitus without complication, with long-term current  use of insulin (H) E11.9     Z79.4    3. Essential hypertension I10    4. Morbid obesity with BMI of 40.0-44.9, adult (H) E66.01     Z68.41        RECOMMENDATIONS:       NO LATEX     Cardiovascular Risk  Performs 4 METs exercise without symptoms (Light housework (dusting, washing dishes) and Climb a flight of stairs) .       --Patient is to take all scheduled medications on the day of surgery EXCEPT for modifications listed below.    Diabetes Medication Use    -----Hold usual oral and non-insulin diabetic meds (e.g. Metformin, Actos, Glipizide) while NPO.   -----Take 80% of long acting insulin (e.g. Lantus, NPH) while NPO (fasting)      Anticoagulant or Antiplatelet Medication Use  ASPIRIN: Discontinue ASA 7-10 days prior to procedure to reduce bleeding risk.  It should be resumed post-operatively.        ACE Inhibitor or Angiotensin Receptor Blocker (ARB) Use  Ace inhibitor or Angiotensin Receptor Blocker (ARB) and should HOLD this medication for the 24 hours prior to surgery.      APPROVAL GIVEN to proceed with proposed procedure, without further diagnostic evaluation       Signed Electronically by: Griselda Yoder MD    Copy of this evaluation report is provided to requesting physician.    [facility] Preop Guidelines    Revised Cardiac Risk Index

## 2018-07-14 LAB
ANION GAP SERPL CALCULATED.3IONS-SCNC: 10 MMOL/L (ref 3–14)
BUN SERPL-MCNC: 11 MG/DL (ref 7–30)
CALCIUM SERPL-MCNC: 8.9 MG/DL (ref 8.5–10.1)
CHLORIDE SERPL-SCNC: 102 MMOL/L (ref 94–109)
CO2 SERPL-SCNC: 27 MMOL/L (ref 20–32)
CREAT SERPL-MCNC: 0.73 MG/DL (ref 0.52–1.04)
GFR SERPL CREATININE-BSD FRML MDRD: 84 ML/MIN/1.7M2
GLUCOSE SERPL-MCNC: 137 MG/DL (ref 70–99)
POTASSIUM SERPL-SCNC: 3.6 MMOL/L (ref 3.4–5.3)
SODIUM SERPL-SCNC: 139 MMOL/L (ref 133–144)

## 2018-07-23 ENCOUNTER — TELEPHONE (OUTPATIENT)
Dept: PEDIATRICS | Facility: CLINIC | Age: 53
End: 2018-07-23

## 2018-07-23 NOTE — TELEPHONE ENCOUNTER
Pt had surg last Thurs.  Ortho surg.  She had diarrhea 2d before surg and still has diarrhea.  Surg prescribed zofran today.  Saw DR. VANN for preop.  2 stools per day.  Now, nausea is more bothersome than diarrhea.  Asked pt to make appointment about this diarrhea.  She says she would like to see if zofran works before scheduling appointment.  Ale Darnell RN

## 2018-07-27 ENCOUNTER — TRANSFERRED RECORDS (OUTPATIENT)
Dept: HEALTH INFORMATION MANAGEMENT | Facility: CLINIC | Age: 53
End: 2018-07-27

## 2018-08-14 DIAGNOSIS — I10 ESSENTIAL HYPERTENSION: ICD-10-CM

## 2018-08-14 NOTE — TELEPHONE ENCOUNTER
"Requested Prescriptions   Pending Prescriptions Disp Refills     losartan-hydrochlorothiazide (HYZAAR) 100-25 MG per tablet [Pharmacy Med Name: LOSARTAN-HCTZ 100-25MG TABS]    Last Written Prescription Date:  8/30/2017  Last Fill Quantity: 90,  # refills: 3   Last office visit: 7/13/2018 with prescribing provider:  Linda Durant     Future Office Visit:   Next 5 appointments (look out 90 days)     Sep 10, 2018 10:20 AM CDT   MyChart Long with Linda Durant MD   [facility]    [address] [place] [address]  [address]  [facility] [address]   [phone]                  90 tablet 0     Sig: TAKE ONE TABLET BY MOUTH EVERY DAY    Angiotensin-II Receptors Passed    8/14/2018  9:22 AM       Passed - Blood pressure under 140/90 in past 12 months    BP Readings from Last 3 Encounters:   07/13/18 110/64   06/25/18 116/80   05/21/18 124/74                Passed - Recent (12 mo) or future (30 days) visit within the authorizing provider's specialty    Patient had office visit in the last 12 months or has a visit in the next 30 days with authorizing provider or within the authorizing provider's specialty.  See \"Patient Info\" tab in inbasket, or \"Choose Columns\" in Meds & Orders section of the refill encounter.           Passed - Patient is age 18 or older       Passed - No active pregnancy on record       Passed - Normal serum creatinine on file in past 12 months    Recent Labs   Lab Test  07/13/18   1523   CR  0.73            Passed - Normal serum potassium on file in past 12 months    Recent Labs   Lab Test  07/13/18   1523   POTASSIUM  3.6                   Passed - No positive pregnancy test in past 12 months          "

## 2018-08-15 RX ORDER — LOSARTAN POTASSIUM AND HYDROCHLOROTHIAZIDE 25; 100 MG/1; MG/1
TABLET ORAL
Qty: 90 TABLET | Refills: 3 | Status: SHIPPED | OUTPATIENT
Start: 2018-08-15 | End: 2019-08-01

## 2018-08-15 NOTE — TELEPHONE ENCOUNTER
Prescription approved per [facility], [facility] or [facility] refill protocol.  Kimber Carrera RN - Triage  [facility]

## 2018-09-10 ENCOUNTER — OFFICE VISIT (OUTPATIENT)
Dept: PEDIATRICS | Facility: CLINIC | Age: 53
End: 2018-09-10
Payer: COMMERCIAL

## 2018-09-10 VITALS
DIASTOLIC BLOOD PRESSURE: 70 MMHG | OXYGEN SATURATION: 98 % | HEIGHT: 67 IN | TEMPERATURE: 98.9 F | SYSTOLIC BLOOD PRESSURE: 92 MMHG | HEART RATE: 77 BPM | BODY MASS INDEX: 41.59 KG/M2 | WEIGHT: 265 LBS

## 2018-09-10 DIAGNOSIS — Z79.4 TYPE 2 DIABETES MELLITUS WITHOUT COMPLICATION, WITH LONG-TERM CURRENT USE OF INSULIN (H): ICD-10-CM

## 2018-09-10 DIAGNOSIS — I10 ESSENTIAL HYPERTENSION: ICD-10-CM

## 2018-09-10 DIAGNOSIS — E11.65 TYPE 2 DIABETES MELLITUS WITH HYPERGLYCEMIA, WITH LONG-TERM CURRENT USE OF INSULIN (H): Primary | ICD-10-CM

## 2018-09-10 DIAGNOSIS — E11.65 TYPE 2 DIABETES MELLITUS WITH HYPERGLYCEMIA, WITH LONG-TERM CURRENT USE OF INSULIN (H): ICD-10-CM

## 2018-09-10 DIAGNOSIS — G89.4 CHRONIC PAIN SYNDROME: ICD-10-CM

## 2018-09-10 DIAGNOSIS — E11.9 TYPE 2 DIABETES MELLITUS WITHOUT COMPLICATION, WITH LONG-TERM CURRENT USE OF INSULIN (H): ICD-10-CM

## 2018-09-10 DIAGNOSIS — Z79.4 TYPE 2 DIABETES MELLITUS WITH HYPERGLYCEMIA, WITH LONG-TERM CURRENT USE OF INSULIN (H): ICD-10-CM

## 2018-09-10 DIAGNOSIS — T63.451D: ICD-10-CM

## 2018-09-10 DIAGNOSIS — E78.5 HYPERLIPIDEMIA LDL GOAL <100: ICD-10-CM

## 2018-09-10 DIAGNOSIS — M79.641 BILATERAL HAND PAIN: ICD-10-CM

## 2018-09-10 DIAGNOSIS — Z79.4 TYPE 2 DIABETES MELLITUS WITH HYPERGLYCEMIA, WITH LONG-TERM CURRENT USE OF INSULIN (H): Primary | ICD-10-CM

## 2018-09-10 DIAGNOSIS — E66.01 MORBID OBESITY WITH BMI OF 40.0-44.9, ADULT (H): ICD-10-CM

## 2018-09-10 DIAGNOSIS — M79.642 BILATERAL HAND PAIN: ICD-10-CM

## 2018-09-10 LAB
ALBUMIN SERPL-MCNC: 3.5 G/DL (ref 3.4–5)
ALP SERPL-CCNC: 83 U/L (ref 40–150)
ALT SERPL W P-5'-P-CCNC: 19 U/L (ref 0–50)
ANION GAP SERPL CALCULATED.3IONS-SCNC: 5 MMOL/L (ref 3–14)
AST SERPL W P-5'-P-CCNC: 14 U/L (ref 0–45)
BILIRUB SERPL-MCNC: 0.4 MG/DL (ref 0.2–1.3)
BUN SERPL-MCNC: 10 MG/DL (ref 7–30)
CALCIUM SERPL-MCNC: 8.9 MG/DL (ref 8.5–10.1)
CHLORIDE SERPL-SCNC: 105 MMOL/L (ref 94–109)
CO2 SERPL-SCNC: 29 MMOL/L (ref 20–32)
CREAT SERPL-MCNC: 0.69 MG/DL (ref 0.52–1.04)
CREAT UR-MCNC: 91 MG/DL
GFR SERPL CREATININE-BSD FRML MDRD: 89 ML/MIN/1.7M2
GLUCOSE SERPL-MCNC: 154 MG/DL (ref 70–99)
HBA1C MFR BLD: 7.6 % (ref 0–5.6)
MICROALBUMIN UR-MCNC: 7 MG/L
MICROALBUMIN/CREAT UR: 7.75 MG/G CR (ref 0–25)
POTASSIUM SERPL-SCNC: 4 MMOL/L (ref 3.4–5.3)
PROT SERPL-MCNC: 7.4 G/DL (ref 6.8–8.8)
SODIUM SERPL-SCNC: 139 MMOL/L (ref 133–144)

## 2018-09-10 PROCEDURE — 82043 UR ALBUMIN QUANTITATIVE: CPT | Performed by: INTERNAL MEDICINE

## 2018-09-10 PROCEDURE — 99207 C FOOT EXAM  NO CHARGE: CPT | Performed by: PEDIATRICS

## 2018-09-10 PROCEDURE — 99214 OFFICE O/P EST MOD 30 MIN: CPT | Performed by: PEDIATRICS

## 2018-09-10 PROCEDURE — 80307 DRUG TEST PRSMV CHEM ANLYZR: CPT | Mod: 90 | Performed by: PEDIATRICS

## 2018-09-10 PROCEDURE — 80053 COMPREHEN METABOLIC PANEL: CPT | Performed by: PEDIATRICS

## 2018-09-10 PROCEDURE — 83036 HEMOGLOBIN GLYCOSYLATED A1C: CPT | Performed by: PEDIATRICS

## 2018-09-10 PROCEDURE — 36415 COLL VENOUS BLD VENIPUNCTURE: CPT | Performed by: PEDIATRICS

## 2018-09-10 PROCEDURE — 99000 SPECIMEN HANDLING OFFICE-LAB: CPT | Performed by: PEDIATRICS

## 2018-09-10 RX ORDER — OXYCODONE AND ACETAMINOPHEN 5; 325 MG/1; MG/1
1-2 TABLET ORAL EVERY 4 HOURS PRN
Qty: 90 TABLET | Refills: 0 | Status: SHIPPED | OUTPATIENT
Start: 2018-09-10 | End: 2018-12-03

## 2018-09-10 RX ORDER — EPINEPHRINE 0.3 MG/.3ML
0.3 INJECTION SUBCUTANEOUS
Qty: 2 ML | Refills: 0 | Status: SHIPPED | OUTPATIENT
Start: 2018-09-10 | End: 2020-12-04

## 2018-09-10 ASSESSMENT — ANXIETY QUESTIONNAIRES
2. NOT BEING ABLE TO STOP OR CONTROL WORRYING: NOT AT ALL
6. BECOMING EASILY ANNOYED OR IRRITABLE: NOT AT ALL
1. FEELING NERVOUS, ANXIOUS, OR ON EDGE: SEVERAL DAYS
3. WORRYING TOO MUCH ABOUT DIFFERENT THINGS: NOT AT ALL
IF YOU CHECKED OFF ANY PROBLEMS ON THIS QUESTIONNAIRE, HOW DIFFICULT HAVE THESE PROBLEMS MADE IT FOR YOU TO DO YOUR WORK, TAKE CARE OF THINGS AT HOME, OR GET ALONG WITH OTHER PEOPLE: NOT DIFFICULT AT ALL
7. FEELING AFRAID AS IF SOMETHING AWFUL MIGHT HAPPEN: NOT AT ALL
GAD7 TOTAL SCORE: 1
5. BEING SO RESTLESS THAT IT IS HARD TO SIT STILL: NOT AT ALL

## 2018-09-10 ASSESSMENT — PATIENT HEALTH QUESTIONNAIRE - PHQ9: 5. POOR APPETITE OR OVEREATING: NOT AT ALL

## 2018-09-10 NOTE — PROGRESS NOTES
SUBJECTIVE:   Johnna Caballero is a 52 year old female who presents to clinic today for the following health issues:      Diabetes Follow-up      Patient is checking blood sugars: varies, has been wearing monitor that tracks hourly sugars.       Diabetic concerns: other - trying to control sugars      Symptoms of hypoglycemia (low blood sugar): none but low has been 70     Paresthesias (numbness or burning in feet) or sores: No     Date of last diabetic eye exam: 06/04/18    Has seen Dr Ellis.  Using Elsa blood sugar monitor and has been adjusting insulin and eating habits.  Blood sugars much improved.    Healed well after recent elbow surgery.    Diabetes Management Resources    Hyperlipidemia Follow-Up      Rate your low fat/cholesterol diet?: fair    Taking statin?  No    Other lipid medications/supplements?:  none    Hypertension Follow-up      Outpatient blood pressures are not being checked.    Low Salt Diet: no added salt    No new cardiac symptoms.  No side effects from medications.    BP Readings from Last 2 Encounters:   09/10/18 92/70   07/13/18 110/64     Hemoglobin A1C (%)   Date Value   09/10/2018 7.6 (H)   06/25/2018 8.7 (H)     LDL Cholesterol Calculated (mg/dL)   Date Value   04/06/2018 104 (H)   04/13/2017 104 (H)     Problems taking medications regularly: No    Medication side effects: none    Diet: low salt and low fat/cholesterol      Ob/Gyn - Dr Saavedra - pap smear 7/2018    Asthma - remains well controlled.  No concerns. ACT of 25.    Mood has been good.    Needs updated epi pen for hornet allergy - [place].    Problem list and histories reviewed & adjusted, as indicated.  Additional history: as documented      Reviewed and updated as needed this visit by clinical staff  Tobacco  Allergies  Meds       Reviewed and updated as needed this visit by Provider         ROS:  Constitutional, msk, cardiovascular, pulmonary, gi and gu systems are negative, except as otherwise  "noted.    OBJECTIVE:     BP 92/70 (BP Location: Right arm, Patient Position: Right side, Cuff Size: Adult Large)  Pulse 77  Temp 98.9  F (37.2  C) (Tympanic)  Ht 5' 7\" (1.702 m)  Wt 265 lb (120.2 kg)  SpO2 98%  BMI 41.5 kg/m2  Body mass index is 41.5 kg/(m^2).  GENERAL: healthy, alert and no distress  RESP: lungs clear to auscultation - no rales, rhonchi or wheezes  CV: regular rate and rhythm, normal S1 S2, no S3 or S4, no murmur, click or rub, no peripheral edema and peripheral pulses strong  Diabetic foot exam: normal DP and PT pulses, no trophic changes or ulcerative lesions and normal sensory exam    Diagnostic Test Results:  Results for orders placed or performed in visit on 09/10/18 (from the past 24 hour(s))   **A1C FUTURE anytime   Result Value Ref Range    Hemoglobin A1C 7.6 (H) 0 - 5.6 %       ASSESSMENT/PLAN:       ICD-10-CM    1. Type 2 diabetes mellitus with hyperglycemia, with long-term current use of insulin (H) E11.65 Improving control, continue emily monitor and current insulin script.  Continue with dietary changes, increased activity as able    Z79.4    2. Chronic pain syndrome G89.4 Drug  Screen Comprehensive, Urine w/o Reported Meds (Pain Care Package)   3. Bilateral hand pain M79.641 oxyCODONE-acetaminophen (PERCOCET) 5-325 MG per tablet    Continue with pain medications - has been using approximately 90 tabs per month    M79.642    4. Hornet sting, accidental or unintentional, subsequent encounter T63.451D EPINEPHrine (EPIPEN/ADRENACLICK/OR ANY BX GENERIC EQUIV) 0.3 MG/0.3ML injection 2-pack   5. Morbid obesity with BMI of 40.0-44.9, adult (H) E66.01 Wt Readings from Last 4 Encounters:   09/10/18 265 lb (120.2 kg)   07/13/18 273 lb 12.8 oz (124.2 kg)   06/25/18 264 lb (119.7 kg)   05/21/18 281 lb (127.5 kg)     Weight down since last visit - continue with lifestyle change    Z68.41        See Patient Instructions - follow up 3 months    Linda Durant MD  [facility] [place]  "

## 2018-09-10 NOTE — MR AVS SNAPSHOT
After Visit Summary   9/10/2018    Johnna Caballero    MRN: [id number]           Patient Information     Date Of Birth          1965        Visit Information        Provider Department      9/10/2018 10:20 AM Linda Durant MD [facility]        Today's Diagnoses     Type 2 diabetes mellitus with hyperglycemia, with long-term current use of insulin (H)    -  1    Chronic pain syndrome        Bilateral hand pain        Hornet sting, accidental or unintentional, subsequent encounter          Care Instructions    Come back in 3 months with labs right before    Continue your excellent blood sugar control              Follow-ups after your visit        Follow-up notes from your care team     Return in about 3 months (around 12/10/2018) for diabetes visit.      Your next 10 appointments already scheduled     Sep 10, 2018  2:40 PM CDT   LAB with EA LAB   [facility] ([facility])    [address] [place] [address]  [address]  [facility] [id number] [phone]           Please do not eat 10-12 hours before your appointment if you are coming in fasting for labs on lipids, cholesterol, or glucose (sugar). This does not apply to pregnant women. Water, hot tea and black coffee (with nothing added) are okay. Do not drink other fluids, diet soda or chew gum.              Who to contact     If you have questions or need follow up information about today's clinic visit or your schedule please contact [facility] directly at [phone].  Normal or non-critical lab and imaging results will be communicated to you by MyChart, letter or phone within 4 business days after the clinic has received the results. If you do not hear from us within 7 days, please contact the clinic through MyChart or phone. If you have a critical or abnormal lab result, we will notify you by phone as soon as possible.  Submit refill requests through [employer] or call your pharmacy and they  "will forward the refill request to us. Please allow 3 business days for your refill to be completed.          Additional Information About Your Visit        [employer] Information     [employer] gives you secure access to your electronic health record. If you see a primary care provider, you can also send messages to your care team and make appointments. If you have questions, please call your primary care clinic.  If you do not have a primary care provider, please call [phone] and they will assist you.        Care EveryWhere ID     This is your Care EveryWhere ID. This could be used by other organizations to access your [facility] medical records  [id number]        Your Vitals Were     Pulse Temperature Height Pulse Oximetry BMI (Body Mass Index)       77 98.9  F (37.2  C) (Tympanic) 5' 7\" (1.702 m) 98% 41.5 kg/m2        Blood Pressure from Last 3 Encounters:   09/10/18 92/70   07/13/18 110/64   06/25/18 116/80    Weight from Last 3 Encounters:   09/10/18 265 lb (120.2 kg)   07/13/18 273 lb 12.8 oz (124.2 kg)   06/25/18 264 lb (119.7 kg)              We Performed the Following     Drug  Screen Comprehensive, Urine w/o Reported Meds (Pain Care Package)          Where to get your medicines      These medications were sent to [facility]  [address] [place] [address]     Phone:  [phone]     blood glucose monitoring test strip    EPINEPHrine 0.3 MG/0.3ML injection 2-pack         Some of these will need a paper prescription and others can be bought over the counter.  Ask your nurse if you have questions.     Bring a paper prescription for each of these medications     oxyCODONE-acetaminophen 5-325 MG per tablet         Information about OPIOIDS     PRESCRIPTION OPIOIDS: WHAT YOU NEED TO KNOW   We gave you an opioid (narcotic) pain medicine. It is important to manage your pain, but opioids are not always the best choice. You should first try all the " other options your care team gave you. Take this medicine for as short a time (and as few doses) as possible.    Some activities can increase your pain, such as bandage changes or therapy sessions. It may help to take your pain medicine 30 to 60 minutes before these activities. Reduce your stress by getting enough sleep, working on hobbies you enjoy and practicing relaxation or meditation. Talk to your care team about ways to manage your pain beyond prescription opioids.    These medicines have risks:    DO NOT drive when on new or higher doses of pain medicine. These medicines can affect your alertness and reaction times, and you could be arrested for driving under the influence (DUI). If you need to use opioids long-term, talk to your care team about driving.    DO NOT operate heavy machinery    DO NOT do any other dangerous activities while taking these medicines.    DO NOT drink any alcohol while taking these medicines.     If the opioid prescribed includes acetaminophen, DO NOT take with any other medicines that contain acetaminophen. Read all labels carefully. Look for the word  acetaminophen  or  Tylenol.  Ask your pharmacist if you have questions or are unsure.    You can get addicted to pain medicines, especially if you have a history of addiction (chemical, alcohol or substance dependence). Talk to your care team about ways to reduce this risk.    All opioids tend to cause constipation. Drink plenty of water and eat foods that have a lot of fiber, such as fruits, vegetables, prune juice, apple juice and high-fiber cereal. Take a laxative (Miralax, milk of magnesia, Colace, Senna) if you don t move your bowels at least every other day. Other side effects include upset stomach, sleepiness, dizziness, throwing up, tolerance (needing more of the medicine to have the same effect), physical dependence and slowed breathing.    Store your pills in a secure place, locked if possible. We will not replace any lost or  stolen medicine. If you don t finish your medicine, please throw away (dispose) as directed by your pharmacist. The [place] [employer] has more information about safe disposal: [email or web address].pca.[email or web address]-[email or web address]-medications         Primary Care Provider Office Phone # Fax #    Linda Durant [phone]       [address] [place] [address]        Equal Access to Services     [facility]: Hadii aad ku hadasho Soomaali, waaxda luqadaha, qaybta kaalmada adeegyada, waxay idiin hayaan adeeg kharash la'colleen . So [facility] [phone].    ATENCIÓN: Si habla español, tiene a subramanian disposición servicios gratuitos de asistencia lingüística. [facility] [phone].    We comply with applicable federal civil rights laws and [place] laws. We do not discriminate on the basis of race, color, national origin, age, disability, sex, sexual orientation, or gender identity.            Thank you!     Thank you for choosing [facility]  for your care. Our goal is always to provide you with excellent care. Hearing back from our patients is one way we can continue to improve our services. Please take a few minutes to complete the written survey that you may receive in the mail after your visit with us. Thank you!             Your Updated Medication List - Protect others around you: Learn how to safely use, store and throw away your medicines at www.disposemymeds.org.          This list is accurate as of 9/10/18 11:14 AM.  Always use your most recent med list.                   Brand Name Dispense Instructions for use Diagnosis    albuterol 108 (90 Base) MCG/ACT inhaler    PROAIR HFA/PROVENTIL HFA/VENTOLIN HFA    1 Inhaler    Inhale 2 puffs into the lungs every 4 hours as needed for shortness of breath / dyspnea or wheezing    Intermittent asthma, uncomplicated       aspirin 81 MG tablet     30 tablet    Take 1 tablet (81 mg) by mouth daily        BASAGLAR  100 UNIT/ML injection     24 mL    Inject 24 units q am and 12 units q pm    Type 2 diabetes mellitus with hyperglycemia, with long-term current use of insulin (H)       blood glucose monitoring lancets     2 Box    Use to test blood sugar 1-2 times daily or as directed.    DM (diabetes mellitus), type 2, uncontrolled (H)       blood glucose monitoring meter device kit    no brand specified    1 kit    Use to test blood sugar 1-2 times daily or as directed.    Type 2 diabetes mellitus without complication (H)       blood glucose monitoring test strip    ACCU-CHEK GABI    200 each    Use to test blood sugars 1-2x daily or as directed.        BREO ELLIPTA 100-25 MCG/INH inhaler   Generic drug:  fluticasone-vilanterol      Inhale 1 puff into the lungs daily        celecoxib 200 MG capsule    celeBREX    30 capsule    TAKE ONE CAPSULE BY MOUTH EVERY DAY    Chronic bilateral low back pain without sciatica       cetirizine 10 MG tablet    zyrTEC    30 tablet    Take 1 tablet (10 mg) by mouth 2 times daily        diazepam 5 MG tablet    VALIUM    30 tablet    Take 1 tablet (5 mg) by mouth every 6 hours as needed for anxiety or sleep    Muscle spasm       EPINEPHrine 0.3 MG/0.3ML injection 2-pack    EPIPEN/ADRENACLICK/or ANY BX GENERIC EQUIV    2 mL    Inject 0.3 mLs (0.3 mg) into the muscle once as needed for anaphylaxis    Hornet sting, accidental or unintentional, subsequent encounter       hydrocortisone 2.5 % cream    PROCTOCREAM-HC    60 g    Apply 2 x daily for not more than 14 days    External hemorrhoids       insulin pen needle 32G X 4 MM    BD VALDO U/F    180 each    Use two pen needles daily or as directed.    Type 2 diabetes mellitus without complication, with long-term current use of insulin (H)       KRILL OIL PO      Take 1 tablet by mouth        LANsoprazole 30 MG CR capsule    PREVACID    90 capsule    Take 1 capsule (30 mg) by mouth daily Take 30-60 minutes before a meal.    Gastroesophageal reflux  disease, esophagitis presence not specified       liraglutide 18 MG/3ML soln    VICTOZA PEN    27 mL    Inject 1.8 mg Subcutaneous daily    Type 2 diabetes mellitus without complication, with long-term current use of insulin (H)       LORazepam 0.5 MG tablet    ATIVAN    30 tablet    Take 1-2 tablets (0.5-1 mg) by mouth every 4 hours as needed for anxiety    Anxiety       losartan-hydrochlorothiazide 100-25 MG per tablet    HYZAAR    90 tablet    TAKE ONE TABLET BY MOUTH EVERY DAY    Essential hypertension       metFORMIN 1000 MG tablet    GLUCOPHAGE    180 tablet    Take 1 tablet (1,000 mg) by mouth 2 times daily (with meals)    Type 2 diabetes mellitus without complication, with long-term current use of insulin (H)       montelukast 10 MG tablet    SINGULAIR    90 tablet    Take 1 tablet (10 mg) by mouth At Bedtime    Intermittent asthma, uncomplicated       multivitamin, therapeutic with minerals Tabs tablet      Take 1 tablet by mouth 2 times daily        order for DME     1 each    Equipment being ordered: TENS unit    Chronic back pain, Bilateral hand pain       oxyCODONE-acetaminophen 5-325 MG per tablet    PERCOCET    90 tablet    Take 1-2 tablets by mouth every 4 hours as needed for pain    Bilateral hand pain       STATIN NOT PRESCRIBED (INTENTIONAL)     0 each    1 each 2 times daily Statin not prescribed intentionally due to Refusal by patient    Hyperlipidemia LDL goal <100       vitamin B complex with vitamin C Tabs tablet      Take 1 tablet by mouth daily        vitamin D 2000 units tablet     30 tablet    Take 1 tablet by mouth daily

## 2018-09-10 NOTE — Clinical Note
Please abstract the following data from this visit with this patient into the appropriate field in Epic:  Pap smear done on this date: 7/1/2018 (approximately), by this group: Ob/gyn specialists, results were normal.

## 2018-09-11 ENCOUNTER — TRANSFERRED RECORDS (OUTPATIENT)
Dept: HEALTH INFORMATION MANAGEMENT | Facility: CLINIC | Age: 53
End: 2018-09-11

## 2018-09-11 ASSESSMENT — ASTHMA QUESTIONNAIRES: ACT_TOTALSCORE: 25

## 2018-09-11 ASSESSMENT — ANXIETY QUESTIONNAIRES: GAD7 TOTAL SCORE: 1

## 2018-09-11 ASSESSMENT — PATIENT HEALTH QUESTIONNAIRE - PHQ9: SUM OF ALL RESPONSES TO PHQ QUESTIONS 1-9: 2

## 2018-09-13 LAB — COMPREHEN DRUG ANALYSIS UR: NORMAL

## 2018-10-03 DIAGNOSIS — K21.9 GASTROESOPHAGEAL REFLUX DISEASE, ESOPHAGITIS PRESENCE NOT SPECIFIED: ICD-10-CM

## 2018-10-04 DIAGNOSIS — Z79.4 TYPE 2 DIABETES MELLITUS WITHOUT COMPLICATION, WITH LONG-TERM CURRENT USE OF INSULIN (H): ICD-10-CM

## 2018-10-04 DIAGNOSIS — E11.9 TYPE 2 DIABETES MELLITUS WITHOUT COMPLICATION, WITH LONG-TERM CURRENT USE OF INSULIN (H): ICD-10-CM

## 2018-10-04 NOTE — TELEPHONE ENCOUNTER
"Requested Prescriptions   Pending Prescriptions Disp Refills     LANsoprazole (PREVACID) 30 MG CR capsule [Pharmacy Med Name: LANSOPRAZOLE 30MG CPDR]  Last Written Prescription Date:  4/25/2018  Last Fill Quantity: 90 capsule,  # refills: 1   Last office visit: 9/10/2018 with prescribing provider:  Linda Durant   Future Office Visit:   Next 5 appointments (look out 90 days)     Nov 06, 2018 10:00 AM CST   Pre-Op physical with Linda Durant MD   [facility]    [address] [place] [address]  [address]  [facility] [address]   [phone]                  90 capsule 1     Sig: TAKE ONE CAPSULE BY MOUTH EVERY DAY -TAKE 30-60 MINUTES BEFORE A MEAL    PPI Protocol Passed    10/3/2018  7:58 PM       Passed - Not on Clopidogrel (unless Pantoprazole ordered)       Passed - No diagnosis of osteoporosis on record       Passed - Recent (12 mo) or future (30 days) visit within the authorizing provider's specialty    Patient had office visit in the last 12 months or has a visit in the next 30 days with authorizing provider or within the authorizing provider's specialty.  See \"Patient Info\" tab in inbasket, or \"Choose Columns\" in Meds & Orders section of the refill encounter.           Passed - Patient is age 18 or older       Passed - No active pregnacy on record       Passed - No positive pregnancy test in past 12 months          "

## 2018-10-04 NOTE — TELEPHONE ENCOUNTER
"Requested Prescriptions   Pending Prescriptions Disp Refills     BD VALDO U/F 32G X 4 MM insulin pen needle [Pharmacy Med Name: BD PEN NEEDLE VALDO  32G X 4 MM MISC]    Last Written Prescription Date:  4/25/2018  Last Fill Quantity: 180,  # refills: 1   Last office visit: 9/10/2018 with prescribing provider:  Linda Durant     Future Office Visit:   Next 5 appointments (look out 90 days)     Nov 06, 2018 10:00 AM CST   Pre-Op physical with Linda Durant MD   [facility]    [address] [place] [address]  [address]  [facility] [address]   [phone]                  180 each 1     Sig: USE 2 PEN NEEDLES DAILY OR AS DIRECTED    Diabetic Supplies Protocol Passed    10/4/2018 10:00 AM       Passed - Patient is 18 years of age or older       Passed - Recent (6 mo) or future (30 days) visit within the authorizing provider's specialty    Patient had office visit in the last 6 months or has a visit in the next 30 days with authorizing provider.  See \"Patient Info\" tab in inbasket, or \"Choose Columns\" in Meds & Orders section of the refill encounter.              "

## 2018-10-05 RX ORDER — LANSOPRAZOLE 30 MG/1
CAPSULE, DELAYED RELEASE ORAL
Qty: 90 CAPSULE | Refills: 1 | Status: SHIPPED | OUTPATIENT
Start: 2018-10-05 | End: 2019-05-24

## 2018-10-05 RX ORDER — PEN NEEDLE, DIABETIC 32GX 5/32"
NEEDLE, DISPOSABLE MISCELLANEOUS
Qty: 180 EACH | Refills: 3 | Status: SHIPPED | OUTPATIENT
Start: 2018-10-05 | End: 2019-11-17

## 2018-10-09 ENCOUNTER — TRANSFERRED RECORDS (OUTPATIENT)
Dept: HEALTH INFORMATION MANAGEMENT | Facility: CLINIC | Age: 53
End: 2018-10-09

## 2018-11-06 ENCOUNTER — OFFICE VISIT (OUTPATIENT)
Dept: PEDIATRICS | Facility: CLINIC | Age: 53
End: 2018-11-06
Payer: COMMERCIAL

## 2018-11-06 VITALS
HEIGHT: 67 IN | BODY MASS INDEX: 42.69 KG/M2 | HEART RATE: 80 BPM | SYSTOLIC BLOOD PRESSURE: 116 MMHG | OXYGEN SATURATION: 98 % | TEMPERATURE: 98.7 F | WEIGHT: 272 LBS | DIASTOLIC BLOOD PRESSURE: 82 MMHG

## 2018-11-06 DIAGNOSIS — Z79.4 TYPE 2 DIABETES MELLITUS WITHOUT COMPLICATION, WITH LONG-TERM CURRENT USE OF INSULIN (H): ICD-10-CM

## 2018-11-06 DIAGNOSIS — Z01.818 PREOP GENERAL PHYSICAL EXAM: Primary | ICD-10-CM

## 2018-11-06 DIAGNOSIS — M54.12 CERVICAL RADICULOPATHY: ICD-10-CM

## 2018-11-06 DIAGNOSIS — E11.9 TYPE 2 DIABETES MELLITUS WITHOUT COMPLICATION, WITH LONG-TERM CURRENT USE OF INSULIN (H): ICD-10-CM

## 2018-11-06 DIAGNOSIS — D17.30 LIPOMA OF SKIN AND SUBCUTANEOUS TISSUE: ICD-10-CM

## 2018-11-06 DIAGNOSIS — I10 ESSENTIAL HYPERTENSION: ICD-10-CM

## 2018-11-06 LAB
ANION GAP SERPL CALCULATED.3IONS-SCNC: 11 MMOL/L (ref 3–14)
BUN SERPL-MCNC: 16 MG/DL (ref 7–30)
CALCIUM SERPL-MCNC: 8.8 MG/DL (ref 8.5–10.1)
CHLORIDE SERPL-SCNC: 102 MMOL/L (ref 94–109)
CO2 SERPL-SCNC: 24 MMOL/L (ref 20–32)
CREAT SERPL-MCNC: 0.61 MG/DL (ref 0.52–1.04)
GFR SERPL CREATININE-BSD FRML MDRD: >90 ML/MIN/1.7M2
GLUCOSE SERPL-MCNC: 159 MG/DL (ref 70–99)
POTASSIUM SERPL-SCNC: 3.6 MMOL/L (ref 3.4–5.3)
SODIUM SERPL-SCNC: 137 MMOL/L (ref 133–144)

## 2018-11-06 PROCEDURE — 80048 BASIC METABOLIC PNL TOTAL CA: CPT | Performed by: PEDIATRICS

## 2018-11-06 PROCEDURE — 36415 COLL VENOUS BLD VENIPUNCTURE: CPT | Performed by: PEDIATRICS

## 2018-11-06 PROCEDURE — 99215 OFFICE O/P EST HI 40 MIN: CPT | Performed by: PEDIATRICS

## 2018-11-06 RX ORDER — METHYLPREDNISOLONE 4 MG
TABLET, DOSE PACK ORAL
Qty: 21 TABLET | Refills: 0 | Status: SHIPPED | OUTPATIENT
Start: 2018-11-06 | End: 2018-12-26

## 2018-11-06 NOTE — PATIENT INSTRUCTIONS
No aspirin, ibuprofen, motrin, aleve, naprosyn or fish oil in the week before surgery. Stop asa, krill oil  and celebrex the week before surgery.       Hold metformin, victoza and losartan/hctz the day of surgery.     Take basaglar 10 U the morning of surgery.       Ok to take other medications the morning of surgery with a small sip of water.    Stop at lab on your way out today        Before Your Surgery      Call your surgeon if there is any change in your health. This includes signs of a cold or flu (such as a sore throat, runny nose, cough, rash or fever).    Do not smoke, drink alcohol or take over the counter medicine (unless your surgeon or primary care doctor tells you to) for the 24 hours before and after surgery.    If you take prescribed drugs: Follow your doctor s orders about which medicines to take and which to stop until after surgery.    Eating and drinking prior to surgery: follow the instructions from your surgeon    Take a shower or bath the night before surgery. Use the soap your surgeon gave you to gently clean your skin. If you do not have soap from your surgeon, use your regular soap. Do not shave or scrub the surgery site.  Wear clean pajamas and have clean sheets on your bed.

## 2018-11-06 NOTE — H&P
Notified Dr. Durant via her triage nurse.  Patient is MRSA positive.  If MD would like Vancomycin, she will need to order it before surgery.

## 2018-11-06 NOTE — MR AVS SNAPSHOT
After Visit Summary   11/6/2018    Johnna Caballero    MRN: [id number]           Patient Information     Date Of Birth          1965        Visit Information        Provider Department      11/6/2018 10:00 AM Linda Durant MD [facility]        Today's Diagnoses     Preop general physical exam    -  1    Lipoma of skin and subcutaneous tissue        Cervical radiculopathy        Essential hypertension        Type 2 diabetes mellitus without complication, with long-term current use of insulin (H)          Care Instructions    No aspirin, ibuprofen, motrin, aleve, naprosyn or fish oil in the week before surgery. Stop asa, krill oil  and celebrex the week before surgery.       Hold metformin, victoza and losartan/hctz the day of surgery.     Take basaglar 10 U the morning of surgery.       Ok to take other medications the morning of surgery with a small sip of water.    Stop at lab on your way out today        Before Your Surgery      Call your surgeon if there is any change in your health. This includes signs of a cold or flu (such as a sore throat, runny nose, cough, rash or fever).    Do not smoke, drink alcohol or take over the counter medicine (unless your surgeon or primary care doctor tells you to) for the 24 hours before and after surgery.    If you take prescribed drugs: Follow your doctor s orders about which medicines to take and which to stop until after surgery.    Eating and drinking prior to surgery: follow the instructions from your surgeon    Take a shower or bath the night before surgery. Use the soap your surgeon gave you to gently clean your skin. If you do not have soap from your surgeon, use your regular soap. Do not shave or scrub the surgery site.  Wear clean pajamas and have clean sheets on your bed.           Follow-ups after your visit        Follow-up notes from your care team     Return in about 1 month (around 12/6/2018) for diabetes visit.      Your  "next 10 appointments already scheduled     Nov 09, 2018   Procedure with Jazmin Bautista MD   [facility] (--)    [address]  [facility] [address] [phone]              Who to contact     If you have questions or need follow up information about today's clinic visit or your schedule please contact [facility] directly at [phone].  Normal or non-critical lab and imaging results will be communicated to you by RoomRevealhart, letter or phone within 4 business days after the clinic has received the results. If you do not hear from us within 7 days, please contact the clinic through Infotrievet or phone. If you have a critical or abnormal lab result, we will notify you by phone as soon as possible.  Submit refill requests through GLOG or call your pharmacy and they will forward the refill request to us. Please allow 3 business days for your refill to be completed.          Additional Information About Your Visit        GLOG Information     GLOG gives you secure access to your electronic health record. If you see a primary care provider, you can also send messages to your care team and make appointments. If you have questions, please call your primary care clinic.  If you do not have a primary care provider, please call [phone] and they will assist you.        Care EveryWhere ID     This is your Care EveryWhere ID. This could be used by other organizations to access your [facility] medical records  [id number]        Your Vitals Were     Pulse Temperature Height Pulse Oximetry BMI (Body Mass Index)       80 98.7  F (37.1  C) (Tympanic) 5' 7\" (1.702 m) 98% 42.6 kg/m2        Blood Pressure from Last 3 Encounters:   11/06/18 116/82   09/10/18 92/70   07/13/18 110/64    Weight from Last 3 Encounters:   11/06/18 272 lb (123.4 kg)   09/10/18 265 lb (120.2 kg)   07/13/18 273 lb 12.8 oz (124.2 kg)              We Performed the Following     Basic metabolic panel     "      Today's Medication Changes          These changes are accurate as of 11/6/18 10:41 AM.  If you have any questions, ask your nurse or doctor.               Start taking these medicines.        Dose/Directions    methylPREDNISolone 4 MG tablet   Commonly known as:  MEDROL DOSEPAK   Used for:  Cervical radiculopathy   Started by:  Linda Durant MD        Follow package instructions   Quantity:  21 tablet   Refills:  0            Where to get your medicines      These medications were sent to [facility] [place] - [place] - [address]   [address], [place] [address]     Phone:  [phone]     methylPREDNISolone 4 MG tablet                Primary Care Provider Office Phone # Fax #    Linda Durant [phone]       [address] [place] [address]        Equal Access to Services     [facility]: Hadii eddy avilez hadasho Soomaali, waaxda luqadaha, qaybta kaalmada adeegyada, waxay roxanain haycolleen zaldivar . So [facility] [phone].    ATENCIÓN: Si habla español, tiene a subramanian disposición servicios gratuitos de asistencia lingüística. Llame al [phone].    We comply with applicable federal civil rights laws and [place] laws. We do not discriminate on the basis of race, color, national origin, age, disability, sex, sexual orientation, or gender identity.            Thank you!     Thank you for choosing [facility]  for your care. Our goal is always to provide you with excellent care. Hearing back from our patients is one way we can continue to improve our services. Please take a few minutes to complete the written survey that you may receive in the mail after your visit with us. Thank you!             Your Updated Medication List - Protect others around you: Learn how to safely use, store and throw away your medicines at www.disposemymeds.org.          This list is accurate as of 11/6/18 10:41 AM.  Always use  your most recent med list.                   Brand Name Dispense Instructions for use Diagnosis    aspirin 81 MG tablet     30 tablet    Take 1 tablet (81 mg) by mouth daily        BASAGLAR 100 UNIT/ML injection     24 mL    Inject 24 units q am and 12 units q pm    Type 2 diabetes mellitus with hyperglycemia, with long-term current use of insulin (H)       BD VALDO U/F 32G X 4 MM   Generic drug:  insulin pen needle     180 each    USE 2 PEN NEEDLES DAILY OR AS DIRECTED    Type 2 diabetes mellitus without complication, with long-term current use of insulin (H)       blood glucose monitoring lancets     2 Box    Use to test blood sugar 1-2 times daily or as directed.    DM (diabetes mellitus), type 2, uncontrolled (H)       blood glucose monitoring meter device kit    no brand specified    1 kit    Use to test blood sugar 1-2 times daily or as directed.    Type 2 diabetes mellitus without complication (H)       blood glucose monitoring test strip    ACCU-CHEK GABI    200 each    Use to test blood sugars 1-2x daily or as directed.        BREO ELLIPTA 100-25 MCG/INH inhaler   Generic drug:  fluticasone-vilanterol      Inhale 1 puff into the lungs daily        celecoxib 200 MG capsule    celeBREX    30 capsule    TAKE ONE CAPSULE BY MOUTH EVERY DAY    Chronic bilateral low back pain without sciatica       cetirizine 10 MG tablet    zyrTEC    30 tablet    Take 1 tablet (10 mg) by mouth 2 times daily        diazepam 5 MG tablet    VALIUM    30 tablet    Take 1 tablet (5 mg) by mouth every 6 hours as needed for anxiety or sleep    Muscle spasm       EPINEPHrine 0.3 MG/0.3ML injection 2-pack    EPIPEN/ADRENACLICK/or ANY BX GENERIC EQUIV    2 mL    Inject 0.3 mLs (0.3 mg) into the muscle once as needed for anaphylaxis    Hornet sting, accidental or unintentional, subsequent encounter       hydrocortisone 2.5 % cream    PROCTOCREAM-HC    60 g    Apply 2 x daily for not more than 14 days    External hemorrhoids       KRILL OIL  PO      Take 1 tablet by mouth        LANsoprazole 30 MG CR capsule    PREVACID    90 capsule    TAKE ONE CAPSULE BY MOUTH EVERY DAY -TAKE 30-60 MINUTES BEFORE A MEAL    Gastroesophageal reflux disease, esophagitis presence not specified       liraglutide 18 MG/3ML soln    VICTOZA PEN    27 mL    Inject 1.8 mg Subcutaneous daily    Type 2 diabetes mellitus without complication, with long-term current use of insulin (H)       LORazepam 0.5 MG tablet    ATIVAN    30 tablet    Take 1-2 tablets (0.5-1 mg) by mouth every 4 hours as needed for anxiety    Anxiety       losartan-hydrochlorothiazide 100-25 MG per tablet    HYZAAR    90 tablet    TAKE ONE TABLET BY MOUTH EVERY DAY    Essential hypertension       metFORMIN 1000 MG tablet    GLUCOPHAGE    180 tablet    Take 1 tablet (1,000 mg) by mouth 2 times daily (with meals)    Type 2 diabetes mellitus without complication, with long-term current use of insulin (H)       methylPREDNISolone 4 MG tablet    MEDROL DOSEPAK    21 tablet    Follow package instructions    Cervical radiculopathy       montelukast 10 MG tablet    SINGULAIR    90 tablet    Take 1 tablet (10 mg) by mouth At Bedtime    Intermittent asthma, uncomplicated       multivitamin, therapeutic with minerals Tabs tablet      Take 1 tablet by mouth 2 times daily        order for DME     1 each    Equipment being ordered: TENS unit    Chronic back pain, Bilateral hand pain       oxyCODONE-acetaminophen 5-325 MG per tablet    PERCOCET    90 tablet    Take 1-2 tablets by mouth every 4 hours as needed for pain    Bilateral hand pain       STATIN NOT PRESCRIBED (INTENTIONAL)     0 each    1 each 2 times daily Statin not prescribed intentionally due to Refusal by patient    Hyperlipidemia LDL goal <100       vitamin B complex with vitamin C Tabs tablet      Take 1 tablet by mouth daily        vitamin D 2000 units tablet     30 tablet    Take 1 tablet by mouth daily

## 2018-11-06 NOTE — PROGRESS NOTES
[facility]  [address]  [address]  [place] [address]  [phone]  Dept: [phone]    PRE-OP EVALUATION:  Today's date: 2018    Johnna Caballero (: 1965) presents for pre-operative evaluation assessment as requested by Dr. Bautista.  She requires evaluation and anesthesia risk assessment prior to undergoing surgery/procedure for treatment of excision left hip mass and left chest wall mass  .    Fax number for surgical facility: within system   Primary Physician: Linda Durant  Type of Anesthesia Anticipated: General    Patient has a Health Care Directive or Living Will:  YES on file     Preop Questions 2018   Who is doing your surgery? [facility]a   What are you having done? lipoma removal left thorax and hip   Date of Surgery/Procedure: 2018   Facility or Hospital where procedure/surgery [facility] be performed[facility]   1.  Do you have a history of Heart attack, stroke, stent, coronary bypass surgery, or other heart surgery? No   2.  Do you ever have any pain or discomfort in your chest? No   3.  Do you have a history of  Heart Failure? No   4.   Are you troubled by shortness of breath when:  walking on a level surface, or up a slight hill, or at night? No   5.  Do you currently have a cold, bronchitis or other respiratory infection? No   6.  Do you have a cough, shortness of breath, or wheezing? No   7.  Do you sometimes get pains in the calves of your legs when you walk? No   8. Do you or anyone in your family have previous history of blood clots? No   9.  Do you or does anyone in your family have a serious bleeding problem such as prolonged bleeding following surgeries or cuts? No   10. Have you ever had problems with anemia or been told to take iron pills? YES - normal most recently   11. Have you had any abnormal blood loss such as black, tarry or bloody stools, or abnormal vaginal bleeding? No   12. Have you ever had a blood transfusion? No   13. Have  you or any of your relatives ever had problems with anesthesia? No   14. Do you have sleep apnea, excessive snoring or daytime drowsiness? No   15. Do you have any prosthetic heart valves? No   16. Do you have prosthetic joints? YES - knees   17. Is there any chance that you may be pregnant? No         HPI:     HPI related to upcoming procedure: large lipomas requiring surgical resection left lateral chest well and lateral hip    Diabetes has been under good control - fasting blood sugars have been around 110.      Cervical radiculopathy    ASTHMA - Patient has a longstanding history of moderate-severe Asthma . Patient has been doing well overall noting NO SYMPTOMS and continues on medication regimen consisting of albuterol prn  without adverse reactions or side effects.                                                                                                                                                 .  DIABETES - Patient has a longstanding history of DiabetesType Type II . Patient is being treated with diet, oral agents, exercise, SMBG, insulin injections and ASA and denies significant side effects. Control has been fair. Complicating factors include but are not limited to: hypertension, hyperlipidemia and morbid obesity .                                                                                                                                 .  HYPERLIPIDEMIA - Patient has a long history of significant Hyperlipidemia for which she refuses medications.                                                                                                                                                        .  HYPERTENSION - Patient has longstanding history of HTN , currently denies any symptoms referable to elevated blood pressure. Specifically denies chest pain, palpitations, dyspnea, orthopnea, PND or peripheral edema. Blood pressure readings have been in normal range. Current medication regimen is as  listed below. Patient denies any side effects of medication.                                                          See problem list for active medical problems.  Problems all longstanding and stable, except as noted/documented.  See ROS for pertinent symptoms related to these conditions.                                                                                                                                                          .    MEDICAL HISTORY:     Patient Active Problem List    Diagnosis Date Noted     Chronic pain syndrome 11/23/2016     Priority: High     Patient is followed by Griselda Yoder MD for ongoing prescription of pain medication.  All refills should be approved by this provider, or covering partner.    Medication(s): percocet 5/325.   Maximum quantity per month: 90  Clinic visit frequency required: Q 6  months     Controlled substance agreement:  Encounter-Level CSA:     There are no encounter-level csa.        Pain Clinic evaluation in the past: No    DIRE Total Score(s):  No flowsheet data found.    Last [facility] website verification:  none   [email or web address]         Type 2 diabetes mellitus without complication (H) 10/02/2015     Priority: High     Hematoma - postoperative 11/23/2012     Priority: High     Problem list name updated by automated process. Provider to review and confirm       Hyperlipidemia LDL goal <100 02/10/2010     Priority: High     Intermittent asthma, uncomplicated 12/28/2016     Priority: Medium     Latex allergy status 10/03/2016     Priority: Medium     Anxiety 05/10/2016     Priority: Medium     Major depressive disorder, recurrent episode, mild (H) 01/05/2016     Priority: Medium     Typically seasonal     celexa- low libido  wellbutrin- didn't work   paxil- didn't work        Essential hypertension 10/04/2015     Priority: Medium     S/P total knee arthroplasty 07/15/2014     Priority: Medium     Morbid obesity with BMI of 40.0-44.9,  adult (H) 09/18/2012     Priority: Medium     Allergic rhinitis 03/25/2003     Priority: Medium     Problem list name updated by automated process. Provider to review       Bilateral low back pain without sciatica 10/25/2015     Priority: Low     Vitamin D deficiency disease 03/06/2013     Priority: Low     Surgery aftercare 12/16/2012     Priority: Low     IMO update changed this record. Please review for accuracy       Long term current use of insulin (H) 09/18/2012     Priority: Low     Esophageal reflux 03/25/2003     Priority: Low      Past Medical History:   Diagnosis Date     Actinic keratosis      Allergic rhinitis, cause unspecified      Anemia      Arthritis      Asthma     no meds x2 yrs     chronic back pain      Chronic infection     MRSA-nasal     Chronic pain     back     Esophageal reflux      fibrocystic breast changes      Gastro-oesophageal reflux disease      Heart murmur     mitral insuffiency     Hx of Fen-Phen use      Hypertension      migraines      Mild intermittent asthma     rare, with dog or exercise     Moderate major depression (H) 3/28/2012     MRSA (methicillin resistant Staphylococcus aureus) 6/27/2014    Nares     Need for desensitization to allergens      Obesity, unspecified      Other and unspecified hyperlipidemia      Temporomandibular joint disorders, unspecified      Thrombosis of leg     incisional area right hip     Type II or unspecified type diabetes mellitus without mention of complication, not stated as uncontrolled      Past Surgical History:   Procedure Laterality Date     ARTHROPLASTY KNEE  7/15/2014    Procedure: ARTHROPLASTY KNEE;  Surgeon: Chapin Paul MD;  Location: [facility] OR     BACK SURGERY       C NONSPECIFIC PROCEDURE      laparoscopy x6     C NONSPECIFIC PROCEDURE  93    laparotomy x2 ovarian cyst     C NONSPECIFIC PROCEDURE  0293    oophorectomy lt side - cyst     C NONSPECIFIC PROCEDURE  0395    laminectomy L4-5. S1 herniated disc     C NONSPECIFIC  PROCEDURE  96    cholecystectomy     C NONSPECIFIC PROCEDURE      ganglion cysts l wrist, rt palm     C NONSPECIFIC PROCEDURE      cyst removal back x2     C NONSPECIFIC PROCEDURE  10/02    rt thumb fusion, ganglion cyst removal     C NONSPECIFIC PROCEDURE  1/08    remove heel spur, excise exostosis     CHOLECYSTECTOMY       COLONOSCOPY N/A 4/26/2017    Procedure: COLONOSCOPY;  COLONOSCOPY;  Surgeon: Chapin Leal MD;  Location: [facility] GI     EXCISE LESION LOWER EXTREMITY  11/20/2012    Procedure: EXCISE LESION LOWER EXTREMITY;  EXCISION LIPOMA RIGHT HIP ;  Surgeon: Jazmin Bautista MD;  Location:  [facility]     EXCISE LESION LOWER EXTREMITY  11/23/2012    Procedure: EXCISE LESION LOWER EXTREMITY;  EXCISE LESION LOWER EXTREMITY  EVACUATE RIGHT HIP HEMATOMA;  Surgeon: Jazmin Bautista MD;  Location: [facility] OR     GYN SURGERY       HC EXPLORATION ANKLE JOINT  1/2006     HC PART EXCIS PLANTAR FASCIA  12/2006     IRRIGATION AND DEBRIDEMENT HIP, COMBINED  12/15/2012    Procedure: COMBINED IRRIGATION AND DEBRIDEMENT HIP;   evacuation hematoma, scar revision right hip;  Surgeon: Jazmin Bautista MD;  Location: [facility] OR     wisdom teeth[       Current Outpatient Prescriptions   Medication Sig Dispense Refill     aspirin 81 MG tablet Take 1 tablet (81 mg) by mouth daily 30 tablet      BASAGLAR 100 UNIT/ML injection Inject 24 units q am and 12 units q pm 24 mL 3     BD VALDO U/F 32G X 4 MM insulin pen needle USE 2 PEN NEEDLES DAILY OR AS DIRECTED 180 each 3     blood glucose monitoring (ACCU-CHEK GABI) test strip Use to test blood sugars 1-2x daily or as directed. 200 each 11     blood glucose monitoring (ACCU-CHEK MULTICLIX) lancets Use to test blood sugar 1-2 times daily or as directed. 2 Box 3     blood glucose monitoring (NO BRAND SPECIFIED) meter device kit Use to test blood sugar 1-2 times daily or as directed. 1 kit 0     celecoxib (CELEBREX) 200 MG capsule TAKE ONE CAPSULE BY MOUTH EVERY DAY 30 capsule 0      cetirizine (ZYRTEC) 10 MG tablet Take 1 tablet (10 mg) by mouth 2 times daily 30 tablet 1     Cholecalciferol (VITAMIN D) 2000 UNITS tablet Take 1 tablet by mouth daily 30 tablet      diazepam (VALIUM) 5 MG tablet Take 1 tablet (5 mg) by mouth every 6 hours as needed for anxiety or sleep 30 tablet 0     EPINEPHrine (EPIPEN/ADRENACLICK/OR ANY BX GENERIC EQUIV) 0.3 MG/0.3ML injection 2-pack Inject 0.3 mLs (0.3 mg) into the muscle once as needed for anaphylaxis 2 mL 0     fluticasone - vilanterol (BREO ELLIPTA) 100-25 MCG/INH oral inhaler Inhale 1 puff into the lungs daily       hydrocortisone (PROCTOCREAM-HC) 2.5 % cream Apply 2 x daily for not more than 14 days 60 g 5     KRILL OIL PO Take 1 tablet by mouth       LANsoprazole (PREVACID) 30 MG CR capsule TAKE ONE CAPSULE BY MOUTH EVERY DAY -TAKE 30-60 MINUTES BEFORE A MEAL 90 capsule 1     liraglutide (VICTOZA PEN) 18 MG/3ML soln Inject 1.8 mg Subcutaneous daily 27 mL 3     LORazepam (ATIVAN) 0.5 MG tablet Take 1-2 tablets (0.5-1 mg) by mouth every 4 hours as needed for anxiety 30 tablet 0     losartan-hydrochlorothiazide (HYZAAR) 100-25 MG per tablet TAKE ONE TABLET BY MOUTH EVERY DAY 90 tablet 3     metFORMIN (GLUCOPHAGE) 1000 MG tablet Take 1 tablet (1,000 mg) by mouth 2 times daily (with meals) 180 tablet 3     methylPREDNISolone (MEDROL DOSEPAK) 4 MG tablet Follow package instructions 21 tablet 0     oxyCODONE-acetaminophen (PERCOCET) 5-325 MG per tablet Take 1-2 tablets by mouth every 4 hours as needed for pain 90 tablet 0     STATIN NOT PRESCRIBED, INTENTIONAL, 1 each 2 times daily Statin not prescribed intentionally due to Refusal by patient 0 each 0     montelukast (SINGULAIR) 10 MG tablet Take 1 tablet (10 mg) by mouth At Bedtime 90 tablet 3     multivitamin, therapeutic with minerals (THERA-VIT-M) TABS Take 1 tablet by mouth 2 times daily        ORDER FOR DME Equipment being ordered: TENS unit 1 each 0     vitamin  B complex with vitamin C (VITAMIN  B  "COMPLEX) TABS Take 1 tablet by mouth daily  0     OTC products: None, except as noted above    Allergies   Allergen Reactions     Eggs      Allergy found in testing     Hornets      wasps     Latex Anaphylaxis     hives  -  able to use BP cuff     Lipitor [Hmg-Coa-R Inhibitors] Cramps     Muscle cramps with statin     Metoclopramide Hcl Difficulty breathing     Neurontin [Gabapentin] Hives      Latex Allergy: YES: Precautions to take: latex free room    Social History   Substance Use Topics     Smoking status: Never Smoker     Smokeless tobacco: Never Used     Alcohol use 0.0 oz/week     0 Standard drinks or equivalent per week      Comment: 1 drink per year or less     History   Drug Use No       REVIEW OF SYSTEMS:   Constitutional, neuro, ENT, endocrine, pulmonary, cardiac, gastrointestinal, genitourinary, musculoskeletal, integument and psychiatric systems are negative, except as otherwise noted.    EXAM:   /82 (BP Location: Right arm, Patient Position: Right side, Cuff Size: Adult Large)  Pulse 80  Temp 98.7  F (37.1  C) (Tympanic)  Ht 5' 7\" (1.702 m)  Wt 272 lb (123.4 kg)  SpO2 98%  BMI 42.6 kg/m2    GENERAL APPEARANCE: healthy, alert and no distress     EYES: EOMI, PERRL     HENT: ear canals and TM's normal and nose and mouth without ulcers or lesions     NECK: no adenopathy, no asymmetry, masses, or scars and thyroid normal to palpation     RESP: lungs clear to auscultation - no rales, rhonchi or wheezes     CV: regular rates and rhythm, normal S1 S2, no S3 or S4 and no murmur, click or rub     ABDOMEN:  soft, nontender, no HSM or masses and bowel sounds normal     MS: tender to palpation over left rhomboid and trapezius musculature     SKIN: no suspicious lesions or rashes     NEURO: Normal strength and tone, sensory exam grossly normal, mentation intact and speech normal     PSYCH: mentation appears normal. and affect normal/bright     LYMPHATICS: No cervical adenopathy    DIAGNOSTICS: "   [id number] appears normal, NSR, normal axis, normal intervals, no acute ST/T changes c/w ischemia, no LVH by voltage criteria    Recent Labs   Lab Test  09/10/18   0932  18   1523  18   0955   17   0935   10/16/16   1937   14   1503  14   0800  14   0633   HGB   --    --    --    --   13.3   --   13.1   < >  13.0   --   10.2*   PLT   --    --    --    --    --    --   299   --   494*   --    --    INR   --    --    --    --    --    --    --    --    --   1.59*  1.62*   NA  139  139   --    < >  138   < >   --    < >   --    --    --    POTASSIUM  4.0  3.6   --    < >  4.0   < >   --    < >   --    --    --    CR  0.69  0.73   --    < >  0.52   < >   --    < >   --    --    --    A1C  7.6*   --   8.7*   < >  7.8*   < >   --    < >   --    --    --     < > = values in this interval not displayed.      BMP pending  IMPRESSION:   Reason for surgery/procedure: lipoma removal    The proposed surgical procedure is considered INTERMEDIATE risk.    REVISED CARDIAC RISK INDEX  The patient has the following serious cardiovascular risks for perioperative complications such as (MI, PE, VFib and 3  AV Block):  No serious cardiac risks  INTERPRETATION: 0 risks: Class I (very low risk - 0.4% complication rate)    The patient has the following additional risks for perioperative complications:  Possible high tolerance to opioid analgesics due to chronic narcotic use  Morbid obesity      ICD-10-CM    1. Preop general physical exam Z01.818 Basic metabolic panel   2. Lipoma of skin and subcutaneous tissue D17.30    3. Cervical radiculopathy M54.12    4. Essential hypertension I10    5. Type 2 diabetes mellitus without complication, with long-term current use of insulin (H) E11.9     Z79.4        RECOMMENDATIONS:     NO LATEX      Cardiovascular Risk  Performs 4 METs exercise without symptoms (Light housework (dusting, washing dishes) and Climb a flight of stairs) .         Diabetes Medication  Use  -----Hold usual oral and non-insulin diabetic meds (e.g. Metformin, Actos, Glipizide) while NPO.   -----Take 50% of long acting insulin (e.g. Lantus, NPH) while NPO (fasting)      Anticoagulant or Antiplatelet Medication Use  NSAIDS: hold 7 days before surgery        ACE Inhibitor or Angiotensin Receptor Blocker (ARB) Use  Ace inhibitor or Angiotensin Receptor Blocker (ARB) and should HOLD this medication for the 24 hours prior to surgery.      APPROVAL GIVEN to proceed with proposed procedure, without further diagnostic evaluation       Signed Electronically by: Linda Durant MD    Copy of this evaluation report is provided to requesting physician.    [place] Preop Guidelines    Revised Cardiac Risk Index

## 2018-11-07 ENCOUNTER — MYC MEDICAL ADVICE (OUTPATIENT)
Dept: PEDIATRICS | Facility: CLINIC | Age: 53
End: 2018-11-07

## 2018-11-08 ENCOUNTER — TRANSFERRED RECORDS (OUTPATIENT)
Dept: HEALTH INFORMATION MANAGEMENT | Facility: CLINIC | Age: 53
End: 2018-11-08

## 2018-11-08 NOTE — PLAN OF CARE
Pt is MRSA positive from a nasal swab done in 2014.  Called Inpatient pharmacist, PRACHI Medel and he is not aware of any need to be put on Vancomycin for her particular surgery(only hips/knees) and said Ancef Iv should be sufficient.  Dr. Bautista's office called message left with Stella and updated on MRSA positive and RPGALA stated ok with Ancef for this surgery.

## 2018-11-08 NOTE — H&P (VIEW-ONLY)
[facility]  [address]  [address]  [place] [address]  [phone]  Dept: [phone]    PRE-OP EVALUATION:  Today's date: 2018    Johnna Caballero (: 1965) presents for pre-operative evaluation assessment as requested by Dr. Bautista.  She requires evaluation and anesthesia risk assessment prior to undergoing surgery/procedure for treatment of excision left hip mass and left chest wall mass  .    Fax number for surgical facility: within system   Primary Physician: Linda Durant  Type of Anesthesia Anticipated: General    Patient has a Health Care Directive or Living Will:  YES on file     Preop Questions 2018   Who is doing your surgery? hima   What are you having done? lipoma removal left thorax and hip   Date of Surgery/Procedure: 2018   Facility or Hospital where procedure/surgery will be performed: [facility]   1.  Do you have a history of Heart attack, stroke, stent, coronary bypass surgery, or other heart surgery? No   2.  Do you ever have any pain or discomfort in your chest? No   3.  Do you have a history of  Heart Failure? No   4.   Are you troubled by shortness of breath when:  walking on a level surface, or up a slight hill, or at night? No   5.  Do you currently have a cold, bronchitis or other respiratory infection? No   6.  Do you have a cough, shortness of breath, or wheezing? No   7.  Do you sometimes get pains in the calves of your legs when you walk? No   8. Do you or anyone in your family have previous history of blood clots? No   9.  Do you or does anyone in your family have a serious bleeding problem such as prolonged bleeding following surgeries or cuts? No   10. Have you ever had problems with anemia or been told to take iron pills? YES - normal most recently   11. Have you had any abnormal blood loss such as black, tarry or bloody stools, or abnormal vaginal bleeding? No   12. Have you ever had a blood transfusion? No   13. Have  you or any of your relatives ever had problems with anesthesia? No   14. Do you have sleep apnea, excessive snoring or daytime drowsiness? No   15. Do you have any prosthetic heart valves? No   16. Do you have prosthetic joints? YES - knees   17. Is there any chance that you may be pregnant? No         HPI:     HPI related to upcoming procedure: large lipomas requiring surgical resection left lateral chest well and lateral hip    Diabetes has been under good control - fasting blood sugars have been around 110.      Cervical radiculopathy    ASTHMA - Patient has a longstanding history of moderate-severe Asthma . Patient has been doing well overall noting NO SYMPTOMS and continues on medication regimen consisting of albuterol prn  without adverse reactions or side effects.                                                                                                                                                 .  DIABETES - Patient has a longstanding history of DiabetesType Type II . Patient is being treated with diet, oral agents, exercise, SMBG, insulin injections and ASA and denies significant side effects. Control has been fair. Complicating factors include but are not limited to: hypertension, hyperlipidemia and morbid obesity .                                                                                                                                 .  HYPERLIPIDEMIA - Patient has a long history of significant Hyperlipidemia for which she refuses medications.                                                                                                                                                        .  HYPERTENSION - Patient has longstanding history of HTN , currently denies any symptoms referable to elevated blood pressure. Specifically denies chest pain, palpitations, dyspnea, orthopnea, PND or peripheral edema. Blood pressure readings have been in normal range. Current medication regimen is as  listed below. Patient denies any side effects of medication.                                                          See problem list for active medical problems.  Problems all longstanding and stable, except as noted/documented.  See ROS for pertinent symptoms related to these conditions.                                                                                                                                                          .    MEDICAL HISTORY:     Patient Active Problem List    Diagnosis Date Noted     Chronic pain syndrome 11/23/2016     Priority: High     Patient is followed by Griselda Yoder MD for ongoing prescription of pain medication.  All refills should be approved by this provider, or covering partner.    Medication(s): percocet 5/325.   Maximum quantity per month: 90  Clinic visit frequency required: Q 6  months     Controlled substance agreement:  Encounter-Level CSA:     There are no encounter-level csa.        Pain Clinic evaluation in the past: No    DIRE Total Score(s):  No flowsheet data found.    Last [facility] website verification:  none   [email or web address]         Type 2 diabetes mellitus without complication (H) 10/02/2015     Priority: High     Hematoma - postoperative 11/23/2012     Priority: High     Problem list name updated by automated process. Provider to review and confirm       Hyperlipidemia LDL goal <100 02/10/2010     Priority: High     Intermittent asthma, uncomplicated 12/28/2016     Priority: Medium     Latex allergy status 10/03/2016     Priority: Medium     Anxiety 05/10/2016     Priority: Medium     Major depressive disorder, recurrent episode, mild (H) 01/05/2016     Priority: Medium     Typically seasonal     celexa- low libido  wellbutrin- didn't work   paxil- didn't work        Essential hypertension 10/04/2015     Priority: Medium     S/P total knee arthroplasty 07/15/2014     Priority: Medium     Morbid obesity with BMI of 40.0-44.9,  adult (H) 09/18/2012     Priority: Medium     Allergic rhinitis 03/25/2003     Priority: Medium     Problem list name updated by automated process. Provider to review       Bilateral low back pain without sciatica 10/25/2015     Priority: Low     Vitamin D deficiency disease 03/06/2013     Priority: Low     Surgery aftercare 12/16/2012     Priority: Low     IMO update changed this record. Please review for accuracy       Long term current use of insulin (H) 09/18/2012     Priority: Low     Esophageal reflux 03/25/2003     Priority: Low      Past Medical History:   Diagnosis Date     Actinic keratosis      Allergic rhinitis, cause unspecified      Anemia      Arthritis      Asthma     no meds x2 yrs     chronic back pain      Chronic infection     MRSA-nasal     Chronic pain     back     Esophageal reflux      fibrocystic breast changes      Gastro-oesophageal reflux disease      Heart murmur     mitral insuffiency     Hx of Fen-Phen use      Hypertension      migraines      Mild intermittent asthma     rare, with dog or exercise     Moderate major depression (H) 3/28/2012     MRSA (methicillin resistant Staphylococcus aureus) 6/27/2014    Nares     Need for desensitization to allergens      Obesity, unspecified      Other and unspecified hyperlipidemia      Temporomandibular joint disorders, unspecified      Thrombosis of leg     incisional area right hip     Type II or unspecified type diabetes mellitus without mention of complication, not stated as uncontrolled      Past Surgical History:   Procedure Laterality Date     ARTHROPLASTY KNEE  7/15/2014    Procedure: ARTHROPLASTY KNEE;  Surgeon: Chapin Paul MD;  Location: [facility] OR     BACK SURGERY       C NONSPECIFIC PROCEDURE      laparoscopy x6     C NONSPECIFIC PROCEDURE  93    laparotomy x2 ovarian cyst     C NONSPECIFIC PROCEDURE  0293    oophorectomy lt side - cyst     C NONSPECIFIC PROCEDURE  0395    laminectomy L4-5. S1 herniated disc     C NONSPECIFIC  PROCEDURE  96    cholecystectomy     C NONSPECIFIC PROCEDURE      ganglion cysts l wrist, rt palm     C NONSPECIFIC PROCEDURE      cyst removal back x2     C NONSPECIFIC PROCEDURE  10/02    rt thumb fusion, ganglion cyst removal     C NONSPECIFIC PROCEDURE  1/08    remove heel spur, excise exostosis     CHOLECYSTECTOMY       COLONOSCOPY N/A 4/26/2017    Procedure: COLONOSCOPY;  COLONOSCOPY;  Surgeon: Chapin Leal MD;  Location: [facility] GI     EXCISE LESION LOWER EXTREMITY  11/20/2012    Procedure: EXCISE LESION LOWER EXTREMITY;  EXCISION LIPOMA RIGHT HIP ;  Surgeon: Jazmin Bautista MD;  Location:  [facility]     EXCISE LESION LOWER EXTREMITY  11/23/2012    Procedure: EXCISE LESION LOWER EXTREMITY;  EXCISE LESION LOWER EXTREMITY  EVACUATE RIGHT HIP HEMATOMA;  Surgeon: Jazmin Bautista MD;  Location: [facility] OR     GYN SURGERY       HC EXPLORATION ANKLE JOINT  1/2006     HC PART EXCIS PLANTAR FASCIA  12/2006     IRRIGATION AND DEBRIDEMENT HIP, COMBINED  12/15/2012    Procedure: COMBINED IRRIGATION AND DEBRIDEMENT HIP;   evacuation hematoma, scar revision right hip;  Surgeon: Jazmin Bautista MD;  Location: [facility] OR     wisdom teeth[       Current Outpatient Prescriptions   Medication Sig Dispense Refill     aspirin 81 MG tablet Take 1 tablet (81 mg) by mouth daily 30 tablet      BASAGLAR 100 UNIT/ML injection Inject 24 units q am and 12 units q pm 24 mL 3     BD VALDO U/F 32G X 4 MM insulin pen needle USE 2 PEN NEEDLES DAILY OR AS DIRECTED 180 each 3     blood glucose monitoring (ACCU-CHEK GABI) test strip Use to test blood sugars 1-2x daily or as directed. 200 each 11     blood glucose monitoring (ACCU-CHEK MULTICLIX) lancets Use to test blood sugar 1-2 times daily or as directed. 2 Box 3     blood glucose monitoring (NO BRAND SPECIFIED) meter device kit Use to test blood sugar 1-2 times daily or as directed. 1 kit 0     celecoxib (CELEBREX) 200 MG capsule TAKE ONE CAPSULE BY MOUTH EVERY DAY 30 capsule 0      cetirizine (ZYRTEC) 10 MG tablet Take 1 tablet (10 mg) by mouth 2 times daily 30 tablet 1     Cholecalciferol (VITAMIN D) 2000 UNITS tablet Take 1 tablet by mouth daily 30 tablet      diazepam (VALIUM) 5 MG tablet Take 1 tablet (5 mg) by mouth every 6 hours as needed for anxiety or sleep 30 tablet 0     EPINEPHrine (EPIPEN/ADRENACLICK/OR ANY BX GENERIC EQUIV) 0.3 MG/0.3ML injection 2-pack Inject 0.3 mLs (0.3 mg) into the muscle once as needed for anaphylaxis 2 mL 0     fluticasone - vilanterol (BREO ELLIPTA) 100-25 MCG/INH oral inhaler Inhale 1 puff into the lungs daily       hydrocortisone (PROCTOCREAM-HC) 2.5 % cream Apply 2 x daily for not more than 14 days 60 g 5     KRILL OIL PO Take 1 tablet by mouth       LANsoprazole (PREVACID) 30 MG CR capsule TAKE ONE CAPSULE BY MOUTH EVERY DAY -TAKE 30-60 MINUTES BEFORE A MEAL 90 capsule 1     liraglutide (VICTOZA PEN) 18 MG/3ML soln Inject 1.8 mg Subcutaneous daily 27 mL 3     LORazepam (ATIVAN) 0.5 MG tablet Take 1-2 tablets (0.5-1 mg) by mouth every 4 hours as needed for anxiety 30 tablet 0     losartan-hydrochlorothiazide (HYZAAR) 100-25 MG per tablet TAKE ONE TABLET BY MOUTH EVERY DAY 90 tablet 3     metFORMIN (GLUCOPHAGE) 1000 MG tablet Take 1 tablet (1,000 mg) by mouth 2 times daily (with meals) 180 tablet 3     methylPREDNISolone (MEDROL DOSEPAK) 4 MG tablet Follow package instructions 21 tablet 0     oxyCODONE-acetaminophen (PERCOCET) 5-325 MG per tablet Take 1-2 tablets by mouth every 4 hours as needed for pain 90 tablet 0     STATIN NOT PRESCRIBED, INTENTIONAL, 1 each 2 times daily Statin not prescribed intentionally due to Refusal by patient 0 each 0     montelukast (SINGULAIR) 10 MG tablet Take 1 tablet (10 mg) by mouth At Bedtime 90 tablet 3     multivitamin, therapeutic with minerals (THERA-VIT-M) TABS Take 1 tablet by mouth 2 times daily        ORDER FOR DME Equipment being ordered: TENS unit 1 each 0     vitamin  B complex with vitamin C (VITAMIN  B  "COMPLEX) TABS Take 1 tablet by mouth daily  0     OTC products: None, except as noted above    Allergies   Allergen Reactions     Eggs      Allergy found in testing     Hornets      wasps     Latex Anaphylaxis     hives  -  able to use BP cuff     Lipitor [Hmg-Coa-R Inhibitors] Cramps     Muscle cramps with statin     Metoclopramide Hcl Difficulty breathing     Neurontin [Gabapentin] Hives      Latex Allergy: YES: Precautions to take: latex free room    Social History   Substance Use Topics     Smoking status: Never Smoker     Smokeless tobacco: Never Used     Alcohol use 0.0 oz/week     0 Standard drinks or equivalent per week      Comment: 1 drink per year or less     History   Drug Use No       REVIEW OF SYSTEMS:   Constitutional, neuro, ENT, endocrine, pulmonary, cardiac, gastrointestinal, genitourinary, musculoskeletal, integument and psychiatric systems are negative, except as otherwise noted.    EXAM:   /82 (BP Location: Right arm, Patient Position: Right side, Cuff Size: Adult Large)  Pulse 80  Temp 98.7  F (37.1  C) (Tympanic)  Ht 5' 7\" (1.702 m)  Wt 272 lb (123.4 kg)  SpO2 98%  BMI 42.6 kg/m2    GENERAL APPEARANCE: healthy, alert and no distress     EYES: EOMI, PERRL     HENT: ear canals and TM's normal and nose and mouth without ulcers or lesions     NECK: no adenopathy, no asymmetry, masses, or scars and thyroid normal to palpation     RESP: lungs clear to auscultation - no rales, rhonchi or wheezes     CV: regular rates and rhythm, normal S1 S2, no S3 or S4 and no murmur, click or rub     ABDOMEN:  soft, nontender, no HSM or masses and bowel sounds normal     MS: tender to palpation over left rhomboid and trapezius musculature     SKIN: no suspicious lesions or rashes     NEURO: Normal strength and tone, sensory exam grossly normal, mentation intact and speech normal     PSYCH: mentation appears normal. and affect normal/bright     LYMPHATICS: No cervical adenopathy    DIAGNOSTICS: "   EK2018 appears normal, NSR, normal axis, normal intervals, no acute ST/T changes c/w ischemia, no LVH by voltage criteria    Recent Labs   Lab Test  09/10/18   0932  18   1523  18   0955   17   0935   10/16/16   1937   14   1503  14   0800  14   0633   HGB   --    --    --    --   13.3   --   13.1   < >  13.0   --   10.2*   PLT   --    --    --    --    --    --   299   --   494*   --    --    INR   --    --    --    --    --    --    --    --    --   1.59*  1.62*   NA  139  139   --    < >  138   < >   --    < >   --    --    --    POTASSIUM  4.0  3.6   --    < >  4.0   < >   --    < >   --    --    --    CR  0.69  0.73   --    < >  0.52   < >   --    < >   --    --    --    A1C  7.6*   --   8.7*   < >  7.8*   < >   --    < >   --    --    --     < > = values in this interval not displayed.      BMP pending  IMPRESSION:   Reason for surgery/procedure: lipoma removal    The proposed surgical procedure is considered INTERMEDIATE risk.    REVISED CARDIAC RISK INDEX  The patient has the following serious cardiovascular risks for perioperative complications such as (MI, PE, VFib and 3  AV Block):  No serious cardiac risks  INTERPRETATION: 0 risks: Class I (very low risk - 0.4% complication rate)    The patient has the following additional risks for perioperative complications:  Possible high tolerance to opioid analgesics due to chronic narcotic use  Morbid obesity      ICD-10-CM    1. Preop general physical exam Z01.818 Basic metabolic panel   2. Lipoma of skin and subcutaneous tissue D17.30    3. Cervical radiculopathy M54.12    4. Essential hypertension I10    5. Type 2 diabetes mellitus without complication, with long-term current use of insulin (H) E11.9     Z79.4        RECOMMENDATIONS:     NO LATEX      Cardiovascular Risk  Performs 4 METs exercise without symptoms (Light housework (dusting, washing dishes) and Climb a flight of stairs) .         Diabetes Medication  Use  -----Hold usual oral and non-insulin diabetic meds (e.g. Metformin, Actos, Glipizide) while NPO.   -----Take 50% of long acting insulin (e.g. Lantus, NPH) while NPO (fasting)      Anticoagulant or Antiplatelet Medication Use  NSAIDS: hold 7 days before surgery        ACE Inhibitor or Angiotensin Receptor Blocker (ARB) Use  Ace inhibitor or Angiotensin Receptor Blocker (ARB) and should HOLD this medication for the 24 hours prior to surgery.      APPROVAL GIVEN to proceed with proposed procedure, without further diagnostic evaluation       Signed Electronically by: Linda Durant MD    Copy of this evaluation report is provided to requesting physician.    [facility] Preop Guidelines    Revised Cardiac Risk Index

## 2018-11-09 ENCOUNTER — SURGERY (OUTPATIENT)
Age: 53
End: 2018-11-09

## 2018-11-09 ENCOUNTER — HOSPITAL ENCOUNTER (OUTPATIENT)
Facility: CLINIC | Age: 53
Discharge: HOME | End: 2018-11-10
Attending: PLASTIC SURGERY | Admitting: PLASTIC SURGERY
Payer: COMMERCIAL

## 2018-11-09 ENCOUNTER — ANESTHESIA EVENT (OUTPATIENT)
Dept: SURGERY | Facility: CLINIC | Age: 53
End: 2018-11-09
Payer: COMMERCIAL

## 2018-11-09 ENCOUNTER — ANESTHESIA (OUTPATIENT)
Dept: SURGERY | Facility: CLINIC | Age: 53
End: 2018-11-09
Payer: COMMERCIAL

## 2018-11-09 DIAGNOSIS — D17.24 LIPOMA OF LEFT THIGH: Primary | ICD-10-CM

## 2018-11-09 PROBLEM — Z98.890 POSTOPERATIVE STATE: Status: ACTIVE | Noted: 2018-11-09

## 2018-11-09 LAB
GLUCOSE BLDC GLUCOMTR-MCNC: 100 MG/DL (ref 70–99)
GLUCOSE BLDC GLUCOMTR-MCNC: 109 MG/DL (ref 70–99)
GLUCOSE BLDC GLUCOMTR-MCNC: 153 MG/DL (ref 70–99)
GLUCOSE BLDC GLUCOMTR-MCNC: 248 MG/DL (ref 70–99)

## 2018-11-09 PROCEDURE — 71000015 ZZH RECOVERY PHASE 1 LEVEL 2 EA ADDTL HR: Performed by: PLASTIC SURGERY

## 2018-11-09 PROCEDURE — 25000125 ZZHC RX 250

## 2018-11-09 PROCEDURE — 71000014 ZZH RECOVERY PHASE 1 LEVEL 2 FIRST HR: Performed by: PLASTIC SURGERY

## 2018-11-09 PROCEDURE — 27210794 ZZH OR GENERAL SUPPLY STERILE: Performed by: PLASTIC SURGERY

## 2018-11-09 PROCEDURE — 25000128 H RX IP 250 OP 636: Performed by: PLASTIC SURGERY

## 2018-11-09 PROCEDURE — 88304 TISSUE EXAM BY PATHOLOGIST: CPT | Performed by: PLASTIC SURGERY

## 2018-11-09 PROCEDURE — 25000128 H RX IP 250 OP 636: Performed by: ANESTHESIOLOGY

## 2018-11-09 PROCEDURE — 37000008 ZZH ANESTHESIA TECHNICAL FEE, 1ST 30 MIN: Performed by: PLASTIC SURGERY

## 2018-11-09 PROCEDURE — 36000052 ZZH SURGERY LEVEL 2 EA 15 ADDTL MIN: Performed by: PLASTIC SURGERY

## 2018-11-09 PROCEDURE — 40000305 ZZH STATISTIC PRE PROC ASSESS I: Performed by: PLASTIC SURGERY

## 2018-11-09 PROCEDURE — 25000566 ZZH SEVOFLURANE, EA 15 MIN: Performed by: PLASTIC SURGERY

## 2018-11-09 PROCEDURE — 25000132 ZZH RX MED GY IP 250 OP 250 PS 637: Performed by: ANESTHESIOLOGY

## 2018-11-09 PROCEDURE — 25000132 ZZH RX MED GY IP 250 OP 250 PS 637: Performed by: PLASTIC SURGERY

## 2018-11-09 PROCEDURE — 82962 GLUCOSE BLOOD TEST: CPT | Mod: 91

## 2018-11-09 PROCEDURE — 27211024 ZZHC OR SUPPLY OTHER OPNP: Performed by: PLASTIC SURGERY

## 2018-11-09 PROCEDURE — 25000128 H RX IP 250 OP 636

## 2018-11-09 PROCEDURE — 36000050 ZZH SURGERY LEVEL 2 1ST 30 MIN: Performed by: PLASTIC SURGERY

## 2018-11-09 PROCEDURE — 25000125 ZZHC RX 250: Performed by: PLASTIC SURGERY

## 2018-11-09 PROCEDURE — 88304 TISSUE EXAM BY PATHOLOGIST: CPT | Mod: 26 | Performed by: PLASTIC SURGERY

## 2018-11-09 PROCEDURE — 37000009 ZZH ANESTHESIA TECHNICAL FEE, EACH ADDTL 15 MIN: Performed by: PLASTIC SURGERY

## 2018-11-09 RX ORDER — KETOROLAC TROMETHAMINE 30 MG/ML
INJECTION, SOLUTION INTRAMUSCULAR; INTRAVENOUS PRN
Status: DISCONTINUED | OUTPATIENT
Start: 2018-11-09 | End: 2018-11-09

## 2018-11-09 RX ORDER — MEPERIDINE HYDROCHLORIDE 25 MG/ML
12.5 INJECTION INTRAMUSCULAR; INTRAVENOUS; SUBCUTANEOUS
Status: DISCONTINUED | OUTPATIENT
Start: 2018-11-09 | End: 2018-11-09 | Stop reason: HOSPADM

## 2018-11-09 RX ORDER — PROPOFOL 10 MG/ML
INJECTION, EMULSION INTRAVENOUS PRN
Status: DISCONTINUED | OUTPATIENT
Start: 2018-11-09 | End: 2018-11-09

## 2018-11-09 RX ORDER — HYDROCORTISONE 2.5 %
CREAM (GRAM) TOPICAL 2 TIMES DAILY PRN
COMMUNITY
End: 2019-11-14

## 2018-11-09 RX ORDER — OXYCODONE AND ACETAMINOPHEN 5; 325 MG/1; MG/1
1-2 TABLET ORAL EVERY 4 HOURS PRN
Qty: 40 TABLET | Refills: 0 | Status: SHIPPED | OUTPATIENT
Start: 2018-11-09 | End: 2018-11-20

## 2018-11-09 RX ORDER — ONDANSETRON 4 MG/1
4 TABLET, ORALLY DISINTEGRATING ORAL EVERY 30 MIN PRN
Status: DISCONTINUED | OUTPATIENT
Start: 2018-11-09 | End: 2018-11-09 | Stop reason: HOSPADM

## 2018-11-09 RX ORDER — CEFAZOLIN SODIUM IN 0.9 % NACL 3 G/100 ML
3 INTRAVENOUS SOLUTION, PIGGYBACK (ML) INTRAVENOUS
Status: COMPLETED | OUTPATIENT
Start: 2018-11-09 | End: 2018-11-09

## 2018-11-09 RX ORDER — SODIUM CHLORIDE, SODIUM LACTATE, POTASSIUM CHLORIDE, CALCIUM CHLORIDE 600; 310; 30; 20 MG/100ML; MG/100ML; MG/100ML; MG/100ML
INJECTION, SOLUTION INTRAVENOUS CONTINUOUS
Status: DISCONTINUED | OUTPATIENT
Start: 2018-11-09 | End: 2018-11-09 | Stop reason: HOSPADM

## 2018-11-09 RX ORDER — FENTANYL CITRATE 50 UG/ML
INJECTION, SOLUTION INTRAMUSCULAR; INTRAVENOUS PRN
Status: DISCONTINUED | OUTPATIENT
Start: 2018-11-09 | End: 2018-11-09

## 2018-11-09 RX ORDER — LIDOCAINE HYDROCHLORIDE 10 MG/ML
INJECTION, SOLUTION INFILTRATION; PERINEURAL PRN
Status: DISCONTINUED | OUTPATIENT
Start: 2018-11-09 | End: 2018-11-09

## 2018-11-09 RX ORDER — DIAZEPAM 5 MG
5 TABLET ORAL EVERY 6 HOURS PRN
Status: DISCONTINUED | OUTPATIENT
Start: 2018-11-09 | End: 2018-11-10 | Stop reason: HOSPADM

## 2018-11-09 RX ORDER — ACETAMINOPHEN 325 MG/1
650 TABLET ORAL EVERY 6 HOURS PRN
Status: DISCONTINUED | OUTPATIENT
Start: 2018-11-09 | End: 2018-11-10 | Stop reason: HOSPADM

## 2018-11-09 RX ORDER — FENTANYL CITRATE 50 UG/ML
25-50 INJECTION, SOLUTION INTRAMUSCULAR; INTRAVENOUS
Status: DISCONTINUED | OUTPATIENT
Start: 2018-11-09 | End: 2018-11-09

## 2018-11-09 RX ORDER — ONDANSETRON 2 MG/ML
4 INJECTION INTRAMUSCULAR; INTRAVENOUS EVERY 30 MIN PRN
Status: DISCONTINUED | OUTPATIENT
Start: 2018-11-09 | End: 2018-11-09 | Stop reason: HOSPADM

## 2018-11-09 RX ORDER — PROCHLORPERAZINE MALEATE 10 MG
10 TABLET ORAL EVERY 6 HOURS PRN
Status: DISCONTINUED | OUTPATIENT
Start: 2018-11-09 | End: 2018-11-10 | Stop reason: HOSPADM

## 2018-11-09 RX ORDER — ONDANSETRON 2 MG/ML
4 INJECTION INTRAMUSCULAR; INTRAVENOUS EVERY 6 HOURS PRN
Status: DISCONTINUED | OUTPATIENT
Start: 2018-11-09 | End: 2018-11-10 | Stop reason: HOSPADM

## 2018-11-09 RX ORDER — LIDOCAINE 40 MG/G
CREAM TOPICAL
Status: DISCONTINUED | OUTPATIENT
Start: 2018-11-09 | End: 2018-11-10 | Stop reason: HOSPADM

## 2018-11-09 RX ORDER — ALBUTEROL SULFATE 0.83 MG/ML
2.5 SOLUTION RESPIRATORY (INHALATION) EVERY 4 HOURS PRN
Status: DISCONTINUED | OUTPATIENT
Start: 2018-11-09 | End: 2018-11-09 | Stop reason: HOSPADM

## 2018-11-09 RX ORDER — LIDOCAINE HYDROCHLORIDE AND EPINEPHRINE 10; 10 MG/ML; UG/ML
INJECTION, SOLUTION INFILTRATION; PERINEURAL PRN
Status: DISCONTINUED | OUTPATIENT
Start: 2018-11-09 | End: 2018-11-09 | Stop reason: HOSPADM

## 2018-11-09 RX ORDER — DIAZEPAM 5 MG
5 TABLET ORAL ONCE
Status: COMPLETED | OUTPATIENT
Start: 2018-11-09 | End: 2018-11-09

## 2018-11-09 RX ORDER — INSULIN GLARGINE 100 [IU]/ML
20 INJECTION, SOLUTION SUBCUTANEOUS EVERY MORNING
COMMUNITY
End: 2019-04-12 | Stop reason: DRUGHIGH

## 2018-11-09 RX ORDER — INSULIN GLARGINE 100 [IU]/ML
6 INJECTION, SOLUTION SUBCUTANEOUS EVERY EVENING
COMMUNITY
End: 2019-04-12 | Stop reason: DRUGHIGH

## 2018-11-09 RX ORDER — SODIUM CHLORIDE, SODIUM LACTATE, POTASSIUM CHLORIDE, CALCIUM CHLORIDE 600; 310; 30; 20 MG/100ML; MG/100ML; MG/100ML; MG/100ML
INJECTION, SOLUTION INTRAVENOUS CONTINUOUS
Status: DISCONTINUED | OUTPATIENT
Start: 2018-11-09 | End: 2018-11-10 | Stop reason: HOSPADM

## 2018-11-09 RX ORDER — FENTANYL CITRATE 50 UG/ML
25-50 INJECTION, SOLUTION INTRAMUSCULAR; INTRAVENOUS
Status: DISCONTINUED | OUTPATIENT
Start: 2018-11-09 | End: 2018-11-09 | Stop reason: HOSPADM

## 2018-11-09 RX ORDER — CELECOXIB 200 MG/1
200 CAPSULE ORAL DAILY
Status: DISCONTINUED | OUTPATIENT
Start: 2018-11-09 | End: 2018-11-10 | Stop reason: HOSPADM

## 2018-11-09 RX ORDER — NALOXONE HYDROCHLORIDE 0.4 MG/ML
.1-.4 INJECTION, SOLUTION INTRAMUSCULAR; INTRAVENOUS; SUBCUTANEOUS
Status: DISCONTINUED | OUTPATIENT
Start: 2018-11-09 | End: 2018-11-09 | Stop reason: HOSPADM

## 2018-11-09 RX ORDER — LIDOCAINE 40 MG/G
CREAM TOPICAL
Status: DISCONTINUED | OUTPATIENT
Start: 2018-11-09 | End: 2018-11-09 | Stop reason: HOSPADM

## 2018-11-09 RX ORDER — LOSARTAN POTASSIUM AND HYDROCHLOROTHIAZIDE 12.5; 5 MG/1; MG/1
2 TABLET ORAL DAILY
Status: DISCONTINUED | OUTPATIENT
Start: 2018-11-09 | End: 2018-11-10 | Stop reason: HOSPADM

## 2018-11-09 RX ORDER — HYDRALAZINE HYDROCHLORIDE 20 MG/ML
2.5-5 INJECTION INTRAMUSCULAR; INTRAVENOUS EVERY 10 MIN PRN
Status: DISCONTINUED | OUTPATIENT
Start: 2018-11-09 | End: 2018-11-09 | Stop reason: HOSPADM

## 2018-11-09 RX ORDER — CETIRIZINE HYDROCHLORIDE 10 MG/1
10 TABLET ORAL DAILY
COMMUNITY
End: 2022-12-07

## 2018-11-09 RX ORDER — ONDANSETRON 4 MG/1
4 TABLET, ORALLY DISINTEGRATING ORAL EVERY 6 HOURS PRN
Status: DISCONTINUED | OUTPATIENT
Start: 2018-11-09 | End: 2018-11-10 | Stop reason: HOSPADM

## 2018-11-09 RX ORDER — ERGOCALCIFEROL 1.25 MG/1
50000 CAPSULE ORAL
COMMUNITY
End: 2019-11-14

## 2018-11-09 RX ORDER — MAGNESIUM HYDROXIDE 1200 MG/15ML
LIQUID ORAL PRN
Status: DISCONTINUED | OUTPATIENT
Start: 2018-11-09 | End: 2018-11-09 | Stop reason: HOSPADM

## 2018-11-09 RX ORDER — HYDROMORPHONE HYDROCHLORIDE 1 MG/ML
.3-.5 INJECTION, SOLUTION INTRAMUSCULAR; INTRAVENOUS; SUBCUTANEOUS EVERY 10 MIN PRN
Status: DISCONTINUED | OUTPATIENT
Start: 2018-11-09 | End: 2018-11-09 | Stop reason: HOSPADM

## 2018-11-09 RX ORDER — HYDROXYZINE HYDROCHLORIDE 25 MG/1
25 TABLET, FILM COATED ORAL EVERY 6 HOURS PRN
Status: DISCONTINUED | OUTPATIENT
Start: 2018-11-09 | End: 2018-11-10 | Stop reason: HOSPADM

## 2018-11-09 RX ORDER — ACETAMINOPHEN 500 MG
1000 TABLET ORAL ONCE
Status: COMPLETED | OUTPATIENT
Start: 2018-11-09 | End: 2018-11-09

## 2018-11-09 RX ORDER — IBUPROFEN 600 MG/1
600 TABLET, FILM COATED ORAL EVERY 6 HOURS PRN
Status: DISCONTINUED | OUTPATIENT
Start: 2018-11-09 | End: 2018-11-09

## 2018-11-09 RX ORDER — OXYCODONE AND ACETAMINOPHEN 5; 325 MG/1; MG/1
1-2 TABLET ORAL EVERY 4 HOURS PRN
Status: DISCONTINUED | OUTPATIENT
Start: 2018-11-09 | End: 2018-11-10 | Stop reason: HOSPADM

## 2018-11-09 RX ORDER — HYDROMORPHONE HYDROCHLORIDE 1 MG/ML
.3-.5 INJECTION, SOLUTION INTRAMUSCULAR; INTRAVENOUS; SUBCUTANEOUS
Status: DISCONTINUED | OUTPATIENT
Start: 2018-11-09 | End: 2018-11-10 | Stop reason: HOSPADM

## 2018-11-09 RX ORDER — NALOXONE HYDROCHLORIDE 0.4 MG/ML
.1-.4 INJECTION, SOLUTION INTRAMUSCULAR; INTRAVENOUS; SUBCUTANEOUS
Status: DISCONTINUED | OUTPATIENT
Start: 2018-11-09 | End: 2018-11-10 | Stop reason: HOSPADM

## 2018-11-09 RX ORDER — CEFAZOLIN SODIUM 1 G/3ML
1 INJECTION, POWDER, FOR SOLUTION INTRAMUSCULAR; INTRAVENOUS SEE ADMIN INSTRUCTIONS
Status: DISCONTINUED | OUTPATIENT
Start: 2018-11-09 | End: 2018-11-09 | Stop reason: HOSPADM

## 2018-11-09 RX ADMIN — Medication 3 G: at 07:35

## 2018-11-09 RX ADMIN — PROPOFOL 200 MG: 10 INJECTION, EMULSION INTRAVENOUS at 07:45

## 2018-11-09 RX ADMIN — HYDROCODONE BITARTRATE AND ACETAMINOPHEN 150 ML: 500; 5 TABLET ORAL at 09:01

## 2018-11-09 RX ADMIN — ACETAMINOPHEN 1000 MG: 500 TABLET, FILM COATED ORAL at 07:11

## 2018-11-09 RX ADMIN — OXYCODONE HYDROCHLORIDE AND ACETAMINOPHEN 1 TABLET: 5; 325 TABLET ORAL at 16:48

## 2018-11-09 RX ADMIN — FENTANYL CITRATE 100 MCG: 50 INJECTION, SOLUTION INTRAMUSCULAR; INTRAVENOUS at 07:45

## 2018-11-09 RX ADMIN — ONDANSETRON 4 MG: 2 INJECTION INTRAMUSCULAR; INTRAVENOUS at 07:57

## 2018-11-09 RX ADMIN — KETOROLAC TROMETHAMINE 30 MG: 30 INJECTION, SOLUTION INTRAMUSCULAR at 07:57

## 2018-11-09 RX ADMIN — LIDOCAINE HYDROCHLORIDE 50 MG: 10 INJECTION, SOLUTION INFILTRATION; PERINEURAL at 07:45

## 2018-11-09 RX ADMIN — OXYCODONE HYDROCHLORIDE AND ACETAMINOPHEN 1 TABLET: 5; 325 TABLET ORAL at 17:28

## 2018-11-09 RX ADMIN — SODIUM CHLORIDE, POTASSIUM CHLORIDE, SODIUM LACTATE AND CALCIUM CHLORIDE: 600; 310; 30; 20 INJECTION, SOLUTION INTRAVENOUS at 08:30

## 2018-11-09 RX ADMIN — HYDROMORPHONE HYDROCHLORIDE 0.5 MG: 1 INJECTION, SOLUTION INTRAMUSCULAR; INTRAVENOUS; SUBCUTANEOUS at 21:38

## 2018-11-09 RX ADMIN — SODIUM CHLORIDE, POTASSIUM CHLORIDE, SODIUM LACTATE AND CALCIUM CHLORIDE: 600; 310; 30; 20 INJECTION, SOLUTION INTRAVENOUS at 07:04

## 2018-11-09 RX ADMIN — LIDOCAINE HYDROCHLORIDE AND EPINEPHRINE 20 ML: 10; 10 INJECTION, SOLUTION INFILTRATION; PERINEURAL at 09:06

## 2018-11-09 RX ADMIN — FENTANYL CITRATE 25 MCG: 50 INJECTION, SOLUTION INTRAMUSCULAR; INTRAVENOUS at 11:00

## 2018-11-09 RX ADMIN — SODIUM CHLORIDE, POTASSIUM CHLORIDE, SODIUM LACTATE AND CALCIUM CHLORIDE: 600; 310; 30; 20 INJECTION, SOLUTION INTRAVENOUS at 12:37

## 2018-11-09 RX ADMIN — FENTANYL CITRATE 25 MCG: 50 INJECTION, SOLUTION INTRAMUSCULAR; INTRAVENOUS at 10:52

## 2018-11-09 RX ADMIN — DIAZEPAM 5 MG: 5 TABLET ORAL at 22:44

## 2018-11-09 RX ADMIN — METFORMIN HYDROCHLORIDE 1000 MG: 500 TABLET, FILM COATED ORAL at 17:27

## 2018-11-09 RX ADMIN — MIDAZOLAM 2 MG: 1 INJECTION INTRAMUSCULAR; INTRAVENOUS at 07:45

## 2018-11-09 RX ADMIN — HYDRALAZINE HYDROCHLORIDE 2.5 MG: 20 INJECTION INTRAMUSCULAR; INTRAVENOUS at 10:29

## 2018-11-09 RX ADMIN — DIAZEPAM 5 MG: 5 TABLET ORAL at 10:40

## 2018-11-09 RX ADMIN — FENTANYL CITRATE 50 MCG: 50 INJECTION, SOLUTION INTRAMUSCULAR; INTRAVENOUS at 08:00

## 2018-11-09 RX ADMIN — ROCURONIUM BROMIDE 50 MG: 10 INJECTION INTRAVENOUS at 07:46

## 2018-11-09 RX ADMIN — HYDROMORPHONE HYDROCHLORIDE 0.3 MG: 1 INJECTION, SOLUTION INTRAMUSCULAR; INTRAVENOUS; SUBCUTANEOUS at 14:11

## 2018-11-09 ASSESSMENT — ACTIVITIES OF DAILY LIVING (ADL)
TRANSFERRING: 0-->INDEPENDENT
SWALLOWING: 0-->SWALLOWS FOODS/LIQUIDS WITHOUT DIFFICULTY
BATHING: 0-->INDEPENDENT
AMBULATION: 0-->INDEPENDENT
FALL_HISTORY_WITHIN_LAST_SIX_MONTHS: NO
TOILETING: 0-->INDEPENDENT
DRESS: 0-->INDEPENDENT
COGNITION: 0 - NO COGNITION ISSUES REPORTED
RETIRED_EATING: 0-->INDEPENDENT
RETIRED_COMMUNICATION: 0-->UNDERSTANDS/COMMUNICATES WITHOUT DIFFICULTY

## 2018-11-09 NOTE — ANESTHESIA POSTPROCEDURE EVALUATION
Patient: Johnna Caballero    Procedure(s):  excision left hip mass and left chest wall mass  EXCISE MASS TRUNK    Diagnosis:left hip mass and left chest wall mass  Diagnosis Additional Information: Pre-operative diagnosis: left hip mass and left chest wall mass  Post-operative diagnosis lipoma left hip/chest    Procedure: Procedure(s):  excision left hip mass and left chest wall mass  EXCISE MASS TRUNK        Anesthesia Type:  General, ETT    Note:  Anesthesia Post Evaluation    Patient location during evaluation: PACU  Patient participation: Able to fully participate in evaluation  Level of consciousness: awake  Pain management: adequate  Airway patency: patent  Cardiovascular status: acceptable  Respiratory status: acceptable  Hydration status: acceptable  PONV: controlled     Anesthetic complications: None          Last vitals:  Vitals:    11/09/18 1200 11/09/18 1219 11/09/18 1244   BP:  130/84 134/87   Pulse:      Resp:  12 14   Temp:  95.6  F (35.3  C)    SpO2: 99% 96% 96%         Electronically Signed By: Los Hannah MD  November 9, 2018  1:10 PM

## 2018-11-09 NOTE — OR NURSING
Pt. VS stable states neck pain is decreased, tolerable had preop and had MRI yesterday asked if able to see neurology.  Discussed with JOEY Mckay no new orders for nuerology.  Valium PO given that patient was on at home (per Dr. Mckay), denies numbness or tinglinging.  Had episode of nausea approximately 1115 resolved with deep breathing and lavendar.  No pain at incision sites.  Pt. Feels need to use bathroom not able to use bedpan bladder scan <300 ml.   Family updated, report to floor. Pt. To room on NC

## 2018-11-09 NOTE — PLAN OF CARE
Problem: Patient Care Overview  Goal: Plan of Care/Patient Progress Review  Outcome: Improving  ROOM # 224  Living Situation (if not independent, order SW consult):Independent at home  Facility name:  :   Tessie Caballero Mother none none [phone]     Activity level at baseline: Independent  Activity level on admit: SBA      Patient registered to observation; given Patient Bill of Rights; given the opportunity to ask questions about observation status and their plan of care.  Patient has been oriented to the observation room, bathroom and call light is in place.    Discussed discharge goals and expectations with patient/family.

## 2018-11-09 NOTE — IP AVS SNAPSHOT
[facility] Observation Department    [address]    [facility] [address]    Phone:  [phone]                                       After Visit Summary   11/9/2018    Johnna Caballero    MRN: [id number]           After Visit Summary Signature Page     I have received my discharge instructions, and my questions have been answered. I have discussed any challenges I see with this plan with the nurse or doctor.    ..........................................................................................................................................  Patient/Patient Representative Signature      ..........................................................................................................................................  Patient Representative Print Name and Relationship to Patient    ..................................................               ................................................  Date                                   Time    ..........................................................................................................................................  Reviewed by Signature/Title    ...................................................              ..............................................  Date                                               Time          22EPIC Rev 08/18

## 2018-11-09 NOTE — ANESTHESIA PREPROCEDURE EVALUATION
PAC NOTE:       ANESTHESIA PRE EVALUATION:  Anesthesia Evaluation     . Pt has had prior anesthetic. Type: General    No history of anesthetic complications          ROS/MED HX    ENT/Pulmonary:     (+)Intermittent asthma , . .    Neurologic:  - neg neurologic ROS     Cardiovascular:     (+) hypertension----. : . . . :. .       METS/Exercise Tolerance:     Hematologic:     (+) History of blood clots -      Musculoskeletal:   (+) arthritis, , , -       GI/Hepatic:     (+) GERD Asymptomatic on medication,       Renal/Genitourinary:  - ROS Renal section negative       Endo:     (+) type II DM Obesity, .      Psychiatric:  - neg psychiatric ROS       Infectious Disease:  - neg infectious disease ROS       Malignancy:      - no malignancy   Other:    (+) No chance of pregnancy C-spine cleared: N/A, H/O Chronic Pain,no other significant disability                    Physical Exam      Airway   Mallampati: III    Dental     Cardiovascular       Pulmonary     Other findings: Morbid obesity         Anesthesia Plan      History & Physical Review  History and physical reviewed and following examination; no interval change.    ASA Status:  3 .    NPO Status:  > 8 hours    Plan for General and ETT with Intravenous and Propofol induction. Maintenance will be Balanced.    PONV prophylaxis:  Ondansetron (or other 5HT-3)       Postoperative Care  Postoperative pain management:  IV analgesics and Oral pain medications.      Consents  Anesthetic plan, risks, benefits and alternatives discussed with:  Patient or representative and Patient..                            .

## 2018-11-09 NOTE — ANESTHESIA CARE TRANSFER NOTE
Patient: Johnna Caballero    Procedure(s):  excision left hip mass and left chest wall mass  EXCISE MASS TRUNK    Diagnosis: left hip mass and left chest wall mass  Diagnosis Additional Information: No value filed.    Anesthesia Type:   General, ETT     Note:    Patient transferred to:PACU  Comments: P(t.to PACU, VS, report to RN      Vitals: (Last set prior to Anesthesia Care Transfer)    CRNA VITALS  11/9/2018 0853 - 11/9/2018 0930      11/9/2018             Resp Rate (observed): 11                Electronically Signed By: LUISANA Rasheed CRNA  November 9, 2018  9:30 AM

## 2018-11-09 NOTE — IP AVS SNAPSHOT
MRN:[id number]                      After Visit Summary   11/9/2018    Johnna Caballero    MRN: [id number]           Thank you!     Thank you for choosing [facility] for your care. Our goal is always to provide you with excellent care. Hearing back from our patients is one way we can continue to improve our services. Please take a few minutes to complete the written survey that you may receive in the mail after you visit. If you would like to speak to someone directly about your visit please contact Patient Relations at [phone]. Thank you!          Patient Information     Date Of Birth          1965        Designated Caregiver       Most Recent Value    Caregiver    Will someone help with your care after discharge? yes    Name of designated caregiver Tessie Caballero    Phone number of caregiver [phone]    Caregiver address [place]      About your hospital stay     You were admitted on:  November 9, 2018 You last received care in the:  [facility] Observation Department    You were discharged on:  November 10, 2018        Reason for your hospital stay       Surgery.            Reason for your hospital stay       Surgery.                  Who to Call     For medical emergencies, please call 911.  For non-urgent questions about your medical care, please call your primary care provider or clinic, [phone]  For questions related to your surgery, please call your surgery clinic        Attending Provider     Provider Specialty    Jazmin Bautista MD Plastic Surgery       Primary Care Provider Office Phone # Fax #    Linda Durant [phone]      After Care Instructions     Discharge Instructions       Follow up with Dr. Bautista in 1-2 weeks.            Discharge Instructions       Patient to follow up with appointment with Dr. Bautista in 1-2 weeks            Discharge Instructions       Follow up with Dr. Bautista in 1 weeks.            Dressing  "      Remove all dressings 24-48 hrs after surgery. May then shower with all incisions/drains uncoverd. Apply Aquaphor to incisions daily.  Record drain output daily. Strip drains daily.            Wound care and dressings       Remove dressing 24-48 hours after surgery.  Then may shower with incisions uncovered. Apply Aquaphor to incisions daily. No restrictions on arm movements. Avoid heavy lifting or strenuous exercise for 2 weeks. Strip drains daily, record output daily. May use OTC ibuprofen/tylenol for discomfort.            Wound care and dressings       Remove dressing 24-48 hours after surgery.  Then may shower with incisions uncovered. Apply Aquaphor to incisions daily. No restrictions on arm movements. Avoid heavy lifting or strenuous exercise for 2 weeks. Strip drains daily, record output daily. May use OTC ibuprofen/tylenol for discomfort.                  Your next 10 appointments already scheduled     Dec 26, 2018 11:40 AM CST   MyChart Long with Linda Durant MD   [facility]    [address] [place] [address]  [address]  [facility] [address]   [phone]                         Pending Results     Date and Time Order Name Status Description    11/9/2018 0810 Surgical pathology exam In process             Statement of Approval     Ordered          11/10/18 0105  I have reviewed and agree with all the recommendations and orders detailed in this document.  EFFECTIVE NOW     Approved and electronically signed by:  Jazmin Bautista MD             Admission Information     Date & Time Provider Department Dept. Phone    11/9/2018 Jazmin Bautista MD [facility] Observation Department [phone]      Your Vitals Were     Blood Pressure Pulse Temperature Respirations Height Weight    98/64 (BP Location: Left arm) 83 97.3  F (36.3  C) (Oral) 16 1.702 m (5' 7\") 125.2 kg (276 lb)    Pulse Oximetry BMI (Body Mass Index)                96% " 43.23 kg/m2          [employer] Information     [employer] gives you secure access to your electronic health record. If you see a primary care provider, you can also send messages to your care team and make appointments. If you have questions, please call your primary care clinic.  If you do not have a primary care provider, please call [phone] and they will assist you.        Care EveryWhere ID     This is your Care EveryWhere ID. This could be used by other organizations to access your [facility] medical records  [id number]        Equal Access to Services     HERMES PARKER : Hadii eddy ku hadasho Soomaali, waaxda luqadaha, qaybta kaalmada adeegyariver, shola campos. So [facility] [phone].    ATENCIÓN: Si habla español, tiene a subramanian disposición servicios gratuitos de asistencia lingüística. Llame al [phone].    We comply with applicable federal civil rights laws and [place] laws. We do not discriminate on the basis of race, color, national origin, age, disability, sex, sexual orientation, or gender identity.               Review of your medicines      CONTINUE these medicines which may have CHANGED, or have new prescriptions. If we are uncertain of the size of tablets/capsules you have at home, strength may be listed as something that might have changed.        Dose / Directions    * BASAGLAR 100 UNIT/ML injection   This may have changed:  Another medication with the same name was removed. Continue taking this medication, and follow the directions you see here.        Dose:  20 Units   Inject 20 Units Subcutaneous every morning   Refills:  0       * BASAGLAR 100 UNIT/ML injection   This may have changed:  Another medication with the same name was removed. Continue taking this medication, and follow the directions you see here.        Dose:  6 Units   Inject 6 Units Subcutaneous every evening   Refills:  0       cetirizine 10 MG tablet   Commonly known as:  zyrTEC   This may have changed:   Another medication with the same name was removed. Continue taking this medication, and follow the directions you see here.        Dose:  10 mg   Take 10 mg by mouth daily   Refills:  0       methylPREDNISolone 4 MG tablet   Commonly known as:  MEDROL DOSEPAK   This may have changed:  additional instructions   Used for:  Cervical radiculopathy        Follow package instructions   Quantity:  21 tablet   Refills:  0       * oxyCODONE-acetaminophen 5-325 MG per tablet   Commonly known as:  PERCOCET   This may have changed:  Another medication with the same name was added. Make sure you understand how and when to take each.   Used for:  Bilateral hand pain        Dose:  1-2 tablet   Take 1-2 tablets by mouth every 4 hours as needed for pain   Quantity:  90 tablet   Refills:  0       * oxyCODONE-acetaminophen 5-325 MG per tablet   Commonly known as:  PERCOCET   This may have changed:  You were already taking a medication with the same name, and this prescription was added. Make sure you understand how and when to take each.   Used for:  Lipoma of left thigh        Dose:  1-2 tablet   Take 1-2 tablets by mouth every 4 hours as needed for severe pain   Quantity:  40 tablet   Refills:  0       * Notice:  This list has 4 medication(s) that are the same as other medications prescribed for you. Read the directions carefully, and ask your doctor or other care provider to review them with you.      CONTINUE these medicines which have NOT CHANGED        Dose / Directions    aspirin 81 MG tablet        Dose:  81 mg   Take 1 tablet (81 mg) by mouth daily   Quantity:  30 tablet   Refills:  0       BD VALDO U/F 32G X 4 MM   Used for:  Type 2 diabetes mellitus without complication, with long-term current use of insulin (H)   Generic drug:  insulin pen needle        USE 2 PEN NEEDLES DAILY OR AS DIRECTED   Quantity:  180 each   Refills:  3       blood glucose monitoring lancets   Used for:  DM (diabetes mellitus), type 2, uncontrolled  (H)        Use to test blood sugar 1-2 times daily or as directed.   Quantity:  2 Box   Refills:  3       blood glucose monitoring meter device kit   Commonly known as:  no brand specified   Used for:  Type 2 diabetes mellitus without complication (H)        Use to test blood sugar 1-2 times daily or as directed.   Quantity:  1 kit   Refills:  0       blood glucose monitoring test strip   Commonly known as:  ACCU-CHEK GABI        Use to test blood sugars 1-2x daily or as directed.   Quantity:  200 each   Refills:  11       celecoxib 200 MG capsule   Commonly known as:  celeBREX   Used for:  Chronic bilateral low back pain without sciatica        TAKE ONE CAPSULE BY MOUTH EVERY DAY   Quantity:  30 capsule   Refills:  0       EPINEPHrine 0.3 MG/0.3ML injection 2-pack   Commonly known as:  EPIPEN/ADRENACLICK/or ANY BX GENERIC EQUIV   Used for:  Hornet sting, accidental or unintentional, subsequent encounter        Dose:  0.3 mg   Inject 0.3 mLs (0.3 mg) into the muscle once as needed for anaphylaxis   Quantity:  2 mL   Refills:  0       ergocalciferol 47070 units capsule   Commonly known as:  ERGOCALCIFEROL        Dose:  42809 Units   Take 50,000 Units by mouth twice a week Take on Mondays and Thursdays   Refills:  0       hydrocortisone 2.5 % cream        Apply topically 2 times daily as needed Do not use for more than 14 days   Refills:  0       KRILL OIL PO        Dose:  1 tablet   Take 1 tablet by mouth daily   Refills:  0       LANsoprazole 30 MG CR capsule   Commonly known as:  PREVACID   Used for:  Gastroesophageal reflux disease, esophagitis presence not specified        TAKE ONE CAPSULE BY MOUTH EVERY DAY -TAKE 30-60 MINUTES BEFORE A MEAL   Quantity:  90 capsule   Refills:  1       liraglutide 18 MG/3ML soln   Commonly known as:  VICTOZA PEN   Used for:  Type 2 diabetes mellitus without complication, with long-term current use of insulin (H)        Dose:  1.8 mg   Inject 1.8 mg Subcutaneous daily   Quantity:   27 mL   Refills:  3       LORazepam 0.5 MG tablet   Commonly known as:  ATIVAN   Used for:  Anxiety        Dose:  0.5-1 mg   Take 1-2 tablets (0.5-1 mg) by mouth every 4 hours as needed for anxiety   Quantity:  30 tablet   Refills:  0       losartan-hydrochlorothiazide 100-25 MG per tablet   Commonly known as:  HYZAAR   Used for:  Essential hypertension        TAKE ONE TABLET BY MOUTH EVERY DAY   Quantity:  90 tablet   Refills:  3       metFORMIN 1000 MG tablet   Commonly known as:  GLUCOPHAGE   Used for:  Type 2 diabetes mellitus without complication, with long-term current use of insulin (H)        Dose:  1000 mg   Take 1 tablet (1,000 mg) by mouth 2 times daily (with meals)   Quantity:  180 tablet   Refills:  3       order for DME   Used for:  Chronic back pain, Bilateral hand pain        Equipment being ordered: TENS unit   Quantity:  1 each   Refills:  0       STATIN NOT PRESCRIBED (INTENTIONAL)   Used for:  Hyperlipidemia LDL goal <100        Dose:  1 each   1 each 2 times daily Statin not prescribed intentionally due to Refusal by patient   Quantity:  0 each   Refills:  0         STOP taking     BREO ELLIPTA 100-25 MCG/INH inhaler   Generic drug:  fluticasone-vilanterol           diazepam 5 MG tablet   Commonly known as:  VALIUM           hydrocortisone 2.5 % cream   Commonly known as:  PROCTOCREAM-HC           multivitamin, therapeutic with minerals Tabs tablet           vitamin B complex with vitamin C Tabs tablet           vitamin D 2000 units tablet                Where to get your medicines      Some of these will need a paper prescription and others can be bought over the counter. Ask your nurse if you have questions.     Bring a paper prescription for each of these medications     oxyCODONE-acetaminophen 5-325 MG per tablet                Protect others around you: Learn how to safely use, store and throw away your medicines at www.disposemymeds.org.        Information about OPIOIDS     PRESCRIPTION  OPIOIDS: WHAT YOU NEED TO KNOW   We gave you an opioid (narcotic) pain medicine. It is important to manage your pain, but opioids are not always the best choice. You should first try all the other options your care team gave you. Take this medicine for as short a time (and as few doses) as possible.    Some activities can increase your pain, such as bandage changes or therapy sessions. It may help to take your pain medicine 30 to 60 minutes before these activities. Reduce your stress by getting enough sleep, working on hobbies you enjoy and practicing relaxation or meditation. Talk to your care team about ways to manage your pain beyond prescription opioids.    These medicines have risks:    DO NOT drive when on new or higher doses of pain medicine. These medicines can affect your alertness and reaction times, and you could be arrested for driving under the influence (DUI). If you need to use opioids long-term, talk to your care team about driving.    DO NOT operate heavy machinery    DO NOT do any other dangerous activities while taking these medicines.    DO NOT drink any alcohol while taking these medicines.     If the opioid prescribed includes acetaminophen, DO NOT take with any other medicines that contain acetaminophen. Read all labels carefully. Look for the word  acetaminophen  or  Tylenol.  Ask your pharmacist if you have questions or are unsure.    You can get addicted to pain medicines, especially if you have a history of addiction (chemical, alcohol or substance dependence). Talk to your care team about ways to reduce this risk.    All opioids tend to cause constipation. Drink plenty of water and eat foods that have a lot of fiber, such as fruits, vegetables, prune juice, apple juice and high-fiber cereal. Take a laxative (Miralax, milk of magnesia, Colace, Senna) if you don t move your bowels at least every other day. Other side effects include upset stomach, sleepiness, dizziness, throwing up,  tolerance (needing more of the medicine to have the same effect), physical dependence and slowed breathing.    Store your pills in a secure place, locked if possible. We will not replace any lost or stolen medicine. If you don t finish your medicine, please throw away (dispose) as directed by your pharmacist. The [place] [employer]ution [employer] has more information about safe disposal: https://www.pca.[facility].[place].us/living-green/managing-unwanted-medications             Medication List: This is a list of all your medications and when to take them. Check marks below indicate your daily home schedule. Keep this list as a reference.      Medications           Morning Afternoon Evening Bedtime As Needed    aspirin 81 MG tablet   Take 1 tablet (81 mg) by mouth daily                                * BASAGLAR 100 UNIT/ML injection   Inject 20 Units Subcutaneous every morning                                * BASAGLAR 100 UNIT/ML injection   Inject 6 Units Subcutaneous every evening                                BD VALDO U/F 32G X 4 MM   USE 2 PEN NEEDLES DAILY OR AS DIRECTED   Generic drug:  insulin pen needle                                blood glucose monitoring lancets   Use to test blood sugar 1-2 times daily or as directed.                                blood glucose monitoring meter device kit   Commonly known as:  no brand specified   Use to test blood sugar 1-2 times daily or as directed.                                blood glucose monitoring test strip   Commonly known as:  ACCU-CHEK GABI   Use to test blood sugars 1-2x daily or as directed.                                celecoxib 200 MG capsule   Commonly known as:  celeBREX   TAKE ONE CAPSULE BY MOUTH EVERY DAY   Last time this was given:  200 mg on 11/10/2018  8:06 AM                                cetirizine 10 MG tablet   Commonly known as:  zyrTEC   Take 10 mg by mouth daily                                EPINEPHrine 0.3 MG/0.3ML injection 2-pack    Commonly known as:  EPIPEN/ADRENACLICK/or ANY BX GENERIC EQUIV   Inject 0.3 mLs (0.3 mg) into the muscle once as needed for anaphylaxis                                ergocalciferol 06499 units capsule   Commonly known as:  ERGOCALCIFEROL   Take 50,000 Units by mouth twice a week Take on Mondays and Thursdays                                hydrocortisone 2.5 % cream   Apply topically 2 times daily as needed Do not use for more than 14 days                                KRILL OIL PO   Take 1 tablet by mouth daily                                LANsoprazole 30 MG CR capsule   Commonly known as:  PREVACID   TAKE ONE CAPSULE BY MOUTH EVERY DAY -TAKE 30-60 MINUTES BEFORE A MEAL                                liraglutide 18 MG/3ML soln   Commonly known as:  VICTOZA PEN   Inject 1.8 mg Subcutaneous daily                                LORazepam 0.5 MG tablet   Commonly known as:  ATIVAN   Take 1-2 tablets (0.5-1 mg) by mouth every 4 hours as needed for anxiety                                losartan-hydrochlorothiazide 100-25 MG per tablet   Commonly known as:  HYZAAR   TAKE ONE TABLET BY MOUTH EVERY DAY                                metFORMIN 1000 MG tablet   Commonly known as:  GLUCOPHAGE   Take 1 tablet (1,000 mg) by mouth 2 times daily (with meals)   Last time this was given:  1,000 mg on 11/10/2018  8:06 AM                                methylPREDNISolone 4 MG tablet   Commonly known as:  MEDROL DOSEPAK   Follow package instructions                                order for DME   Equipment being ordered: TENS unit                                * oxyCODONE-acetaminophen 5-325 MG per tablet   Commonly known as:  PERCOCET   Take 1-2 tablets by mouth every 4 hours as needed for pain   Last time this was given:  1 tablet on 11/10/2018 11:39 AM                                * oxyCODONE-acetaminophen 5-325 MG per tablet   Commonly known as:  PERCOCET   Take 1-2 tablets by mouth every 4 hours as needed for severe pain    Last time this was given:  1 tablet on 11/10/2018 11:39 AM                                STATIN NOT PRESCRIBED (INTENTIONAL)   1 each 2 times daily Statin not prescribed intentionally due to Refusal by patient                                * Notice:  This list has 4 medication(s) that are the same as other medications prescribed for you. Read the directions carefully, and ask your doctor or other care provider to review them with you.

## 2018-11-09 NOTE — BRIEF OP NOTE
[facility] Brief Operative Note    Pre-operative diagnosis: left hip mass and left chest wall mass   Post-operative diagnosis lipoma left hip/chest     Procedure: Procedure(s):  excision left hip mass and left chest wall mass  EXCISE MASS TRUNK   Surgeon(s): Surgeon(s) and Role:     * Jazmin Bautista MD - Primary   Estimated blood loss: * No values recorded between 11/9/2018  8:03 AM and 11/9/2018  9:16 AM *    Specimens:   ID Type Source Tests Collected by Time Destination   A : Left chest wall mass  Tissue Chest SURGICAL PATHOLOGY EXAM Jazmin Bautista MD 11/9/2018  8:06 AM    B : Left hip mass  Tissue Hip, Left SURGICAL PATHOLOGY EXAM Jazmin Bautista MD 11/9/2018  8:34 AM       Findings: 20 x 20 cm mass left hip

## 2018-11-09 NOTE — PLAN OF CARE
"Problem: Patient Care Overview  Goal: Plan of Care/Patient Progress Review  Outcome: Improving  PRIMARY DIAGNOSIS: SURGERY (Excision left hip mass and left chest wall mass)   OUTPATIENT/OBSERVATION GOALS TO BE MET BEFORE DISCHARGE:  1. Stable vital signs Yes  2. Tolerating diet:Yes  3. Pain controlled with oral pain medications:  Yes  4. Positive bowel sounds:  Yes  5. Voiding without difficulty:  Yes  6. Able to ambulate:  Yes  7. Provider specific discharge goals met:  Started on regular diet.     Discharge Planner Nurse   Safe discharge environment identified: Yes  Barriers to discharge: No       Entered by: Mel Donaldson 11/09/2018 1:24 PM     Please review provider order for any additional goals.   Nurse to notify provider when observation goals have been met and patient is ready for discharge.  /87  Pulse 83  Temp 95.6  F (35.3  C) (Oral)  Resp 14  Ht 1.702 m (5' 7\")  Wt 125.2 kg (276 lb)  SpO2 96%  BMI 43.23 kg/m2     A&Ox4. 2L of O2 via nasal canula, Capnography monitoring Glucose checks. Up w/ Ax1, Voided 300mL patty urine, PVR 37. BS hypoactive, tolerating clear liquids, advanced to regular diet, denies passing flatus. JOSSELYN drain x1. Dressings CDI. Numbness/tingling to L arm, baseline per pt. CMS otherwise intact. Pt. C/o of neck pain, declined medication at this time. Will continue to monitor   Script for med in chart.  "

## 2018-11-09 NOTE — PHARMACY-ADMISSION MEDICATION HISTORY
Admission medication history interview status for this patient is complete. See [facility] admission navigator for allergy information, prior to admission medications and immunization status.     Medication history interview source(s):Patient  Medication history resources (including written lists, pill bottles, clinic record):None  Primary pharmacy:[facility]    Changes made to PTA medication list:  Added: ergocalciferol   Deleted: vitamin D3, Valium, Breo ellipta, multivitamin, vitmain B complex  Changed: Basaglar from 20 units qam and 10 units qpm to 20 units qam and 6 units qpm. Zyrtec from BID to daily. Hydrocortisone from BID to BID prn    Actions taken by pharmacist (provider contacted, etc):None     Additional medication history information:Patient only took 16 units today, 11/9/18    Medication reconciliation/reorder completed by provider prior to medication history? Yes    Do you take OTC medications (eg tylenol, ibuprofen, fish oil, eye/ear drops, etc)? Y(Y/N)    For patients on insulin therapy: Y (Y/N)  Lantus/levemir/NPH/Mix 70/30 dose:   (Y) (see Med list for doses)   Sliding scale Novolog N  If Yes, do you have a baseline novolog pre-meal dose:  units with meals    Time spent in this activity: 15 min    Prior to Admission medications    Medication Sig Last Dose Taking? Auth Provider   aspirin 81 MG tablet Take 1 tablet (81 mg) by mouth daily Past Month at Unknown time Yes Griselda Yoder MD   BASAGLAR 100 UNIT/ML injection Inject 20 Units Subcutaneous every morning 11/9/2018 at took only 16 units Yes Unknown, Entered By History   BASAGLAR 100 UNIT/ML injection Inject 6 Units Subcutaneous every evening 11/8/2018 at Unknown time Yes Unknown, Entered By History   celecoxib (CELEBREX) 200 MG capsule TAKE ONE CAPSULE BY MOUTH EVERY DAY Past Week at Unknown time Yes Kimber Simmons MD   cetirizine (ZYRTEC) 10 MG tablet Take 10 mg by mouth daily 11/8/2018 at Unknown time Yes Unknown, Entered By  History   EPINEPHrine (EPIPEN/ADRENACLICK/OR ANY BX GENERIC EQUIV) 0.3 MG/0.3ML injection 2-pack Inject 0.3 mLs (0.3 mg) into the muscle once as needed for anaphylaxis  Yes Linda Durant MD   ergocalciferol (ERGOCALCIFEROL) 78683 units capsule Take 50,000 Units by mouth twice a week Take on Mondays and Thursdays 11/8/2018 at Unknown time Yes Unknown, Entered By History   hydrocortisone 2.5 % cream Apply topically 2 times daily as needed Do not use for more than 14 continuous days  at prn Yes Unknown, Entered By History   KRILL OIL PO Take 1 tablet by mouth daily  Past Week at Unknown time Yes Reported, Patient   LANsoprazole (PREVACID) 30 MG CR capsule TAKE ONE CAPSULE BY MOUTH EVERY DAY -TAKE 30-60 MINUTES BEFORE A MEAL 11/9/2018 at Unknown time Yes Linda Durant MD   liraglutide (VICTOZA PEN) 18 MG/3ML soln Inject 1.8 mg Subcutaneous daily 11/8/2018 at Unknown time Yes Linda Durant MD   LORazepam (ATIVAN) 0.5 MG tablet Take 1-2 tablets (0.5-1 mg) by mouth every 4 hours as needed for anxiety  at prn Yes Griselda Yoder MD   losartan-hydrochlorothiazide (HYZAAR) 100-25 MG per tablet TAKE ONE TABLET BY MOUTH EVERY DAY 11/9/2018 at Unknown time Yes Griselda Yoder MD   metFORMIN (GLUCOPHAGE) 1000 MG tablet Take 1 tablet (1,000 mg) by mouth 2 times daily (with meals) 11/8/2018 at Unknown time Yes Linda Durant MD   methylPREDNISolone (MEDROL DOSEPAK) 4 MG tablet Follow package instructions  Patient taking differently: Follow package instructions (Started on 11/6/18) 11/8/2018 at Unknown time Yes Linda Durant MD   oxyCODONE-acetaminophen (PERCOCET) 5-325 MG per tablet Take 1-2 tablets by mouth every 4 hours as needed for severe pain  Yes Jazmin Bautista MD   oxyCODONE-acetaminophen (PERCOCET) 5-325 MG per tablet Take 1-2 tablets by mouth every 4 hours as needed for pain 11/8/2018 at Unknown time Yes Linda Durant MD   BD VALDO U/F 32G X 4 MM  insulin pen needle USE 2 PEN NEEDLES DAILY OR AS DIRECTED   Linda Durant MD   blood glucose monitoring (ACCU-CHEK GABI) test strip Use to test blood sugars 1-2x daily or as directed.   Linda Durant MD   blood glucose monitoring (ACCU-CHEK MULTICLIX) lancets Use to test blood sugar 1-2 times daily or as directed.   Griselda Yoder MD   blood glucose monitoring (NO BRAND SPECIFIED) meter device kit Use to test blood sugar 1-2 times daily or as directed.   Griselda Ydoer MD   ORDER FOR DME Equipment being ordered: TENS unit   Griselda Yoder MD   STATIN NOT PRESCRIBED, INTENTIONAL, 1 each 2 times daily Statin not prescribed intentionally due to Refusal by patient   Griselda Yoder MD

## 2018-11-10 VITALS
OXYGEN SATURATION: 98 % | BODY MASS INDEX: 43.32 KG/M2 | WEIGHT: 276 LBS | DIASTOLIC BLOOD PRESSURE: 75 MMHG | TEMPERATURE: 96.8 F | RESPIRATION RATE: 16 BRPM | SYSTOLIC BLOOD PRESSURE: 118 MMHG | HEIGHT: 67 IN | HEART RATE: 83 BPM

## 2018-11-10 LAB — GLUCOSE BLDC GLUCOMTR-MCNC: 155 MG/DL (ref 70–99)

## 2018-11-10 PROCEDURE — 25000132 ZZH RX MED GY IP 250 OP 250 PS 637: Performed by: PLASTIC SURGERY

## 2018-11-10 PROCEDURE — 82962 GLUCOSE BLOOD TEST: CPT

## 2018-11-10 PROCEDURE — 25000128 H RX IP 250 OP 636: Performed by: PLASTIC SURGERY

## 2018-11-10 RX ADMIN — METFORMIN HYDROCHLORIDE 1000 MG: 500 TABLET, FILM COATED ORAL at 08:06

## 2018-11-10 RX ADMIN — CELECOXIB 200 MG: 200 CAPSULE ORAL at 08:06

## 2018-11-10 RX ADMIN — HYDROMORPHONE HYDROCHLORIDE 0.5 MG: 1 INJECTION, SOLUTION INTRAMUSCULAR; INTRAVENOUS; SUBCUTANEOUS at 09:23

## 2018-11-10 RX ADMIN — HYDROMORPHONE HYDROCHLORIDE 0.5 MG: 1 INJECTION, SOLUTION INTRAMUSCULAR; INTRAVENOUS; SUBCUTANEOUS at 04:30

## 2018-11-10 RX ADMIN — OXYCODONE HYDROCHLORIDE AND ACETAMINOPHEN 1 TABLET: 5; 325 TABLET ORAL at 11:39

## 2018-11-10 RX ADMIN — OXYCODONE HYDROCHLORIDE AND ACETAMINOPHEN 2 TABLET: 5; 325 TABLET ORAL at 01:18

## 2018-11-10 RX ADMIN — DIAZEPAM 5 MG: 5 TABLET ORAL at 06:19

## 2018-11-10 RX ADMIN — OXYCODONE HYDROCHLORIDE AND ACETAMINOPHEN 2 TABLET: 5; 325 TABLET ORAL at 06:19

## 2018-11-10 NOTE — PLAN OF CARE
"Problem: Patient Care Overview  Goal: Plan of Care/Patient Progress Review  PRIMARY DIAGNOSIS: Excision of L hip & Chest wall mass  OUTPATIENT/OBSERVATION GOALS TO BE MET BEFORE DISCHARGE:  1. Stable vital signs Yes  2. Tolerating diet:Yes  3. Pain controlled with oral pain medications: Yes  4. Positive bowel sounds: Yes  5. Voiding without difficulty: Yes  6. Able to ambulate:  Yes  7. Provider specific discharge goals met: Yes     Discharge Planner Nurse   Safe discharge environment identified: Yes  Barriers to discharge: No       Entered by: Regina Daniel 11/10/2018 8:41AM    Please review provider order for any additional goals.   Nurse to notify provider when observation goals have been met and patient is ready for discharge.    BP 98/64 (BP Location: Left arm)  Pulse 83  Temp 97.3  F (36.3  C) (Oral)  Resp 16  Ht 1.702 m (5' 7\")  Wt 125.2 kg (276 lb)  SpO2 96%  BMI 43.23 kg/m2    Pt alert and oriented - up independently. Dressing to L hip and chest - clean/dry/intact. JOSSELYN to left hip. Bruising noted around L chest incision. PRN pain medications for pain management.      "

## 2018-11-10 NOTE — PLAN OF CARE
Problem: Patient Care Overview  Goal: Plan of Care/Patient Progress Review  PRIMARY DIAGNOSIS: excision of L hip & chest wall mass  OUTPATIENT/OBSERVATION GOALS TO BE MET BEFORE DISCHARGE:  1. Stable vital signs Yes  2. Tolerating diet:Yes  3. Pain controlled with oral pain medications:  Yes  4. Positive bowel sounds:  Yes  5. Voiding without difficulty:  Yes  6. Able to ambulate:  Yes  7. Provider specific discharge goals met:  Yes    Discharge Planner Nurse   Safe discharge environment identified: Yes  Barriers to discharge: No       Entered by: Tessie Khan 11/10/2018 4:47 AM     VSS, pt is A&Ox4, up independently, incisions are C/D/I, JOSSELYN x1 with serosanguinous output, SL, voiding adequately, will continue to monitor         Please review provider order for any additional goals.   Nurse to notify provider when observation goals have been met and patient is ready for discharge.

## 2018-11-10 NOTE — PLAN OF CARE
"Problem: Patient Care Overview  Goal: Plan of Care/Patient Progress Review  PRIMARY DIAGNOSIS: Excision of L hip & Chest wall mass  OUTPATIENT/OBSERVATION GOALS TO BE MET BEFORE DISCHARGE:  1. Stable vital signs Yes  2. Tolerating diet:Yes  3. Pain controlled with oral pain medications: Yes  4. Positive bowel sounds: Yes  5. Voiding without difficulty: Yes  6. Able to ambulate:  Yes  7. Provider specific discharge goals met: Yes      Discharge Planner Nurse   Safe discharge environment identified: Yes  Barriers to discharge: No       Entered by: Regina Daniel 11/10/2018 12:16PM     Please review provider order for any additional goals.   Nurse to notify provider when observation goals have been met and patient is ready for discharge.     BP 98/64 (BP Location: Left arm)  Pulse 83  Temp 97.3  F (36.3  C) (Oral)  Resp 16  Ht 1.702 m (5' 7\")  Wt 125.2 kg (276 lb)  SpO2 96%  BMI 43.23 kg/m2    Pt alert and oriented - up independently. New dressings to L hip and chest. JOSSELYN to left hip - will go home in place. Bruising noted around L chest incision. PRN pain medications for pain management. Discharge home this afternoon - family to provide transport.       "

## 2018-11-10 NOTE — OP NOTE
PLASTIC SURGERY OPERATIVE NOTE  Patient Name:  Johnna Caballero     Date of Service:  11/9/2018     PREOPERATIVE DIAGNOSES:   1.  left hip mass and left chest wall mass     POSTOPERATIVE DIAGNOSES:   1.  left hip mass and left chest wall mass       SURGEON:  Jazmin Bautista MD   OR STAFF:  Circulator: Jitendra Aviles RN  Relief Circulator: Gaby Bautista RN  Scrub Person: Marjorie Jennifer Mendoza     ANESTHESIA:  General     ESTIMATED BLOOD LOSS:  * No values recorded between 11/9/2018  8:03 AM and 11/9/2018  9:23 AM *    SPECIMEN(S):    ID Type Source Tests Collected by Time Destination   A : Left chest wall mass  Tissue Chest SURGICAL PATHOLOGY EXAM Jazmin Bautista MD 11/9/2018  8:06 AM    B : Left hip mass  Tissue Hip, Left SURGICAL PATHOLOGY EXAM Jazmin Bautista MD 11/9/2018  8:34 AM         PROCEDURES:   1.  Excision left chest wall mass with excised diameter of greater than 4 cm  2.  Excision left hip mass with excised diameter of more than 20 x 20 cm      DESCRIPTION OF PROCEDURE:  Patient was marked for incisions and taken to the operating room.  General anesthesia was administered.  Patient was placed in the right lateral decubitus position the left chest and hip area were prepped and draped in a sterile manner.  An incision was made over the left chest mass following the previous scar.  Dissection was carried down to underlying soft tissue.  Mass had the appearance of a lipoma.  The fatty tissue was removed with electrocautery.  Hemostasis was achieved.  The incision was reapproximated interrupted 2-0 PDS in the superficial fascia followed by interrupted 3-0 Monocryl in the subcutaneous tissue and a running 4-0 subcuticular Monocryl suture.    A longitudinal incision was then made over the left hip mass and dissection was carried down to the underlying mass.  This mass also had the appearance of a lipoma.  The lipoma was not well delineated.  The mass was removed using  electrocautery.  The mass size was greater than 20 x 20 cm.  Hemostasis was achieved.  Excess skin was sharply excised.  Hemostasis was achieved.  A 15 Thai JOSSELYN drain was placed and secured with 3-0 nylon suture.  The skin incision was closed with interrupted 2-0 PDS in the superficial fascia followed by interrupted 3-0 Monocryl in the subcutaneous tissue and a running 4-0 subcuticular Monocryl suture.  Xeroform and a light dressing were applied to both incisions.    IMPLANTS:  * No implants in log *    FINDINGS: Vaguely defined lipomas removed from left chest and left hip area.    Jazmin Bautista MD  Plastic Surgery  [facility]  [phone] (office)

## 2018-11-10 NOTE — PLAN OF CARE
Problem: Patient Care Overview  Goal: Plan of Care/Patient Progress Review  Patient's After Visit Summary was reviewed with patient.  Patient verbalized understanding of After Visit Summary, recommended follow up and was given an opportunity to ask questions.   Discharge medications sent home with patient/family: YES, prescription filled by discharge pharmacy and sent home with patient.   Discharged with mother. Mom here to transport home.

## 2018-11-10 NOTE — PLAN OF CARE
Problem: Patient Care Overview  Goal: Plan of Care/Patient Progress Review  PRIMARY DIAGNOSIS: Excision of L hip and chest wall mass  OUTPATIENT/OBSERVATION GOALS TO BE MET BEFORE DISCHARGE:  1. Stable vital signs Yes  2. Tolerating diet:Yes  3. Pain controlled with oral pain medications:  Yes  4. Positive bowel sounds:  Yes  5. Voiding without difficulty:  Yes  6. Able to ambulate:  Yes  7. Provider specific discharge goals met:  Yes    Discharge Planner Nurse   Safe discharge environment identified: Yes  Barriers to discharge: No       Entered by: Tessie Khan 11/10/2018 1:35 AM     VSS, pt is A&Ox4, up independently, incisions are C/D/I, JOSSELYN C/D/I with serosanguinous drainage, pain controlled with oral pain meds, voiding adequately, will continue to monitor         Please review provider order for any additional goals.   Nurse to notify provider when observation goals have been met and patient is ready for discharge.

## 2018-11-10 NOTE — PLAN OF CARE
"Problem: Patient Care Overview  Goal: Plan of Care/Patient Progress Review  PRIMARY DIAGNOSIS: SURGERY (Excision left hip mass and left chest wall mass)   OUTPATIENT/OBSERVATION GOALS TO BE MET BEFORE DISCHARGE:  1. Stable vital signs Yes  2. Tolerating diet: Yes  3. Pain controlled with oral pain medications: Yes, PRN percocet.   4. Positive bowel sounds: Yes, BM this evening  5. Voiding without difficulty: Yes  6. Able to ambulate:  Yes - independent  7. Provider specific discharge goals met: Started on regular diet, walked halls.     Discharge Planner Nurse   Safe discharge environment identified: Yes  Barriers to discharge: No       Entered by: Regina Daniel 11/9/18 6:20PM  Please review provider order for any additional goals.   Nurse to notify provider when observation goals have been met and patient is ready for discharge.    /72 (BP Location: Right arm)  Pulse 83  Temp 95.6  F (35.3  C) (Oral)  Resp 15  Ht 1.702 m (5' 7\")  Wt 125.2 kg (276 lb)  SpO2 98%  BMI 43.23 kg/m2     Pt alert and oriented - up independently in room.   PRN pain medications given.   Dressing to L chest and L hip - C/D/I  JOSSELYN drain to L hip in place - to bulb suction.   Capno - on room air.   Continue to monitor and provide supportive cares.       "

## 2018-11-10 NOTE — PLAN OF CARE
"Problem: Patient Care Overview  Goal: Plan of Care/Patient Progress Review  Outcome: Improving  PRIMARY DIAGNOSIS: Surgery (Excision left hip mass and left chest wall mass)  OUTPATIENT/OBSERVATION GOALS TO BE MET BEFORE DISCHARGE:  1. Stable vital signs Yes  2. Tolerating diet:Yes  3. Pain controlled with oral pain medications: Yes and IV Dilaudid for break through pain.       Gave prn IV Dilaudid at 2130. Valium at HS. Both effective.  4. Positive bowel sounds:  Yes  5. Voiding without difficulty:  Yes  6. Able to ambulate:  Yes- Is independent. Ambulating in room and halls.  7. Provider specific discharge goals met:  Yes    Discharge Planner Nurse   Safe discharge environment identified: Yes  Barriers to discharge: No       Entered by: Padma Loera 11/09/2018 11:06 PM   /78 (BP Location: Right arm)  Pulse 83  Temp 98.3  F (36.8  C) (Oral)  Resp 16  Ht 1.702 m (5' 7\")  Wt 125.2 kg (276 lb)  SpO2 95%  BMI 43.23 kg/m2  Please review provider order for any additional goals.   Nurse to notify provider when observation goals have been met and patient is ready for discharge.      "

## 2018-11-12 LAB — COPATH REPORT: NORMAL

## 2018-11-19 ENCOUNTER — MYC MEDICAL ADVICE (OUTPATIENT)
Dept: PEDIATRICS | Facility: CLINIC | Age: 53
End: 2018-11-19

## 2018-11-19 DIAGNOSIS — D17.24 LIPOMA OF LEFT THIGH: ICD-10-CM

## 2018-11-20 ENCOUNTER — OFFICE VISIT (OUTPATIENT)
Dept: PEDIATRICS | Facility: CLINIC | Age: 53
End: 2018-11-20
Payer: COMMERCIAL

## 2018-11-20 VITALS
BODY MASS INDEX: 41.53 KG/M2 | WEIGHT: 264.6 LBS | SYSTOLIC BLOOD PRESSURE: 132 MMHG | TEMPERATURE: 97.8 F | OXYGEN SATURATION: 99 % | HEART RATE: 88 BPM | DIASTOLIC BLOOD PRESSURE: 72 MMHG | HEIGHT: 67 IN

## 2018-11-20 DIAGNOSIS — M79.641 BILATERAL HAND PAIN: ICD-10-CM

## 2018-11-20 DIAGNOSIS — M79.642 BILATERAL HAND PAIN: ICD-10-CM

## 2018-11-20 DIAGNOSIS — M62.838 SPASM OF MUSCLE: Primary | ICD-10-CM

## 2018-11-20 PROCEDURE — 99213 OFFICE O/P EST LOW 20 MIN: CPT | Performed by: FAMILY MEDICINE

## 2018-11-20 RX ORDER — OXYCODONE AND ACETAMINOPHEN 5; 325 MG/1; MG/1
1-2 TABLET ORAL EVERY 4 HOURS PRN
Qty: 90 TABLET | Refills: 0 | Status: SHIPPED | OUTPATIENT
Start: 2018-11-20 | End: 2018-12-05

## 2018-11-20 RX ORDER — OXYCODONE AND ACETAMINOPHEN 5; 325 MG/1; MG/1
1-2 TABLET ORAL EVERY 4 HOURS PRN
Qty: 90 TABLET | Refills: 0 | Status: CANCELLED | OUTPATIENT
Start: 2018-11-20

## 2018-11-20 NOTE — PROGRESS NOTES
"  SUBJECTIVE:   Johnna Caballero is a 53 year old female who presents to clinic today for the following health issues:    Neck Pain  Onset: ***    Description:   Location: ***  Radiation: {.:759166::\"none\"}    Intensity: {.:604532}    Progression of Symptoms:  {.:698938}    Accompanying Signs & Symptoms:  Burning, prickly sensation (paresthesias) in arm(s): { :654099}  Numbness in arm(s): { :778826}  Weakness in arm(s):  { :638541}  Fever: { :851653}  Headache: { :099949}  Nausea and/or vomiting: { :615261}    History:   Trauma: { :075709}  Previous neck pain: { :100310}  Previous surgery or injections: { :255250}  Previous Imaging (MRI,X ray): { :228028}    Precipitating factors:   Does movement increase the pain:  { :062495}    Alleviating factors:  ***    Therapies Tried and outcome:  ***    {additional problems for provider to add:612696}    Problem list and histories reviewed & adjusted, as indicated.  Additional history: {NONE - AS DOCUMENTED:853304::\"as documented\"}    {HIST REVIEW/ LINKS 2:788497}    Reviewed and updated as needed this visit by clinical staff       Reviewed and updated as needed this visit by Provider         {PROVIDER CHARTING PREFERENCE:796897}  "

## 2018-11-20 NOTE — MR AVS SNAPSHOT
After Visit Summary   11/20/2018    Johnna Caballero    MRN: [id number]           Patient Information     Date Of Birth          1965        Visit Information        Provider Department      11/20/2018 8:30 AM Armando Shaikh MD [facility]        Today's Diagnoses     Spasm of muscle    -  1    Bilateral hand pain          Care Instructions    Continue celecoxib as prescribed    650mg tylenol every 4-6 hours as needed for pain    Try the Zanaflex/tizanidine for muscle spasms every 8 hours (caution can cause dizziness)    Make appointment to see Spine surgeon    Make appointment to see Dr. Durant          Follow-ups after your visit        Your next 10 appointments already scheduled     Dec 26, 2018 11:40 AM CST   MyChart Long with Linda Durant MD   [facility] ([facility])    [address] [place] [id number] [phone]              Who to contact     If you have questions or need follow up information about today's clinic visit or your schedule please contact [facility] directly at [phone].  Normal or non-critical lab and imaging results will be communicated to you by AVIcodehart, letter or phone within 4 business days after the clinic has received the results. If you do not hear from us within 7 days, please contact the clinic through AVIcodehart or phone. If you have a critical or abnormal lab result, we will notify you by phone as soon as possible.  Submit refill requests through Songvice or call your pharmacy and they will forward the refill request to us. Please allow 3 business days for your refill to be completed.          Additional Information About Your Visit        MyChart Information     Songvice gives you secure access to your electronic health record. If you see a primary care provider, you can also send messages to your care team and make appointments. If you have questions, please call your  "primary care clinic.  If you do not have a primary care provider, please call [phone] and they will assist you.        Care EveryWhere ID     This is your Care EveryWhere ID. This could be used by other organizations to access your [facility] medical records  [id number]        Your Vitals Were     Pulse Temperature Height Pulse Oximetry BMI (Body Mass Index)       88 97.8  F (36.6  C) (Oral) 5' 7\" (1.702 m) 99% 41.44 kg/m2        Blood Pressure from Last 3 Encounters:   11/20/18 132/72   11/10/18 118/75   11/06/18 116/82    Weight from Last 3 Encounters:   11/20/18 264 lb 9.6 oz (120 kg)   11/09/18 276 lb (125.2 kg)   11/06/18 272 lb (123.4 kg)              Today, you had the following     No orders found for display         Today's Medication Changes          These changes are accurate as of 11/20/18  9:10 AM.  If you have any questions, ask your nurse or doctor.               Start taking these medicines.        Dose/Directions    tiZANidine 4 MG tablet   Commonly known as:  ZANAFLEX   Used for:  Spasm of muscle   Started by:  Armando Shaikh MD        Dose:  4 mg   Take 1 tablet (4 mg) by mouth 3 times daily as needed for muscle spasms   Quantity:  21 tablet   Refills:  0         These medicines have changed or have updated prescriptions.        Dose/Directions    methylPREDNISolone 4 MG tablet   Commonly known as:  MEDROL DOSEPAK   This may have changed:  additional instructions   Used for:  Cervical radiculopathy        Follow package instructions   Quantity:  21 tablet   Refills:  0         Stop taking these medicines if you haven't already. Please contact your care team if you have questions.     LORazepam 0.5 MG tablet   Commonly known as:  ATIVAN   Stopped by:  Armando Shaikh MD                Where to get your medicines      [facility] [place] [facility] [address]  [address] [address]     Phone:  [phone]    "  tiZANidine 4 MG tablet                Primary Care Provider Office Phone # Fax #    Linda Durant [phone] [address] [place] [address]        Equal Access to Services     RAVINDRA PARKER : Hadii eddy ku jennifero Sosylvieali, waaxda luqadaha, qaybta kaalmada adeegyada, shola anandcolleen campos. So [facility] [phone].    ATENCIÓN: Si habla español, tiene a subramanian disposición servicios gratuitos de asistencia lingüística. Llame al [phone].    We comply with applicable federal civil rights laws and [place] laws. We do not discriminate on the basis of race, color, national origin, age, disability, sex, sexual orientation, or gender identity.            Thank you!     Thank you for choosing [facility]  for your care. Our goal is always to provide you with excellent care. Hearing back from our patients is one way we can continue to improve our services. Please take a few minutes to complete the written survey that you may receive in the mail after your visit with us. Thank you!             Your Updated Medication List - Protect others around you: Learn how to safely use, store and throw away your medicines at www.disposemymeds.org.          This list is accurate as of 11/20/18  9:10 AM.  Always use your most recent med list.                   Brand Name Dispense Instructions for use Diagnosis    aspirin 81 MG tablet     30 tablet    Take 1 tablet (81 mg) by mouth daily        BD VALDO U/F 32G X 4 MM miscellaneous   Generic drug:  insulin pen needle     180 each    USE 2 PEN NEEDLES DAILY OR AS DIRECTED    Type 2 diabetes mellitus without complication, with long-term current use of insulin (H)       blood glucose monitoring lancets     2 Box    Use to test blood sugar 1-2 times daily or as directed.    DM (diabetes mellitus), type 2, uncontrolled (H)       blood glucose monitoring meter device kit    no brand specified    1 kit    Use to test blood  sugar 1-2 times daily or as directed.    Type 2 diabetes mellitus without complication (H)       blood glucose monitoring test strip    ACCU-CHEK GABI    200 each    Use to test blood sugars 1-2x daily or as directed.        celecoxib 200 MG capsule    celeBREX    30 capsule    TAKE ONE CAPSULE BY MOUTH EVERY DAY    Chronic bilateral low back pain without sciatica       cetirizine 10 MG tablet    zyrTEC     Take 10 mg by mouth daily        EPINEPHrine 0.3 MG/0.3ML injection 2-pack    EPIPEN/ADRENACLICK/or ANY BX GENERIC EQUIV    2 mL    Inject 0.3 mLs (0.3 mg) into the muscle once as needed for anaphylaxis    Hornet sting, accidental or unintentional, subsequent encounter       ergocalciferol 08173 units capsule    ERGOCALCIFEROL     Take 50,000 Units by mouth twice a week Take on Mondays and Thursdays        hydrocortisone 2.5 % cream      Apply topically 2 times daily as needed Do not use for more than 14 days        * insulin glargine 100 UNIT/ML pen      Inject 20 Units Subcutaneous every morning        * insulin glargine 100 UNIT/ML pen      Inject 6 Units Subcutaneous every evening        KRILL OIL PO      Take 1 tablet by mouth daily        LANsoprazole 30 MG CR capsule    PREVACID    90 capsule    TAKE ONE CAPSULE BY MOUTH EVERY DAY -TAKE 30-60 MINUTES BEFORE A MEAL    Gastroesophageal reflux disease, esophagitis presence not specified       liraglutide 18 MG/3ML solution    VICTOZA PEN    27 mL    Inject 1.8 mg Subcutaneous daily    Type 2 diabetes mellitus without complication, with long-term current use of insulin (H)       losartan-hydrochlorothiazide 100-25 MG per tablet    HYZAAR    90 tablet    TAKE ONE TABLET BY MOUTH EVERY DAY    Essential hypertension       metFORMIN 1000 MG tablet    GLUCOPHAGE    180 tablet    Take 1 tablet (1,000 mg) by mouth 2 times daily (with meals)    Type 2 diabetes mellitus without complication, with long-term current use of insulin (H)       methylPREDNISolone 4 MG  tablet    MEDROL DOSEPAK    21 tablet    Follow package instructions    Cervical radiculopathy       order for DME     1 each    Equipment being ordered: TENS unit    Chronic back pain, Bilateral hand pain       * oxyCODONE-acetaminophen 5-325 MG per tablet    PERCOCET    90 tablet    Take 1-2 tablets by mouth every 4 hours as needed for pain    Bilateral hand pain       * oxyCODONE-acetaminophen 5-325 MG per tablet    PERCOCET    40 tablet    Take 1-2 tablets by mouth every 4 hours as needed for severe pain    Lipoma of left thigh       STATIN NOT PRESCRIBED (INTENTIONAL)     0 each    1 each 2 times daily Statin not prescribed intentionally due to Refusal by patient    Hyperlipidemia LDL goal <100       tiZANidine 4 MG tablet    ZANAFLEX    21 tablet    Take 1 tablet (4 mg) by mouth 3 times daily as needed for muscle spasms    Spasm of muscle       * Notice:  This list has 4 medication(s) that are the same as other medications prescribed for you. Read the directions carefully, and ask your doctor or other care provider to review them with you.

## 2018-11-20 NOTE — TELEPHONE ENCOUNTER
Spoke to pt inquiring where pt would like RX placed. Placed RX at  per pt request.     Alysa Young MA

## 2018-11-20 NOTE — TELEPHONE ENCOUNTER
PCP:    Please see below. Pt was seen today by Dr. Shaikh.     Jennifer Hanley RN -- [facility]

## 2018-11-20 NOTE — PROGRESS NOTES
Subjective:   Johnna Caballero is a 53 year old female who presents for   Chief Complaint   Patient presents with     Neck Pain     x2 weeks sharp/stabbing pain extending to the arm, numbness in the hand, seeing PT, massage, and chiro. Patient had MRI and brought them in today. Patient states that Flexeril doesn't work for her.     She reports that she is having issues with her chronic pain especially with left muscle and shoulder spasms.  She is requesting a refill of her Percocet earlier than scheduled.  Typically sees Dr. Durant for this.  Reports that she had a recent plastic surgery procedure where she had a lipoma removed and thus required more frequent use of her Percocet which brings her in today.  She is seeing [facility] orthopedics and had an MRI done which showed a small disc herniation she states that they declined to give her any further narcotics and that requested her to see orthospine which she is in the process of making appointment.  No new focal deficits or increased numbness per her baseline      Patient Active Problem List    Diagnosis Date Noted     Chronic pain syndrome 11/23/2016     Priority: High     Patient is followed by Griselda Yoder MD for ongoing prescription of pain medication.  All refills should be approved by this provider, or covering partner.    Medication(s): percocet 5/325.   Maximum quantity per month: 90  Clinic visit frequency required: Q 6  months     Controlled substance agreement:  Encounter-Level CSA:     There are no encounter-level csa.        Pain Clinic evaluation in the past: No    DIRE Total Score(s):  No flowsheet data found.    Last [facility] website verification:  none   [email or web address]         Type 2 diabetes mellitus without complication (H) 10/02/2015     Priority: High     Hematoma - postoperative 11/23/2012     Priority: High     Problem list name updated by automated process. Provider to review and confirm       Hyperlipidemia LDL goal  <100 02/10/2010     Priority: High     Postoperative state 11/09/2018     Priority: Medium     Intermittent asthma, uncomplicated 12/28/2016     Priority: Medium     Latex allergy status 10/03/2016     Priority: Medium     Anxiety 05/10/2016     Priority: Medium     Major depressive disorder, recurrent episode, mild (H) 01/05/2016     Priority: Medium     Typically seasonal     celexa- low libido  wellbutrin- didn't work   paxil- didn't work        Essential hypertension 10/04/2015     Priority: Medium     S/P total knee arthroplasty 07/15/2014     Priority: Medium     Morbid obesity with BMI of 40.0-44.9, adult (H) 09/18/2012     Priority: Medium     Allergic rhinitis 03/25/2003     Priority: Medium     Problem list name updated by automated process. Provider to review       Bilateral low back pain without sciatica 10/25/2015     Priority: Low     Vitamin D deficiency disease 03/06/2013     Priority: Low     Surgery aftercare 12/16/2012     Priority: Low     IMO update changed this record. Please review for accuracy       Long term current use of insulin (H) 09/18/2012     Priority: Low     Esophageal reflux 03/25/2003     Priority: Low       Current Outpatient Prescriptions   Medication     aspirin 81 MG tablet     BASAGLAR 100 UNIT/ML injection     BASAGLAR 100 UNIT/ML injection     BD VALDO U/F 32G X 4 MM insulin pen needle     blood glucose monitoring (ACCU-CHEK GABI) test strip     blood glucose monitoring (ACCU-CHEK MULTICLIX) lancets     blood glucose monitoring (NO BRAND SPECIFIED) meter device kit     celecoxib (CELEBREX) 200 MG capsule     cetirizine (ZYRTEC) 10 MG tablet     EPINEPHrine (EPIPEN/ADRENACLICK/OR ANY BX GENERIC EQUIV) 0.3 MG/0.3ML injection 2-pack     ergocalciferol (ERGOCALCIFEROL) 85182 units capsule     hydrocortisone 2.5 % cream     KRILL OIL PO     LANsoprazole (PREVACID) 30 MG CR capsule     liraglutide (VICTOZA PEN) 18 MG/3ML soln     losartan-hydrochlorothiazide (HYZAAR) 100-25 MG  "per tablet     metFORMIN (GLUCOPHAGE) 1000 MG tablet     methylPREDNISolone (MEDROL DOSEPAK) 4 MG tablet     ORDER FOR DME     STATIN NOT PRESCRIBED, INTENTIONAL,     tiZANidine (ZANAFLEX) 4 MG tablet     oxyCODONE-acetaminophen (PERCOCET) 5-325 MG per tablet     oxyCODONE-acetaminophen (PERCOCET) 5-325 MG per tablet     No current facility-administered medications for this visit.      Facility-Administered Medications Ordered in Other Visits   Medication     ceFAZolin (ANCEF) 1 g vial to attach to IVPB       ROS:  As above per HPI    Objective:   /72  Pulse 88  Temp 97.8  F (36.6  C) (Oral)  Ht 5' 7\" (1.702 m)  Wt 264 lb 9.6 oz (120 kg)  SpO2 99%  BMI 41.44 kg/m2, Body mass index is 41.44 kg/(m^2).  Gen:  NAD, well-nourished, sitting in chair comfortably  HEENT: EOMI, sclera anicteric, Head normocephalic, ; nares patent; moist mucous membranes  Neck: trachea midline, no thyromegaly  CV:  Hemodynamically stable  Pulm:  no increased work of breathing   Extrem: no cyanosis, edema or clubbing, intact movement of upper extremities  Skin: no obvious rashes or abnormalities  Psych: Euthymic, linear thoughts, normal rate of speech  Gait: normal    Assessment & Plan:   Johnna Caballero, 53 year old female who presents with:  Bilateral hand pain  Spasm of muscle  Mn  reviewed which showed that patient has had an additional 40 of Percocet prescribed to her in the last 10 days by surgeon, I found it rather concerning that that she did not feel the need to disclose this up front with me.  Upon reviewing her chart for her chronic pain states that patient should only see Dr. Durant for additional controlled substances.  When I initially declined her request for additional Percocet she did ask me for Ativan and Valium which I also declined at this time.  Encouraged that she make an appointment as requested by orthopedics for her to be seen with orthospine specialist and that she feels like she absolutely needs Percocet " to make an appointment to see her primary who knows her medical history (patient emphasizing that she has had over 30 surgeries over lifetime??).   For her muscle spasms, I offered a trial of tizanidine as she felt that flexeril was not effective.   -Outside MRI report was requested to be scanned into patient's chart  - tiZANidine (ZANAFLEX) 4 MG tablet  Dispense: 21 tablet; Refill: 0      Armando Shaikh MD   [facility]    Options for treatment and/or follow-up care were reviewed with the patient. Johnna Caballero and/or legal guardian was engaged and actively involved in the decision making process. Patient/guardian verbalized understanding of the options discussed and was satisfied with the final plan.

## 2018-11-20 NOTE — PATIENT INSTRUCTIONS
Continue celecoxib as prescribed    650mg tylenol every 4-6 hours as needed for pain    Try the Zanaflex/tizanidine for muscle spasms every 8 hours (caution can cause dizziness)    Make appointment to see Spine surgeon    Make appointment to see Dr. Durant

## 2018-11-20 NOTE — TELEPHONE ENCOUNTER
Script for additional percoset printed, signed, and in station out basket or on MA/LPN/RN desk.       OK for patient to fill now.   Please let Johnna know.      Linda Durant MD  Internal Medicine - Pediatrics

## 2018-11-26 ENCOUNTER — E-VISIT (OUTPATIENT)
Dept: PEDIATRICS | Facility: CLINIC | Age: 53
End: 2018-11-26
Payer: COMMERCIAL

## 2018-11-26 ENCOUNTER — TRANSFERRED RECORDS (OUTPATIENT)
Dept: HEALTH INFORMATION MANAGEMENT | Facility: CLINIC | Age: 53
End: 2018-11-26

## 2018-11-26 ENCOUNTER — MYC MEDICAL ADVICE (OUTPATIENT)
Dept: PEDIATRICS | Facility: CLINIC | Age: 53
End: 2018-11-26

## 2018-11-26 DIAGNOSIS — M62.838 MUSCLE SPASM: Primary | ICD-10-CM

## 2018-11-26 PROCEDURE — 99444 ZZC PHYSICIAN ONLINE EVALUATION & MANAGEMENT SERVICE: CPT | Performed by: PEDIATRICS

## 2018-11-26 NOTE — MR AVS SNAPSHOT
After Visit Summary   11/26/2018    Johnna Caballero    MRN: [id number]           Patient Information     Date Of Birth          1965        Visit Information        Provider Department      11/26/2018 6:37 PM Linda Durant MD [facility]        Today's Diagnoses     Muscle spasm    -  1       Follow-ups after your visit        Your next 10 appointments already scheduled     Dec 03, 2018  1:00 PM CST   SHORT with Linda Durant MD   [facility] ([facility]    [address] [place] [address]  [address]  [facility] [address] [phone]            Dec 26, 2018 11:40 AM CST   MyChart Long with Linda Durant MD   [facility] ([facility]    [address] [place] [address]  [address]  [place] [address] [phone]              Who to contact     If you have questions or need follow up information about today's clinic visit or your schedule please contact [facility] directly at [phone].  Normal or non-critical lab and imaging results will be communicated to you by [employer], letter or phone within 4 business days after the clinic has received the results. If you do not hear from us within 7 days, please contact the clinic through [employer] or phone. If you have a critical or abnormal lab result, we will notify you by phone as soon as possible.  Submit refill requests through [employer] or call your pharmacy and they will forward the refill request to us. Please allow 3 business days for your refill to be completed.          Additional Information About Your Visit        [employer] Information     [employer] gives you secure access to your electronic health record. If you see a primary care provider, you can also send messages to your care team and make appointments. If you have questions, please call your primary care clinic.  If you do not have a primary care provider, please call [phone] and they will assist  you.        Care EveryWhere ID     This is your Care EveryWhere ID. This could be used by other organizations to access your [place] medical records  [id number]         Blood Pressure from Last 3 Encounters:   11/20/18 132/72   11/10/18 118/75   11/06/18 116/82    Weight from Last 3 Encounters:   11/20/18 264 lb 9.6 oz (120 kg)   11/09/18 276 lb (125.2 kg)   11/06/18 272 lb (123.4 kg)              Today, you had the following     No orders found for display         Today's Medication Changes          These changes are accurate as of 11/26/18 11:59 PM.  If you have any questions, ask your nurse or doctor.               Start taking these medicines.        Dose/Directions    diazepam 5 MG tablet   Commonly known as:  VALIUM   Used for:  Muscle spasm   Started by:  Linda Durant MD        Dose:  5 mg   Take 1 tablet (5 mg) by mouth every 12 hours as needed for muscle spasms   Quantity:  30 tablet   Refills:  1         These medicines have changed or have updated prescriptions.        Dose/Directions    methylPREDNISolone 4 MG tablet   Commonly known as:  MEDROL DOSEPAK   This may have changed:  additional instructions   Used for:  Cervical radiculopathy        Follow package instructions   Quantity:  21 tablet   Refills:  0            Where to get your medicines      Some of these will need a paper prescription and others can be bought over the counter.  Ask your nurse if you have questions.     Bring a paper prescription for each of these medications     diazepam 5 MG tablet                Primary Care Provider Office Phone # Fax #    Linda Durant [phone] [address] [place] [address]        Equal Access to Services     [facility] AH: Becky Joshi, waadryda luqadaha, qaybta kaalmada shola lamar. So [facility] [phone].    ATENCIÓN: Si habla español, tiene a subramanian disposición servicios gratuitos de asistencia  lingüísticaCecilia Pina al [phone].    We comply with applicable federal civil rights laws and [place] laws. We do not discriminate on the basis of race, color, national origin, age, disability, sex, sexual orientation, or gender identity.            Thank you!     Thank you for choosing [facility]  for your care. Our goal is always to provide you with excellent care. Hearing back from our patients is one way we can continue to improve our services. Please take a few minutes to complete the written survey that you may receive in the mail after your visit with us. Thank you!             Your Updated Medication List - Protect others around you: Learn how to safely use, store and throw away your medicines at www.disposemymeds.org.          This list is accurate as of 11/26/18 11:59 PM.  Always use your most recent med list.                   Brand Name Dispense Instructions for use Diagnosis    aspirin 81 MG tablet    ASA    30 tablet    Take 1 tablet (81 mg) by mouth daily        BD VALDO U/F 32G X 4 MM miscellaneous   Generic drug:  insulin pen needle     180 each    USE 2 PEN NEEDLES DAILY OR AS DIRECTED    Type 2 diabetes mellitus without complication, with long-term current use of insulin (H)       blood glucose monitoring lancets     2 Box    Use to test blood sugar 1-2 times daily or as directed.    DM (diabetes mellitus), type 2, uncontrolled (H)       blood glucose monitoring meter device kit    no brand specified    1 kit    Use to test blood sugar 1-2 times daily or as directed.    Type 2 diabetes mellitus without complication (H)       blood glucose monitoring test strip    ACCU-CHEK GABI    200 each    Use to test blood sugars 1-2x daily or as directed.        celecoxib 200 MG capsule    celeBREX    30 capsule    TAKE ONE CAPSULE BY MOUTH EVERY DAY    Chronic bilateral low back pain without sciatica       cetirizine 10 MG tablet    zyrTEC     Take 10 mg by mouth daily        diazepam 5 MG  tablet    VALIUM    30 tablet    Take 1 tablet (5 mg) by mouth every 12 hours as needed for muscle spasms    Muscle spasm       EPINEPHrine 0.3 MG/0.3ML injection 2-pack    EPIPEN/ADRENACLICK/or ANY BX GENERIC EQUIV    2 mL    Inject 0.3 mLs (0.3 mg) into the muscle once as needed for anaphylaxis    Hornet sting, accidental or unintentional, subsequent encounter       ergocalciferol 20643 units capsule    ERGOCALCIFEROL     Take 50,000 Units by mouth twice a week Take on Mondays and Thursdays        hydrocortisone 2.5 % cream      Apply topically 2 times daily as needed Do not use for more than 14 days        * insulin glargine 100 UNIT/ML pen      Inject 20 Units Subcutaneous every morning        * insulin glargine 100 UNIT/ML pen      Inject 6 Units Subcutaneous every evening        KRILL OIL PO      Take 1 tablet by mouth daily        LANsoprazole 30 MG DR capsule    PREVACID    90 capsule    TAKE ONE CAPSULE BY MOUTH EVERY DAY -TAKE 30-60 MINUTES BEFORE A MEAL    Gastroesophageal reflux disease, esophagitis presence not specified       liraglutide 18 MG/3ML solution    VICTOZA PEN    27 mL    Inject 1.8 mg Subcutaneous daily    Type 2 diabetes mellitus without complication, with long-term current use of insulin (H)       losartan-hydrochlorothiazide 100-25 MG per tablet    HYZAAR    90 tablet    TAKE ONE TABLET BY MOUTH EVERY DAY    Essential hypertension       metFORMIN 1000 MG tablet    GLUCOPHAGE    180 tablet    Take 1 tablet (1,000 mg) by mouth 2 times daily (with meals)    Type 2 diabetes mellitus without complication, with long-term current use of insulin (H)       methylPREDNISolone 4 MG tablet    MEDROL DOSEPAK    21 tablet    Follow package instructions    Cervical radiculopathy       order for DME     1 each    Equipment being ordered: TENS unit    Chronic back pain, Bilateral hand pain       * oxyCODONE-acetaminophen 5-325 MG tablet    PERCOCET    90 tablet    Take 1-2 tablets by mouth every 4 hours  as needed for pain    Bilateral hand pain       * oxyCODONE-acetaminophen 5-325 MG tablet    PERCOCET    90 tablet    Take 1-2 tablets by mouth every 4 hours as needed for severe pain    Lipoma of left thigh       STATIN NOT PRESCRIBED (INTENTIONAL)     0 each    1 each 2 times daily Statin not prescribed intentionally due to Refusal by patient    Hyperlipidemia LDL goal <100       tiZANidine 4 MG tablet    ZANAFLEX    21 tablet    Take 1 tablet (4 mg) by mouth 3 times daily as needed for muscle spasms    Spasm of muscle       * Notice:  This list has 4 medication(s) that are the same as other medications prescribed for you. Read the directions carefully, and ask your doctor or other care provider to review them with you.

## 2018-11-27 RX ORDER — DIAZEPAM 5 MG
5 TABLET ORAL EVERY 12 HOURS PRN
Qty: 30 TABLET | Refills: 1 | Status: SHIPPED | OUTPATIENT
Start: 2018-11-27 | End: 2020-06-22

## 2018-11-27 NOTE — TELEPHONE ENCOUNTER
Script printed, signed, and in station out basket or on MA/LPN/RN desk    Linda Durant MD  Internal Medicine - Pediatrics

## 2018-12-03 ENCOUNTER — TELEPHONE (OUTPATIENT)
Dept: PEDIATRICS | Facility: CLINIC | Age: 53
End: 2018-12-03

## 2018-12-03 ENCOUNTER — MYC MEDICAL ADVICE (OUTPATIENT)
Dept: PEDIATRICS | Facility: CLINIC | Age: 53
End: 2018-12-03

## 2018-12-03 ENCOUNTER — OFFICE VISIT (OUTPATIENT)
Dept: PEDIATRICS | Facility: CLINIC | Age: 53
End: 2018-12-03
Payer: COMMERCIAL

## 2018-12-03 VITALS
HEART RATE: 87 BPM | SYSTOLIC BLOOD PRESSURE: 124 MMHG | OXYGEN SATURATION: 98 % | BODY MASS INDEX: 42.38 KG/M2 | HEIGHT: 67 IN | TEMPERATURE: 97.9 F | WEIGHT: 270 LBS | DIASTOLIC BLOOD PRESSURE: 80 MMHG

## 2018-12-03 DIAGNOSIS — M54.12 CERVICAL RADICULOPATHY: Primary | ICD-10-CM

## 2018-12-03 DIAGNOSIS — G89.18 POSTOPERATIVE PAIN: ICD-10-CM

## 2018-12-03 DIAGNOSIS — T14.8XXA HEMATOMA OF SKIN: ICD-10-CM

## 2018-12-03 DIAGNOSIS — D17.24 LIPOMA OF LEFT THIGH: ICD-10-CM

## 2018-12-03 DIAGNOSIS — Z79.4 TYPE 2 DIABETES MELLITUS WITHOUT COMPLICATION, WITH LONG-TERM CURRENT USE OF INSULIN (H): ICD-10-CM

## 2018-12-03 DIAGNOSIS — E11.9 TYPE 2 DIABETES MELLITUS WITHOUT COMPLICATION, WITH LONG-TERM CURRENT USE OF INSULIN (H): ICD-10-CM

## 2018-12-03 LAB
ERYTHROCYTE [DISTWIDTH] IN BLOOD BY AUTOMATED COUNT: 12.6 % (ref 10–15)
HCT VFR BLD AUTO: 38.8 % (ref 35–47)
HGB BLD-MCNC: 12.9 G/DL (ref 11.7–15.7)
MCH RBC QN AUTO: 28.7 PG (ref 26.5–33)
MCHC RBC AUTO-ENTMCNC: 33.2 G/DL (ref 31.5–36.5)
MCV RBC AUTO: 86 FL (ref 78–100)
PLATELET # BLD AUTO: 348 10E9/L (ref 150–450)
RBC # BLD AUTO: 4.49 10E12/L (ref 3.8–5.2)
WBC # BLD AUTO: 7.9 10E9/L (ref 4–11)

## 2018-12-03 PROCEDURE — 85027 COMPLETE CBC AUTOMATED: CPT | Performed by: PEDIATRICS

## 2018-12-03 PROCEDURE — 83540 ASSAY OF IRON: CPT | Performed by: PEDIATRICS

## 2018-12-03 PROCEDURE — 36415 COLL VENOUS BLD VENIPUNCTURE: CPT | Performed by: PEDIATRICS

## 2018-12-03 PROCEDURE — 83550 IRON BINDING TEST: CPT | Performed by: PEDIATRICS

## 2018-12-03 PROCEDURE — 99214 OFFICE O/P EST MOD 30 MIN: CPT | Performed by: PEDIATRICS

## 2018-12-03 NOTE — PATIENT INSTRUCTIONS
I'll fill out forms for your medication and hope to get the additional pain medication to you tomorrow    Keep up your traction/therapies    Start checking your blood sugar again

## 2018-12-03 NOTE — PROGRESS NOTES
"  SUBJECTIVE:   Johnna Caballero is a 53 year old female who presents to clinic today for the following health issues:      Follow up:    Cervical radiculopathy - associated with severe pain - has been doing traction for 6-8 weeks.  Physical therapy, massage, and chiropractic care multiple times during the week.  Valium, as recommended by neurosurgery, didn't help.   Pain is improving slowly.  Has required additional pain medication above her baseline amount due to new pain with this new problem.  Left thumb remains partially numb, but is improving.  Left arm ROM is improving.   Plans PHYSICAL THERAPY for 6 weeks, if not improved, plan is for HOOD - following with neurosurgery in [facility] system.    Recent surgery with Dr Bautista for removal of large lipomas on left side -  has had hematoma drained a few times.    Has follow up with Dr Bautista later today    Leaving for [place] on Wednesday    Needs PA for pain medications.    Currently only taking percoset at night (on average 2 tabs) for cervical radiculopathy pain and pain associated with recent surgery to left side.    Reviewed not driving on these medications or combining temporally with benzos.      Diabetes - hasn't been checking blood sugar recently due to stress and pain around recent events.   Working on getting jayla monitor set up and will then start again.          Problem list and histories reviewed & adjusted, as indicated.  Additional history: as documented      Reviewed and updated as needed this visit by clinical staff  Tobacco  Allergies  Meds  Med Hx  Surg Hx  Fam Hx  Soc Hx      Reviewed and updated as needed this visit by Provider         ROS:  Constitutional, endo, msk, neuro, derm systems are negative, except as otherwise noted.    OBJECTIVE:     /80 (BP Location: Right arm, Patient Position: Right side, Cuff Size: Adult Large)  Pulse 87  Temp 97.9  F (36.6  C) (Tympanic)  Ht 5' 7\" (1.702 m)  Wt 270 lb (122.5 kg)  SpO2 98%  BMI " 42.29 kg/m2  Body mass index is 42.29 kg/(m^2).  GENERAL: alert and no distress - appears much more comfortable than at last visit.  MS: limited LUE ROM due to pain, left thumb with decreased sensation to light touch.  SKIN: long incision over left lateral flank and hip without associated erythema or drainage, pocket of fluid palpable below this incision.  Second incision under pressure dressing at left thorax not fully examined.  PSYCH: mentation appears normal, affect normal/bright      ASSESSMENT/PLAN:         ICD-10-CM    1. Cervical radiculopathy M54.12 Continue pain medications, PA completed to allow patient to get additional supply for this month due to postoperative pain and cervical radiculopathy   2. Hematoma of skin T14.8XXA CBC with platelets     Iron and iron binding capacity    Concern for large amount of bloody drainage after major surgery - recheck blood counts today   3. Postoperative pain G89.18 As above, additional pain medication allotment this month, anticipate back to her typical #90 per 3 months in the near future - she is very motivated to decrease her narcotic use and is currently only using at night.   Reviewed not driving on narcotics or combining with other medications.   4. Type 2 diabetes mellitus without complication, with long-term current use of insulin (H) E11.9 Patient to start Elijah monitor soon, encouraged her to do this as soon as possible    Z79.4        See Patient Instructions    Linda Durant MD  [facility]

## 2018-12-03 NOTE — MR AVS SNAPSHOT
After Visit Summary   12/3/2018    Johnna Caballero    MRN: [id number]           Patient Information     Date Of Birth          1965        Visit Information        Provider Department      12/3/2018 1:00 PM Linda Durant MD [facility]        Today's Diagnoses     Cervical radiculopathy    -  1    Hematoma of skin          Care Instructions    I'll fill out forms for your medication and hope to get the additional pain medication to you tomorrow    Keep up your traction/therapies    Start checking your blood sugar again          Follow-ups after your visit        Follow-up notes from your care team     Return in about 1 month (around 1/3/2019) for diabetes visit.      Your next 10 appointments already scheduled     Dec 26, 2018 11:40 AM CST   MyChart Long with Linda Durant MD   [facility] ([facility])    [address] [place] [address]  [address]  [facility] [id number] [phone]              Who to contact     If you have questions or need follow up information about today's clinic visit or your schedule please contact [facility] directly at [phone].  Normal or non-critical lab and imaging results will be communicated to you by MyChart, letter or phone within 4 business days after the clinic has received the results. If you do not hear from us within 7 days, please contact the clinic through [employer] or phone. If you have a critical or abnormal lab result, we will notify you by phone as soon as possible.  Submit refill requests through [email or web address] or call your pharmacy and they will forward the refill request to us. Please allow 3 business days for your refill to be completed.          Additional Information About Your Visit        MyChart Information     [email or web address] gives you secure access to your electronic health record. If you see a primary care provider, you can also send messages to your care team and make appointments. If you have  "questions, please call your primary care clinic.  If you do not have a primary care provider, please call [phone] and they will assist you.        Care EveryWhere ID     This is your Care EveryWhere ID. This could be used by other organizations to access your [place] medical records  [id number]        Your Vitals Were     Pulse Temperature Height Pulse Oximetry BMI (Body Mass Index)       87 97.9  F (36.6  C) (Tympanic) 5' 7\" (1.702 m) 98% 42.29 kg/m2        Blood Pressure from Last 3 Encounters:   12/03/18 124/80   11/20/18 132/72   11/10/18 118/75    Weight from Last 3 Encounters:   12/03/18 270 lb (122.5 kg)   11/20/18 264 lb 9.6 oz (120 kg)   11/09/18 276 lb (125.2 kg)              We Performed the Following     CBC with platelets     Iron and iron binding capacity          Today's Medication Changes          These changes are accurate as of 12/3/18  1:38 PM.  If you have any questions, ask your nurse or doctor.               These medicines have changed or have updated prescriptions.        Dose/Directions    methylPREDNISolone 4 MG tablet therapy pack   Commonly known as:  MEDROL DOSEPAK   This may have changed:  additional instructions   Used for:  Cervical radiculopathy        Follow package instructions   Quantity:  21 tablet   Refills:  0                Primary Care Provider Office Phone # Fax #    Linda Durant [phone] [address] [place] [address]        Equal Access to Services     [facility] AH: Hadii eddy Joshi, waaxda isma, qaybta kaalshola reyes. So [facility] [phone].    ATENCIÓN: Si habla español, tiene a subramanian disposición servicios gratuitos de asistencia lingüística. Llame al [phone].    We comply with applicable federal civil rights laws and [place] laws. We do not discriminate on the basis of race, color, national origin, age, disability, sex, sexual orientation, or gender " identity.            Thank you!     Thank you for choosing [facility]  for your care. Our goal is always to provide you with excellent care. Hearing back from our patients is one way we can continue to improve our services. Please take a few minutes to complete the written survey that you may receive in the mail after your visit with us. Thank you!             Your Updated Medication List - Protect others around you: Learn how to safely use, store and throw away your medicines at www.disposemymeds.org.          This list is accurate as of 12/3/18  1:38 PM.  Always use your most recent med list.                   Brand Name Dispense Instructions for use Diagnosis    aspirin 81 MG tablet    ASA    30 tablet    Take 1 tablet (81 mg) by mouth daily        BD VALDO U/F 32G X 4 MM miscellaneous   Generic drug:  insulin pen needle     180 each    USE 2 PEN NEEDLES DAILY OR AS DIRECTED    Type 2 diabetes mellitus without complication, with long-term current use of insulin (H)       blood glucose monitoring lancets     2 Box    Use to test blood sugar 1-2 times daily or as directed.    DM (diabetes mellitus), type 2, uncontrolled (H)       blood glucose monitoring meter device kit    NO BRAND SPECIFIED    1 kit    Use to test blood sugar 1-2 times daily or as directed.    Type 2 diabetes mellitus without complication (H)       blood glucose monitoring test strip    ACCU-CHEK GABI    200 each    Use to test blood sugars 1-2x daily or as directed.        celecoxib 200 MG capsule    celeBREX    30 capsule    TAKE ONE CAPSULE BY MOUTH EVERY DAY    Chronic bilateral low back pain without sciatica       cetirizine 10 MG tablet    zyrTEC     Take 10 mg by mouth daily        diazepam 5 MG tablet    VALIUM    30 tablet    Take 1 tablet (5 mg) by mouth every 12 hours as needed for muscle spasms    Muscle spasm       EPINEPHrine 0.3 MG/0.3ML injection 2-pack    EPIPEN/ADRENACLICK/or ANY BX GENERIC EQUIV    2 mL    Inject  0.3 mLs (0.3 mg) into the muscle once as needed for anaphylaxis    Hornet sting, accidental or unintentional, subsequent encounter       ergocalciferol 32064 units capsule    ERGOCALCIFEROL     Take 50,000 Units by mouth twice a week Take on Mondays and Thursdays        hydrocortisone 2.5 % cream      Apply topically 2 times daily as needed Do not use for more than 14 days        * insulin glargine 100 UNIT/ML pen      Inject 20 Units Subcutaneous every morning        * insulin glargine 100 UNIT/ML pen      Inject 6 Units Subcutaneous every evening        KRILL OIL PO      Take 1 tablet by mouth daily        LANsoprazole 30 MG DR capsule    PREVACID    90 capsule    TAKE ONE CAPSULE BY MOUTH EVERY DAY -TAKE 30-60 MINUTES BEFORE A MEAL    Gastroesophageal reflux disease, esophagitis presence not specified       liraglutide 18 MG/3ML solution    VICTOZA PEN    27 mL    Inject 1.8 mg Subcutaneous daily    Type 2 diabetes mellitus without complication, with long-term current use of insulin (H)       losartan-hydrochlorothiazide 100-25 MG tablet    HYZAAR    90 tablet    TAKE ONE TABLET BY MOUTH EVERY DAY    Essential hypertension       metFORMIN 1000 MG tablet    GLUCOPHAGE    180 tablet    Take 1 tablet (1,000 mg) by mouth 2 times daily (with meals)    Type 2 diabetes mellitus without complication, with long-term current use of insulin (H)       methylPREDNISolone 4 MG tablet therapy pack    MEDROL DOSEPAK    21 tablet    Follow package instructions    Cervical radiculopathy       order for DME     1 each    Equipment being ordered: TENS unit    Chronic back pain, Bilateral hand pain       oxyCODONE-acetaminophen 5-325 MG tablet    PERCOCET    90 tablet    Take 1-2 tablets by mouth every 4 hours as needed for severe pain    Lipoma of left thigh       STATIN NOT PRESCRIBED    INTENTIONAL    0 each    1 each 2 times daily Statin not prescribed intentionally due to Refusal by patient    Hyperlipidemia LDL goal <100        tiZANidine 4 MG tablet    ZANAFLEX    21 tablet    Take 1 tablet (4 mg) by mouth 3 times daily as needed for muscle spasms    Spasm of muscle       * Notice:  This list has 2 medication(s) that are the same as other medications prescribed for you. Read the directions carefully, and ask your doctor or other care provider to review them with you.

## 2018-12-03 NOTE — TELEPHONE ENCOUNTER
PA needed on oxyCODONE-acetaminophen (PERCOCET) 5-325 MG per tablet, paperwork completed by provider and faxed in.     Awaiting outcome, paperwork can be found in station out basket.     Alysa Young MA

## 2018-12-04 LAB
IRON SATN MFR SERPL: 11 % (ref 15–46)
IRON SERPL-MCNC: 44 UG/DL (ref 35–180)
TIBC SERPL-MCNC: 413 UG/DL (ref 240–430)

## 2018-12-05 NOTE — TELEPHONE ENCOUNTER
Does the patient need a new prescription first? If so please enter in as refill and route to provider. (PA under review for medication).     Alysa Young MA

## 2018-12-05 NOTE — TELEPHONE ENCOUNTER
PCP:    Please review. Looks like the PA is still in review per below.     Jennifer Hanley RN -- [facility]

## 2018-12-05 NOTE — TELEPHONE ENCOUNTER
Routing to MA pool. Please mail script to below pharmacy. Thank you.     Jennifer Hanley RN -- [facility]

## 2018-12-05 NOTE — TELEPHONE ENCOUNTER
Can you clarify if the needed script would just be for the additional pills that were unable to be filled at the last refill - #34, I believe (out of the 90).      Linda Durant MD  Internal Medicine - Pediatrics

## 2018-12-06 RX ORDER — OXYCODONE AND ACETAMINOPHEN 5; 325 MG/1; MG/1
1-2 TABLET ORAL EVERY 4 HOURS PRN
Qty: 34 TABLET | Refills: 0 | Status: SHIPPED | OUTPATIENT
Start: 2018-12-06 | End: 2019-04-15

## 2018-12-06 NOTE — TELEPHONE ENCOUNTER
OK to write for the additional 34 tabs (had increased pain medication needs apart from her controlled substance agreement due to surgery).    Not in clinic today - forwarded to Dr Rivera to fill.   Thanks in advance!      Linda Durant MD  Internal Medicine - Pediatrics

## 2018-12-06 NOTE — TELEPHONE ENCOUNTER
PA has been approved 11/04/18-12/04/19, paperwork sent to [facility].     Mailed prescription and sent update to patient on MC.     Alysa Young MA

## 2018-12-06 NOTE — TELEPHONE ENCOUNTER
Called [facility] ([phone]) to check on status of PA. PA has been approved 11/04/18-12/04/19. Pharmacy will have to re run prescription to update. Informed pt in refill encounter.     Alysa Young MA

## 2018-12-19 ENCOUNTER — DOCUMENTATION ONLY (OUTPATIENT)
Dept: PEDIATRICS | Facility: CLINIC | Age: 53
End: 2018-12-19

## 2018-12-19 DIAGNOSIS — E11.9 TYPE 2 DIABETES MELLITUS WITHOUT COMPLICATION, WITH LONG-TERM CURRENT USE OF INSULIN (H): Primary | ICD-10-CM

## 2018-12-19 DIAGNOSIS — Z79.4 TYPE 2 DIABETES MELLITUS WITHOUT COMPLICATION, WITH LONG-TERM CURRENT USE OF INSULIN (H): Primary | ICD-10-CM

## 2018-12-19 NOTE — PROGRESS NOTES
Patient has an up coming lab appointment on  12/26/18 . Please review and place future orders that may be needed.    Thank you,  Paty Pitts MLT

## 2018-12-24 ENCOUNTER — TRANSFERRED RECORDS (OUTPATIENT)
Dept: HEALTH INFORMATION MANAGEMENT | Facility: CLINIC | Age: 53
End: 2018-12-24

## 2018-12-26 ENCOUNTER — OFFICE VISIT (OUTPATIENT)
Dept: PEDIATRICS | Facility: CLINIC | Age: 53
End: 2018-12-26
Payer: COMMERCIAL

## 2018-12-26 VITALS
SYSTOLIC BLOOD PRESSURE: 114 MMHG | OXYGEN SATURATION: 99 % | HEIGHT: 67 IN | BODY MASS INDEX: 42.22 KG/M2 | HEART RATE: 86 BPM | WEIGHT: 269 LBS | DIASTOLIC BLOOD PRESSURE: 76 MMHG | TEMPERATURE: 98.1 F

## 2018-12-26 DIAGNOSIS — Z79.4 TYPE 2 DIABETES MELLITUS WITHOUT COMPLICATION, WITH LONG-TERM CURRENT USE OF INSULIN (H): ICD-10-CM

## 2018-12-26 DIAGNOSIS — Z79.4 TYPE 2 DIABETES MELLITUS WITH HYPERGLYCEMIA, WITH LONG-TERM CURRENT USE OF INSULIN (H): Primary | ICD-10-CM

## 2018-12-26 DIAGNOSIS — J32.0 CHRONIC MAXILLARY SINUSITIS: ICD-10-CM

## 2018-12-26 DIAGNOSIS — D64.9 ANEMIA, UNSPECIFIED TYPE: ICD-10-CM

## 2018-12-26 DIAGNOSIS — E11.65 TYPE 2 DIABETES MELLITUS WITH HYPERGLYCEMIA, WITH LONG-TERM CURRENT USE OF INSULIN (H): Primary | ICD-10-CM

## 2018-12-26 DIAGNOSIS — E11.9 TYPE 2 DIABETES MELLITUS WITHOUT COMPLICATION, WITH LONG-TERM CURRENT USE OF INSULIN (H): ICD-10-CM

## 2018-12-26 LAB
ANION GAP SERPL CALCULATED.3IONS-SCNC: 10 MMOL/L (ref 3–14)
BUN SERPL-MCNC: 10 MG/DL (ref 7–30)
CALCIUM SERPL-MCNC: 8.8 MG/DL (ref 8.5–10.1)
CHLORIDE SERPL-SCNC: 101 MMOL/L (ref 94–109)
CO2 SERPL-SCNC: 25 MMOL/L (ref 20–32)
CREAT SERPL-MCNC: 0.54 MG/DL (ref 0.52–1.04)
ERYTHROCYTE [DISTWIDTH] IN BLOOD BY AUTOMATED COUNT: 12.6 % (ref 10–15)
GFR SERPL CREATININE-BSD FRML MDRD: >90 ML/MIN/{1.73_M2}
GLUCOSE SERPL-MCNC: 177 MG/DL (ref 70–99)
HBA1C MFR BLD: 8.2 % (ref 0–5.6)
HCT VFR BLD AUTO: 39 % (ref 35–47)
HGB BLD-MCNC: 12.6 G/DL (ref 11.7–15.7)
MCH RBC QN AUTO: 28.1 PG (ref 26.5–33)
MCHC RBC AUTO-ENTMCNC: 32.3 G/DL (ref 31.5–36.5)
MCV RBC AUTO: 87 FL (ref 78–100)
PLATELET # BLD AUTO: 384 10E9/L (ref 150–450)
POTASSIUM SERPL-SCNC: 3.7 MMOL/L (ref 3.4–5.3)
RBC # BLD AUTO: 4.49 10E12/L (ref 3.8–5.2)
SODIUM SERPL-SCNC: 136 MMOL/L (ref 133–144)
WBC # BLD AUTO: 8.5 10E9/L (ref 4–11)

## 2018-12-26 PROCEDURE — 83036 HEMOGLOBIN GLYCOSYLATED A1C: CPT | Performed by: PEDIATRICS

## 2018-12-26 PROCEDURE — 80048 BASIC METABOLIC PNL TOTAL CA: CPT | Performed by: PEDIATRICS

## 2018-12-26 PROCEDURE — 82728 ASSAY OF FERRITIN: CPT | Performed by: PEDIATRICS

## 2018-12-26 PROCEDURE — 36415 COLL VENOUS BLD VENIPUNCTURE: CPT | Performed by: PEDIATRICS

## 2018-12-26 PROCEDURE — 99214 OFFICE O/P EST MOD 30 MIN: CPT | Performed by: PEDIATRICS

## 2018-12-26 PROCEDURE — 85027 COMPLETE CBC AUTOMATED: CPT | Performed by: PEDIATRICS

## 2018-12-26 RX ORDER — CEFDINIR 300 MG/1
300 CAPSULE ORAL 2 TIMES DAILY
Qty: 20 CAPSULE | Refills: 0 | Status: SHIPPED | OUTPATIENT
Start: 2018-12-26 | End: 2019-01-05

## 2018-12-26 ASSESSMENT — MIFFLIN-ST. JEOR: SCORE: 1862.81

## 2018-12-26 NOTE — PATIENT INSTRUCTIONS
[facility] [phone] - call to set up a sinus CT    Script for omnicef printed - take it if your sinus symptoms return    I'll send you labs when they are ready    Walk in [place]    Start Elijah monitor    3 month follow up

## 2018-12-26 NOTE — PROGRESS NOTES
SUBJECTIVE:   Johnna Caballero is a 53 year old female who presents to clinic today for the following health issues:      Diabetes Follow-up      Patient is checking blood sugars: not at all, pt has had recent procedures     Diabetic concerns: other - has not been checking sugars      Symptoms of hypoglycemia (low blood sugar): chills      Paresthesias (numbness or burning in feet) or sores: No     Date of last diabetic eye exam: 06/04/18    Finished antibiotics Saturday for sinus infection - still coughing    Elijah meter - used with great success in the past.  Has been overwhelmed with medical issues and appointments recently, but is feeling like she will be ready to start checking blood sugars again soon.    Diabetes Management Resources    Hyperlipidemia Follow-Up      Rate your low fat/cholesterol diet?: not much appetite lately     Taking statin?  No    Other lipid medications/supplements?:  none    Hypertension Follow-up      Outpatient blood pressures are not being checked.    Low Salt Diet: no added salt    BP Readings from Last 2 Encounters:   12/26/18 114/76   12/03/18 124/80     Hemoglobin A1C (%)   Date Value   12/26/2018 8.2 (H)   09/10/2018 7.6 (H)     LDL Cholesterol Calculated (mg/dL)   Date Value   04/06/2018 104 (H)   04/13/2017 104 (H)       Problems taking medications regularly: No    Medication side effects: none      Labs- Iron check?  Feeling weak and tired after recent surgery - has continued to have blood/fluid drained from flank incision sites.    Seroma after recent surgery, issues with bleeding.  Last had 500cc last week, will see Dr Bautista tomorrow again.      Hematology - off aspirin - looking for platelet abnormality as part of current work up due to bleeding after surgeries.    Sinus infection - treated herself for sinus infection while in [place] -completed course of augmentin.  Symptoms improved, but not completely resolved. Afebrile.  Ears still full.   Multiple sinus infections this  "year - wondering about next steps.     Problem list and histories reviewed & adjusted, as indicated.  Additional history: as documented      Reviewed and updated as needed this visit by clinical staff  Tobacco  Allergies  Meds  Med Hx  Surg Hx  Fam Hx  Soc Hx      Reviewed and updated as needed this visit by Provider         ROS:  Constitutional, HEENT, cardiovascular, pulmonary, gi and msk systems are negative, except as otherwise noted.    OBJECTIVE:     /76 (BP Location: Right arm, Patient Position: Right side, Cuff Size: Adult Large)   Pulse 86   Temp 98.1  F (36.7  C) (Tympanic)   Ht 1.702 m (5' 7\")   Wt 122 kg (269 lb)   SpO2 99%   BMI 42.13 kg/m    Body mass index is 42.13 kg/m .   Wt Readings from Last 4 Encounters:   12/26/18 122 kg (269 lb)   12/03/18 122.5 kg (270 lb)   11/20/18 120 kg (264 lb 9.6 oz)   11/09/18 125.2 kg (276 lb)       GENERAL: healthy, alert and no distress  EYES: Eyes grossly normal to inspection, PERRL and conjunctivae and sclerae normal  HENT: normal cephalic/atraumatic, both ears: clear effusion, nasal mucosa edematous , oropharynx clear, oral mucous membranes moist and sinuses: not tender  NECK: no adenopathy, no asymmetry, masses, or scars and thyroid normal to palpation  RESP: lungs clear to auscultation - no rales, rhonchi or wheezes  CV: regular rate and rhythm, normal S1 S2, no S3 or S4, no murmur, click or rub, no peripheral edema and peripheral pulses strong  SKIN: 2 well healing incisions left flank, bottom incision with large amount of fluid present, no tenderness or erythema, no leaking fluid    Diagnostic Test Results:  Results for orders placed or performed in visit on 12/26/18 (from the past 24 hour(s))   CBC with platelets   Result Value Ref Range    WBC 8.5 4.0 - 11.0 10e9/L    RBC Count 4.49 3.8 - 5.2 10e12/L    Hemoglobin 12.6 11.7 - 15.7 g/dL    Hematocrit 39.0 35.0 - 47.0 %    MCV 87 78 - 100 fl    MCH 28.1 26.5 - 33.0 pg    MCHC 32.3 31.5 - 36.5 " g/dL    RDW 12.6 10.0 - 15.0 %    Platelet Count 384 150 - 450 10e9/L     A1C; 8.2%    Remainder of labs pending    ASSESSMENT/PLAN:       ICD-10-CM    1. Type 2 diabetes mellitus with hyperglycemia, with long-term current use of insulin (H) E11.65 Continue current medications, start jayla monitor as this helps her greatly with dietary changes that impact her blood sugar.  Follow up in 3 months.  Also sees Dr Ellis.    Z79.4    2. Chronic maxillary sinusitis J32.0 CT Sinus w/o Contrast     cefdinir (OMNICEF) 300 MG capsule    Plan CT sinuses due to recurrent infections.  Should symptoms worsen again, given written script for omnicef   3. Anemia, unspecified type D64.9 CBC with platelets     Ferritin    Recheck blood counts today, large amounts of blood loss after recent surgery       See Patient Instructions    Linda Durant MD  [facility]

## 2018-12-27 LAB — FERRITIN SERPL-MCNC: 26 NG/ML (ref 8–252)

## 2019-01-03 ENCOUNTER — HOSPITAL ENCOUNTER (OUTPATIENT)
Dept: CT IMAGING | Facility: CLINIC | Age: 54
Discharge: HOME | End: 2019-01-03
Attending: PEDIATRICS | Admitting: PEDIATRICS
Payer: COMMERCIAL

## 2019-01-03 DIAGNOSIS — J32.0 CHRONIC MAXILLARY SINUSITIS: ICD-10-CM

## 2019-01-03 PROCEDURE — 70486 CT MAXILLOFACIAL W/O DYE: CPT

## 2019-01-17 ENCOUNTER — MYC MEDICAL ADVICE (OUTPATIENT)
Dept: PEDIATRICS | Facility: CLINIC | Age: 54
End: 2019-01-17

## 2019-01-17 ENCOUNTER — TELEPHONE (OUTPATIENT)
Dept: PEDIATRICS | Facility: CLINIC | Age: 54
End: 2019-01-17

## 2019-01-17 NOTE — TELEPHONE ENCOUNTER
Patient calling requesting a prescription for Lorazepam. She thinks it is for 0.5mg dose. She is requesting this because 2 of her family members  in a crash and she is wanting to have this refill to help with her loss. She is in [place] right now and will be coming home tomorrow for the . Please send this to her pharmacy [facility] in [place]. You can contact her with questions.

## 2019-01-17 NOTE — TELEPHONE ENCOUNTER
Pt sent a navabi message in requesting an rx for Lorazepam.  Informed pt to send in an e-visit, as this would be a new medication for her.    Florecita Godwin RN

## 2019-02-12 DIAGNOSIS — E11.65 TYPE 2 DIABETES MELLITUS WITH HYPERGLYCEMIA, WITH LONG-TERM CURRENT USE OF INSULIN (H): ICD-10-CM

## 2019-02-12 DIAGNOSIS — G89.29 CHRONIC BILATERAL LOW BACK PAIN WITHOUT SCIATICA: ICD-10-CM

## 2019-02-12 DIAGNOSIS — Z79.4 TYPE 2 DIABETES MELLITUS WITH HYPERGLYCEMIA, WITH LONG-TERM CURRENT USE OF INSULIN (H): ICD-10-CM

## 2019-02-12 DIAGNOSIS — M54.50 CHRONIC BILATERAL LOW BACK PAIN WITHOUT SCIATICA: ICD-10-CM

## 2019-02-13 RX ORDER — CELECOXIB 200 MG/1
CAPSULE ORAL
Qty: 90 CAPSULE | Refills: 1 | Status: SHIPPED | OUTPATIENT
Start: 2019-02-13 | End: 2019-02-28

## 2019-02-13 RX ORDER — INSULIN GLARGINE 100 [IU]/ML
INJECTION, SOLUTION SUBCUTANEOUS
Qty: 30 ML | Refills: 3 | Status: SHIPPED | OUTPATIENT
Start: 2019-02-13 | End: 2019-04-12

## 2019-02-13 NOTE — TELEPHONE ENCOUNTER
"Requested Prescriptions   Pending Prescriptions Disp Refills     insulin glargine (BASAGLAR KWIKPEN) 100 UNIT/ML pen [Pharmacy Med Name: BASAGLAR KWIKPEN 100UNIT/ML SOPN]    Last Written Prescription Date:  6/25/2018  Last Fill Quantity: 24 ml,  # refills: 3   Last office visit: 12/26/2018 with prescribing provider:  Linda Durant     Future Office Visit:     30 mL 2     Sig: INJECT 24 UNIT SUBCUTANEOUSLY EVERY MORNING AND 12 UNITS EVERY P.M.    Long Acting Insulin Protocol Passed - 2/12/2019  7:33 PM       Passed - Blood pressure less than 140/90 in past 6 months    BP Readings from Last 3 Encounters:   12/26/18 114/76   12/03/18 124/80   11/20/18 132/72                Passed - LDL on file in past 12 months    Recent Labs   Lab Test 04/06/18  0831   *            Passed - Microalbumin on file in past 12 months    Recent Labs   Lab Test 09/10/18  1030   MICROL 7   UMALCR 7.75            Passed - Serum creatinine on file in past 12 months    Recent Labs   Lab Test 12/26/18  1100  04/20/12  0739   CR 0.54   < >  --    CREAT  --   --  0.6    < > = values in this interval not displayed.            Passed - HgbA1C in past 3 or 6 months    If HgbA1C is 8 or greater, it needs to be on file within the past 3 months.  If less than 8, must be on file within the past 6 months.     Recent Labs   Lab Test 12/26/18  1100   A1C 8.2*            Passed - Medication is active on med list       Passed - Patient is age 18 or older       Passed - Recent (6 mo) or future (30 days) visit within the authorizing provider's specialty    Patient had office visit in the last 6 months or has a visit in the next 30 days with authorizing provider or within the authorizing provider's specialty.  See \"Patient Info\" tab in inbasket, or \"Choose Columns\" in Meds & Orders section of the refill encounter.              "

## 2019-02-13 NOTE — TELEPHONE ENCOUNTER
"Requested Prescriptions   Pending Prescriptions Disp Refills     celecoxib (CELEBREX) 200 MG capsule [Pharmacy Med Name: CELECOXIB CAP 200MG]    Last Written Prescription Date:  3/28/2018  Last Fill Quantity: 30,  # refills: 0   Last office visit: 12/26/2018 with prescribing provider:  Linda Durant     Future Office Visit:     90 capsule 2     Sig: TAKE 1 CAPSULE DAILY    NSAID Medications Passed - 2/12/2019  9:06 PM       Passed - Blood pressure under 140/90 in past 12 months    BP Readings from Last 3 Encounters:   12/26/18 114/76   12/03/18 124/80   11/20/18 132/72                Passed - Normal ALT on file in past 12 months    Recent Labs   Lab Test 09/10/18  0932   ALT 19            Passed - Normal AST on file in past 12 months    Recent Labs   Lab Test 09/10/18  0932   AST 14            Passed - Recent (12 mo) or future (30 days) visit within the authorizing provider's specialty    Patient had office visit in the last 12 months or has a visit in the next 30 days with authorizing provider or within the authorizing provider's specialty.  See \"Patient Info\" tab in inbasket, or \"Choose Columns\" in Meds & Orders section of the refill encounter.             Passed - Patient is age 6-64 years       Passed - Normal CBC on file in past 12 months    Recent Labs   Lab Test 12/26/18  1211   WBC 8.5   RBC 4.49   HGB 12.6   HCT 39.0                   Passed - Medication is active on med list       Passed - No active pregnancy on record       Passed - Normal serum creatinine on file in past 12 months    Recent Labs   Lab Test 12/26/18  1100  04/20/12  0739   CR 0.54   < >  --    CREAT  --   --  0.6    < > = values in this interval not displayed.            Passed - No positive pregnancy test in past 12 months          "

## 2019-02-13 NOTE — TELEPHONE ENCOUNTER
I have historically filled this medication.  New script sent.      Linda Durant MD  Internal Medicine - Pediatrics

## 2019-02-13 NOTE — TELEPHONE ENCOUNTER
Prescription approved per [facility] Refill Protocol.      Maddison Sepulveda RN

## 2019-02-13 NOTE — TELEPHONE ENCOUNTER
This is patient reported. Sig is not the same on EMR med list. Also not the same under [facility] documents in care everywhere.    Is Endocrinology ([facility]) filling this?    Maddison Sepulveda RN

## 2019-02-27 ENCOUNTER — MYC MEDICAL ADVICE (OUTPATIENT)
Dept: PEDIATRICS | Facility: CLINIC | Age: 54
End: 2019-02-27

## 2019-02-27 DIAGNOSIS — G89.29 CHRONIC BILATERAL LOW BACK PAIN WITHOUT SCIATICA: ICD-10-CM

## 2019-02-27 DIAGNOSIS — M54.50 CHRONIC BILATERAL LOW BACK PAIN WITHOUT SCIATICA: ICD-10-CM

## 2019-02-28 RX ORDER — CELECOXIB 200 MG/1
200 CAPSULE ORAL DAILY
Qty: 90 CAPSULE | Refills: 3 | Status: SHIPPED | OUTPATIENT
Start: 2019-02-28 | End: 2019-08-29

## 2019-02-28 NOTE — TELEPHONE ENCOUNTER
Forwarded chart to Dr Rivera to have her sign for patient.      Linda Durant MD  Internal Medicine - Pediatrics

## 2019-02-28 NOTE — TELEPHONE ENCOUNTER
Call to [facility] to get more information. Spoke with pharmacy tech who states patient's insurance coverage is through [employer] The medication itself is covered however Dr. Durant is excluded as a covering provider. They were unable to give me any more information other than having patient call her insurance, as directed below, or to have another provider send prescription. It has nothing to do with KARO or NPI #'s.     Melissa Montano RN

## 2019-02-28 NOTE — TELEPHONE ENCOUNTER
Are you able to get any more information from [facility] about why they won't allow my celebrex script to go through?  I should be approved to prescribe this medication - this doesn't make any sense.    Thanks,    Linda Durant MD  Internal Medicine - Pediatrics

## 2019-02-28 NOTE — TELEPHONE ENCOUNTER
MIKEY   Spoke with [facility] and pharmacy rep and verified NPI and KARO# for Dr. Durant. The rep informed me that Dr. Durant is unable to fill this prescription p/ the insurance company. The patient will need to contact the insurance company to get additional information.

## 2019-03-05 ENCOUNTER — TRANSFERRED RECORDS (OUTPATIENT)
Dept: HEALTH INFORMATION MANAGEMENT | Facility: CLINIC | Age: 54
End: 2019-03-05

## 2019-04-12 ENCOUNTER — TELEPHONE (OUTPATIENT)
Dept: PEDIATRICS | Facility: CLINIC | Age: 54
End: 2019-04-12

## 2019-04-12 DIAGNOSIS — E11.9 TYPE 2 DIABETES MELLITUS WITHOUT COMPLICATION (H): Primary | ICD-10-CM

## 2019-04-12 NOTE — TELEPHONE ENCOUNTER
Received fax from [facility] stating Basaglar no longer covered. Lantus is now covered by insurance.     Sent Lantus per standing order.

## 2019-04-15 ENCOUNTER — TRANSFERRED RECORDS (OUTPATIENT)
Dept: HEALTH INFORMATION MANAGEMENT | Facility: CLINIC | Age: 54
End: 2019-04-15

## 2019-04-15 ENCOUNTER — OFFICE VISIT (OUTPATIENT)
Dept: PEDIATRICS | Facility: CLINIC | Age: 54
End: 2019-04-15
Payer: COMMERCIAL

## 2019-04-15 VITALS
DIASTOLIC BLOOD PRESSURE: 72 MMHG | BODY MASS INDEX: 44.64 KG/M2 | RESPIRATION RATE: 24 BRPM | HEART RATE: 88 BPM | WEIGHT: 285 LBS | TEMPERATURE: 97.9 F | SYSTOLIC BLOOD PRESSURE: 108 MMHG

## 2019-04-15 DIAGNOSIS — R20.0 NUMBNESS OF RIGHT FOOT: ICD-10-CM

## 2019-04-15 DIAGNOSIS — Z79.4 TYPE 2 DIABETES MELLITUS WITHOUT COMPLICATION, WITH LONG-TERM CURRENT USE OF INSULIN (H): ICD-10-CM

## 2019-04-15 DIAGNOSIS — E11.9 TYPE 2 DIABETES MELLITUS WITHOUT COMPLICATION, WITH LONG-TERM CURRENT USE OF INSULIN (H): ICD-10-CM

## 2019-04-15 DIAGNOSIS — E66.01 MORBID OBESITY WITH BMI OF 40.0-44.9, ADULT (H): ICD-10-CM

## 2019-04-15 DIAGNOSIS — E11.65 TYPE 2 DIABETES MELLITUS WITH HYPERGLYCEMIA, WITH LONG-TERM CURRENT USE OF INSULIN (H): Primary | ICD-10-CM

## 2019-04-15 DIAGNOSIS — D17.24 LIPOMA OF LEFT THIGH: ICD-10-CM

## 2019-04-15 DIAGNOSIS — I10 ESSENTIAL HYPERTENSION: ICD-10-CM

## 2019-04-15 DIAGNOSIS — M79.671 RIGHT FOOT PAIN: ICD-10-CM

## 2019-04-15 DIAGNOSIS — G89.4 CHRONIC PAIN SYNDROME: ICD-10-CM

## 2019-04-15 DIAGNOSIS — M19.91 PRIMARY OSTEOARTHRITIS, UNSPECIFIED SITE: ICD-10-CM

## 2019-04-15 DIAGNOSIS — Z79.4 TYPE 2 DIABETES MELLITUS WITH HYPERGLYCEMIA, WITH LONG-TERM CURRENT USE OF INSULIN (H): Primary | ICD-10-CM

## 2019-04-15 LAB
ALBUMIN SERPL-MCNC: 3.3 G/DL (ref 3.4–5)
ALP SERPL-CCNC: 86 U/L (ref 40–150)
ALT SERPL W P-5'-P-CCNC: 25 U/L (ref 0–50)
ANION GAP SERPL CALCULATED.3IONS-SCNC: 7 MMOL/L (ref 3–14)
AST SERPL W P-5'-P-CCNC: 18 U/L (ref 0–45)
BILIRUB SERPL-MCNC: 0.4 MG/DL (ref 0.2–1.3)
BUN SERPL-MCNC: 13 MG/DL (ref 7–30)
CALCIUM SERPL-MCNC: 9.1 MG/DL (ref 8.5–10.1)
CHLORIDE SERPL-SCNC: 106 MMOL/L (ref 94–109)
CHOLEST SERPL-MCNC: 190 MG/DL
CO2 SERPL-SCNC: 25 MMOL/L (ref 20–32)
CREAT SERPL-MCNC: 0.65 MG/DL (ref 0.52–1.04)
GFR SERPL CREATININE-BSD FRML MDRD: >90 ML/MIN/{1.73_M2}
GLUCOSE SERPL-MCNC: 165 MG/DL (ref 70–99)
HBA1C MFR BLD: 8.7 % (ref 0–5.6)
HDLC SERPL-MCNC: 41 MG/DL
LDLC SERPL CALC-MCNC: 112 MG/DL
NONHDLC SERPL-MCNC: 149 MG/DL
POTASSIUM SERPL-SCNC: 4.1 MMOL/L (ref 3.4–5.3)
PROT SERPL-MCNC: 7.1 G/DL (ref 6.8–8.8)
SODIUM SERPL-SCNC: 138 MMOL/L (ref 133–144)
TRIGL SERPL-MCNC: 187 MG/DL

## 2019-04-15 PROCEDURE — 80061 LIPID PANEL: CPT | Performed by: PEDIATRICS

## 2019-04-15 PROCEDURE — 99214 OFFICE O/P EST MOD 30 MIN: CPT | Performed by: PEDIATRICS

## 2019-04-15 PROCEDURE — 36415 COLL VENOUS BLD VENIPUNCTURE: CPT | Performed by: PEDIATRICS

## 2019-04-15 PROCEDURE — 80053 COMPREHEN METABOLIC PANEL: CPT | Performed by: PEDIATRICS

## 2019-04-15 PROCEDURE — 83036 HEMOGLOBIN GLYCOSYLATED A1C: CPT | Performed by: PEDIATRICS

## 2019-04-15 RX ORDER — LIRAGLUTIDE 6 MG/ML
1.8 INJECTION SUBCUTANEOUS DAILY
Qty: 27 ML | Refills: 3 | Status: SHIPPED | OUTPATIENT
Start: 2019-04-15 | End: 2020-03-02

## 2019-04-15 RX ORDER — OXYCODONE AND ACETAMINOPHEN 5; 325 MG/1; MG/1
1-2 TABLET ORAL EVERY 4 HOURS PRN
Qty: 90 TABLET | Refills: 0 | Status: SHIPPED | OUTPATIENT
Start: 2019-04-15 | End: 2019-08-01

## 2019-04-15 ASSESSMENT — PATIENT HEALTH QUESTIONNAIRE - PHQ9: SUM OF ALL RESPONSES TO PHQ QUESTIONS 1-9: 0

## 2019-04-15 NOTE — PROGRESS NOTES
SUBJECTIVE:   Johnna Caballero is a 53 year old female who presents to clinic today for the following   health issues:      Diabetes Follow-up      Patient is checking blood sugars: not at all    Diabetic concerns: None and other - Mouth sores     Symptoms of hypoglycemia (low blood sugar): none     Paresthesias (numbness or burning in feet) or sores: No     Date of last diabetic eye exam: 6/2018      Patient presents for diabetes follow-up.  She has had a very traumatic last few months, with multiple family members passing away, many of them unexpectedly.  She is not  had as much time to focus on her blood sugars.  She does have the 10-day continuous blood glucose monitoring button on and is found this very helpful with her dietary choices.  She is maintained stable dosing of the remainder of her diabetes medications and has not had any serious lows.        Diabetes Management Resources    Hyperlipidemia Follow-Up      Rate your low fat/cholesterol diet?: good    Taking statin?  No    Other lipid medications/supplements?:   Fish oil    Hypertension Follow-up      Outpatient blood pressures are not being checked.    Low Salt Diet: no added salt    Blood pressures have been within the normal range, and she had no new cardiac symptoms.    BP Readings from Last 2 Encounters:   12/26/18 114/76   12/03/18 124/80     Hemoglobin A1C (%)   Date Value   12/26/2018 8.2 (H)   09/10/2018 7.6 (H)     LDL Cholesterol Calculated (mg/dL)   Date Value   04/06/2018 104 (H)   04/13/2017 104 (H)       Amount of exercise or physical activity: None    Problems taking medications regularly: No    Medication side effects: none    Diet: regular (no restrictions) and carbohydrate counting          Chronic pain related to multiple orthopedic problems.  Patient has had dozens of surgeries.  Most recently, she has had very large lipomatous tumors removed from her thorax and hip area.  The surgeries were complicated by hematoma formations.  She  has a controlled substance agreement to receive #90 tabs of 5/325 Percocets per month.  Most recently, she has been using less than this.  She has noticed significant pain relief with trials of CBD oil.  Patient denies any side effects related to Percocet and takes it responsibly.  She has not asked for early refills.    Her right foot has been increasingly problematic.  She has had 4 surgeries on this foot and is now having issues with increased numbness and pain.  She is interested in a second opinion regarding any other possible treatment options.          Additional history: as documented    Reviewed  and updated as needed this visit by clinical staff  Allergies         Reviewed and updated as needed this visit by Provider           ROS:  Constitutional, cardiovascular, pulmonary, gi and msk systems are negative, except as otherwise noted.    OBJECTIVE:     /72   Pulse 88   Temp 97.9  F (36.6  C) (Oral)   Resp 24   Wt 129.3 kg (285 lb)   BMI 44.64 kg/m    Body mass index is 44.64 kg/m .  GENERAL: alert and no distress  RESP: lungs clear to auscultation - no rales, rhonchi or wheezes  CV: regular rates and rhythm, normal S1 S2, no S3 or S4, no murmur, click or rub and peripheral pulses strong  MS: right foot with trace edema, multiple well healed surgical scars    Diagnostic Test Results:  Results for orders placed or performed in visit on 04/15/19 (from the past 24 hour(s))   Lipid panel reflex to direct LDL Fasting   Result Value Ref Range    Cholesterol 190 <200 mg/dL    Triglycerides 187 (H) <150 mg/dL    HDL Cholesterol 41 (L) >49 mg/dL    LDL Cholesterol Calculated 112 (H) <100 mg/dL    Non HDL Cholesterol 149 (H) <130 mg/dL   **Comprehensive metabolic panel FUTURE anytime   Result Value Ref Range    Sodium 138 133 - 144 mmol/L    Potassium 4.1 3.4 - 5.3 mmol/L    Chloride 106 94 - 109 mmol/L    Carbon Dioxide 25 20 - 32 mmol/L    Anion Gap 7 3 - 14 mmol/L    Glucose 165 (H) 70 - 99 mg/dL     Urea Nitrogen 13 7 - 30 mg/dL    Creatinine 0.65 0.52 - 1.04 mg/dL    GFR Estimate >90 >60 mL/min/[1.73_m2]    GFR Estimate If Black >90 >60 mL/min/[1.73_m2]    Calcium 9.1 8.5 - 10.1 mg/dL    Bilirubin Total 0.4 0.2 - 1.3 mg/dL    Albumin 3.3 (L) 3.4 - 5.0 g/dL    Protein Total 7.1 6.8 - 8.8 g/dL    Alkaline Phosphatase 86 40 - 150 U/L    ALT 25 0 - 50 U/L    AST 18 0 - 45 U/L   **A1C FUTURE anytime   Result Value Ref Range    Hemoglobin A1C 8.7 (H) 0 - 5.6 %       ASSESSMENT/PLAN:         ICD-10-CM    1. Type 2 diabetes mellitus with hyperglycemia, with long-term current use of insulin (H) E11.65 **A1C FUTURE anytime    Z79.4 **Comprehensive metabolic panel FUTURE anytime     Lipid panel reflex to direct LDL Fasting     metFORMIN (GLUCOPHAGE) 1000 MG tablet     liraglutide (VICTOZA PEN) 18 MG/3ML solution     insulin glargine (LANTUS SOLOSTAR PEN) 100 UNIT/ML pen    Worsening control over the last few months due to significantly increased stressors.  Patient now focused on her continuous blood glucose monitor, adjusting diet, and being faithful with medications.  She is also been followed by [facility]. Ellis in [facility], who has recently left his practice.  If he struggled to get her blood sugars under control, will seek out a new endocrinologist to assist with her care.  Follow-up with me in 3 months.   2. Chronic pain syndrome G89.4 oxyCODONE-acetaminophen (PERCOCET) 5-325 MG tablet    New controlled substance agreement signed today.  Patient aware of risks benefits associated with chronic opioid use.  She is actively working to minimize her use through other pain control options.   3. Numbness of right foot R20.0 PODIATRY/FOOT & ANKLE SURGERY REFERRAL    Plan second opinion with a new podiatrist to evaluate her complicated right foot situation.   4. Right foot pain M79.671 PODIATRY/FOOT & ANKLE SURGERY REFERRAL   5. Lipoma of left thigh D17.24 oxyCODONE-acetaminophen (PERCOCET) 5-325 MG  tablet    Following closely with plastic surgery.  Will need another reconstructive surgery later this year.   6. Morbid obesity with BMI of 40.0-44.9, adult (H) E66.01 **A1C FUTURE anytime    Z68.41 **Comprehensive metabolic panel FUTURE anytime     Lipid panel reflex to direct LDL Fasting   7. Essential hypertension I10  well-controlled continue current medications.   8. Primary osteoarthritis, unspecified site M19.91 oxyCODONE-acetaminophen (PERCOCET) 5-325 MG tablet       See Patient Instructions    Linda Durant MD  [facility]

## 2019-04-15 NOTE — LETTER
[facility] (formerly [facility])  04/15/19    Patient: Johnna Caballero  YOB: 1965  Medical Record Number: [id number]                                                                  Opioid / Opioid Plus Controlled Substance Agreement  Percoset 5/325 #90 per month    I understand that my care provider has prescribed an opioid (narcotic) controlled substance to help manage my condition(s). I am taking this medicine to help me function or work. I know this is strong medicine, and that it can cause serious side effects. Opioid medicine can be sedating, addicting and may cause a dependency on the drug. They can affect my ability to drive or think, and cause depression. They need to be taken exactly as prescribed. Combining opioids with certain medicines or chemicals (such as cocaine, sedatives and tranquilizers, sleeping pills, meth) can be dangerous or even fatal. Also, if I stop opioids suddenly, I may have severe withdrawal symptoms. Last, I understand that opioids do not work for all types of pain nor for all patients. If not helpful, I may be asked to stop them.      The risks, benefits, and side effects of these medicine(s) were explained to me. I agree that:    1. I will take part in other treatments as advised by my care team. This may be psychiatry or counseling, physical therapy, behavioral therapy, group treatment or a referral to a pain clinic. I will reduce or stop my medicine when my care team tells me to do so.  2. I will take my medicines as prescribed. I will not change the dose or schedule unless my care team tells me to. There will be no refills if I  run out early.   I may be contactedwithout warning and asked to complete a urine drug test or pill count at any time.   3. I will keep all my appointments, and understand this is part of the monitoring of opioids. My care team may require an office visit for EVERY opioid/controlled substance refill. If I miss appointments or don t follow instructions,  my care team may stop my medicine.  4. I will not ask other providers to prescribe controlled substances, and I will not accept controlled substances from other people. If I need another prescribed controlled substance for a new reason, I will tell my care team within 1 business day.  5. I will use one pharmacy to fill all of my controlled substance prescriptions, and it is up to me to make sure that I do not run out of my medicines on weekends or holidays. If my care team is willing to refill my opioid prescription without a visit, I must request refills only during office hours, refills may take up to 3 days to process, and it may take up to 5 to 7 days for my medicine to be mailed and ready at my pharmacy. Prescriptions will not be mailed anywhere except my pharmacy.        [id number]  Rev 12/18         Registration to scan to EHR                             Page 1 of 2               Controlled Substance Agreement Opioid        [facility]  04/15/19  Patient: Johnna Caballero  YOB: 1965  Medical Record Number: [id number]                                                                  6. I am responsible for my prescriptions. If the medicine/prescription is lost or stolen, it will not be replaced. I also agree not to share controlled substance medicines with anyone.  7. I agree to not use ANY illegal or recreational drugs. This includes marijuana, cocaine, bath salts or other drugs. I agree not to use alcohol unless my care team says I may.          I agree to give urine samples whenever asked. If I don t give a urine sample, the care team may stop my medicine.    8. If I enroll in the [place] Medical Marijuana program, I will tell my care team. I will also sign an agreement to share my medical records with my care team.   9. I will bring in my list of medicines (or my medicine bottles) each time I come to the clinic.   10. I will tell my care team right away if I become pregnant or have a new  medical problem treated outside of my regular clinic.  11. I understand that this medicine can affect my thinking and judgment. It may be unsafe for me to drive, use machinery and do dangerous tasks. I will not do any of these things until I know how the medicine affects me. If my dose changes, I will wait to see how it affects me. I will contact my care team if I have concerns about medicine side effects.    I understand that if I do not follow any of the conditions above, my prescriptions or treatment may be stopped.      I agree that my provider, clinic care team, and pharmacy may work with any city, state or federal law enforcement agency that investigates the misuse, sale, or other diversion of my controlled medicine. I will allow my provider to discuss my care with or share a copy of this agreement with any other treating provider, pharmacy or emergency room where I receive care. I agree to give up (waive) any right of privacy or confidentiality with respect to these consents.     I have read this agreement and have asked questions about anything I did not understand.      ________________________________________________________________________  Patient signature - Date/Time -  Johnna Caballero                                      ________________________________________________________________________  Witness signature                                                            ________________________________________________________________________  Provider signature - Linda Durant MD      [id number]  Rev 12/18         Registration to scan to EHR                         Page 2 of 2                   Controlled Substance Agreement Opioid           Page 1 of 2  Opioid Pain Medicines (also known as Narcotics)  What You Need to Know    What are opioids?   Opioids are pain medicines that must be prescribed by a doctor.  They are also known as narcotics.    Examples are:     morphine (MS Contin,  Philomena)    oxycodone (Oxycontin)    oxycodone and acetaminophen (Percocet)    hydrocodone and acetaminophen (Vicodin, Norco)     fentanyl patch (Duragesic)     hydromorphone (Dilaudid)     methadone     What do opioids do well?   Opioids are best for short-term pain after a surgery or injury. They also work well for cancer pain. Unlike other pain medicines, they do not cause liver or kidney failure or ulcers. They may help some people with long-lasting (chronic) pain.     What do opioids NOT do well?   Opioids never get rid of pain entirely, and they do not work well for most patients with chronic pain. Opioids do not reduce swelling, one of the causes of pain. They also don t work well for nerve pain.                           For informational purposes only.  Not to replace the advice of your care provider.  Copyright [facility]. All right reserved. [employer] 623236-[id number] 02/18.      Page 2 of 2    Risks and side effects   Talk to your doctor before you start or decide to keep taking one of these medicines. Side effects include:    Lowering your breathing rate enough to cause death    Overdose, including death, especially if taking higher than prescribed doses    Long-term opioid use    Worse depression symptoms; less pleasure in things you usually enjoy    Feeling tired or sluggish    Slower thoughts or cloudy thinking    Being more sensitive to pain over time; pain is harder to control    Trouble sleeping or restless sleep    Changes in hormone levels (for example, less testosterone)    Changes in sex drive or ability to have sex    Constipation    Unsafe driving    Itching and sweating    Feeling dizzy    Nausea, vomiting and dry mouth    What else should I know about opioids?  When someone takes opioids for too long or too often, they become dependent. This means that if you stop or reduce the medicine too quickly, you will have withdrawal symptoms.    Dependence is not the same as addiction.  Addiction is when people keep using a substance that harms their body, their mind or their relations with others. If you have a history of drug or alcohol abuse, taking opioids can cause a relapse.    Over time, opioids don t work as well. Most people will need higher and higher doses. The higher the dose, the more serious the side effects. We don t know the long-term effects of opioids.      Prescribed opioids aren't the best way to manage chronic pain    Other ways to manage pain include:      Ibuprofen or acetaminophen.  You should always try this first.      Treat health problems that may be causing pain.      acupuncture or massage, deep breathing, meditation, visual imagery, aromatherapy.      Use heat or ice at the pain site      Physical therapy and exercise      Stop smoking      See a counselor or therapist                                                  People who have used opioids for a long time may have a lower quality of life, worse depression, higher levels of pain and more visits to doctors.    Never share your opioids with others. Be sure to store opioids in a secure place, locked if possible.Young children can easily swallow them and overdose.     You can overdose on opioids.  Signs of overdose include decrease or loss of consciousness, slowed breathing, trouble waking and blue lips.  If someone is worried about overdose, they should call 911.    If you are at risk for overdose, you may get naloxone (Narcan, a medicine that reverses the effects of opioids.  If you overdose, a friend or family member can give you Narcan while waiting for the ambulance.  They need to know the signs of overdose and how to give Narcan.    While you're taking opioids:    Don't use alcohol or street drugs. Taking them together can cause death.    Don't take any of these medicines unless your doctor says its okay.  Taking these with opioids can cause death.    Benzodiazepines (such as lorazepam         or  diazepam)    Muscle relaxers (such as cyclobenzaprine)    sleeping pills    other opioids    Safe disposal of opioids  Find your area drug take-back program, your pharmacy mail-back program, buy a special disposal bag (such as Deterra) from your pharmacy or flush them down the toilet.  Use the guidelines at:  www.fda.gov/drugs/resourcesforyou

## 2019-04-16 ASSESSMENT — ASTHMA QUESTIONNAIRES: ACT_TOTALSCORE: 25

## 2019-04-23 ENCOUNTER — OFFICE VISIT (OUTPATIENT)
Dept: PODIATRY | Facility: CLINIC | Age: 54
End: 2019-04-23
Payer: COMMERCIAL

## 2019-04-23 ENCOUNTER — ANCILLARY PROCEDURE (OUTPATIENT)
Dept: GENERAL RADIOLOGY | Facility: CLINIC | Age: 54
End: 2019-04-23
Attending: PODIATRIST
Payer: COMMERCIAL

## 2019-04-23 VITALS
HEIGHT: 67 IN | DIASTOLIC BLOOD PRESSURE: 76 MMHG | SYSTOLIC BLOOD PRESSURE: 116 MMHG | BODY MASS INDEX: 44.73 KG/M2 | WEIGHT: 285 LBS

## 2019-04-23 DIAGNOSIS — M21.41 PES PLANUS OF BOTH FEET: ICD-10-CM

## 2019-04-23 DIAGNOSIS — E11.9 TYPE 2 DIABETES MELLITUS WITHOUT COMPLICATION, WITH LONG-TERM CURRENT USE OF INSULIN (H): ICD-10-CM

## 2019-04-23 DIAGNOSIS — M79.671 RIGHT FOOT PAIN: Primary | ICD-10-CM

## 2019-04-23 DIAGNOSIS — M21.42 PES PLANUS OF BOTH FEET: ICD-10-CM

## 2019-04-23 DIAGNOSIS — M62.461 GASTROCNEMIUS EQUINUS, RIGHT: ICD-10-CM

## 2019-04-23 DIAGNOSIS — M79.671 RIGHT FOOT PAIN: ICD-10-CM

## 2019-04-23 DIAGNOSIS — G57.61 MORTON'S NEUROMA, RIGHT: ICD-10-CM

## 2019-04-23 DIAGNOSIS — Z79.4 TYPE 2 DIABETES MELLITUS WITHOUT COMPLICATION, WITH LONG-TERM CURRENT USE OF INSULIN (H): ICD-10-CM

## 2019-04-23 DIAGNOSIS — G89.4 CHRONIC PAIN SYNDROME: ICD-10-CM

## 2019-04-23 DIAGNOSIS — M20.41 HAMMER TOE OF RIGHT FOOT: ICD-10-CM

## 2019-04-23 DIAGNOSIS — M77.51 CAPSULITIS OF METATARSOPHALANGEAL (MTP) JOINT OF RIGHT FOOT: ICD-10-CM

## 2019-04-23 PROCEDURE — 73630 X-RAY EXAM OF FOOT: CPT | Mod: RT

## 2019-04-23 PROCEDURE — 99203 OFFICE O/P NEW LOW 30 MIN: CPT | Mod: 25 | Performed by: PODIATRIST

## 2019-04-23 PROCEDURE — 64455 NJX AA&/STRD PLTR COM DG NRV: CPT | Mod: LT | Performed by: PODIATRIST

## 2019-04-23 ASSESSMENT — MIFFLIN-ST. JEOR: SCORE: 1935.38

## 2019-04-23 NOTE — PROGRESS NOTES
PATIENT HISTORY:  Dr. Durant requested I see this patient for their foot issue.  Johnna Caballero is a 53 year old female who presents to clinic for pain to the right foot. Notes she has had multiple surgeries to the foot. Pain for last 4 months. Has had neuroma's removed 2x. Pain is 7/10. Worse with walking and at night. Notes numbness to feet daily. Has had cortisone injections without relief. Pain is throbbing and shooting. Would like to know what is causing pain and what can be done for it. She normally wears Birkenstocks which seem to help the most. Does walk around without shoes a lot at home.     Review of Systems:  Patient denies fever, chills, rash, wound, weakness, heart burn, blood in stool, chest pain with activity, calf pain when walking, shortness of breath with activity, chronic cough, easy bleeding/bruising, excessive thirst, fatigue, depression, anxiety.  Patient admits to limping, stiffness, swelling, numbness.     PAST MEDICAL HISTORY:   Past Medical History:   Diagnosis Date     Actinic keratosis      Allergic rhinitis, cause unspecified      Anemia      Arthritis      Asthma     no meds x2 yrs     chronic back pain      Chronic infection     MRSA-nasal     Chronic pain     back     Esophageal reflux      fibrocystic breast changes      Gastro-oesophageal reflux disease      Heart murmur     mitral insuffiency     Hx of Fen-Phen use      Hypertension      migraines      Mild intermittent asthma     rare, with dog or exercise     Moderate major depression (H) 3/28/2012     MRSA (methicillin resistant Staphylococcus aureus) 6/27/2014    Peg     Need for desensitization to allergens      Obesity, unspecified      Other and unspecified hyperlipidemia      Temporomandibular joint disorders, unspecified      Thrombosis of leg     incisional area right hip     Type II or unspecified type diabetes mellitus without mention of complication, not stated as uncontrolled         PAST SURGICAL HISTORY:   Past  Surgical History:   Procedure Laterality Date     ARTHROPLASTY KNEE  7/15/2014    Procedure: ARTHROPLASTY KNEE;  Surgeon: Chapin Paul MD;  Location: [facility] OR     BACK SURGERY       C NONSPECIFIC PROCEDURE      laparoscopy x6     C NONSPECIFIC PROCEDURE  93    laparotomy x2 ovarian cyst     C NONSPECIFIC PROCEDURE  0293    oophorectomy lt side - cyst     C NONSPECIFIC PROCEDURE  0395    laminectomy L4-5. S1 herniated disc     C NONSPECIFIC PROCEDURE  96    cholecystectomy     C NONSPECIFIC PROCEDURE      ganglion cysts l wrist, rt palm     C NONSPECIFIC PROCEDURE      cyst removal back x2     C NONSPECIFIC PROCEDURE  10/02    rt thumb fusion, ganglion cyst removal     C NONSPECIFIC PROCEDURE  1/08    remove heel spur, excise exostosis     CHOLECYSTECTOMY       COLONOSCOPY N/A 4/26/2017    Procedure: COLONOSCOPY;  COLONOSCOPY;  Surgeon: Chapin Leal MD;  Location:  GI     EXCISE LESION LOWER EXTREMITY  11/20/2012    Procedure: EXCISE LESION LOWER EXTREMITY;  EXCISION LIPOMA RIGHT HIP ;  Surgeon: Jazmin Bautista MD;  Location:  [facility]     EXCISE LESION LOWER EXTREMITY  11/23/2012    Procedure: EXCISE LESION LOWER EXTREMITY;  EXCISE LESION LOWER EXTREMITY  EVACUATE RIGHT HIP HEMATOMA;  Surgeon: Jazmin Bautista MD;  Location:  OR     EXCISE MASS HIP Left 11/9/2018    Procedure: 1.  Excision left chest wall mass with excised diameter of greater than 4 cm 2.  Excision left hip mass with excised diameter of more than 20 x 20 cm  ;  Surgeon: Jazmin Bautista MD;  Location:  OR     EXCISE MASS TRUNK Left 11/9/2018    Procedure: EXCISE MASS TRUNK;  Surgeon: Jazmin Bautista MD;  Location: [facility] OR     GYN SURGERY       HC EXPLORATION ANKLE JOINT  1/2006     HC PART EXCIS PLANTAR FASCIA  12/2006     IRRIGATION AND DEBRIDEMENT HIP, COMBINED  12/15/2012    Procedure: COMBINED IRRIGATION AND DEBRIDEMENT HIP;   evacuation hematoma, scar revision right hip;  Surgeon: Jazmin Bautista  MD;  Location: [facility] OR     wisdom teeth[          MEDICATIONS:   Current Outpatient Medications:      aspirin 81 MG tablet, Take 1 tablet (81 mg) by mouth daily, Disp: 30 tablet, Rfl:      BD VALDO U/F 32G X 4 MM insulin pen needle, USE 2 PEN NEEDLES DAILY OR AS DIRECTED, Disp: 180 each, Rfl: 3     blood glucose monitoring (ACCU-CHEK GABI) test strip, Use to test blood sugars 1-2x daily or as directed., Disp: 200 each, Rfl: 11     blood glucose monitoring (ACCU-CHEK MULTICLIX) lancets, Use to test blood sugar 1-2 times daily or as directed., Disp: 2 Box, Rfl: 3     blood glucose monitoring (NO BRAND SPECIFIED) meter device kit, Use to test blood sugar 1-2 times daily or as directed., Disp: 1 kit, Rfl: 0     celecoxib (CELEBREX) 200 MG capsule, Take 1 capsule (200 mg) by mouth daily, Disp: 90 capsule, Rfl: 3     cetirizine (ZYRTEC) 10 MG tablet, Take 10 mg by mouth daily, Disp: , Rfl:      diazepam (VALIUM) 5 MG tablet, Take 1 tablet (5 mg) by mouth every 12 hours as needed for muscle spasms, Disp: 30 tablet, Rfl: 1     EPINEPHrine (EPIPEN/ADRENACLICK/OR ANY BX GENERIC EQUIV) 0.3 MG/0.3ML injection 2-pack, Inject 0.3 mLs (0.3 mg) into the muscle once as needed for anaphylaxis, Disp: 2 mL, Rfl: 0     ergocalciferol (ERGOCALCIFEROL) 30676 units capsule, Take 50,000 Units by mouth twice a week Take on Mondays and Thursdays, Disp: , Rfl:      hydrocortisone 2.5 % cream, Apply topically 2 times daily as needed Do not use for more than 14 days, Disp: , Rfl:      insulin glargine (LANTUS SOLOSTAR PEN) 100 UNIT/ML pen, Inject 24 Units Subcutaneous 2 times daily, Disp: 45 mL, Rfl: 3     KRILL OIL PO, Take 1 tablet by mouth daily , Disp: , Rfl:      LANsoprazole (PREVACID) 30 MG CR capsule, TAKE ONE CAPSULE BY MOUTH EVERY DAY -TAKE 30-60 MINUTES BEFORE A MEAL, Disp: 90 capsule, Rfl: 1     liraglutide (VICTOZA PEN) 18 MG/3ML solution, Inject 1.8 mg Subcutaneous daily, Disp: 27 mL, Rfl: 3     losartan-hydrochlorothiazide (HYZAAR)  100-25 MG per tablet, TAKE ONE TABLET BY MOUTH EVERY DAY, Disp: 90 tablet, Rfl: 3     metFORMIN (GLUCOPHAGE) 1000 MG tablet, Take 1 tablet (1,000 mg) by mouth 2 times daily (with meals), Disp: 180 tablet, Rfl: 3     ORDER FOR DME, Equipment being ordered: TENS unit, Disp: 1 each, Rfl: 0     oxyCODONE-acetaminophen (PERCOCET) 5-325 MG tablet, Take 1-2 tablets by mouth every 4 hours as needed for severe pain, Disp: 90 tablet, Rfl: 0     STATIN NOT PRESCRIBED, INTENTIONAL,, 1 each 2 times daily Statin not prescribed intentionally due to Refusal by patient, Disp: 0 each, Rfl: 0     tiZANidine (ZANAFLEX) 4 MG tablet, Take 1 tablet (4 mg) by mouth 3 times daily as needed for muscle spasms, Disp: 21 tablet, Rfl: 0     ALLERGIES:    Allergies   Allergen Reactions     Eggs      Allergy found in testing     Hornets      wasps     Latex Anaphylaxis     hives  -  able to use BP cuff     Lipitor [Hmg-Coa-R Inhibitors] Cramps     Muscle cramps with statin     Metoclopramide Hcl Difficulty breathing     Neurontin [Gabapentin] Hives        SOCIAL HISTORY:   Social History     Socioeconomic History     Marital status: Single     Spouse name: Not on file     Number of children: 0     Years of education: 16     Highest education level: Not on file   Occupational History     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     Employer: [employer]   Social Needs     Financial resource strain: Not on file     Food insecurity:     Worry: Not on file     Inability: Not on file     Transportation needs:     Medical: Not on file     Non-medical: Not on file   Tobacco Use     Smoking status: Never Smoker     Smokeless tobacco: Never Used   Substance and Sexual Activity     Alcohol use: Yes     Alcohol/week: 0.0 oz     Comment: 1 drink per year or less     Drug use: No     Sexual activity: Not Currently     Partners: Male     Birth control/protection: IUD   Lifestyle     Physical activity:     Days per week: Not on file     Minutes per session: Not  "on file     Stress: Not on file   Relationships     Social connections:     Talks on phone: Not on file     Gets together: Not on file     Attends Religion service: Not on file     Active member of club or organization: Not on file     Attends meetings of clubs or organizations: Not on file     Relationship status: Not on file     Intimate partner violence:     Fear of current or ex partner: Not on file     Emotionally abused: Not on file     Physically abused: Not on file     Forced sexual activity: Not on file   Other Topics Concern     Parent/sibling w/ CABG, MI or angioplasty before 65F 55M? No   Social History Narrative     Not on file        FAMILY HISTORY:   Family History   Adopted: Yes   Problem Relation Age of Onset     Respiratory Brother         1/2 sib     Psychotic Disorder Mother         depression, chronic fatigue     Psychotic Disorder Brother         bipolar     C.A.D. Maternal Grandfather      Breast Cancer Maternal Grandmother      Diabetes Maternal Uncle         EXAM:Vitals: /76   Ht 1.702 m (5' 7\")   Wt 129.3 kg (285 lb)   BMI 44.64 kg/m    BMI= Body mass index is 44.64 kg/m .     A1C: 8.7 (4/2019)    General appearance: Patient is alert and fully cooperative with history & exam.  No sign of distress is noted during the visit.     Psychiatric: Affect is pleasant & appropriate.  Patient appears motivated to improve health.     Respiratory: Breathing is regular & unlabored while sitting.     HEENT: Hearing is intact to spoken word.  Speech is clear.  No gross evidence of visual impairment that would impact ambulation.     Dermatologic: Skin is intact to both lower extremities without significant lesions, rash or abrasion.  No paronychia or evidence of soft tissue infection is noted.     Vascular: DP & PT pulses are intact & regular bilaterally.   edema and varicosities noted.  CFT and skin temperature is normal to both lower extremities.     Neurologic: Lower extremity sensation is " "diminished to feet.      Musculoskeletal: Patient is ambulatory without assistive device or brace. Decrease arch height. Pain on palpation of the right 3rd intermetatarsal space and to plantar distal right 2nd metatarsal head. No range of motion at right interphalangeal joint. Decrease dorsiflexion right ankle.    Radiographs:  I personally reviewed the xrays. Degenerative changes to midfoot. No fractures noted. Fusion of the right interphalangeal joint. Prominent navicular tuberosity.      ASSESSMENT:     Right foot pain  Pes planus of both feet  Capsulitis of metatarsophalangeal (MTP) joint of right foot  Hammer toe of right foot  Simental's neuroma, right  Type 2 diabetes mellitus without complication, with long-term current use of insulin (H)  Chronic pain syndrome  Gastrocnemius equinus, right     PLAN:  Reviewed patient's chart in epic.  Had a long discussion with patient. Talked about diabetes and foot care. This could be a cause of numbness as well as the multiple surgeries to the feet. Discussed that we can on reverse the numbness sensations.      We discussed causes and treatments of neuromas.  These included shoe gear, orthotics, metatarsal pads, cortisone injections, ice, physical therapy, and possible surgical removal.   Given that she has had 2 surgeries to remove neuromas, could be phantom pain. Talked about sclerosing alcohol injections. Discussed this is a series of injections every 2 weeks for up to 7 times to try to \"deaden\" the nerve to help with pain. She can't do iontophoresis as it reacts to her skin. Talked about new inserts.     She would like to start serial injections today. Follow up in 2 weeks for 2nd injection.     Reviewed and discussed causes of capsulitis.  Talked about how the elongated 2nd metatarsal can cause more pressure to occur to the joint.  We talked about treatments such as padding, orthotics, injection, physical therapy, immobilization, MRI, and possible surgery to shorten " metatarsal.    Recommend boot for next month. Then transition to shoes and inserts.     Procedure:  After verbal consent, the foot was prepped and draped using sterile technique.  A mixture of 2cc of a mix of 1% lidocaine plain dehydrogentated alcohol was injected into the right 3rd intermetatarsal nerve. This is the 1st injection in the series. Patient tolerated procedure and anesthesia well.       Suzanna Strong DPM, Podiatry/Foot and Ankle Surgery    Weight management plan: Patient was referred to their PCP to discuss a diet and exercise plan.

## 2019-04-23 NOTE — PATIENT INSTRUCTIONS
Thank you for choosing [facility]!    DR. WALTON'S CLINIC SCHEDULE  MONDAY AM - [place] TUESDAY - [place]   [address] [address] [place] [address] [place] [address]   [phone] / [phone] / [phone]       WEDNESDAY - [place] FRIDAY AM - W[facility]   [address]   [place] [address] [place] [address]   [phone] / [phone]       FRIDAY PM - [place] SCHEDULE SURGERY: [phone]   [address] BILLING QUESTIONS: [phone]   [place] [address] AFTER HOURS: [phone]   [phone] / [phone] APPOINTMENTS: [phone]         2 week follow up      Consumer Price Line (CPL) [phone]   SIMENTAL'S NEUROMA   Simental's neuroma is an enlargement or thickening of a nerve in the foot. It is also sometimes referred to as an intermetatarsal neuroma, interdigital neuroma, Simental's metatarsalgia (pain in the metatarsal head area), arthur-neural fibrosis (scar tissue around a nerve) or entrapment neuropathy (abnormal nerve due to compression). A Simental's neuroma most commonly occurs in the third interspace between the third and fourth toes, followed by the second interspace between the second and third toes. Simental's neuromas have also occurred in the fourth and first interspaces, but these are rare. If you have a Simental's neuroma, there is a 15% chance it will occur bilaterally (on both feet). Simental's neuromas occur most commonly in women who are between 30 to 50 years old. The reason they are more common in women is thought to be due to the shoes women wear.   CAUSES: A Simental's neuroma is thought to be caused by trauma to the nerve, but scientists are still not sure about the exact cause of the trauma. The trauma may be caused by the metatarsal heads, the deep transverse intermetatarsal ligament (holds the metatarsal heads together) or an intermetatarsal bursa  "(fluid-filled sac). All of these structures can cause compression/trauma on the nerve which initially causes swelling and injury in the nerve. Over time if the compression/trauma continues, the nerve repairs itself with very fibrous tissue that leads to enlargement and thickening of the nerve. Other causes of trauma to the nerve may include; overpronation (foot rolls inward), hypermobility (too much motion), cavo varus (high arch foot) and excessive dorsiflexion (toes bend upward) of the toes. These biomechanical (howthe foot moves) factors may cause trauma to the nerve with every step. If the nerve becomes irritated and enlarged then it takes up more space and gets even more compressed and irritated. It becomes a vicious cycle.   SIGNS & SYMPTOMS  - Pain (sharp, stabbing, throbbing, shooting)   - Numbness   - Tingling or \"pins & needles\"   - Burning   - Cramping   - Feeling that you are stepping on something or that something is in your shoe   - Initially the symptoms may happen once in a while, but as the condition gets     worse, the symptoms may happen all of the time   - It usually feels better by taking off your shoe and massaging your foot     DIAGNOSIS: Your podiatrist will ask many questions about your signs and symptoms and will perform a physical exam. Some of the exams may include a web space compression test. This is done by squeezing the metatarsals together with one hand and using the thumb and index finger of the other hand to compress the affected web space to reproduce the pain/symptoms. A palpable click (Cheryl's click) is usually present. This test may also cause pain to shoot into the toes and that is called a Tinel's sign. Bushra's test involves squeezing the metatarsals together and moving the toes up and down for 30 seconds. This will usually cause pain or it will bring on your other symptoms. Piper's sign is positive when you stand and the affected toes spread apart. A Simental's neuroma " is usually diagnosed based on the history and physical exam findings, but sometimes other tests such as an x-ray, ultrasound or an MRI are needed.   TREATMENT  1.  Footwear Changes: Wear shoes that are wide and deep in the toe box so they  do not put pressure on your toes and metatarsals. Avoid wearing high heels because they cause increased pressure on the ball of your foot (forefoot).    2.  Metatarsal Pads: These help to lift and separate the metatarsal heads to take pressure off of the nerve. They are placed just behind where you feel the pain, not on top of the painful spot.   3.  Activity modification: For example, you may try swimming instead of running until your symptoms go away.   4.  Taping   5.  Icing   6.  NSAIDs (anti-inflammatories): aleve, ibuprofen, etc.   7.  Arch Supports or Orthotics: These help to control some of the abnormal motion in your feet. The abnormal motion can lead to extra torque and pressure on the nerve.   8.  Physical Therapy  9.  Cortisone Injection: Helps to decrease the size of the irritated, enlarged nerve.   10.  Sclerosing Alcohol Injection: Helps to destroy the nerve chemically, which causes permanent numbness    SURGERY  If conservative treatment does not help surgery may be needed. Surgery may involve cutting out the nerve or cutting the intermetatarsalligament. Studies have shown surgery has an 80-85% success rate.  Will result in numbness    PREVENTION  -Avoid wearing narrow, pointed toe shoes, or high heels    CAPSULITIS / METATARSALGIA  All joints in the body are surrounded by a capsule, or a covering of soft tissue and ligaments. The capsule holds bones together and secretes joint fluid to help lubricate the joint. If a joint capsule is exposed to excessive force, it can develop microscopic tears and become inflamed. This commonly occurs in the foot due to mild variation in anatomy. Hammertoes, bunions, irregular bone length, joint immobility, etc. can all lead to  excessive force on the joint. Capsule injury can also occur due to repetitive stress from exercise, insufficient support from shoes, excessive bare foot walking and excessive weight.      Conservative treatments include ice, rest from the aggravating activity, weight loss, orthotic inserts, improving shoes and shoe modifications. Appropriate shoes will protect the inflamed tissue improving the chances of healing. Avoidance of standing or walking barefoot, including around the house, is necessary to allow healing. Casts are sometimes used for more aggressive protection.  NSAIDs such as Advil are also used to help with pain and decreasing inflammation. If pain continues over a period of weeks with continuous rest and icing, Corticosteroid injections can be a treatment option to try and help decrease inflammation.    Surgery is often necessary to correct the underlying structural problem. Surgery might include shortening an excessively long bone, repairing bunion or hammertoe, lengthening a tight Achilles  tendon, etc. These are same day surgeries that might be pursued if more conservative measures fail to provide relief.      The inflamed joint capsule has the potential to completely tear. This will allow the toe to drift off the ground, curving toward the other toes. The involved toe may under or overlap the adjacent toes as drift continues. The pain may improve after the joint tears or this new position will be permanent. Surgery can address the toe alignment. Your goal of treating capsulitis is to avoid this scenario.                  BODY WEIGHT AND YOUR FEET  The following information is included in the after visit summary for all patients. Body weight can be a sensitive issue to discuss in clinic, but we think the following information is very important. Although we focus on the feet and ankles, we do support the overall health of our patients.     Many things can cause foot and ankle problems. Foot structure,  activity level, foot mechanics and injuries are common causes of pain. One very important issue that often goes unmentioned, is body weight. Extra weight can cause increased stress on muscles, ligaments, bones and tendons. Sometimes just a few extra pounds is all it takes to put one over her/his threshold. Without reducing that stress, it can be difficult to alleviate pain. As Foot & Ankle specialists, our job is addressing the lower extremity problem and possible causes. Regarding extra body weight, we encourage patients to discuss diet and weight management plans with their primary care doctors. It is this team approach that gives you the best opportunity for pain relief and getting you back on your feet.      [facility] has a Comprehensive Weight Management Program. This program includes counseling, education, non-surgical and surgical approaches to weight loss. If you are interested in learning more either talk to you primary care provider or call [phone].

## 2019-04-23 NOTE — LETTER
4/23/2019         RE: Johnna Caballero  [address]  [facility] [address]        Dear Colleague,    Thank you for referring your patient, Johnna Caballero, to the [facility]. Please see a copy of my visit note below.    PATIENT HISTORY:  Dr. Durant requested I see this patient for their foot issue.  Johnna Caballero is a 53 year old female who presents to clinic for pain to the right foot. Notes she has had multiple surgeries to the foot. Pain for last 4 months. Has had neuroma's removed 2x. Pain is 7/10. Worse with walking and at night. Notes numbness to feet daily. Has had cortisone injections without relief. Pain is throbbing and shooting. Would like to know what is causing pain and what can be done for it. She normally wears Birkenstocks which seem to help the most. Does walk around without shoes a lot at home.     Review of Systems:  Patient denies fever, chills, rash, wound, weakness, heart burn, blood in stool, chest pain with activity, calf pain when walking, shortness of breath with activity, chronic cough, easy bleeding/bruising, excessive thirst, fatigue, depression, anxiety.  Patient admits to limping, stiffness, swelling, numbness.     PAST MEDICAL HISTORY:   Past Medical History:   Diagnosis Date     Actinic keratosis      Allergic rhinitis, cause unspecified      Anemia      Arthritis      Asthma     no meds x2 yrs     chronic back pain      Chronic infection     MRSA-nasal     Chronic pain     back     Esophageal reflux      fibrocystic breast changes      Gastro-oesophageal reflux disease      Heart murmur     mitral insuffiency     Hx of Fen-Phen use      Hypertension      migraines      Mild intermittent asthma     rare, with dog or exercise     Moderate major depression (H) 3/28/2012     MRSA (methicillin resistant Staphylococcus aureus) 6/27/2014    Nares     Need for desensitization to allergens      Obesity, unspecified      Other and unspecified hyperlipidemia       Temporomandibular joint disorders, unspecified      Thrombosis of leg     incisional area right hip     Type II or unspecified type diabetes mellitus without mention of complication, not stated as uncontrolled         PAST SURGICAL HISTORY:   Past Surgical History:   Procedure Laterality Date     ARTHROPLASTY KNEE  7/15/2014    Procedure: ARTHROPLASTY KNEE;  Surgeon: Chapin Paul MD;  Location: [facility] OR     BACK SURGERY       C NONSPECIFIC PROCEDURE      laparoscopy x6     C NONSPECIFIC PROCEDURE  93    laparotomy x2 ovarian cyst     C NONSPECIFIC PROCEDURE  0293    oophorectomy lt side - cyst     C NONSPECIFIC PROCEDURE  0395    laminectomy L4-5. S1 herniated disc     C NONSPECIFIC PROCEDURE  96    cholecystectomy     C NONSPECIFIC PROCEDURE      ganglion cysts l wrist, rt palm     C NONSPECIFIC PROCEDURE      cyst removal back x2     C NONSPECIFIC PROCEDURE  10/02    rt thumb fusion, ganglion cyst removal     C NONSPECIFIC PROCEDURE  1/08    remove heel spur, excise exostosis     CHOLECYSTECTOMY       COLONOSCOPY N/A 4/26/2017    Procedure: COLONOSCOPY;  COLONOSCOPY;  Surgeon: Chapin Leal MD;  Location:  GI     EXCISE LESION LOWER EXTREMITY  11/20/2012    Procedure: EXCISE LESION LOWER EXTREMITY;  EXCISION LIPOMA RIGHT HIP ;  Surgeon: Jazmin Bautista MD;  Location:  [facility]     EXCISE LESION LOWER EXTREMITY  11/23/2012    Procedure: EXCISE LESION LOWER EXTREMITY;  EXCISE LESION LOWER EXTREMITY  EVACUATE RIGHT HIP HEMATOMA;  Surgeon: Jazmin Bautista MD;  Location:  OR     EXCISE MASS HIP Left 11/9/2018    Procedure: 1.  Excision left chest wall mass with excised diameter of greater than 4 cm 2.  Excision left hip mass with excised diameter of more than 20 x 20 cm  ;  Surgeon: Jazmin Bautista MD;  Location:  OR     EXCISE MASS TRUNK Left 11/9/2018    Procedure: EXCISE MASS TRUNK;  Surgeon: Jazmin Bautista MD;  Location:  OR     GYN SURGERY       HC EXPLORATION ANKLE  JOINT  1/2006     HC PART EXCIS PLANTAR FASCIA  12/2006     IRRIGATION AND DEBRIDEMENT HIP, COMBINED  12/15/2012    Procedure: COMBINED IRRIGATION AND DEBRIDEMENT HIP;   evacuation hematoma, scar revision right hip;  Surgeon: Jazmin Bautista MD;  Location: [facility] OR     wisdom teeth[          MEDICATIONS:   Current Outpatient Medications:      aspirin 81 MG tablet, Take 1 tablet (81 mg) by mouth daily, Disp: 30 tablet, Rfl:      BD VALDO U/F 32G X 4 MM insulin pen needle, USE 2 PEN NEEDLES DAILY OR AS DIRECTED, Disp: 180 each, Rfl: 3     blood glucose monitoring (ACCU-CHEK GABI) test strip, Use to test blood sugars 1-2x daily or as directed., Disp: 200 each, Rfl: 11     blood glucose monitoring (ACCU-CHEK MULTICLIX) lancets, Use to test blood sugar 1-2 times daily or as directed., Disp: 2 Box, Rfl: 3     blood glucose monitoring (NO BRAND SPECIFIED) meter device kit, Use to test blood sugar 1-2 times daily or as directed., Disp: 1 kit, Rfl: 0     celecoxib (CELEBREX) 200 MG capsule, Take 1 capsule (200 mg) by mouth daily, Disp: 90 capsule, Rfl: 3     cetirizine (ZYRTEC) 10 MG tablet, Take 10 mg by mouth daily, Disp: , Rfl:      diazepam (VALIUM) 5 MG tablet, Take 1 tablet (5 mg) by mouth every 12 hours as needed for muscle spasms, Disp: 30 tablet, Rfl: 1     EPINEPHrine (EPIPEN/ADRENACLICK/OR ANY BX GENERIC EQUIV) 0.3 MG/0.3ML injection 2-pack, Inject 0.3 mLs (0.3 mg) into the muscle once as needed for anaphylaxis, Disp: 2 mL, Rfl: 0     ergocalciferol (ERGOCALCIFEROL) 35499 units capsule, Take 50,000 Units by mouth twice a week Take on Mondays and Thursdays, Disp: , Rfl:      hydrocortisone 2.5 % cream, Apply topically 2 times daily as needed Do not use for more than 14 days, Disp: , Rfl:      insulin glargine (LANTUS SOLOSTAR PEN) 100 UNIT/ML pen, Inject 24 Units Subcutaneous 2 times daily, Disp: 45 mL, Rfl: 3     KRILL OIL PO, Take 1 tablet by mouth daily , Disp: , Rfl:      LANsoprazole (PREVACID) 30 MG CR  capsule, TAKE ONE CAPSULE BY MOUTH EVERY DAY -TAKE 30-60 MINUTES BEFORE A MEAL, Disp: 90 capsule, Rfl: 1     liraglutide (VICTOZA PEN) 18 MG/3ML solution, Inject 1.8 mg Subcutaneous daily, Disp: 27 mL, Rfl: 3     losartan-hydrochlorothiazide (HYZAAR) 100-25 MG per tablet, TAKE ONE TABLET BY MOUTH EVERY DAY, Disp: 90 tablet, Rfl: 3     metFORMIN (GLUCOPHAGE) 1000 MG tablet, Take 1 tablet (1,000 mg) by mouth 2 times daily (with meals), Disp: 180 tablet, Rfl: 3     ORDER FOR DME, Equipment being ordered: TENS unit, Disp: 1 each, Rfl: 0     oxyCODONE-acetaminophen (PERCOCET) 5-325 MG tablet, Take 1-2 tablets by mouth every 4 hours as needed for severe pain, Disp: 90 tablet, Rfl: 0     STATIN NOT PRESCRIBED, INTENTIONAL,, 1 each 2 times daily Statin not prescribed intentionally due to Refusal by patient, Disp: 0 each, Rfl: 0     tiZANidine (ZANAFLEX) 4 MG tablet, Take 1 tablet (4 mg) by mouth 3 times daily as needed for muscle spasms, Disp: 21 tablet, Rfl: 0     ALLERGIES:    Allergies   Allergen Reactions     Eggs      Allergy found in testing     Hornets      wasps     Latex Anaphylaxis     hives  -  able to use BP cuff     Lipitor [Hmg-Coa-R Inhibitors] Cramps     Muscle cramps with statin     Metoclopramide Hcl Difficulty breathing     Neurontin [Gabapentin] Hives        SOCIAL HISTORY:   Social History     Socioeconomic History     Marital status: Single     Spouse name: Not on file     Number of children: 0     Years of education: 16     Highest education level: Not on file   Occupational History     Occupation: [employer]     Employer: [employer]   Social Needs     Financial resource strain: Not on file     Food insecurity:     Worry: Not on file     Inability: Not on file     Transportation needs:     Medical: Not on file     Non-medical: Not on file   Tobacco Use     Smoking status: Never Smoker     Smokeless tobacco: Never Used   Substance and Sexual Activity     Alcohol use: Yes      "Alcohol/week: 0.0 oz     Comment: 1 drink per year or less     Drug use: No     Sexual activity: Not Currently     Partners: Male     Birth control/protection: IUD   Lifestyle     Physical activity:     Days per week: Not on file     Minutes per session: Not on file     Stress: Not on file   Relationships     Social connections:     Talks on phone: Not on file     Gets together: Not on file     Attends Orthodox service: Not on file     Active member of club or organization: Not on file     Attends meetings of clubs or organizations: Not on file     Relationship status: Not on file     Intimate partner violence:     Fear of current or ex partner: Not on file     Emotionally abused: Not on file     Physically abused: Not on file     Forced sexual activity: Not on file   Other Topics Concern     Parent/sibling w/ CABG, MI or angioplasty before 65F 55M? No   Social History Narrative     Not on file        FAMILY HISTORY:   Family History   Adopted: Yes   Problem Relation Age of Onset     Respiratory Brother         1/2 sib     Psychotic Disorder Mother         depression, chronic fatigue     Psychotic Disorder Brother         bipolar     C.A.D. Maternal Grandfather      Breast Cancer Maternal Grandmother      Diabetes Maternal Uncle         EXAM:Vitals: /76   Ht 1.702 m (5' 7\")   Wt 129.3 kg (285 lb)   BMI 44.64 kg/m     BMI= Body mass index is 44.64 kg/m .     A1C: 8.7 (4/2019)    General appearance: Patient is alert and fully cooperative with history & exam.  No sign of distress is noted during the visit.     Psychiatric: Affect is pleasant & appropriate.  Patient appears motivated to improve health.     Respiratory: Breathing is regular & unlabored while sitting.     HEENT: Hearing is intact to spoken word.  Speech is clear.  No gross evidence of visual impairment that would impact ambulation.     Dermatologic: Skin is intact to both lower extremities without significant lesions, rash or abrasion.  No " "paronychia or evidence of soft tissue infection is noted.     Vascular: DP & PT pulses are intact & regular bilaterally.   edema and varicosities noted.  CFT and skin temperature is normal to both lower extremities.     Neurologic: Lower extremity sensation is diminished to feet.      Musculoskeletal: Patient is ambulatory without assistive device or brace. Decrease arch height. Pain on palpation of the right 3rd intermetatarsal space and to plantar distal right 2nd metatarsal head. No range of motion at right interphalangeal joint. Decrease dorsiflexion right ankle.    Radiographs:  I personally reviewed the xrays. Degenerative changes to midfoot. No fractures noted. Fusion of the right interphalangeal joint. Prominent navicular tuberosity.      ASSESSMENT:     Right foot pain  Pes planus of both feet  Capsulitis of metatarsophalangeal (MTP) joint of right foot  Hammer toe of right foot  Simental's neuroma, right  Type 2 diabetes mellitus without complication, with long-term current use of insulin (H)  Chronic pain syndrome  Gastrocnemius equinus, right     PLAN:  Reviewed patient's chart in epic.  Had a long discussion with patient. Talked about diabetes and foot care. This could be a cause of numbness as well as the multiple surgeries to the feet. Discussed that we can on reverse the numbness sensations.      We discussed causes and treatments of neuromas.  These included shoe gear, orthotics, metatarsal pads, cortisone injections, ice, physical therapy, and possible surgical removal.   Given that she has had 2 surgeries to remove neuromas, could be phantom pain. Talked about sclerosing alcohol injections. Discussed this is a series of injections every 2 weeks for up to 7 times to try to \"deaden\" the nerve to help with pain. She can't do iontophoresis as it reacts to her skin. Talked about new inserts.     She would like to start serial injections today. Follow up in 2 weeks for 2nd injection.     Reviewed and " discussed causes of capsulitis.  Talked about how the elongated 2nd metatarsal can cause more pressure to occur to the joint.  We talked about treatments such as padding, orthotics, injection, physical therapy, immobilization, MRI, and possible surgery to shorten metatarsal.    Recommend boot for next month. Then transition to shoes and inserts.     Procedure:  After verbal consent, the foot was prepped and draped using sterile technique.  A mixture of 2cc of a mix of 1% lidocaine plain dehydrogentated alcohol was injected into the right 3rd intermetatarsal nerve. This is the 1st injection in the series. Patient tolerated procedure and anesthesia well.       Suzanna Strong DPM, Podiatry/Foot and Ankle Surgery    Weight management plan: Patient was referred to their PCP to discuss a diet and exercise plan.    Again, thank you for allowing me to participate in the care of your patient.        Sincerely,        Suzanna Strong DPM, Podiatry/Foot and Ankle Surgery

## 2019-05-07 ENCOUNTER — OFFICE VISIT (OUTPATIENT)
Dept: PODIATRY | Facility: CLINIC | Age: 54
End: 2019-05-07
Payer: COMMERCIAL

## 2019-05-07 VITALS
BODY MASS INDEX: 44.73 KG/M2 | DIASTOLIC BLOOD PRESSURE: 72 MMHG | HEIGHT: 67 IN | WEIGHT: 285 LBS | SYSTOLIC BLOOD PRESSURE: 118 MMHG

## 2019-05-07 DIAGNOSIS — G57.61 MORTON'S NEUROMA OF RIGHT FOOT: ICD-10-CM

## 2019-05-07 DIAGNOSIS — M62.461 GASTROCNEMIUS EQUINUS, RIGHT: ICD-10-CM

## 2019-05-07 DIAGNOSIS — E11.9 TYPE 2 DIABETES MELLITUS WITHOUT COMPLICATION, WITH LONG-TERM CURRENT USE OF INSULIN (H): ICD-10-CM

## 2019-05-07 DIAGNOSIS — M79.671 RIGHT FOOT PAIN: Primary | ICD-10-CM

## 2019-05-07 DIAGNOSIS — E66.01 MORBID OBESITY WITH BMI OF 40.0-44.9, ADULT (H): ICD-10-CM

## 2019-05-07 DIAGNOSIS — G89.4 CHRONIC PAIN SYNDROME: ICD-10-CM

## 2019-05-07 DIAGNOSIS — M20.31 HALLUX HAMMERTOE, RIGHT: ICD-10-CM

## 2019-05-07 DIAGNOSIS — Z79.4 TYPE 2 DIABETES MELLITUS WITHOUT COMPLICATION, WITH LONG-TERM CURRENT USE OF INSULIN (H): ICD-10-CM

## 2019-05-07 DIAGNOSIS — M21.41 PES PLANUS OF BOTH FEET: ICD-10-CM

## 2019-05-07 DIAGNOSIS — M21.42 PES PLANUS OF BOTH FEET: ICD-10-CM

## 2019-05-07 PROCEDURE — 64455 NJX AA&/STRD PLTR COM DG NRV: CPT | Mod: RT | Performed by: PODIATRIST

## 2019-05-07 ASSESSMENT — MIFFLIN-ST. JEOR: SCORE: 1935.38

## 2019-05-07 NOTE — LETTER
"    5/7/2019         RE: Johnna Caballero  [address]  [facility] [address]        Dear Colleague,    Thank you for referring your patient, Johnna Caballero, to the [facility]. Please see a copy of my visit note below.    Podiatry / Foot and Ankle Surgery Progress Note    May 7, 2019    Subject: Patient was seen for follow up on serial injection to right foot for neuroma treatment.  Notes she felt it helped initially for a little but but a few days after was quite painful.     Objective:  Vitals: /72   Ht 1.702 m (5' 7\")   Wt 129.3 kg (285 lb)   BMI 44.64 kg/m     BMI= Body mass index is 44.64 kg/m .    A1C: 8.7 (4/2019)     General appearance: Patient is alert and fully cooperative with history & exam.  No sign of distress is noted during the visit.     Psychiatric: Affect is pleasant & appropriate.  Patient appears motivated to improve health.     Respiratory: Breathing is regular & unlabored while sitting.     HEENT: Hearing is intact to spoken word.  Speech is clear.  No gross evidence of visual impairment that would impact ambulation.     Dermatologic: Skin is intact to both lower extremities without significant lesions, rash or abrasion.  No paronychia or evidence of soft tissue infection is noted.     Vascular: DP & PT pulses are intact & regular bilaterally.   edema and varicosities noted.  CFT and skin temperature is normal to both lower extremities.     Neurologic: Lower extremity sensation is diminished to feet.      Musculoskeletal: Patient is ambulatory without assistive device or brace. Decrease arch height. Pain on palpation of the right 3rd intermetatarsal space and to plantar distal right 2nd metatarsal head. No range of motion at right interphalangeal joint. Decrease dorsiflexion right ankle.     Radiographs:  I personally reviewed the xrays. Degenerative changes to midfoot. No fractures noted. Fusion of the right interphalangeal joint. Prominent navicular tuberosity. " "     ASSESSMENT:      Right foot pain  Type 2 diabetes mellitus without complication, with long-term current use of insulin (H)  Hola's neuroma of right foot  Pes planus of both feet  Hallux hammertoe, right  Gastrocnemius equinus, right  Chronic pain syndrome  Morbid obesity with BMI of 40.0-44.9, adult (H)     PLAN:  Reviewed patient's chart in epic.  Had a long discussion with patient. Talked about diabetes and foot care. This could be a cause of numbness as well as the multiple surgeries to the feet. Discussed that we can on reverse the numbness sensations.       We discussed causes and treatments of neuromas.  These included shoe gear, orthotics, metatarsal pads, cortisone injections, ice, physical therapy, and possible surgical removal.   Given that she has had 2 surgeries to remove neuromas, could be phantom pain. Talked about sclerosing alcohol injections. Discussed this is a series of injections every 2 weeks for up to 7 times to try to \"deaden\" the nerve to help with pain. She can't do iontophoresis as it reacts to her skin. Talked about new inserts.      She would like to start serial injections today. Follow up in 2 weeks for 2nd injection.      Reviewed and discussed causes of capsulitis.  Talked about how the elongated 2nd metatarsal can cause more pressure to occur to the joint.  We talked about treatments such as padding, orthotics, injection, physical therapy, immobilization, MRI, and possible surgery to shorten metatarsal.     Recommend boot for next month. Then transition to shoes and inserts.      Procedure:  After verbal consent, the foot was prepped and draped using sterile technique.  A mixture of 2cc of a mix of 1% lidocaine plain dehydrogentated alcohol was injected into the right 3rd intermetatarsal nerve. This is the 2nd injection in the series. Patient tolerated procedure and anesthesia well.    Suzanna Strong DPM, Podiatry/Foot and Ankle Surgery    Weight management plan: Patient was " referred to their PCP to discuss a diet and exercise plan.    Recommended to Johnna Caballero to follow up with Primary Care provider regarding elevated blood pressure.      Again, thank you for allowing me to participate in the care of your patient.        Sincerely,        Suzanna Strong DPM, Podiatry/Foot and Ankle Surgery

## 2019-05-07 NOTE — PROGRESS NOTES
"Podiatry / Foot and Ankle Surgery Progress Note    May 7, 2019    Subject: Patient was seen for follow up on serial injection to right foot for neuroma treatment.  Notes she felt it helped initially for a little but but a few days after was quite painful.     Objective:  Vitals: /72   Ht 1.702 m (5' 7\")   Wt 129.3 kg (285 lb)   BMI 44.64 kg/m    BMI= Body mass index is 44.64 kg/m .    A1C: 8.7 (4/2019)     General appearance: Patient is alert and fully cooperative with history & exam.  No sign of distress is noted during the visit.     Psychiatric: Affect is pleasant & appropriate.  Patient appears motivated to improve health.     Respiratory: Breathing is regular & unlabored while sitting.     HEENT: Hearing is intact to spoken word.  Speech is clear.  No gross evidence of visual impairment that would impact ambulation.     Dermatologic: Skin is intact to both lower extremities without significant lesions, rash or abrasion.  No paronychia or evidence of soft tissue infection is noted.     Vascular: DP & PT pulses are intact & regular bilaterally.   edema and varicosities noted.  CFT and skin temperature is normal to both lower extremities.     Neurologic: Lower extremity sensation is diminished to feet.      Musculoskeletal: Patient is ambulatory without assistive device or brace. Decrease arch height. Pain on palpation of the right 3rd intermetatarsal space and to plantar distal right 2nd metatarsal head. No range of motion at right interphalangeal joint. Decrease dorsiflexion right ankle.     Radiographs:  I personally reviewed the xrays. Degenerative changes to midfoot. No fractures noted. Fusion of the right interphalangeal joint. Prominent navicular tuberosity.      ASSESSMENT:     Right foot pain  Type 2 diabetes mellitus without complication, with long-term current use of insulin (H)  Simental's neuroma of right foot  Pes planus of both feet  Hallux hammertoe, right  Gastrocnemius equinus, " "right  Chronic pain syndrome  Morbid obesity with BMI of 40.0-44.9, adult (H)     PLAN:  Reviewed patient's chart in epic.  Had a long discussion with patient. Talked about diabetes and foot care. This could be a cause of numbness as well as the multiple surgeries to the feet. Discussed that we can on reverse the numbness sensations.       We discussed causes and treatments of neuromas.  These included shoe gear, orthotics, metatarsal pads, cortisone injections, ice, physical therapy, and possible surgical removal.   Given that she has had 2 surgeries to remove neuromas, could be phantom pain. Talked about sclerosing alcohol injections. Discussed this is a series of injections every 2 weeks for up to 7 times to try to \"deaden\" the nerve to help with pain. She can't do iontophoresis as it reacts to her skin. Talked about new inserts.      She would like to start serial injections today. Follow up in 2 weeks for 2nd injection.      Reviewed and discussed causes of capsulitis.  Talked about how the elongated 2nd metatarsal can cause more pressure to occur to the joint.  We talked about treatments such as padding, orthotics, injection, physical therapy, immobilization, MRI, and possible surgery to shorten metatarsal.     Recommend boot for next month. Then transition to shoes and inserts.      Procedure:  After verbal consent, the foot was prepped and draped using sterile technique.  A mixture of 2cc of a mix of 1% lidocaine plain dehydrogentated alcohol was injected into the right 3rd intermetatarsal nerve. This is the 2nd injection in the series. Patient tolerated procedure and anesthesia well.    Suzanna Strong DPM, Podiatry/Foot and Ankle Surgery    Weight management plan: Patient was referred to their PCP to discuss a diet and exercise plan.    Recommended to Johnna Caballero to follow up with Primary Care provider regarding elevated blood pressure.    "

## 2019-05-21 ENCOUNTER — OFFICE VISIT (OUTPATIENT)
Dept: PODIATRY | Facility: CLINIC | Age: 54
End: 2019-05-21
Payer: COMMERCIAL

## 2019-05-21 VITALS
BODY MASS INDEX: 44.73 KG/M2 | SYSTOLIC BLOOD PRESSURE: 122 MMHG | WEIGHT: 285 LBS | HEIGHT: 67 IN | DIASTOLIC BLOOD PRESSURE: 78 MMHG

## 2019-05-21 DIAGNOSIS — M21.41 BILATERAL PES PLANUS: ICD-10-CM

## 2019-05-21 DIAGNOSIS — E66.01 MORBID OBESITY WITH BMI OF 40.0-44.9, ADULT (H): ICD-10-CM

## 2019-05-21 DIAGNOSIS — M62.461 GASTROCNEMIUS EQUINUS OF RIGHT LOWER EXTREMITY: ICD-10-CM

## 2019-05-21 DIAGNOSIS — G57.61 MORTON'S NEUROMA OF RIGHT FOOT: ICD-10-CM

## 2019-05-21 DIAGNOSIS — E11.42 DIABETIC POLYNEUROPATHY ASSOCIATED WITH TYPE 2 DIABETES MELLITUS (H): ICD-10-CM

## 2019-05-21 DIAGNOSIS — M20.31 HALLUX HAMMERTOE, RIGHT: ICD-10-CM

## 2019-05-21 DIAGNOSIS — G89.4 CHRONIC PAIN SYNDROME: ICD-10-CM

## 2019-05-21 DIAGNOSIS — M21.42 BILATERAL PES PLANUS: ICD-10-CM

## 2019-05-21 DIAGNOSIS — M79.671 RIGHT FOOT PAIN: Primary | ICD-10-CM

## 2019-05-21 PROCEDURE — 64455 NJX AA&/STRD PLTR COM DG NRV: CPT | Mod: RT | Performed by: PODIATRIST

## 2019-05-21 ASSESSMENT — MIFFLIN-ST. JEOR: SCORE: 1935.38

## 2019-05-21 NOTE — PATIENT INSTRUCTIONS
Thank you for choosing [facility]!    DR. WALTON'S CLINIC SCHEDULE  MONDAY AM - [place] TUESDAY - [place]   [address] [address] [place] [address] [place], [place] [id number] [phone] / [phone] / [phone]       WEDNESDAY - [place] FRIDAY AM - [facility]   [address]   [place] [address] [place] [id number] [phone] / [phone]       FRIDAY PM - [place] SCHEDULE SURGERY: [phone]   [address] BILLING QUESTIONS: [phone]   [place] [address] AFTER HOURS: [phone]   [phone] / [phone] APPOINTMENTS: [phone]     Consumer Price Line (CPL) [phone]       Two week follow up    [facility] LOC[facility]  [facility]  [address]  [place] [address]  Phone: [phone]  Fax: [phone] [facility]  [address]  [place] [address]  Phone: [phone]   Fax: [phone]   [facility]  [address]  [place] [address]  Phone: [phone]  Fax: [phone] [facility]  [address]  [place] [address]  Phone: [phone]   Fax: [phone]   [facility]   [address]  [place] [address]  Phone: [phone]   Fax: [phone] * Please call any location listed to make an appointment for a casting/fitting. Your referral was sent to their central office and they will all have the order on file.     WEARING YOUR CUSTOM FOOT ORTHOTICS   Most insurance plans cover one pair of orthotics per year. You must check with your   insurance plan to see what your payment responsibility will be. Please call your   insurance company by calling the number on the back of your insurance card.   Orthotic's are non-refundable and non-returnable.   Orthotics are made of  various designs. Some orthotics are covered with material that extends beyond your toes. If your orthotic is of this design, you will likely need to trim the toe end to get a proper fit. The insole from your shoe can be used as a template. Simply overlay the shoe insert on top of the custom orthotic. Align the heel end while tracing the length of the insert onto the custom orthotic. Use a large scissor to trim the toe end until you get a proper fit in the shoe.   The orthotic needs to be pushed as far back in the shoe as possible. The heel portion should not ride forward so as not to irritate your heel.   Orthotics are designed to work with socks. Excessive perspiration will shorten the life span of the orthotics. Remove the orthotic from the shoe frequently for proper drying.   The break-in period lasts for weeks. People new to orthotics will likely experience new aches and pains. The orthotic is forcing your foot into a new position. Arch, foot and leg muscle aches and fatigue are common during these weeks. Minor discomfort can be considered normal break in phenomenon. Start wearing your orthotic around your home your first day. Limited activity for one to two hours is recommended. You can increase one or two additional hours each day provided the aches and pains are subsiding. The degree of discomfort, fatigue and problems will dictate the speed of break in. You may require multiple weeks to work up to full time use.   Do not continue wearing your orthotics if they are creating problems such as blisters or sores. Do not hesitate to call the clinic to speak with a nurse regarding orthotic   break in, fit, trimming, etc. You may also need to see the doctor if the orthotics are   simply not working out. Adjustments are sometimes made to improve orthotic   function.     Orthotics will only work in certain styles and types of shoes. Orthotics rarely work in dress shoes. Slip-ons, clogs, sandals and heels are  particularly troublesome. Specially designed orthotics may be necessary for these types of shoes. Your custom orthotic was designed for activities that require appropriate walking or running shoes. Lace up athletic shoes, walking shoes or work boots should work appropriately. You may need a wider or longer shoe. Shoes with a removable  or insert work best. In general, you want to remove an insert from the shoe before placing the orthotic into the shoe. Shoes without a removable liner may not work as well.     When purchasing new shoes, bring your orthotics along to get a proper fit. Shop at stores that are familiar with orthotics.   Frequent washing of the orthotic may shorten the life span of the top cover. The top cover can be replaced but will generally last one to five years depending on use and foot perspiration.           BODY WEIGHT AND YOUR FEET  The following information is included in the after visit summary for all patients. Body weight can be a sensitive issue to discuss in clinic, but we think the following information is very important. Although we focus on the feet and ankles, we do support the overall health of our patients.     Many things can cause foot and ankle problems. Foot structure, activity level, foot mechanics and injuries are common causes of pain. One very important issue that often goes unmentioned, is body weight. Extra weight can cause increased stress on muscles, ligaments, bones and tendons. Sometimes just a few extra pounds is all it takes to put one over her/his threshold. Without reducing that stress, it can be difficult to alleviate pain. As Foot & Ankle specialists, our job is addressing the lower extremity problem and possible causes. Regarding extra body weight, we encourage patients to discuss diet and weight management plans with their primary care doctors. It is this team approach that gives you the best opportunity for pain relief and getting you back on your  feet.      [facility] has a Comprehensive Weight Management Program. This program includes counseling, education, non-surgical and surgical approaches to weight loss. If you are interested in learning more either talk to you primary care provider or call [phone].

## 2019-05-21 NOTE — PROGRESS NOTES
"Podiatry / Foot and Ankle Surgery Progress Note    May 21, 2019    Subject: Patient was seen for follow up on serial injection to right foot for neuroma treatment.  Notes she felt it is starting to help more. Not as sore. 2nd toe is more sore. Boot does not seem to be helping.       Objective:  Vitals: Ht 1.702 m (5' 7\")   Wt 129.3 kg (285 lb)   BMI 44.64 kg/m    BMI= Body mass index is 44.64 kg/m .    A1C: 8.7 (4/2019)     General appearance: Patient is alert and fully cooperative with history & exam.  No sign of distress is noted during the visit.     Psychiatric: Affect is pleasant & appropriate.  Patient appears motivated to improve health.     Respiratory: Breathing is regular & unlabored while sitting.     HEENT: Hearing is intact to spoken word.  Speech is clear.  No gross evidence of visual impairment that would impact ambulation.     Dermatologic: Skin is intact to both lower extremities without significant lesions, rash or abrasion.  No paronychia or evidence of soft tissue infection is noted.     Vascular: DP & PT pulses are intact & regular bilaterally.   edema and varicosities noted.  CFT and skin temperature is normal to both lower extremities.     Neurologic: Lower extremity sensation is diminished to feet.      Musculoskeletal: Patient is ambulatory without assistive device or brace. Decrease arch height. Pain on palpation of the right 3rd intermetatarsal space and to plantar distal right 2nd metatarsal head. No range of motion at right interphalangeal joint. Decrease dorsiflexion right ankle.     Radiographs:  I personally reviewed the xrays. Degenerative changes to midfoot. No fractures noted. Fusion of the right interphalangeal joint. Prominent navicular tuberosity.      ASSESSMENT:     Right foot pain  Type 2 diabetes mellitus without complication, with long-term current use of insulin (H)  Simental's neuroma of right foot  Pes planus of both feet  Hallux hammertoe, right  Gastrocnemius equinus, " "right  Chronic pain syndrome  Morbid obesity with BMI of 40.0-44.9, adult (H)     PLAN:  Reviewed patient's chart in epic.  Had a long discussion with patient. Talked about diabetes and foot care. This could be a cause of numbness as well as the multiple surgeries to the feet. Discussed that we can on reverse the numbness sensations.       We discussed causes and treatments of neuromas.  These included shoe gear, orthotics, metatarsal pads, cortisone injections, ice, physical therapy, and possible surgical removal.   Given that she has had 2 surgeries to remove neuromas, could be phantom pain. Talked about sclerosing alcohol injections. Discussed this is a series of injections every 2 weeks for up to 7 times to try to \"deaden\" the nerve to help with pain. She can't do iontophoresis as it reacts to her skin. Talked about new inserts.      She would like to start serial injections today. Follow up in 2 weeks for 4th injection.      Reviewed and discussed causes of capsulitis.  Talked about how the elongated 2nd metatarsal can cause more pressure to occur to the joint.  We talked about treatments such as padding, orthotics, injection, physical therapy, immobilization, MRI, and possible surgery to shorten metatarsal.      Will transition to shoes and custom inserts. Was given an order for this. If pain continues, recommend MRI to assess for tear in 2nd metatarsal phalangeal joint capsule.     Procedure:  After verbal consent, the foot was prepped and draped using sterile technique.  A mixture of 2cc of a mix of 1% lidocaine plain dehydrogentated alcohol was injected into the right 3rd intermetatarsal nerve. This is the 3rd injection in the series. Patient tolerated procedure and anesthesia well.    Suzanna Strong DPM, Podiatry/Foot and Ankle Surgery    Weight management plan: Patient was referred to their PCP to discuss a diet and exercise plan.    Recommended to Johnna Caballero to follow up with Primary Care provider " regarding elevated blood pressure.

## 2019-05-24 DIAGNOSIS — K21.9 GASTROESOPHAGEAL REFLUX DISEASE, ESOPHAGITIS PRESENCE NOT SPECIFIED: ICD-10-CM

## 2019-05-24 RX ORDER — LANSOPRAZOLE 30 MG/1
CAPSULE, DELAYED RELEASE ORAL
Qty: 90 CAPSULE | Refills: 1 | Status: SHIPPED | OUTPATIENT
Start: 2019-05-24 | End: 2019-11-12

## 2019-05-24 NOTE — TELEPHONE ENCOUNTER
"Requested Prescriptions   Pending Prescriptions Disp Refills     LANsoprazole (PREVACID) 30 MG DR capsule [Pharmacy Med Name: LANSOPRAZOLE 30MG CPDR]    Last Written Prescription Date:  10/5/2018  Last Fill Quantity: 90,  # refills: 1   Last office visit: 4/15/2019 with prescribing provider:  Linda Durant     Future Office Visit:   Next 5 appointments (look out 90 days)    Jun 04, 2019  1:15 PM CDT  Return Visit with Suzanna Strong DPM, Podiatry/Foot and Ankle Surgery  [facility] ([facility]) [address] [place] [id number]  [phone]   Aug 01, 2019  9:20 AM CDT  MyChart Long with Linda Durant MD  [facility] ([facility] [address] [place] [address]  [address]  [facility] [address]  [phone]          90 capsule 1     Sig: TAKE ONE CAPSULE BY MOUTH EVERY DAY TAKE 30-60 MIN BEFORE A MEAL       PPI Protocol Passed - 5/24/2019  9:56 AM        Passed - Not on Clopidogrel (unless Pantoprazole ordered)        Passed - No diagnosis of osteoporosis on record        Passed - Recent (12 mo) or future (30 days) visit within the authorizing provider's specialty     Patient had office visit in the last 12 months or has a visit in the next 30 days with authorizing provider or within the authorizing provider's specialty.  See \"Patient Info\" tab in inbasket, or \"Choose Columns\" in Meds & Orders section of the refill encounter.              Passed - Medication is active on med list        Passed - Patient is age 18 or older        Passed - No active pregnacy on record        Passed - No positive pregnancy test in past 12 months          "

## 2019-06-04 ENCOUNTER — OFFICE VISIT (OUTPATIENT)
Dept: PODIATRY | Facility: CLINIC | Age: 54
End: 2019-06-04
Payer: COMMERCIAL

## 2019-06-04 VITALS
DIASTOLIC BLOOD PRESSURE: 70 MMHG | WEIGHT: 285 LBS | BODY MASS INDEX: 44.73 KG/M2 | SYSTOLIC BLOOD PRESSURE: 122 MMHG | HEIGHT: 67 IN

## 2019-06-04 DIAGNOSIS — E11.42 DIABETIC POLYNEUROPATHY ASSOCIATED WITH TYPE 2 DIABETES MELLITUS (H): ICD-10-CM

## 2019-06-04 DIAGNOSIS — G89.4 CHRONIC PAIN SYNDROME: ICD-10-CM

## 2019-06-04 DIAGNOSIS — M21.42 PES PLANUS OF BOTH FEET: ICD-10-CM

## 2019-06-04 DIAGNOSIS — M79.671 RIGHT FOOT PAIN: Primary | ICD-10-CM

## 2019-06-04 DIAGNOSIS — E66.01 MORBID OBESITY WITH BMI OF 40.0-44.9, ADULT (H): ICD-10-CM

## 2019-06-04 DIAGNOSIS — M21.41 PES PLANUS OF BOTH FEET: ICD-10-CM

## 2019-06-04 DIAGNOSIS — M62.461 GASTROCNEMIUS EQUINUS OF RIGHT LOWER EXTREMITY: ICD-10-CM

## 2019-06-04 DIAGNOSIS — G57.61 MORTON'S NEUROMA OF RIGHT FOOT: ICD-10-CM

## 2019-06-04 PROCEDURE — 64455 NJX AA&/STRD PLTR COM DG NRV: CPT | Mod: RT | Performed by: PODIATRIST

## 2019-06-04 ASSESSMENT — MIFFLIN-ST. JEOR: SCORE: 1935.38

## 2019-06-04 NOTE — LETTER
"    6/4/2019         RE: Johnna Caballero  [address]  [facility] [address]        Dear Colleague,    Thank you for referring your patient, Johnna Caballero, to the [facility]. Please see a copy of my visit note below.    Podiatry / Foot and Ankle Surgery Progress Note    June 4, 2019    Subject: Patient was seen for follow up on serial injections to right foot. Notes it hasn't seem to have done much. Will try another injection today as this would be the 4th and we discussed 3-7. Pain is still 8/10.    Objective:  Vitals: /70   Ht 1.702 m (5' 7\")   Wt 129.3 kg (285 lb)   BMI 44.64 kg/m     BMI= Body mass index is 44.64 kg/m .    A1C: 8.7 (4/2019)     General appearance: Patient is alert and fully cooperative with history & exam.  No sign of distress is noted during the visit.     Psychiatric: Affect is pleasant & appropriate.  Patient appears motivated to improve health.     Respiratory: Breathing is regular & unlabored while sitting.     HEENT: Hearing is intact to spoken word.  Speech is clear.  No gross evidence of visual impairment that would impact ambulation.     Dermatologic: Skin is intact to both lower extremities without significant lesions, rash or abrasion.  No paronychia or evidence of soft tissue infection is noted.     Vascular: DP & PT pulses are intact & regular bilaterally.   edema and varicosities noted.  CFT and skin temperature is normal to both lower extremities.     Neurologic: Lower extremity sensation is diminished to feet.      Musculoskeletal: Patient is ambulatory without assistive device or brace. Decrease arch height. Pain on palpation of the right 3rd intermetatarsal space and to plantar distal right 2nd metatarsal head. No range of motion at right interphalangeal joint. Decrease dorsiflexion right ankle.     Radiographs:  I personally reviewed the xrays. Degenerative changes to midfoot. No fractures noted. Fusion of the right interphalangeal joint. " "Prominent navicular tuberosity.      ASSESSMENT:     Right foot pain  Type 2 diabetes mellitus without complication, with long-term current use of insulin (H)  Hola's neuroma of right foot  Pes planus of both feet  Hallux hammertoe, right  Gastrocnemius equinus, right  Chronic pain syndrome  Morbid obesity with BMI of 40.0-44.9, adult (H)     PLAN:  Reviewed patient's chart in epic.  Had a long discussion with patient. Talked about diabetes and foot care. This could be a cause of numbness as well as the multiple surgeries to the feet. Discussed that we can on reverse the numbness sensations.       We discussed causes and treatments of neuromas.  These included shoe gear, orthotics, metatarsal pads, cortisone injections, ice, physical therapy, and possible surgical removal.   Given that she has had 2 surgeries to remove neuromas, could be phantom pain. Talked about sclerosing alcohol injections. Discussed this is a series of injections every 2 weeks for up to 7 times to try to \"deaden\" the nerve to help with pain. She can't do iontophoresis as it reacts to her skin. Talked about new inserts.      She would like to start serial injections today. Follow up in 2 weeks. If no improvement, recommend MRI of the foot. .      Reviewed and discussed causes of capsulitis.  Talked about how the elongated 2nd metatarsal can cause more pressure to occur to the joint.  We talked about treatments such as padding, orthotics, injection, physical therapy, immobilization, MRI, and possible surgery to shorten metatarsal.      Will transition to shoes and custom inserts. Was given an order for this. If pain continues, recommend MRI to assess for tear in 2nd metatarsal phalangeal joint capsule.     Procedure:  After verbal consent, the foot was prepped and draped using sterile technique.  A mixture of 2cc of a mix of 1% lidocaine plain dehydrogentated alcright footohol was injected into the right 3rd intermetatarsal nerve. This is " the 4th injection in the series. Patient tolerated procedure and anesthesia well.      Suzanna Strong DPM, Podiatry/Foot and Ankle Surgery    Weight management plan: Patient was referred to their PCP to discuss a diet and exercise plan.    Recommended to Johnna Caballero to follow up with Primary Care provider regarding elevated blood pressure.      Again, thank you for allowing me to participate in the care of your patient.        Sincerely,        Suzanna Strong DPM, Podiatry/Foot and Ankle Surgery

## 2019-06-04 NOTE — PROGRESS NOTES
"Podiatry / Foot and Ankle Surgery Progress Note    June 4, 2019    Subject: Patient was seen for follow up on serial injections to right foot. Notes it hasn't seem to have done much. Will try another injection today as this would be the 4th and we discussed 3-7. Pain is still 8/10.    Objective:  Vitals: /70   Ht 1.702 m (5' 7\")   Wt 129.3 kg (285 lb)   BMI 44.64 kg/m    BMI= Body mass index is 44.64 kg/m .    A1C: 8.7 (4/2019)     General appearance: Patient is alert and fully cooperative with history & exam.  No sign of distress is noted during the visit.     Psychiatric: Affect is pleasant & appropriate.  Patient appears motivated to improve health.     Respiratory: Breathing is regular & unlabored while sitting.     HEENT: Hearing is intact to spoken word.  Speech is clear.  No gross evidence of visual impairment that would impact ambulation.     Dermatologic: Skin is intact to both lower extremities without significant lesions, rash or abrasion.  No paronychia or evidence of soft tissue infection is noted.     Vascular: DP & PT pulses are intact & regular bilaterally.   edema and varicosities noted.  CFT and skin temperature is normal to both lower extremities.     Neurologic: Lower extremity sensation is diminished to feet.      Musculoskeletal: Patient is ambulatory without assistive device or brace. Decrease arch height. Pain on palpation of the right 3rd intermetatarsal space and to plantar distal right 2nd metatarsal head. No range of motion at right interphalangeal joint. Decrease dorsiflexion right ankle.     Radiographs:  I personally reviewed the xrays. Degenerative changes to midfoot. No fractures noted. Fusion of the right interphalangeal joint. Prominent navicular tuberosity.      ASSESSMENT:     Right foot pain  Type 2 diabetes mellitus without complication, with long-term current use of insulin (H)  Simental's neuroma of right foot  Pes planus of both feet  Hallux hammertoe, " "right  Gastrocnemius equinus, right  Chronic pain syndrome  Morbid obesity with BMI of 40.0-44.9, adult (H)     PLAN:  Reviewed patient's chart in epic.  Had a long discussion with patient. Talked about diabetes and foot care. This could be a cause of numbness as well as the multiple surgeries to the feet. Discussed that we can on reverse the numbness sensations.       We discussed causes and treatments of neuromas.  These included shoe gear, orthotics, metatarsal pads, cortisone injections, ice, physical therapy, and possible surgical removal.   Given that she has had 2 surgeries to remove neuromas, could be phantom pain. Talked about sclerosing alcohol injections. Discussed this is a series of injections every 2 weeks for up to 7 times to try to \"deaden\" the nerve to help with pain. She can't do iontophoresis as it reacts to her skin. Talked about new inserts.      She would like to start serial injections today. Follow up in 2 weeks. If no improvement, recommend MRI of the foot. .      Reviewed and discussed causes of capsulitis.  Talked about how the elongated 2nd metatarsal can cause more pressure to occur to the joint.  We talked about treatments such as padding, orthotics, injection, physical therapy, immobilization, MRI, and possible surgery to shorten metatarsal.      Will transition to shoes and custom inserts. Was given an order for this. If pain continues, recommend MRI to assess for tear in 2nd metatarsal phalangeal joint capsule.     Procedure:  After verbal consent, the foot was prepped and draped using sterile technique.  A mixture of 2cc of a mix of 1% lidocaine plain dehydrogentated alcright footohol was injected into the right 3rd intermetatarsal nerve. This is the 4th injection in the series. Patient tolerated procedure and anesthesia well.      Suzanna Strong DPM, Podiatry/Foot and Ankle Surgery    Weight management plan: Patient was referred to their PCP to discuss a diet and exercise " plan.    Recommended to Johnna Caballero to follow up with Primary Care provider regarding elevated blood pressure.

## 2019-06-04 NOTE — PATIENT INSTRUCTIONS
Thank you for choosing [facility]!    DR. [facility]'S CLINIC SCHEDULE  MONDAY AM - [place] TUESDAY - [facility]   [address] [address] [place] [address] [place] [address]   [phone] / [phone] / [phone]       WEDNESDAY - [place] FRIDAY AM - [facility]   [address]   [place] [address] [place] [address]   [phone] / [phone]       FRIDAY PM - [place] SCHEDULE SURGERY: [phone]   [address] BILLING QUESTIONS: [phone]   [place] [address] AFTER HOURS: [phone]   [phone] / [phone] APPOINTMENTS: [phone]     [employer] ([employer]) [phone]       Follow up in 2 weeks        BODY WEIGHT AND YOUR FEET  The following information is included in the after visit summary for all patients. Body weight can be a sensitive issue to discuss in clinic, but we think the following information is very important. Although we focus on the feet and ankles, we do support the overall health of our patients.     Many things can cause foot and ankle problems. Foot structure, activity level, foot mechanics and injuries are common causes of pain. One very important issue that often goes unmentioned, is body weight. Extra weight can cause increased stress on muscles, ligaments, bones and tendons. Sometimes just a few extra pounds is all it takes to put one over her/his threshold. Without reducing that stress, it can be difficult to alleviate pain. As Foot & Ankle specialists, our job is addressing the lower extremity problem and possible causes. Regarding extra body weight, we encourage patients to discuss diet and weight management plans with their primary care doctors. It is this team approach that gives you the best opportunity for pain relief and getting you back on your feet.      [facility] has a Comprehensive Weight Management Program. This program  includes counseling, education, non-surgical and surgical approaches to weight loss. If you are interested in learning more either talk to you primary care provider or call [phone].

## 2019-06-10 ENCOUNTER — TRANSFERRED RECORDS (OUTPATIENT)
Dept: HEALTH INFORMATION MANAGEMENT | Facility: CLINIC | Age: 54
End: 2019-06-10

## 2019-06-10 ENCOUNTER — ANCILLARY PROCEDURE (OUTPATIENT)
Dept: MAMMOGRAPHY | Facility: CLINIC | Age: 54
End: 2019-06-10
Attending: PEDIATRICS
Payer: COMMERCIAL

## 2019-06-10 DIAGNOSIS — Z12.31 VISIT FOR SCREENING MAMMOGRAM: ICD-10-CM

## 2019-06-10 PROCEDURE — 77067 SCR MAMMO BI INCL CAD: CPT | Mod: TC

## 2019-06-10 PROCEDURE — 77063 BREAST TOMOSYNTHESIS BI: CPT | Mod: TC

## 2019-06-18 ENCOUNTER — TELEPHONE (OUTPATIENT)
Dept: PODIATRY | Facility: CLINIC | Age: 54
End: 2019-06-18

## 2019-06-18 DIAGNOSIS — M79.671 RIGHT FOOT PAIN: Primary | ICD-10-CM

## 2019-06-18 NOTE — TELEPHONE ENCOUNTER
Spoke to pt, advised that order had been faxed to preferred location.        Jhoan Villavicencio on 6/18/2019 at 9:45 AM

## 2019-06-18 NOTE — TELEPHONE ENCOUNTER
Patient called and would like Dr Strong to place the MRI for her    Please send it to [facility] in [place] that is where patient goes.     Shalini Barriga/MAGO

## 2019-06-25 ENCOUNTER — TRANSFERRED RECORDS (OUTPATIENT)
Dept: HEALTH INFORMATION MANAGEMENT | Facility: CLINIC | Age: 54
End: 2019-06-25

## 2019-06-26 ENCOUNTER — TELEPHONE (OUTPATIENT)
Dept: PODIATRY | Facility: CLINIC | Age: 54
End: 2019-06-26

## 2019-06-26 DIAGNOSIS — M79.671 RIGHT FOOT PAIN: Primary | ICD-10-CM

## 2019-06-26 DIAGNOSIS — M19.071 PRIMARY OSTEOARTHRITIS OF RIGHT FOOT: ICD-10-CM

## 2019-06-26 NOTE — TELEPHONE ENCOUNTER
MRI of right foot does not show any neuroma. No fractures or tendon tears. Shows some arthritis to the foot and that is all.     Nothing to surgically remove based on MRI. For arthritis, all I have is topical pain cream or possible xray guided injection of cortisone to arthritic joints for pain relief. Could also do a pain management referral to try to help better with arthritic pain long term.      Please let patient know.     Suzanna Strong DPM

## 2019-06-26 NOTE — TELEPHONE ENCOUNTER
I called and spoke with patient and informed her of providers message with results. Patient is not interested in the other options given.  She would like to get the xray guided cortisone injection.     [facility]:       [phone]      [address] [place] [place] [address]   Number and information above given to the patient.     Will route back to provider for orders.    Kendy Shore, CMA

## 2019-07-03 ENCOUNTER — MYC MEDICAL ADVICE (OUTPATIENT)
Dept: PEDIATRICS | Facility: CLINIC | Age: 54
End: 2019-07-03

## 2019-07-03 DIAGNOSIS — E11.9 TYPE 2 DIABETES MELLITUS WITHOUT COMPLICATION, WITH LONG-TERM CURRENT USE OF INSULIN (H): Primary | ICD-10-CM

## 2019-07-03 DIAGNOSIS — Z79.4 TYPE 2 DIABETES MELLITUS WITHOUT COMPLICATION, WITH LONG-TERM CURRENT USE OF INSULIN (H): Primary | ICD-10-CM

## 2019-07-05 RX ORDER — FLASH GLUCOSE SENSOR
1 KIT MISCELLANEOUS
Qty: 6 EACH | Refills: 0 | Status: SHIPPED | OUTPATIENT
Start: 2019-07-05 | End: 2019-10-15

## 2019-07-05 NOTE — TELEPHONE ENCOUNTER
Pt sent a [facility] message requesting rx for freestyle jayla sensors.    Rx sent to pharmacy.    Florecita Godwin, RN - [facility]

## 2019-07-09 ENCOUNTER — TELEPHONE (OUTPATIENT)
Dept: PODIATRY | Facility: CLINIC | Age: 54
End: 2019-07-09

## 2019-07-09 NOTE — TELEPHONE ENCOUNTER
Patient called to [facility] clinic today, was given information by TC. Patient requesting a call back in regards to MRI. Will call patient today.        Jhoan Villavicencio on 7/9/2019 at 1:46 PM

## 2019-07-09 NOTE — TELEPHONE ENCOUNTER
Called patient, stated that she had been given MRI results from someone previously. Was not given a contact number for the x-ray guided injections. Orders had previously been placed. Gave patient contact number for [phone].    Pt satisfied with plan, will call if there are any issues with placed order or scheduling.        Jhoan Villavicencio on 2019 at 2:26 PM

## 2019-07-11 ENCOUNTER — TRANSFERRED RECORDS (OUTPATIENT)
Dept: HEALTH INFORMATION MANAGEMENT | Facility: CLINIC | Age: 54
End: 2019-07-11

## 2019-07-17 ENCOUNTER — HOSPITAL ENCOUNTER (OUTPATIENT)
Dept: GENERAL RADIOLOGY | Facility: CLINIC | Age: 54
Discharge: HOME | End: 2019-07-17
Attending: PODIATRIST | Admitting: PODIATRIST
Payer: COMMERCIAL

## 2019-07-17 VITALS — HEART RATE: 69 BPM | DIASTOLIC BLOOD PRESSURE: 89 MMHG | SYSTOLIC BLOOD PRESSURE: 150 MMHG

## 2019-07-17 DIAGNOSIS — M79.671 RIGHT FOOT PAIN: ICD-10-CM

## 2019-07-17 DIAGNOSIS — M19.071 PRIMARY OSTEOARTHRITIS OF RIGHT FOOT: ICD-10-CM

## 2019-07-17 PROCEDURE — 25000128 H RX IP 250 OP 636

## 2019-07-17 PROCEDURE — 20605 DRAIN/INJ JOINT/BURSA W/O US: CPT | Mod: RT

## 2019-07-17 PROCEDURE — 25000125 ZZHC RX 250: Performed by: PODIATRIST

## 2019-07-17 RX ORDER — BUPIVACAINE HYDROCHLORIDE 5 MG/ML
INJECTION, SOLUTION EPIDURAL; INTRACAUDAL
Status: COMPLETED
Start: 2019-07-17 | End: 2019-07-17

## 2019-07-17 RX ORDER — TRIAMCINOLONE ACETONIDE 40 MG/ML
INJECTION, SUSPENSION INTRA-ARTICULAR; INTRAMUSCULAR
Status: COMPLETED
Start: 2019-07-17 | End: 2019-07-17

## 2019-07-17 RX ORDER — NICOTINE POLACRILEX 4 MG
15-30 LOZENGE BUCCAL
Status: CANCELLED | OUTPATIENT
Start: 2019-07-17

## 2019-07-17 RX ORDER — DEXTROSE MONOHYDRATE 25 G/50ML
25-50 INJECTION, SOLUTION INTRAVENOUS
Status: CANCELLED | OUTPATIENT
Start: 2019-07-17

## 2019-07-17 RX ADMIN — TRIAMCINOLONE ACETONIDE 40 MG: 40 INJECTION, SUSPENSION INTRA-ARTICULAR; INTRAMUSCULAR at 10:32

## 2019-07-17 RX ADMIN — LIDOCAINE HYDROCHLORIDE 30 ML: 10 INJECTION, SOLUTION EPIDURAL; INFILTRATION; INTRACAUDAL; PERINEURAL at 10:31

## 2019-07-17 RX ADMIN — BUPIVACAINE HYDROCHLORIDE 150 MG: 5 INJECTION, SOLUTION EPIDURAL; INTRACAUDAL at 10:32

## 2019-07-17 NOTE — PROGRESS NOTES
Pt was in Radiology today for a Right foot joint x 2 steroid injection. Pt tolerated ortho procedure well. Pre procedure pain was 5 post procedure pain level 1.Procedure was completed by LUCY SHAH. There were no complications during this procedure. Pt verbalized understanding of written and verbal instructions and left department in stable and satisfactory condition with. There is no evidence of bleeding or any other complications upon discharge.

## 2019-07-17 NOTE — PROGRESS NOTES
RADIOLOGY PROCEDURE NOTE  Patient name: Johnna Caballero  MRN: [id number]  : 1965    Pre-procedure diagnosis: right foot pain  Post-procedure diagnosis: Same    Procedure Date/Time: 2019  11:01 AM  Procedure: right 4th metatarsal cuboid and naviculocuneiform steroid injection  Estimated blood loss: None  Specimen(s) collected with description: none  The patient tolerated the procedure well with no immediate complications.  Significant findings:none    See imaging dictation for procedural details.    Provider name: Zenia Reardon  Assistant(s):None

## 2019-08-01 ENCOUNTER — OFFICE VISIT (OUTPATIENT)
Dept: PEDIATRICS | Facility: CLINIC | Age: 54
End: 2019-08-01
Payer: COMMERCIAL

## 2019-08-01 VITALS
WEIGHT: 280.1 LBS | DIASTOLIC BLOOD PRESSURE: 80 MMHG | HEART RATE: 78 BPM | RESPIRATION RATE: 16 BRPM | BODY MASS INDEX: 43.87 KG/M2 | OXYGEN SATURATION: 97 % | SYSTOLIC BLOOD PRESSURE: 124 MMHG | TEMPERATURE: 98.1 F

## 2019-08-01 DIAGNOSIS — E11.65 TYPE 2 DIABETES MELLITUS WITH HYPERGLYCEMIA, WITH LONG-TERM CURRENT USE OF INSULIN (H): ICD-10-CM

## 2019-08-01 DIAGNOSIS — J45.20 INTERMITTENT ASTHMA, UNCOMPLICATED: ICD-10-CM

## 2019-08-01 DIAGNOSIS — Z79.4 TYPE 2 DIABETES MELLITUS WITH HYPERGLYCEMIA, WITH LONG-TERM CURRENT USE OF INSULIN (H): ICD-10-CM

## 2019-08-01 DIAGNOSIS — M19.91 PRIMARY OSTEOARTHRITIS, UNSPECIFIED SITE: ICD-10-CM

## 2019-08-01 DIAGNOSIS — E66.01 MORBID OBESITY WITH BMI OF 40.0-44.9, ADULT (H): ICD-10-CM

## 2019-08-01 DIAGNOSIS — D17.24 LIPOMA OF LEFT THIGH: ICD-10-CM

## 2019-08-01 DIAGNOSIS — E11.65 TYPE 2 DIABETES MELLITUS WITH HYPERGLYCEMIA, WITH LONG-TERM CURRENT USE OF INSULIN (H): Primary | ICD-10-CM

## 2019-08-01 DIAGNOSIS — R10.11 RUQ ABDOMINAL PAIN: ICD-10-CM

## 2019-08-01 DIAGNOSIS — F33.0 MAJOR DEPRESSIVE DISORDER, RECURRENT EPISODE, MILD (H): ICD-10-CM

## 2019-08-01 DIAGNOSIS — I10 ESSENTIAL HYPERTENSION: ICD-10-CM

## 2019-08-01 DIAGNOSIS — Z79.4 LONG TERM CURRENT USE OF INSULIN (H): ICD-10-CM

## 2019-08-01 DIAGNOSIS — G89.4 CHRONIC PAIN SYNDROME: ICD-10-CM

## 2019-08-01 DIAGNOSIS — Z79.4 TYPE 2 DIABETES MELLITUS WITH HYPERGLYCEMIA, WITH LONG-TERM CURRENT USE OF INSULIN (H): Primary | ICD-10-CM

## 2019-08-01 LAB
CREAT UR-MCNC: 113 MG/DL
HBA1C MFR BLD: 8 % (ref 0–5.6)
MICROALBUMIN UR-MCNC: 7 MG/L
MICROALBUMIN/CREAT UR: 5.9 MG/G CR (ref 0–25)

## 2019-08-01 PROCEDURE — 36415 COLL VENOUS BLD VENIPUNCTURE: CPT | Performed by: PEDIATRICS

## 2019-08-01 PROCEDURE — 82043 UR ALBUMIN QUANTITATIVE: CPT | Performed by: PEDIATRICS

## 2019-08-01 PROCEDURE — 99214 OFFICE O/P EST MOD 30 MIN: CPT | Performed by: PEDIATRICS

## 2019-08-01 PROCEDURE — 83036 HEMOGLOBIN GLYCOSYLATED A1C: CPT | Performed by: PEDIATRICS

## 2019-08-01 RX ORDER — LOSARTAN POTASSIUM AND HYDROCHLOROTHIAZIDE 25; 100 MG/1; MG/1
1 TABLET ORAL DAILY
Qty: 90 TABLET | Refills: 3 | Status: SHIPPED | OUTPATIENT
Start: 2019-08-01 | End: 2020-09-09

## 2019-08-01 RX ORDER — OXYCODONE AND ACETAMINOPHEN 5; 325 MG/1; MG/1
1-2 TABLET ORAL EVERY 4 HOURS PRN
Qty: 90 TABLET | Refills: 0 | Status: SHIPPED | OUTPATIENT
Start: 2019-08-01 | End: 2019-08-29

## 2019-08-01 ASSESSMENT — ANXIETY QUESTIONNAIRES
IF YOU CHECKED OFF ANY PROBLEMS ON THIS QUESTIONNAIRE, HOW DIFFICULT HAVE THESE PROBLEMS MADE IT FOR YOU TO DO YOUR WORK, TAKE CARE OF THINGS AT HOME, OR GET ALONG WITH OTHER PEOPLE: SOMEWHAT DIFFICULT
6. BECOMING EASILY ANNOYED OR IRRITABLE: SEVERAL DAYS
3. WORRYING TOO MUCH ABOUT DIFFERENT THINGS: SEVERAL DAYS
7. FEELING AFRAID AS IF SOMETHING AWFUL MIGHT HAPPEN: SEVERAL DAYS
GAD7 TOTAL SCORE: 5
2. NOT BEING ABLE TO STOP OR CONTROL WORRYING: SEVERAL DAYS
1. FEELING NERVOUS, ANXIOUS, OR ON EDGE: SEVERAL DAYS
5. BEING SO RESTLESS THAT IT IS HARD TO SIT STILL: NOT AT ALL

## 2019-08-01 ASSESSMENT — PATIENT HEALTH QUESTIONNAIRE - PHQ9: 5. POOR APPETITE OR OVEREATING: NOT AT ALL

## 2019-08-01 NOTE — PROGRESS NOTES
Subjective     Johnna Caballero is a 53 year old female who presents to clinic today for the following health issues:    HPI     Diabetes Follow-up      How often are you checking your blood sugar? Two times daily    What time of day are you checking your blood sugars (select all that apply)?  Before meals    Have you had any blood sugars above 200?  Yes     Have you had any blood sugars below 70?  Yes a few     What symptoms do you notice when your blood sugar is low?  Lethargy    What concerns do you have today about your diabetes? None     Do you have any of these symptoms? (Select all that apply)  No numbness or tingling in feet.  No redness, sores or blisters on feet.  No complaints of excessive thirst.  No reports of blurry vision.  No significant changes to weight.     Have you had a diabetic eye exam in the last 12 months? Yes- Date of last eye exam: 6/10/2019    Using the jayla freestyle and this has been very helpful.  Had some lows 3 months ago, adjusting diet based on jayla and has found this very helpful.    Diabetes Management Resources    Hyperlipidemia Follow-Up      Are you having any of the following symptoms? (Select all that apply)  No complaints of shortness of breath, chest pain or pressure.  No increased sweating or nausea with activity.  No left-sided neck or arm pain.  No complaints of pain in calves when walking 1-2 blocks.    Are you regularly taking any medication or supplement to lower your cholesterol?   No    Are you having muscle aches or other side effects that you think could be caused by your cholesterol lowering medication?  No    Hypertension Follow-up      Do you check your blood pressure regularly outside of the clinic? No     Are you following a low salt diet? Yes    Are your blood pressures ever more than 140 on the top number (systolic) OR more   than 90 on the bottom number (diastolic), for example 140/90? No     No new cardiac symptoms    BP Readings from Last 2 Encounters:    08/01/19 124/80   07/17/19 150/89     Hemoglobin A1C (%)   Date Value   08/01/2019 8.0 (H)   04/15/2019 8.7 (H)     LDL Cholesterol Calculated (mg/dL)   Date Value   04/15/2019 112 (H)   04/06/2018 104 (H)       Amount of exercise or physical activity: 2-3 days/week for an average of 45-60 minutes    Problems taking medications regularly: No    Medication side effects: none    Diet: regular (no restrictions)      CBD oil - mood is better with it and pain control improves with it - has allowed her to use less opioid pain medication (last refilled in April)    Left rib fracture - fell off a ladder about 6 weeks ago -  over left lower rib cage, but improving.    Recent foot injections - alcohol injections were not helpful, foot MRI with arthritis, steroid injection very helpful thereafter - following with Dr Strong.    Right sided pain abdominal pain - present for about one month - eating doesn't make any difference.  Constant pain.  Gallbladder has been previously removed.  No N/V/D.  No changes in stool or blood/mucous in stool.   Weight stable.    Weight down slightly - has been working on limiting portions and healthy eating.    Mood has been ok - has suffered multiple family losses this year with another aunt and uncle recently passing away.  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    Reviewed and updated as needed this visit by Provider         Review of Systems   ROS COMP: Constitutional, cardiovascular, pulmonary, gi and neuro systems are negative, except as otherwise noted.      Objective    /80 (BP Location: Right arm, Patient Position: Sitting, Cuff Size: Adult Large)   Pulse 78   Temp 98.1  F (36.7  C) (Oral)   Resp 16   Wt 127.1 kg (280 lb 1.6 oz)   SpO2 97%   BMI 43.87 kg/m    Body mass index is 43.87 kg/m .  Physical Exam    GENERAL: healthy, alert and no distress  RESP: lungs clear to auscultation - no rales, rhonchi or wheezes  CV: regular rate and rhythm, normal S1 S2, no S3 or S4, no murmur, click or rub, no peripheral edema and peripheral pulses strong  ABDOMEN: bruise over left lower ribcage in midaxillary line - tender to palpation, +BS, soft, tender to palpation over entire upper right quadrant, no rebound or guarding  PSYCH: mentation appears normal, affect normal/bright    Diagnostic Test Results:  Results for orders placed or performed in visit on 08/01/19 (from the past 24 hour(s))   Albumin Random Urine Quantitative with Creat Ratio   Result Value Ref Range    Creatinine Urine 113 mg/dL    Albumin Urine mg/L 7 mg/L    Albumin Urine mg/g Cr 5.90 0 - 25 mg/g Cr   **A1C FUTURE anytime   Result Value Ref Range    Hemoglobin A1C 8.0 (H) 0 - 5.6 %           Assessment & Plan       ICD-10-CM    1. Type 2 diabetes mellitus with hyperglycemia, with long-term current use of insulin (H) E11.65 TSH WITH FREE T4 REFLEX    Z79.4 losartan-hydrochlorothiazide (HYZAAR) 100-25 MG tablet     **A1C FUTURE anytime     **Comprehensive metabolic panel FUTURE anytime    Control improving, plan to continue to work with freestyle jayla to adjust diet, continue current medications.   2. Essential hypertension I10 losartan-hydrochlorothiazide (HYZAAR) 100-25 MG tablet  Well controlled, continue current medications     3. Lipoma of left thigh D17.24 oxyCODONE-acetaminophen (PERCOCET) 5-325 MG tablet   4. Chronic pain syndrome G89.4 oxyCODONE-acetaminophen (PERCOCET) 5-325 MG tablet  Pain medication has decreased with use of CBD oil.   Last refill in April.   Refilling and using responsibly.  No new side effects.     5. Primary osteoarthritis, unspecified site M19.91 oxyCODONE-acetaminophen (PERCOCET) 5-325 MG tablet   6. RUQ abdominal pain R10.11 US Abdomen Complete  Wonder if this may be related to injury of fall off ladder 6 weeks ago.  Plan  "imaging today and to alert me if worsening.   7. Morbid obesity with BMI of 40.0-44.9, adult (H) E66.01 Encouraged in her weight loss efforts    Z68.41    8. Major depressive disorder, recurrent episode, mild (H) F33.0 Doing well, follow   9. Intermittent asthma, uncomplicated J45.20 Well controlled, ACT today of 25, follow   10. Long term current use of insulin (H) Z79.4         BMI:   Estimated body mass index is 43.87 kg/m  as calculated from the following:    Height as of 6/4/19: 1.702 m (5' 7\").    Weight as of this encounter: 127.1 kg (280 lb 1.6 oz).   Weight management plan: Discussed healthy diet and exercise guidelines        See Patient Instructions    Linda Durant MD  [facility]    "

## 2019-08-01 NOTE — PATIENT INSTRUCTIONS
Continue your efforts with dietary changes and the Elijah Freestyle    Keep diabetes and blood pressure meds the same for now    Come back to see me in 3 months with labs    [facility] Radiology [phone] - call to set up RUQ ultrasound - alert me if this is getting worse

## 2019-08-02 ASSESSMENT — ANXIETY QUESTIONNAIRES: GAD7 TOTAL SCORE: 5

## 2019-08-02 ASSESSMENT — ASTHMA QUESTIONNAIRES: ACT_TOTALSCORE: 25

## 2019-08-09 ENCOUNTER — HOSPITAL ENCOUNTER (OUTPATIENT)
Dept: ULTRASOUND IMAGING | Facility: CLINIC | Age: 54
Discharge: HOME | End: 2019-08-09
Attending: PEDIATRICS | Admitting: PEDIATRICS
Payer: COMMERCIAL

## 2019-08-09 DIAGNOSIS — R10.11 RUQ ABDOMINAL PAIN: ICD-10-CM

## 2019-08-09 PROCEDURE — 76700 US EXAM ABDOM COMPLETE: CPT

## 2019-08-29 ENCOUNTER — MYC REFILL (OUTPATIENT)
Dept: PEDIATRICS | Facility: CLINIC | Age: 54
End: 2019-08-29

## 2019-08-29 DIAGNOSIS — G89.4 CHRONIC PAIN SYNDROME: ICD-10-CM

## 2019-08-29 DIAGNOSIS — M19.91 PRIMARY OSTEOARTHRITIS, UNSPECIFIED SITE: ICD-10-CM

## 2019-08-29 DIAGNOSIS — D17.24 LIPOMA OF LEFT THIGH: ICD-10-CM

## 2019-08-29 DIAGNOSIS — G89.29 CHRONIC BILATERAL LOW BACK PAIN WITHOUT SCIATICA: ICD-10-CM

## 2019-08-29 DIAGNOSIS — M54.50 CHRONIC BILATERAL LOW BACK PAIN WITHOUT SCIATICA: ICD-10-CM

## 2019-08-29 RX ORDER — OXYCODONE AND ACETAMINOPHEN 5; 325 MG/1; MG/1
1-2 TABLET ORAL EVERY 4 HOURS PRN
Qty: 90 TABLET | Refills: 0 | Status: SHIPPED | OUTPATIENT
Start: 2019-08-29 | End: 2019-11-14

## 2019-08-29 RX ORDER — CELECOXIB 200 MG/1
200 CAPSULE ORAL DAILY
Qty: 90 CAPSULE | Refills: 0 | Status: SHIPPED | OUTPATIENT
Start: 2019-08-29 | End: 2019-11-17

## 2019-09-06 ENCOUNTER — MYC REFILL (OUTPATIENT)
Dept: PEDIATRICS | Facility: CLINIC | Age: 54
End: 2019-09-06

## 2019-09-06 DIAGNOSIS — G89.29 CHRONIC BILATERAL LOW BACK PAIN WITHOUT SCIATICA: ICD-10-CM

## 2019-09-06 DIAGNOSIS — M54.50 CHRONIC BILATERAL LOW BACK PAIN WITHOUT SCIATICA: ICD-10-CM

## 2019-09-06 NOTE — TELEPHONE ENCOUNTER
"Requested Prescriptions   Pending Prescriptions Disp Refills     celecoxib (CELEBREX) 200 MG capsule  Last Written Prescription Date:  08/29/2019  Last Fill Quantity: 90 capsule,  # refills: 0   Last Office Visit: 8/1/2019 Linda Durant MD   Future Office Visit:      90 capsule 0     Sig: Take 1 capsule (200 mg) by mouth daily       NSAID Medications Passed - 9/6/2019  3:22 PM        Passed - Blood pressure under 140/90 in past 12 months     BP Readings from Last 3 Encounters:   08/01/19 124/80   07/17/19 150/89   06/04/19 122/70             Passed - Normal ALT on file in past 12 months     Recent Labs   Lab Test 04/15/19  1018   ALT 25             Passed - Normal AST on file in past 12 months     Recent Labs   Lab Test 04/15/19  1018   AST 18             Passed - Recent (12 mo) or future (30 days) visit within the authorizing provider's specialty     Patient had office visit in the last 12 months or has a visit in the next 30 days with authorizing provider or within the authorizing provider's specialty.  See \"Patient Info\" tab in inbasket, or \"Choose Columns\" in Meds & Orders section of the refill encounter.              Passed - Patient is age 6-64 years        Passed - Normal CBC on file in past 12 months     Recent Labs   Lab Test 12/26/18  1211   WBC 8.5   RBC 4.49   HGB 12.6   HCT 39.0                Passed - Medication is active on med list        Passed - No active pregnancy on record        Passed - Normal serum creatinine on file in past 12 months     Recent Labs   Lab Test 04/15/19  1018  04/20/12  0739   CR 0.65   < >  --    CREAT  --   --  0.6    < > = values in this interval not displayed.             Passed - No positive pregnancy test in past 12 months          "

## 2019-09-09 RX ORDER — CELECOXIB 200 MG/1
200 CAPSULE ORAL DAILY
Qty: 90 CAPSULE | Refills: 0 | OUTPATIENT
Start: 2019-09-09

## 2019-09-09 NOTE — TELEPHONE ENCOUNTER
Refused noting to early to fill and to see the 8/29/19 Rx for 90 day.    Gracie Dickey RN, [facility]

## 2019-09-12 ENCOUNTER — TRANSFERRED RECORDS (OUTPATIENT)
Dept: HEALTH INFORMATION MANAGEMENT | Facility: CLINIC | Age: 54
End: 2019-09-12

## 2019-09-24 ENCOUNTER — TRANSFERRED RECORDS (OUTPATIENT)
Dept: HEALTH INFORMATION MANAGEMENT | Facility: CLINIC | Age: 54
End: 2019-09-24

## 2019-10-03 ENCOUNTER — HEALTH MAINTENANCE LETTER (OUTPATIENT)
Age: 54
End: 2019-10-03

## 2019-10-14 ENCOUNTER — TRANSFERRED RECORDS (OUTPATIENT)
Dept: HEALTH INFORMATION MANAGEMENT | Facility: CLINIC | Age: 54
End: 2019-10-14

## 2019-10-17 ENCOUNTER — TELEPHONE (OUTPATIENT)
Dept: PEDIATRICS | Facility: CLINIC | Age: 54
End: 2019-10-17

## 2019-10-17 NOTE — TELEPHONE ENCOUNTER
PA Initiation    Medication: FreeStyle Elijah 14 Day [place] -   Insurance Company: [employer] - Phone [phone] Fax [phone]  Pharmacy Filling the Rx: [facility] [facility] - [place], [place] - [address] [facility]  Filling Pharmacy Phone: [phone]  Filling Pharmacy Fax: [phone]  Start Date: 10/17/2019

## 2019-10-17 NOTE — TELEPHONE ENCOUNTER
PA Initiation    Medication: FreeStyle Elijah 14 Day [place] -   Insurance Company: [employer] - Phone [phone] Fax [phone]  Pharmacy Filling the Rx: [facility] - [place], [place] - [address] [facility]  Filling Pharmacy Phone: [phone]  Filling Pharmacy Fax: [phone]  Start Date: 10/17/2019

## 2019-10-17 NOTE — LETTER
[facility]  [address] [place] [address]  [address]  [place] [address] [phone]          October 30, 2019    RE:  Johnna Caballero                                                                                                                                                       PO   [facility] [address]            To whom it may concern:    Johnna Caballero is under my professional care for type 2 diabetes.     I am writing to appeal your recent decision to deny coverage of her FreeStyle Elijah [place].  She has demonstrated poor diabetes control and significant variability in glucose levels requiring frequent medication dose adjustments over the last two years.    Please cover her Elijah [place] as this has been an extremely valuable tool for her in improving glycemic control safely.    Sincerely,          Linda Durant MD  Internal Medicine - Pediatrics

## 2019-10-17 NOTE — LETTER
[facility]  [address]  [address]  [place] [address] [phone]          October 30, 2019    RE:  Johnna Caballero                                                                                                                                                       PO   [facility] [address]            To whom it may concern:    Johnna Caballero is under my professional care for type 2 diabetes.   I am writing to appeal your recent decision to deny coverage of her FreeStyle Elijah Sensor.  She has demonstrated poor diabetes control and significant variability in glucose levels requiring frequent medication dose adjustments over the last two years.    Please cover her Elijah Sensor as this has been an extremely valuable tool for her in improving glycemic control safely.    Sincerely,        Linda Durant MD  Internal Medicine - Pediatrics

## 2019-10-17 NOTE — TELEPHONE ENCOUNTER
Copied from [employer], PA needed on Lontra HELENE:     Order resigned.  Per patient, needs PA - forwarded to PA team.        Linda Durant MD  Internal Medicine - Pediatrics

## 2019-10-21 ENCOUNTER — TELEPHONE (OUTPATIENT)
Dept: PEDIATRICS | Facility: CLINIC | Age: 54
End: 2019-10-21

## 2019-10-21 NOTE — TELEPHONE ENCOUNTER
Reason for call:  Other   Patient called regarding (reason for call): appointment  Additional comments:     Patient needs 2 pre ops has surgery Dec 16th and Jan 7, please call to assist in patient getting scheduled  Only wants to see pcp     Phone number to reach patient:  Cell number on file:    Telephone Information:   Mobile [phone]       Best Time:  Any     Can we leave a detailed message on this number?  YES

## 2019-10-21 NOTE — TELEPHONE ENCOUNTER
OK to put in for a JEN spot on December 12th.    Linda Durant MD  Internal Medicine - Pediatrics

## 2019-10-23 NOTE — TELEPHONE ENCOUNTER
"PRIOR AUTHORIZATION DENIED    Medication: FreeStyle Elijah 14 Day Sensor - DENIED    Denial Date: 10/19/2019    Denial Rational:     Freestyle Elijah 14 Day Sensor is covered if one of the following criteria is met:  (1) Patients receiving short-acting insulin (such as Humalog) or a sulfonylurea (such as glipizide);   OR  (2) Patients at risk of hypoglycemia (low blood sugar):    (a) patients with severe hypoglycemia (needing assistance) or frequent mild to moderate hypoglycemia (BG <70 mg/dL);    (b) documentation of considerable variability in glucose levels requiring frequent medication dose adjustments;  OR   (c) documentation of barriers to self-monitoring of blood glucose (such as visual  impairment, cognitive issues, or caregiver assisting with care)        Appeal Information:     **Please advise if appeal is necessary and place a letter of medical necessity under the \"letters\" tab in patient's chart and route back to [facility] [[id number]]        "

## 2019-10-23 NOTE — TELEPHONE ENCOUNTER
"PRIOR AUTHORIZATION DENIED    Medication: FreeStyle Elijah 14 Day [place] - DENIED    Denial Date: 10/19/2019    Denial Rational:     Freestyle Elijah 14 Day Sensor is covered if one of the following criteria is met:  (1) Patients receiving short-acting insulin (such as Humalog) or a sulfonylurea (such as glipizide);   OR  (2) Patients at risk of hypoglycemia (low blood sugar):               (a) patients with severe hypoglycemia (needing assistance) or frequent mild to moderate hypoglycemia (BG <70 mg/dL);               (b) documentation of considerable variability in glucose levels requiring frequent medication dose adjustments;  OR              (c) documentation of barriers to self-monitoring of blood glucose (such as visual  impairment, cognitive issues, or caregiver assisting with care)        Appeal Information:     **Please advise if appeal is necessary and place a letter of medical necessity under the \"letters\" tab in patient's chart and route back to [facility] [[id number]]        "

## 2019-10-30 NOTE — TELEPHONE ENCOUNTER
Appeal letter written.  Forwarded back to PABLO jacobo.    Linda Durant MD  Internal Medicine - Pediatrics

## 2019-10-31 ENCOUNTER — HEALTH MAINTENANCE LETTER (OUTPATIENT)
Age: 54
End: 2019-10-31

## 2019-11-04 NOTE — TELEPHONE ENCOUNTER
Medication Appeal Initiation    We have initiated an appeal for the requested medication:  Medication: FreeStyle Elijah 14 Day Sensor- Denied- Appeal -   Appeal Start Date:  11/4/2019  Insurance Company: [employer] - Phone [phone] Fax [phone]  Comments:  Sent in appeal and letter.

## 2019-11-04 NOTE — TELEPHONE ENCOUNTER
Medication Appeal Initiation    We have initiated an appeal for the requested medication:  Medication: FreeStyle Elijah 14 Day [place]- Denied- Appeal -   Appeal Start Date:  11/4/2019  Insurance Company: [employer] - Phone [phone] Fax [phone]  Comments:  Sent in appeal and letter

## 2019-11-07 NOTE — TELEPHONE ENCOUNTER
MEDICATION APPEAL APPROVED    Medication: FreeStyle Elijah 14 Day Sensor- Denied- Appeal - APPROVED  Authorization Effective Date: 10/5/2019  Authorization Expiration Date: 11/3/2022  Approved Dose/Quantity:   Reference #:     Insurance Company: [employer] - Phone [phone] Fax [phone]  Expected CoPay:       CoPay Card Available:      Foundation Assistance Needed:    Which Pharmacy is filling the prescription (Not needed for infusion/clinic administered): [facility] [place] - [address] [facility]

## 2019-11-07 NOTE — TELEPHONE ENCOUNTER
MEDICATION APPEAL APPROVED    Medication: FreeStyle Elijah 14 Day [place]- Denied- Appeal - APPROVED  Authorization Effective Date: 10/5/2019  Authorization Expiration Date: 11/3/2022  Approved Dose/Quantity:   Reference #:     Insurance Company: [employer] - Phone [phone] Fax [phone]  Expected CoPay:       CoPay Card Available:      Foundation Assistance Needed:    Which Pharmacy is filling the prescription (Not needed for infusion/clinic administered): [facility] [place] [address] [facility]

## 2019-11-14 ENCOUNTER — OFFICE VISIT (OUTPATIENT)
Dept: PEDIATRICS | Facility: CLINIC | Age: 54
End: 2019-11-14
Payer: COMMERCIAL

## 2019-11-14 VITALS
HEART RATE: 68 BPM | SYSTOLIC BLOOD PRESSURE: 130 MMHG | OXYGEN SATURATION: 97 % | DIASTOLIC BLOOD PRESSURE: 84 MMHG | WEIGHT: 291.6 LBS | TEMPERATURE: 97.3 F | BODY MASS INDEX: 45.67 KG/M2 | RESPIRATION RATE: 18 BRPM

## 2019-11-14 DIAGNOSIS — E55.9 VITAMIN D DEFICIENCY DISEASE: ICD-10-CM

## 2019-11-14 DIAGNOSIS — M54.50 CHRONIC BILATERAL LOW BACK PAIN WITHOUT SCIATICA: ICD-10-CM

## 2019-11-14 DIAGNOSIS — E11.9 TYPE 2 DIABETES MELLITUS WITHOUT COMPLICATION (H): ICD-10-CM

## 2019-11-14 DIAGNOSIS — M19.91 PRIMARY OSTEOARTHRITIS, UNSPECIFIED SITE: ICD-10-CM

## 2019-11-14 DIAGNOSIS — E11.65 TYPE 2 DIABETES MELLITUS WITH HYPERGLYCEMIA, WITH LONG-TERM CURRENT USE OF INSULIN (H): Primary | ICD-10-CM

## 2019-11-14 DIAGNOSIS — G89.29 CHRONIC BILATERAL LOW BACK PAIN WITHOUT SCIATICA: ICD-10-CM

## 2019-11-14 DIAGNOSIS — K21.9 GASTROESOPHAGEAL REFLUX DISEASE, ESOPHAGITIS PRESENCE NOT SPECIFIED: ICD-10-CM

## 2019-11-14 DIAGNOSIS — Z79.4 TYPE 2 DIABETES MELLITUS WITH HYPERGLYCEMIA, WITH LONG-TERM CURRENT USE OF INSULIN (H): Primary | ICD-10-CM

## 2019-11-14 DIAGNOSIS — G89.4 CHRONIC PAIN SYNDROME: ICD-10-CM

## 2019-11-14 DIAGNOSIS — D17.24 LIPOMA OF LEFT THIGH: ICD-10-CM

## 2019-11-14 DIAGNOSIS — K64.4 EXTERNAL HEMORRHOIDS: ICD-10-CM

## 2019-11-14 LAB
ALBUMIN SERPL-MCNC: 3.4 G/DL (ref 3.4–5)
ALP SERPL-CCNC: 103 U/L (ref 40–150)
ALT SERPL W P-5'-P-CCNC: 22 U/L (ref 0–50)
ANION GAP SERPL CALCULATED.3IONS-SCNC: 8 MMOL/L (ref 3–14)
AST SERPL W P-5'-P-CCNC: 12 U/L (ref 0–45)
BILIRUB SERPL-MCNC: 0.3 MG/DL (ref 0.2–1.3)
BUN SERPL-MCNC: 11 MG/DL (ref 7–30)
CALCIUM SERPL-MCNC: 9 MG/DL (ref 8.5–10.1)
CHLORIDE SERPL-SCNC: 108 MMOL/L (ref 94–109)
CO2 SERPL-SCNC: 23 MMOL/L (ref 20–32)
CREAT SERPL-MCNC: 0.65 MG/DL (ref 0.52–1.04)
GFR SERPL CREATININE-BSD FRML MDRD: >90 ML/MIN/{1.73_M2}
GLUCOSE SERPL-MCNC: 160 MG/DL (ref 70–99)
HBA1C MFR BLD: 7.9 % (ref 0–5.6)
POTASSIUM SERPL-SCNC: 4.1 MMOL/L (ref 3.4–5.3)
PROT SERPL-MCNC: 7.2 G/DL (ref 6.8–8.8)
SODIUM SERPL-SCNC: 139 MMOL/L (ref 133–144)
TSH SERPL DL<=0.005 MIU/L-ACNC: 2 MU/L (ref 0.4–4)

## 2019-11-14 PROCEDURE — 84443 ASSAY THYROID STIM HORMONE: CPT | Performed by: PEDIATRICS

## 2019-11-14 PROCEDURE — 80053 COMPREHEN METABOLIC PANEL: CPT | Performed by: PEDIATRICS

## 2019-11-14 PROCEDURE — 83036 HEMOGLOBIN GLYCOSYLATED A1C: CPT | Performed by: PEDIATRICS

## 2019-11-14 PROCEDURE — 99214 OFFICE O/P EST MOD 30 MIN: CPT | Performed by: PEDIATRICS

## 2019-11-14 PROCEDURE — 36415 COLL VENOUS BLD VENIPUNCTURE: CPT | Performed by: PEDIATRICS

## 2019-11-14 RX ORDER — LANSOPRAZOLE 30 MG/1
30 CAPSULE, DELAYED RELEASE ORAL
Qty: 90 CAPSULE | Refills: 11 | Status: SHIPPED | OUTPATIENT
Start: 2019-11-14 | End: 2020-03-02

## 2019-11-14 RX ORDER — OXYCODONE AND ACETAMINOPHEN 5; 325 MG/1; MG/1
1-2 TABLET ORAL EVERY 4 HOURS PRN
Qty: 90 TABLET | Refills: 0 | Status: SHIPPED | OUTPATIENT
Start: 2019-11-14 | End: 2020-03-02

## 2019-11-14 RX ORDER — CELECOXIB 200 MG/1
200 CAPSULE ORAL DAILY
Qty: 90 CAPSULE | Refills: 1 | Status: CANCELLED | OUTPATIENT
Start: 2019-11-14

## 2019-11-14 RX ORDER — TRIAMCINOLONE ACETONIDE 5 MG/G
OINTMENT TOPICAL
Qty: 60 G | Refills: 11 | Status: SHIPPED | OUTPATIENT
Start: 2019-11-14 | End: 2021-05-17

## 2019-11-14 NOTE — PATIENT INSTRUCTIONS
I look forward to seeing you for your preop    Increase your lantus to 22 units    Let me know about your blood sugars when you get the new meter    Call for a visit with colorectal surgery - I recommend Dr Hammer.

## 2019-11-14 NOTE — PROGRESS NOTES
Subjective     Talk about Hydrocortisone.  Talk about adjusting Lantus.     Johnna Caballero is a 54 year old female who presents to clinic today for the following health issues:    HPI     Diabetes Follow-up    How often are you checking your blood sugar? Continuous glucose monitor  What time of day are you checking your blood sugars (select all that apply)?  Before meals and After meals  Have you had any blood sugars above 200?  Yes   Have you had any blood sugars below 70?  Yes     What symptoms do you notice when your blood sugar is low?  Shaky and Dizzy    What concerns do you have today about your diabetes? None and Other: Blood sugar monitor is not working, ordered new one      Do you have any of these symptoms? (Select all that apply)  No numbness or tingling in feet.  No redness, sores or blisters on feet.  No complaints of excessive thirst.  No reports of blurry vision.  No significant changes to weight.     Have you had a diabetic eye exam in the last 12 months? Yes- Date of last eye exam: May 2019, [facility]       Getting a new Elijah meter - current meter reading inappropriately low - double check on other meter showed that readings were inaccurate - waiting for new sensors to be sent to her.    Currently using lantus 20 units daily as she was concerned her sugars might be low - now those readings seem invalid, wondering about increasing dose    Surgery for lipoma removal planned soon.    Left frayed rotator cuff - has MRI last week - planning on surgery in December/January    Neck herniation C5-6, symptoms seeing to improve    Diabetes Management Resources    Hyperlipidemia Follow-Up      Are you having any of the following symptoms? (Select all that apply)  No complaints of shortness of breath, chest pain or pressure.  No increased sweating or nausea with activity.  No left-sided neck or arm pain.  No complaints of pain in calves when walking 1-2 blocks.    Are you regularly taking any  medication or supplement to lower your cholesterol?   No    Are you having muscle aches or other side effects that you think could be caused by your cholesterol lowering medication?  No    Hypertension Follow-up      Do you check your blood pressure regularly outside of the clinic? No     Are you following a low salt diet? Yes    Are your blood pressures ever more than 140 on the top number (systolic) OR more   than 90 on the bottom number (diastolic), for example 140/90? No   No new cardiac symptoms    BP Readings from Last 2 Encounters:   11/14/19 130/84   08/01/19 124/80     Hemoglobin A1C (%)   Date Value   11/14/2019 7.9 (H)   08/01/2019 8.0 (H)     LDL Cholesterol Calculated (mg/dL)   Date Value   04/15/2019 112 (H)   04/06/2018 104 (H)         Lipoma on left side, bra line - also fell on this area and hurt ribs this summer.  Had US in the [facility] system recently - recommended surgical evaluation, wondering if this can be removed in addition to the other area of concern during her upcoming planned surgery    Hemorrhoids - having itching at night and feels a new bump.  Hydrocortisone 2.5% hasn't been effective.    Ob/Gyn - has visit next week with IUD replacement and pap smear planned    Dr Strong plans for visit next week - big toe severed at age 14 and then fused in 2009.     Will be very busy with surgeries.    On chronic opioids for pain related to multiple orthopedic problems.    Reviewed and updated as needed this visit by Provider         Review of Systems   ROS COMP: Constitutional, HEENT, cardiovascular, pulmonary, gi and msk systems are negative, except as otherwise noted.      Objective    /84 (BP Location: Right arm, Patient Position: Sitting, Cuff Size: Adult Large)   Pulse 68   Temp 97.3  F (36.3  C) (Tympanic)   Resp 18   Wt 132.3 kg (291 lb 9.6 oz)   SpO2 97%   BMI 45.67 kg/m    Body mass index is 45.67 kg/m .  Physical Exam   GENERAL: healthy, alert and no distress  CV: regular rate  and rhythm, normal S1 S2, no S3 or S4, no murmur, click or rub, no peripheral edema and peripheral pulses strong  RECTUM: declined exam  SKIN: left axillary line palpable mass 2cm diameter under scar from previous surgery, not painful and no overlying skin changes  PSYCH: mentation appears normal, affect normal/bright    Diagnostic Test Results:  Labs reviewed in Epic  Results for orders placed or performed in visit on 11/14/19 (from the past 24 hour(s))   HEMOGLOBIN A1C   Result Value Ref Range    Hemoglobin A1C 7.9 (H) 0 - 5.6 %           Assessment & Plan       ICD-10-CM    1. Type 2 diabetes mellitus with hyperglycemia, with long-term current use of insulin (H) E11.65 TSH WITH FREE T4 REFLEX    Z79.4 HEMOGLOBIN A1C     blood glucose (ACCU-CHEK GABI) test strip     Comprehensive metabolic panel    Control improving, continue using jayla monitor.  Adjusted lantus to 22 units daily (from 20) - patient to call with concerns about CGM values   2. Chronic bilateral low back pain without sciatica M54.5 Well controlled, continue current medications      G89.29    3. Gastroesophageal reflux disease, esophagitis presence not specified K21.9 LANsoprazole (PREVACID) 30 MG DR capsule  Well controlled, continue current medications                 5. Vitamin D deficiency E55.9 vitamin D3 (CHOLECALCIFEROL) 26410 units (250 mcg) capsule   6. Lipoma of left thigh D17.24 oxyCODONE-acetaminophen (PERCOCET) 5-325 MG tablet   7. Chronic pain syndrome G89.4 oxyCODONE-acetaminophen (PERCOCET) 5-325 MG tablet   8. Primary osteoarthritis, unspecified site M19.91 oxyCODONE-acetaminophen (PERCOCET) 5-325 MG tablet   9. External hemorrhoids K64.4 triamcinolone (KENALOG) 0.5 % external ointment     COLORECTAL SURGERY REFERRAL    Plan trial of stronger steroid ointment for 2 weeks, patient to see colorectal surgery - had previously followed with them        BMI:   Estimated body mass index is 45.67 kg/m  as calculated from the following:     "Height as of 6/4/19: 1.702 m (5' 7\").    Weight as of this encounter: 132.3 kg (291 lb 9.6 oz).   Weight management plan: Discussed healthy diet and exercise guidelines        See Patient Instructions      Linda Durant MD  [facility] [place]    "

## 2019-11-17 DIAGNOSIS — G89.29 CHRONIC BILATERAL LOW BACK PAIN WITHOUT SCIATICA: ICD-10-CM

## 2019-11-17 DIAGNOSIS — E11.9 TYPE 2 DIABETES MELLITUS WITHOUT COMPLICATION, WITH LONG-TERM CURRENT USE OF INSULIN (H): ICD-10-CM

## 2019-11-17 DIAGNOSIS — Z79.4 TYPE 2 DIABETES MELLITUS WITHOUT COMPLICATION, WITH LONG-TERM CURRENT USE OF INSULIN (H): ICD-10-CM

## 2019-11-17 DIAGNOSIS — M54.50 CHRONIC BILATERAL LOW BACK PAIN WITHOUT SCIATICA: ICD-10-CM

## 2019-11-17 RX ORDER — CELECOXIB 200 MG/1
200 CAPSULE ORAL DAILY
Qty: 90 CAPSULE | Refills: 0 | Status: SHIPPED | OUTPATIENT
Start: 2019-11-17 | End: 2019-12-11

## 2019-11-17 RX ORDER — PEN NEEDLE, DIABETIC 32GX 5/32"
NEEDLE, DISPOSABLE MISCELLANEOUS
Qty: 200 EACH | Refills: 3 | Status: SHIPPED | OUTPATIENT
Start: 2019-11-17 | End: 2020-03-22

## 2019-11-17 NOTE — TELEPHONE ENCOUNTER
Patient has pending appointment as below:    Next 5 appointments (look out 90 days)    Nov 19, 2019 11:00 AM CST  Return Visit with Suzanna Strong DPM, Podiatry/Foot and Ankle Surgery  [facility] ([facility]) [address] [place] [address]  [phone]   Dec 12, 2019  1:00 PM CST  Pre-Operative Physical with Linda Durant MD, EA EXAM ROOM 21  [facility] ([facility]) [address] [place] [id number]  [phone]            Rx refilled per [facility] refill protocol.    Tamia BELTRÁNN, RN

## 2019-11-17 NOTE — TELEPHONE ENCOUNTER
"Requested Prescriptions   Pending Prescriptions Disp Refills     celecoxib (CELEBREX) 200 MG capsule  Last Written Prescription Date:  08/29/2019  Last Fill Quantity: 90 capsule,  # refills: 0   Last Office Visit: 11/14/2019 Linda Durant MD   Future Office Visit:    Next 5 appointments (look out 90 days)    Nov 19, 2019 11:00 AM CST  Return Visit with Suzanna Strong DPM, Podiatry/Foot and Ankle Surgery  [facility] ([facility]) [address] [place] [address]  [phone]   Dec 12, 2019  1:00 PM CST  Pre-Operative Physical with Linda Durant MD, EA EXAM ROOM 21  [facility] [address] [place] [address]  [address]  [facility] [address]  [phone]          90 capsule 0     Sig: Take 1 capsule (200 mg) by mouth daily       NSAID Medications Passed - 11/17/2019 12:42 PM        Passed - Blood pressure under 140/90 in past 12 months     BP Readings from Last 3 Encounters:   11/14/19 130/84   08/01/19 124/80   07/17/19 150/89                 Passed - Normal ALT on file in past 12 months     Recent Labs   Lab Test 11/14/19  0931   ALT 22             Passed - Normal AST on file in past 12 months     Recent Labs   Lab Test 11/14/19  0931   AST 12             Passed - Recent (12 mo) or future (30 days) visit within the authorizing provider's specialty     Patient has had an office visit with the authorizing provider or a provider within the authorizing providers department within the previous 12 mos or has a future within next 30 days. See \"Patient Info\" tab in inbasket, or \"Choose Columns\" in Meds & Orders section of the refill encounter.              Passed - Patient is age 6-64 years        Passed - Normal CBC on file in past 12 months     Recent Labs   Lab Test 12/26/18  1211   WBC 8.5   RBC 4.49   HGB 12.6   HCT 39.0                    Passed - Medication is active on med list        Passed - No active " pregnancy on record        Passed - Normal serum creatinine on file in past 12 months     Recent Labs   Lab Test 11/14/19  0931  04/20/12  0739   CR 0.65   < >  --    CREAT  --   --  0.6    < > = values in this interval not displayed.             Passed - No positive pregnancy test in past 12 months

## 2019-11-19 ENCOUNTER — OFFICE VISIT (OUTPATIENT)
Dept: PODIATRY | Facility: CLINIC | Age: 54
End: 2019-11-19
Payer: COMMERCIAL

## 2019-11-19 VITALS — BODY MASS INDEX: 45.58 KG/M2 | DIASTOLIC BLOOD PRESSURE: 80 MMHG | SYSTOLIC BLOOD PRESSURE: 132 MMHG | WEIGHT: 291 LBS

## 2019-11-19 DIAGNOSIS — G89.4 CHRONIC PAIN SYNDROME: ICD-10-CM

## 2019-11-19 DIAGNOSIS — E11.42 DIABETIC POLYNEUROPATHY ASSOCIATED WITH TYPE 2 DIABETES MELLITUS (H): Primary | ICD-10-CM

## 2019-11-19 DIAGNOSIS — M79.671 RIGHT FOOT PAIN: ICD-10-CM

## 2019-11-19 DIAGNOSIS — M21.41 PES PLANUS OF BOTH FEET: ICD-10-CM

## 2019-11-19 DIAGNOSIS — M21.42 PES PLANUS OF BOTH FEET: ICD-10-CM

## 2019-11-19 DIAGNOSIS — G57.91 NEURITIS OF RIGHT FOOT: ICD-10-CM

## 2019-11-19 DIAGNOSIS — M77.41 METATARSALGIA, RIGHT FOOT: ICD-10-CM

## 2019-11-19 DIAGNOSIS — M20.5X1 HALLUX LIMITUS, RIGHT: ICD-10-CM

## 2019-11-19 PROCEDURE — 99213 OFFICE O/P EST LOW 20 MIN: CPT | Performed by: PODIATRIST

## 2019-11-19 NOTE — LETTER
"    11/19/2019         RE: Johnna Caballero  [id number]  [facility] [address]        Dear Colleague,    Thank you for referring your patient, Johnna Caballero, to the [facility]. Please see a copy of my visit note below.    Podiatry / Foot and Ankle Surgery Progress Note    November 19, 2019    Subject: Patient was seen for follow up on right foot pain. Notes that injections helped a lot.  Foot overall is feeling really good. She notes her right great toe \"feels cold\" during the winter. Has since she injured it when she was little. Also notes some aching to the ball of the foot. Wondering what can be done for that.     Objective:  Vitals:/80   Wt 132 kg (291 lb)   BMI 45.58 kg/m       A1C: 8.7 (4/2019)     General appearance: Patient is alert and fully cooperative with history & exam.  No sign of distress is noted during the visit.     Psychiatric: Affect is pleasant & appropriate.  Patient appears motivated to improve health.     Respiratory: Breathing is regular & unlabored while sitting.     HEENT: Hearing is intact to spoken word.  Speech is clear.  No gross evidence of visual impairment that would impact ambulation.     Dermatologic: Skin is intact to both lower extremities without significant lesions, rash or abrasion.  No paronychia or evidence of soft tissue infection is noted.     Vascular: DP & PT pulses are intact & regular bilaterally.   edema and varicosities noted.  CFT and skin temperature is normal to both lower extremities.     Neurologic: Lower extremity sensation is diminished to feet.      Musculoskeletal: Patient is ambulatory without assistive device or brace. Decrease arch height. NO Pain on palpation of the right 3rd intermetatarsal space and to plantar distal right 2nd metatarsal head. No range of motion at right interphalangeal joint. Decrease dorsiflexion right ankle.     Radiographs:  I personally reviewed the xrays. Degenerative changes to midfoot. No " fractures noted. Fusion of the right interphalangeal joint. Prominent navicular tuberosity.     MRI: arthritis to midfoot. Otherwise unremarkable.      ASSESSMENT:    Diabetic polyneuropathy associated with type 2 diabetes mellitus (H)  Pes planus of both feet  Right foot pain  Chronic pain syndrome  Hallux limitus, right  Metatarsalgia, right foot     PLAN:  Reviewed patient's chart in [facility]. Reviewed and discussed causes of capsulitis.  Talked about how the elongated 2nd metatarsal can cause more pressure to occur to the joint.  We talked about treatments such as padding, orthotics, injection, physical therapy, immobilization, MRI, and possible surgery to shorten metatarsal.    Likely due to arthritis/hallux limitus right great toe.  Recommend voltaren gel.     Talked about numbness and cold feeling to right great toe. Recommend TENS. She has one at home.     She will call if she needs new order for injections in the foot.      Suzanna Strong DPM, Podiatry/Foot and Ankle Surgery    Weight management plan: Patient was referred to their PCP to discuss a diet and exercise plan.    Recommended to Johnna Caballero to follow up with Primary Care provider regarding elevated blood pressure.      Again, thank you for allowing me to participate in the care of your patient.        Sincerely,        Suzanna Strong DPM, Podiatry/Foot and Ankle Surgery

## 2019-11-19 NOTE — PROGRESS NOTES
"Podiatry / Foot and Ankle Surgery Progress Note    November 19, 2019    Subject: Patient was seen for follow up on right foot pain. Notes that injections helped a lot.  Foot overall is feeling really good. She notes her right great toe \"feels cold\" during the winter. Has since she injured it when she was little. Also notes some aching to the ball of the foot. Wondering what can be done for that.     Objective:  Vitals:/80   Wt 132 kg (291 lb)   BMI 45.58 kg/m      A1C: 8.7 (4/2019)     General appearance: Patient is alert and fully cooperative with history & exam.  No sign of distress is noted during the visit.     Psychiatric: Affect is pleasant & appropriate.  Patient appears motivated to improve health.     Respiratory: Breathing is regular & unlabored while sitting.     HEENT: Hearing is intact to spoken word.  Speech is clear.  No gross evidence of visual impairment that would impact ambulation.     Dermatologic: Skin is intact to both lower extremities without significant lesions, rash or abrasion.  No paronychia or evidence of soft tissue infection is noted.     Vascular: DP & PT pulses are intact & regular bilaterally.   edema and varicosities noted.  CFT and skin temperature is normal to both lower extremities.     Neurologic: Lower extremity sensation is diminished to feet.      Musculoskeletal: Patient is ambulatory without assistive device or brace. Decrease arch height. NO Pain on palpation of the right 3rd intermetatarsal space and to plantar distal right 2nd metatarsal head. No range of motion at right interphalangeal joint. Decrease dorsiflexion right ankle.     Radiographs:  I personally reviewed the xrays. Degenerative changes to midfoot. No fractures noted. Fusion of the right interphalangeal joint. Prominent navicular tuberosity.     MRI: arthritis to midfoot. Otherwise unremarkable.      ASSESSMENT:    Diabetic polyneuropathy associated with type 2 diabetes mellitus (H)  Pes planus of " both feet  Right foot pain  Chronic pain syndrome  Hallux limitus, right  Metatarsalgia, right foot     PLAN:  Reviewed patient's chart in [facility]. Reviewed and discussed causes of capsulitis.  Talked about how the elongated 2nd metatarsal can cause more pressure to occur to the joint.  We talked about treatments such as padding, orthotics, injection, physical therapy, immobilization, MRI, and possible surgery to shorten metatarsal.    Likely due to arthritis/hallux limitus right great toe.  Recommend voltaren gel.     Talked about numbness and cold feeling to right great toe. Recommend TENS. She has one at home.     She will call if she needs new order for injections in the foot.      Suzanna Strong DPM, Podiatry/Foot and Ankle Surgery    Weight management plan: Patient was referred to their PCP to discuss a diet and exercise plan.    Recommended to Johnna Caballero to follow up with Primary Care provider regarding elevated blood pressure.

## 2019-11-19 NOTE — PATIENT INSTRUCTIONS
Thank you for choosing [facility]!    DR. WALTON'S CLINIC SCHEDULE  MONDAY AM - [place] TUESDAY - [place]   [address] [address] [place] [address] [place] [address]   [phone] / [phone] / [phone]       WEDNESDAY - [place] FRIDAY AM - [facility]   [address]   [place] [address] [place] [address]   [phone] / [phone]       FRIDAY PM - [place] SCHEDULE SURGERY: [phone]   [address] BILLING QUESTIONS: [phone]   [place] [address] AFTER HOURS: [phone]   [phone] / [phone] APPOINTMENTS: [phone]     Consumer Price Line (CPL) [phone]       CAPSULITIS / METATARSALGIA  All joints in the body are surrounded by a capsule, or a covering of soft tissue and ligaments. The capsule holds bones together and secretes joint fluid to help lubricate the joint. If a joint capsule is exposed to excessive force, it can develop microscopic tears and become inflamed. This commonly occurs in the foot due to mild variation in anatomy. Hammertoes, bunions, irregular bone length, joint immobility, etc. can all lead to excessive force on the joint. Capsule injury can also occur due to repetitive stress from exercise, insufficient support from shoes, excessive bare foot walking and excessive weight.      Conservative treatments include ice, rest from the aggravating activity, weight loss, orthotic inserts, improving shoes and shoe modifications. You can purchase an over the counter felt metatarsal pad to place in your shoes at some [employer] or on [employer]. Appropriate shoes will protect the inflamed tissue improving the chances of healing. Avoidance of standing or walking barefoot, including around the house, is necessary to allow healing. Casts are sometimes used for more aggressive protection.  NSAIDs such as Advil are also used to help with pain  and decreasing inflammation. If pain continues over a period of weeks with continuous rest and icing, Corticosteroid injections can be a treatment option to try and help decrease inflammation.    Surgery is often necessary to correct the underlying structural problem. Surgery might include shortening an excessively long bone, repairing bunion or hammertoe, lengthening a tight Achilles  tendon, etc. These are same day surgeries that might be pursued if more conservative measures fail to provide relief.      The inflamed joint capsule has the potential to completely tear. This will allow the toe to drift off the ground, curving toward the other toes. The involved toe may under or overlap the adjacent toes as drift continues. The pain may improve after the joint tears or this new position will be permanent. Surgery can address the toe alignment. Your goal of treating capsulitis is to avoid this scenario.                BODY WEIGHT AND YOUR FEET  The following information is included in the after visit summary for all patients. Body weight can be a sensitive issue to discuss in clinic, but we think the following information is very important. Although we focus on the feet and ankles, we do support the overall health of our patients.     Many things can cause foot and ankle problems. Foot structure, activity level, foot mechanics and injuries are common causes of pain. One very important issue that often goes unmentioned, is body weight. Extra weight can cause increased stress on muscles, ligaments, bones and tendons. Sometimes just a few extra pounds is all it takes to put one over her/his threshold. Without reducing that stress, it can be difficult to alleviate pain. As Foot & Ankle specialists, our job is addressing the lower extremity problem and possible causes. Regarding extra body weight, we encourage patients to discuss diet and weight management plans with their primary care doctors. It is this team approach  that gives you the best opportunity for pain relief and getting you back on your feet.      [facility] has a Comprehensive Weight Management Program. This program includes counseling, education, non-surgical and surgical approaches to weight loss. If you are interested in learning more either talk to you primary care provider or call [phone].

## 2019-12-03 ENCOUNTER — HOSPITAL ENCOUNTER (OUTPATIENT)
Facility: CLINIC | Age: 54
End: 2019-12-03
Payer: COMMERCIAL

## 2019-12-03 ENCOUNTER — TELEPHONE (OUTPATIENT)
Dept: PODIATRY | Facility: CLINIC | Age: 54
End: 2019-12-03

## 2019-12-03 DIAGNOSIS — M79.671 RIGHT FOOT PAIN: Primary | ICD-10-CM

## 2019-12-03 DIAGNOSIS — M19.071 PRIMARY OSTEOARTHRITIS OF RIGHT FOOT: ICD-10-CM

## 2019-12-03 NOTE — TELEPHONE ENCOUNTER
Spoke to patient, informed that orders had been placed and gave contact info for imaging. No concerns.          Jhoan Villavicencio on 12/3/2019 at 4:48 PM

## 2019-12-03 NOTE — TELEPHONE ENCOUNTER
REcieved message below:    Order placed for injection. Please let patient know she can call to schedule at: [phone].    Suzanna Marie DPM        From: Johnna Caballero     Sent: 12/3/2019  9:36 AM CST       To: Patient Referral Request Message List  Subject: RE: Referral Request    Johnna Caballero would like to request a referral.  Reason: referral for foot injection by xray  Requested provider: dr Suzanna marie  Comment:  Can I please get a referral for an injection for cortisone for my right foot by x-ray at [facility] in [place]?

## 2019-12-04 NOTE — TELEPHONE ENCOUNTER
"Spoke  With Geraldo and told him it should also say \"navicular cuneiform joint\".    Suzanna Strong, LUZ MARIA  "

## 2019-12-04 NOTE — TELEPHONE ENCOUNTER
"[facility] called to clarify orders for the injection. In the instructions portion it says   \"cortisone injection to right 4th metatarsal cuboid joint and\"  Needs to clarify if there was more to the order or not    Please call Geraldo back at [phone]    Brenda Suggs ATC    "

## 2019-12-05 ENCOUNTER — TRANSFERRED RECORDS (OUTPATIENT)
Dept: HEALTH INFORMATION MANAGEMENT | Facility: CLINIC | Age: 54
End: 2019-12-05

## 2019-12-09 ENCOUNTER — TRANSFERRED RECORDS (OUTPATIENT)
Dept: HEALTH INFORMATION MANAGEMENT | Facility: CLINIC | Age: 54
End: 2019-12-09

## 2019-12-12 ENCOUNTER — HOSPITAL ENCOUNTER (OUTPATIENT)
Dept: GENERAL RADIOLOGY | Facility: CLINIC | Age: 54
Discharge: HOME | End: 2019-12-12
Attending: PODIATRIST | Admitting: PODIATRIST
Payer: COMMERCIAL

## 2019-12-12 ENCOUNTER — OFFICE VISIT (OUTPATIENT)
Dept: PEDIATRICS | Facility: CLINIC | Age: 54
End: 2019-12-12
Payer: COMMERCIAL

## 2019-12-12 VITALS
SYSTOLIC BLOOD PRESSURE: 136 MMHG | HEIGHT: 67 IN | TEMPERATURE: 96.7 F | RESPIRATION RATE: 18 BRPM | WEIGHT: 285.9 LBS | DIASTOLIC BLOOD PRESSURE: 72 MMHG | OXYGEN SATURATION: 98 % | BODY MASS INDEX: 44.87 KG/M2 | HEART RATE: 83 BPM

## 2019-12-12 DIAGNOSIS — G89.29 CHRONIC BILATERAL LOW BACK PAIN WITHOUT SCIATICA: ICD-10-CM

## 2019-12-12 DIAGNOSIS — D17.30 LIPOMA OF SKIN AND SUBCUTANEOUS TISSUE: ICD-10-CM

## 2019-12-12 DIAGNOSIS — G89.4 CHRONIC PAIN SYNDROME: ICD-10-CM

## 2019-12-12 DIAGNOSIS — I10 ESSENTIAL HYPERTENSION: ICD-10-CM

## 2019-12-12 DIAGNOSIS — Z91.040 LATEX ALLERGY STATUS: ICD-10-CM

## 2019-12-12 DIAGNOSIS — Z01.818 PREOP GENERAL PHYSICAL EXAM: Primary | ICD-10-CM

## 2019-12-12 DIAGNOSIS — E11.9 TYPE 2 DIABETES MELLITUS WITHOUT COMPLICATION, WITH LONG-TERM CURRENT USE OF INSULIN (H): ICD-10-CM

## 2019-12-12 DIAGNOSIS — M54.50 CHRONIC BILATERAL LOW BACK PAIN WITHOUT SCIATICA: ICD-10-CM

## 2019-12-12 DIAGNOSIS — E66.01 MORBID OBESITY WITH BMI OF 40.0-44.9, ADULT (H): ICD-10-CM

## 2019-12-12 DIAGNOSIS — Z79.4 TYPE 2 DIABETES MELLITUS WITHOUT COMPLICATION, WITH LONG-TERM CURRENT USE OF INSULIN (H): ICD-10-CM

## 2019-12-12 DIAGNOSIS — M19.071 PRIMARY OSTEOARTHRITIS OF RIGHT FOOT: ICD-10-CM

## 2019-12-12 DIAGNOSIS — M79.671 RIGHT FOOT PAIN: ICD-10-CM

## 2019-12-12 PROCEDURE — 80307 DRUG TEST PRSMV CHEM ANLYZR: CPT | Mod: 90 | Performed by: PEDIATRICS

## 2019-12-12 PROCEDURE — 80048 BASIC METABOLIC PNL TOTAL CA: CPT | Performed by: PEDIATRICS

## 2019-12-12 PROCEDURE — 99000 SPECIMEN HANDLING OFFICE-LAB: CPT | Performed by: PEDIATRICS

## 2019-12-12 PROCEDURE — 25500064 ZZH RX 255 OP 636: Performed by: PHYSICIAN ASSISTANT

## 2019-12-12 PROCEDURE — 99214 OFFICE O/P EST MOD 30 MIN: CPT | Performed by: PEDIATRICS

## 2019-12-12 PROCEDURE — 20605 DRAIN/INJ JOINT/BURSA W/O US: CPT | Mod: RT

## 2019-12-12 PROCEDURE — 25000125 ZZHC RX 250: Performed by: PHYSICIAN ASSISTANT

## 2019-12-12 PROCEDURE — 25000128 H RX IP 250 OP 636: Performed by: PHYSICIAN ASSISTANT

## 2019-12-12 PROCEDURE — 93000 ELECTROCARDIOGRAM COMPLETE: CPT | Performed by: PEDIATRICS

## 2019-12-12 PROCEDURE — 36415 COLL VENOUS BLD VENIPUNCTURE: CPT | Performed by: PEDIATRICS

## 2019-12-12 RX ORDER — LIDOCAINE HYDROCHLORIDE 10 MG/ML
30 INJECTION, SOLUTION EPIDURAL; INFILTRATION; INTRACAUDAL; PERINEURAL ONCE
Status: COMPLETED | OUTPATIENT
Start: 2019-12-12 | End: 2019-12-12

## 2019-12-12 RX ORDER — IOPAMIDOL 408 MG/ML
10 INJECTION, SOLUTION INTRATHECAL ONCE
Status: COMPLETED | OUTPATIENT
Start: 2019-12-12 | End: 2019-12-12

## 2019-12-12 RX ORDER — TRIAMCINOLONE ACETONIDE 40 MG/ML
40 INJECTION, SUSPENSION INTRA-ARTICULAR; INTRAMUSCULAR ONCE
Status: COMPLETED | OUTPATIENT
Start: 2019-12-12 | End: 2019-12-12

## 2019-12-12 RX ORDER — CELECOXIB 200 MG/1
200 CAPSULE ORAL DAILY
Qty: 90 CAPSULE | Refills: 11 | Status: SHIPPED | OUTPATIENT
Start: 2019-12-12 | End: 2020-02-06

## 2019-12-12 RX ORDER — ETHYL CHLORIDE 100 %
1 AEROSOL, SPRAY (ML) TOPICAL ONCE
Status: COMPLETED | OUTPATIENT
Start: 2019-12-12 | End: 2019-12-12

## 2019-12-12 RX ADMIN — Medication 1 APPLICATION.: at 11:43

## 2019-12-12 RX ADMIN — TRIAMCINOLONE ACETONIDE 40 MG: 40 INJECTION, SUSPENSION INTRA-ARTICULAR; INTRAMUSCULAR at 11:48

## 2019-12-12 RX ADMIN — IOPAMIDOL 1 ML: 408 INJECTION, SOLUTION INTRATHECAL at 11:47

## 2019-12-12 RX ADMIN — LIDOCAINE HYDROCHLORIDE 3 ML: 10 INJECTION, SOLUTION EPIDURAL; INFILTRATION; INTRACAUDAL; PERINEURAL at 11:49

## 2019-12-12 SDOH — SOCIAL STABILITY: SOCIAL NETWORK: ARE YOU MARRIED, WIDOWED, DIVORCED, SEPARATED, NEVER MARRIED, OR LIVING WITH A PARTNER?: NEVER MARRIED

## 2019-12-12 SDOH — ECONOMIC STABILITY: INCOME INSECURITY: HOW HARD IS IT FOR YOU TO PAY FOR THE VERY BASICS LIKE FOOD, HOUSING, MEDICAL CARE, AND HEATING?: NOT HARD AT ALL

## 2019-12-12 SDOH — SOCIAL STABILITY: SOCIAL NETWORK: HOW OFTEN DO YOU GET TOGETHER WITH FRIENDS OR RELATIVES?: ONCE A WEEK

## 2019-12-12 SDOH — HEALTH STABILITY: MENTAL HEALTH: HOW MANY STANDARD DRINKS CONTAINING ALCOHOL DO YOU HAVE ON A TYPICAL DAY?: 1 OR 2

## 2019-12-12 SDOH — HEALTH STABILITY: MENTAL HEALTH: HOW OFTEN DO YOU HAVE 6 OR MORE DRINKS ON ONE OCCASION?: NEVER

## 2019-12-12 SDOH — ECONOMIC STABILITY: FOOD INSECURITY: WITHIN THE PAST 12 MONTHS, THE FOOD YOU BOUGHT JUST DIDN'T LAST AND YOU DIDN'T HAVE MONEY TO GET MORE.: NEVER TRUE

## 2019-12-12 SDOH — SOCIAL STABILITY: SOCIAL NETWORK
IN A TYPICAL WEEK, HOW MANY TIMES DO YOU TALK ON THE PHONE WITH FAMILY, FRIENDS, OR NEIGHBORS?: MORE THAN THREE TIMES A WEEK

## 2019-12-12 SDOH — SOCIAL STABILITY: SOCIAL NETWORK: HOW OFTEN DO YOU ATTENT MEETINGS OF THE CLUB OR ORGANIZATION YOU BELONG TO?: NEVER

## 2019-12-12 SDOH — SOCIAL STABILITY: SOCIAL NETWORK
DO YOU BELONG TO ANY CLUBS OR ORGANIZATIONS SUCH AS CHURCH GROUPS UNIONS, FRATERNAL OR ATHLETIC GROUPS, OR SCHOOL GROUPS?: NO

## 2019-12-12 SDOH — ECONOMIC STABILITY: TRANSPORTATION INSECURITY
IN THE PAST 12 MONTHS, HAS LACK OF TRANSPORTATION KEPT YOU FROM MEETINGS, WORK, OR FROM GETTING THINGS NEEDED FOR DAILY LIVING?: NO

## 2019-12-12 SDOH — SOCIAL STABILITY: SOCIAL NETWORK: HOW OFTEN DO YOU ATTEND CHURCH OR RELIGIOUS SERVICES?: NEVER

## 2019-12-12 SDOH — ECONOMIC STABILITY: TRANSPORTATION INSECURITY
IN THE PAST 12 MONTHS, HAS THE LACK OF TRANSPORTATION KEPT YOU FROM MEDICAL APPOINTMENTS OR FROM GETTING MEDICATIONS?: NO

## 2019-12-12 SDOH — HEALTH STABILITY: PHYSICAL HEALTH: ON AVERAGE, HOW MANY DAYS PER WEEK DO YOU ENGAGE IN MODERATE TO STRENUOUS EXERCISE (LIKE A BRISK WALK)?: 2 DAYS

## 2019-12-12 SDOH — ECONOMIC STABILITY: FOOD INSECURITY: WITHIN THE PAST 12 MONTHS, YOU WORRIED THAT YOUR FOOD WOULD RUN OUT BEFORE YOU GOT MONEY TO BUY MORE.: NEVER TRUE

## 2019-12-12 SDOH — HEALTH STABILITY: MENTAL HEALTH
STRESS IS WHEN SOMEONE FEELS TENSE, NERVOUS, ANXIOUS, OR CAN'T SLEEP AT NIGHT BECAUSE THEIR MIND IS TROUBLED. HOW STRESSED ARE YOU?: NOT AT ALL

## 2019-12-12 SDOH — HEALTH STABILITY: PHYSICAL HEALTH: ON AVERAGE, HOW MANY MINUTES DO YOU ENGAGE IN EXERCISE AT THIS LEVEL?: 20 MIN

## 2019-12-12 SDOH — HEALTH STABILITY: MENTAL HEALTH: HOW OFTEN DO YOU HAVE A DRINK CONTAINING ALCOHOL?: NEVER

## 2019-12-12 ASSESSMENT — PATIENT HEALTH QUESTIONNAIRE - PHQ9
SUM OF ALL RESPONSES TO PHQ QUESTIONS 1-9: 0
SUM OF ALL RESPONSES TO PHQ QUESTIONS 1-9: 0
10. IF YOU CHECKED OFF ANY PROBLEMS, HOW DIFFICULT HAVE THESE PROBLEMS MADE IT FOR YOU TO DO YOUR WORK, TAKE CARE OF THINGS AT HOME, OR GET ALONG WITH OTHER PEOPLE: NOT DIFFICULT AT ALL

## 2019-12-12 ASSESSMENT — MIFFLIN-ST. JEOR: SCORE: 1934.46

## 2019-12-12 NOTE — IP AVS SNAPSHOT
MRN:[id number]                      After Visit Summary   12/12/2019    Johnna Caballero    MRN: [id number]           Visit Information        Provider Department      12/12/2019 11:00 AM  [facility]; [facility]           Review of your medicines      UNREVIEWED medicines. Ask your doctor about these medicines       Dose / Directions   aspirin 81 MG tablet  Commonly known as:  ASA      Dose:  81 mg  Take 1 tablet (81 mg) by mouth daily  Quantity:  30 tablet  Refills:  0     celecoxib 200 MG capsule  Commonly known as:  celeBREX  Used for:  Chronic bilateral low back pain without sciatica      Dose:  200 mg  Take 1 capsule (200 mg) by mouth daily  Quantity:  90 capsule  Refills:  0     cetirizine 10 MG tablet  Commonly known as:  zyrTEC      Dose:  10 mg  Take 10 mg by mouth daily  Refills:  0     diazepam 5 MG tablet  Commonly known as:  VALIUM  Used for:  Muscle spasm      Dose:  5 mg  Take 1 tablet (5 mg) by mouth every 12 hours as needed for muscle spasms  Quantity:  30 tablet  Refills:  1     diclofenac 1 % topical gel  Commonly known as:  VOLTAREN  Used for:  Diabetic polyneuropathy associated with type 2 diabetes mellitus (H), Pes planus of both feet, Right foot pain, Chronic pain syndrome, Hallux limitus, right, Metatarsalgia, right foot, Neuritis of right foot      Dose:  2 g  Place 2 g onto the skin 4 times daily  Quantity:  100 g  Refills:  2     EPINEPHrine 0.3 MG/0.3ML injection 2-pack  Commonly known as:  EPIPEN/ADRENACLICK/or ANY BX GENERIC EQUIV  Used for:  Hornet sting, accidental or unintentional, subsequent encounter      Dose:  0.3 mg  Inject 0.3 mLs (0.3 mg) into the muscle once as needed for anaphylaxis  Quantity:  2 mL  Refills:  0     insulin glargine 100 UNIT/ML pen  Commonly known as:  LANTUS PEN  Used for:  Type 2 diabetes mellitus with hyperglycemia, with long-term current use of insulin (H)      Dose:  24 Units  Inject 24 Units Subcutaneous 2 times  daily  Quantity:  45 mL  Refills:  3     KRILL OIL PO      Dose:  1 tablet  Take 1 tablet by mouth daily  Refills:  0     LANsoprazole 30 MG DR capsule  Commonly known as:  PREVACID  Used for:  Gastroesophageal reflux disease, esophagitis presence not specified      Dose:  30 mg  Take 1 capsule (30 mg) by mouth every morning (before breakfast) Take 30-60 min before a meal  Quantity:  90 capsule  Refills:  11     liraglutide 18 MG/3ML solution  Commonly known as:  VICTOZA PEN  Used for:  Type 2 diabetes mellitus with hyperglycemia, with long-term current use of insulin (H)      Dose:  1.8 mg  Inject 1.8 mg Subcutaneous daily  Quantity:  27 mL  Refills:  3     losartan-hydrochlorothiazide 100-25 MG tablet  Commonly known as:  HYZAAR  Used for:  Essential hypertension, Type 2 diabetes mellitus with hyperglycemia, with long-term current use of insulin (H)      Dose:  1 tablet  Take 1 tablet by mouth daily  Quantity:  90 tablet  Refills:  3     metFORMIN 1000 MG tablet  Commonly known as:  GLUCOPHAGE  Used for:  Type 2 diabetes mellitus with hyperglycemia, with long-term current use of insulin (H)      Dose:  1,000 mg  Take 1 tablet (1,000 mg) by mouth 2 times daily (with meals)  Quantity:  180 tablet  Refills:  3     oxyCODONE-acetaminophen 5-325 MG tablet  Commonly known as:  PERCOCET  Used for:  Lipoma of left thigh, Chronic pain syndrome, Primary osteoarthritis, unspecified site      Dose:  1-2 tablet  Take 1-2 tablets by mouth every 4 hours as needed for severe pain  Quantity:  90 tablet  Refills:  0     STATIN NOT PRESCRIBED  Commonly known as:  INTENTIONAL  Used for:  Hyperlipidemia LDL goal <100      Dose:  1 each  1 each 2 times daily Statin not prescribed intentionally due to Refusal by patient  Quantity:  0 each  Refills:  0     triamcinolone 0.5 % external ointment  Commonly known as:  KENALOG  Used for:  External hemorrhoids      Topically twice daily as needed for 14 days  Quantity:  60 g  Refills:  11    "  vitamin D3 76166 units (250 mcg) capsule  Commonly known as:  CHOLECALCIFEROL      Dose:  1 capsule  Take 1 capsule (10,000 Units) by mouth daily  Refills:  0        CONTINUE these medicines which have NOT CHANGED       Dose / Directions   BD Pen Needle Kim 2nd Gen 32G X 4 MM miscellaneous  Used for:  Type 2 diabetes mellitus without complication, with long-term current use of insulin (H)  Generic drug:  insulin pen needle      USE 2 PEN NEEDLES DAILY OR AS DIRECTED  Quantity:  200 each  Refills:  3     blood glucose monitoring lancets  Used for:  DM (diabetes mellitus), type 2, uncontrolled (H)      Use to test blood sugar 1-2 times daily or as directed.  Quantity:  2 Box  Refills:  3     blood glucose test strip  Commonly known as:  Accu-Chek Jennifer  Used for:  Type 2 diabetes mellitus with hyperglycemia, with long-term current use of insulin (H)      Use to test blood sugars 1-2x daily or as directed.  Quantity:  200 each  Refills:  11     FreeStyle Elijah 14 Day Sensor Misc  Used for:  Type 2 diabetes mellitus without complication, with long-term current use of insulin (H)      Dose:  1 each  1 each every 14 days  Quantity:  6 each  Refills:  11     order for DME  Used for:  Chronic back pain, Bilateral hand pain      Equipment being ordered: TENS unit  Quantity:  1 each  Refills:  0              Protect others around you: Learn how to safely use, store and throw away your medicines at www.disposemymeds.org.       Follow-ups after your visit       Your next 10 appointments already scheduled    Dec 12, 2019  1:00 PM CST  Pre-Operative Physical with Linda Durant MD, [facility] ROOM [facility] [address] [place] [address] [phone]   Please plan to arrive at the clinic at your \"Arrive By\" time for your appointment. Our late policy will be enforced based on this time.     Dec 16, 2019  Procedure with Jazmin Bautista, " MD  [facility] (--) [address]  [place] [address]  [phone]   Mar 02, 2020 10:10 AM CST  (Arrive by 9:50 AM)  OFFICE VISIT with Linda Durant MD  [facility] ([facility]) [address]  [address]  [place] [address] [phone]         Care Instructions       Further instructions from your care team         Orthopedic Discharge Instructions:   After Your Injection or Aspiration  ________________________________________    Patient Name:  Johnna Caballero  Today's Date:  December 12, 2019  The provider who performed your Joint Injection was Justo Jones PA-C at [facility]  in the  General Radiology Department  Care of needle site    If you have new numbness down your leg, this may last up to 6 hours, but it should go away. You may need help with walking until your leg feels normal.     Over the next 24 to 48 hours, pain at the needle site may increase before it gets better.     For the next 48 hours, use ice packs for 15 minutes, three to four times a day for pain.    If you have a bandage, you may remove it the next morning.     No tub baths, hot tubs or swimming for 48 hours. You may shower the next day.   Activity    Do not drive until morning.     Limit your activity based on your pain level. Follow your doctor s orders for activity.     You may eat a normal diet.     If you had sedation,   - You may feel sleepy, forgetful or unsteady.   - Do not drink alcohol for 24 hours.  Medicines    If you take aspirin or platelet inhibitors, you can restart them tomorrow.     Restart all other medicines today at your regular dose, including Coumadin (warfarin).    If you are restarting Coumadin, talk to your doctor about having your INR checked.   If you had a steroid shot     The medicine should help reduce swelling and pain. This may take from 7 to 10 days.     Side effects from the shot will be mild and go  away in 2 to 3 days. Common side effects may include:  -  Insomnia (trouble sleeping).  -  Heartburn.  -  Flushed face.  -  Water retention (bloating or fluid build-up).  -  Increased appetite (feeling more hungry than usual).  -  Increased blood sugar.  If you have diabetes, watch your blood sugar closely. If needed, call your doctor to help you control your blood sugar.  Some patients will get lasting relief from a single shot. Others may require up to three shots to get results. If you have more than one steroid shot, they should be given two weeks apart.  Some patients do not have relief of symptoms.    Follow-up:  No follow-up needed     Call your doctor at or go to the Emergency Room if you have severe pain, fever or problems with bowel or bladder control.     Additional Information About Your Visit       [facility] Information    [employer] gives you secure access to your electronic health record. If you see a primary care provider, you can also send messages to your care team and make appointments. If you have questions, please call your primary care clinic.  If you do not have a primary care provider, please call [phone] and they will assist you.       Care EveryWhere ID    This is your Care EveryWhere ID. This could be used by other organizations to access your [facility] medical records  [id number]        Primary Care Provider Office Phone # Fax #    Linda Durant [phone]      Equal Access to Services    HERMES PARKER : Hadsalma toscano Soanshul, waaxda luqadaha, qaybta kaalmada willard, shola campos. So [facility] [phone].    ATENCIÓN: Si habla español, tiene a subramanian disposición servicios gratuitos de asistencia lingüística. Yovani al [phone].    We comply with applicable federal and state civil rights laws, including the [place] Human Rights Act. We do not discriminate on the basis of race, color, creed, Advent, national origin, marital  status, age, disability, sex, sexual orientation, or gender identity.       Thank you!    Thank you for choosing [facility] for your care. Our goal is always to provide you with excellent care. Hearing back from our patients is one way we can continue to improve our services. Please take a few minutes to complete the written survey that you may receive in the mail after you visit with us. Thank you!            Medication List      Medications          Morning Afternoon Evening Bedtime As Needed    BD Pen Needle Kim 2nd Gen 32G X 4 MM miscellaneous  INSTRUCTIONS:  USE 2 PEN NEEDLES DAILY OR AS DIRECTED  Generic drug:  insulin pen needle                     blood glucose monitoring lancets  INSTRUCTIONS:  Use to test blood sugar 1-2 times daily or as directed.                     blood glucose test strip  Also known as:  Accu-Chek Jennifer  INSTRUCTIONS:  Use to test blood sugars 1-2x daily or as directed.                     FreeStyle Elijah 14 Day Sensor Misc  INSTRUCTIONS:  1 each every 14 days                     order for DME  INSTRUCTIONS:  Equipment being ordered: TENS unit                       ASK your doctor about these medications          Morning Afternoon Evening Bedtime As Needed    aspirin 81 MG tablet  Also known as:  ASA  INSTRUCTIONS:  Take 1 tablet (81 mg) by mouth daily                     celecoxib 200 MG capsule  Also known as:  celeBREX  INSTRUCTIONS:  Take 1 capsule (200 mg) by mouth daily                     cetirizine 10 MG tablet  Also known as:  zyrTEC  INSTRUCTIONS:  Take 10 mg by mouth daily                     diazepam 5 MG tablet  Also known as:  VALIUM  INSTRUCTIONS:  Take 1 tablet (5 mg) by mouth every 12 hours as needed for muscle spasms                     diclofenac 1 % topical gel  Also known as:  VOLTAREN  INSTRUCTIONS:  Place 2 g onto the skin 4 times daily  Doctor's comments:  Apply  2 grams up to foot four times daily as needed for pain using enclosed dosing card.                      EPINEPHrine 0.3 MG/0.3ML injection 2-pack  Also known as:  EPIPEN/ADRENACLICK/or ANY BX GENERIC EQUIV  INSTRUCTIONS:  Inject 0.3 mLs (0.3 mg) into the muscle once as needed for anaphylaxis  Doctor's comments:  Profile Rx: patient will contact pharmacy when needed                     insulin glargine 100 UNIT/ML pen  Also known as:  LANTUS PEN  INSTRUCTIONS:  Inject 24 Units Subcutaneous 2 times daily                     KRILL OIL PO  INSTRUCTIONS:  Take 1 tablet by mouth daily                     LANsoprazole 30 MG DR capsule  Also known as:  PREVACID  INSTRUCTIONS:  Take 1 capsule (30 mg) by mouth every morning (before breakfast) Take 30-60 min before a meal                     liraglutide 18 MG/3ML solution  Also known as:  VICTOZA PEN  INSTRUCTIONS:  Inject 1.8 mg Subcutaneous daily                     losartan-hydrochlorothiazide 100-25 MG tablet  Also known as:  HYZAAR  INSTRUCTIONS:  Take 1 tablet by mouth daily                     metFORMIN 1000 MG tablet  Also known as:  GLUCOPHAGE  INSTRUCTIONS:  Take 1 tablet (1,000 mg) by mouth 2 times daily (with meals)                     oxyCODONE-acetaminophen 5-325 MG tablet  Also known as:  PERCOCET  INSTRUCTIONS:  Take 1-2 tablets by mouth every 4 hours as needed for severe pain                     STATIN NOT PRESCRIBED  Also known as:  INTENTIONAL  INSTRUCTIONS:  1 each 2 times daily Statin not prescribed intentionally due to Refusal by patient                     triamcinolone 0.5 % external ointment  Also known as:  KENALOG  INSTRUCTIONS:  Topically twice daily as needed for 14 days                     vitamin D3 22032 units (250 mcg) capsule  Also known as:  CHOLECALCIFEROL  INSTRUCTIONS:  Take 1 capsule (10,000 Units) by mouth daily

## 2019-12-12 NOTE — DISCHARGE INSTRUCTIONS
Orthopedic Discharge Instructions:   After Your Injection or Aspiration  ________________________________________    Patient Name:  Johnna Caballero  Today's Date:  December 12, 2019  The provider who performed your Joint Injection was Justo Jones PA-C at [facility]  in the  General Radiology Department  Care of needle site    If you have new numbness down your leg, this may last up to 6 hours, but it should go away. You may need help with walking until your leg feels normal.     Over the next 24 to 48 hours, pain at the needle site may increase before it gets better.     For the next 48 hours, use ice packs for 15 minutes, three to four times a day for pain.    If you have a bandage, you may remove it the next morning.     No tub baths, hot tubs or swimming for 48 hours. You may shower the next day.   Activity    Do not drive until morning.     Limit your activity based on your pain level. Follow your doctor s orders for activity.     You may eat a normal diet.     If you had sedation,   - You may feel sleepy, forgetful or unsteady.   - Do not drink alcohol for 24 hours.  Medicines    If you take aspirin or platelet inhibitors, you can restart them tomorrow.     Restart all other medicines today at your regular dose, including Coumadin (warfarin).    If you are restarting Coumadin, talk to your doctor about having your INR checked.   If you had a steroid shot     The medicine should help reduce swelling and pain. This may take from 7 to 10 days.     Side effects from the shot will be mild and go away in 2 to 3 days. Common side effects may include:  -  Insomnia (trouble sleeping).  -  Heartburn.  -  Flushed face.  -  Water retention (bloating or fluid build-up).  -  Increased appetite (feeling more hungry than usual).  -  Increased blood sugar.  If you have diabetes, watch your blood sugar closely. If needed, call your doctor to help you control your blood sugar.  Some patients will get lasting  relief from a single shot. Others may require up to three shots to get results. If you have more than one steroid shot, they should be given two weeks apart.  Some patients do not have relief of symptoms.    Follow-up:  No follow-up needed     Call your doctor at or go to the Emergency Room if you have severe pain, fever or problems with bowel or bladder control.

## 2019-12-12 NOTE — PATIENT INSTRUCTIONS
Take 50% of long acting insulin (e.g. Lantus, NPH) while NPO (fasting)    No aspirin, ibuprofen, motrin, aleve, naprosyn or fish oil in the week before surgery.   Stop asa, krill oil  and celebrex the week before surgery.    Hold metformin, victoza and losartan/hctz the day of surgery    Before Your Surgery      Call your surgeon if there is any change in your health. This includes signs of a cold or flu (such as a sore throat, runny nose, cough, rash or fever).    Do not smoke, drink alcohol or take over the counter medicine (unless your surgeon or primary care doctor tells you to) for the 24 hours before and after surgery.    If you take prescribed drugs: Follow your doctor s orders about which medicines to take and which to stop until after surgery.    Eating and drinking prior to surgery: follow the instructions from your surgeon    Take a shower or bath the night before surgery. Use the soap your surgeon gave you to gently clean your skin. If you do not have soap from your surgeon, use your regular soap. Do not shave or scrub the surgery site.  Wear clean pajamas and have clean sheets on your bed.

## 2019-12-12 NOTE — IP AVS SNAPSHOT
[facility] Radiology  [address] [address]  Phone:  [phone]                                    After Visit Summary   12/12/2019    Johnna Caballero    MRN: [id number]           After Visit Summary Signature Page    I have received my discharge instructions, and my questions have been answered. I have discussed any challenges I see with this plan with the nurse or doctor.    ..........................................................................................................................................  Patient/Patient Representative Signature      ..........................................................................................................................................  Patient Representative Print Name and Relationship to Patient    ..................................................               ................................................  Date                                   Time    ..........................................................................................................................................  Reviewed by Signature/Title    ...................................................              ..............................................  Date                                               Time          22EPIC Rev 08/18

## 2019-12-12 NOTE — PROGRESS NOTES
Answers for HPI/ROS submitted by the patient on 2019   If you checked off any problems, how difficult have these problems made it for you to do your work, take care of things at home, or get along with other people?: Not difficult at all  PHQ9 TOTAL SCORE: 0    [facility] [place]  [address]  [address]  [place] [id number] [phone]  Dept: [phone]    PRE-OP EVALUATION:  Today's date: 2019    Johnna Caballero (: 1965) presents for pre-operative evaluation assessment as requested by Dr. Torres.  She requires evaluation and anesthesia risk assessment prior to undergoing surgery/procedure:REVISION LEFT HIP SCAR WITH POSSIBLE SEROMA EVACUATION; REMOVAL LEFT CHEST WALL MASS, IUD change    Fax number for surgical facility: [phone], [phone]  Primary Physician: Linda Durant  Type of Anesthesia Anticipated: General    Patient has a Health Care Directive or Living Will:  NO    Preop Questions 2019   Who is doing your surgery? kassidy rabago   What are you having done? lipoma removal and butt lift   Date of Surgery/Procedure: [id number]   Facility or Hospital where procedure/surgery will be performed: [facility]   1.  Do you have a history of Heart attack, stroke, stent, coronary bypass surgery, or other heart surgery? No   2.  Do you ever have any pain or discomfort in your chest? No   3.  Do you have a history of  Heart Failure? No   4.   Are you troubled by shortness of breath when:  walking on a level surface, or up a slight hill, or at night? No   5.  Do you currently have a cold, bronchitis or other respiratory infection? No   6.  Do you have a cough, shortness of breath, or wheezing? No   7.  Do you sometimes get pains in the calves of your legs when you walk? No   8. Do you or anyone in your family have previous history of blood clots? No   9.  Do you or does anyone in your family have a serious bleeding problem such as prolonged bleeding  following surgeries or cuts? No   10. Have you ever had problems with anemia or been told to take iron pills? Yes - normal on most recent labs   11. Have you had any abnormal blood loss such as black, tarry or bloody stools, or abnormal vaginal bleeding? No   12. Have you ever had a blood transfusion? No   13. Have you or any of your relatives ever had problems with anesthesia? No   14. Do you have sleep apnea, excessive snoring or daytime drowsiness? No   15. Do you have any prosthetic heart valves? No   16. Do you have prosthetic joints? YES - knees   17. Is there any chance that you may be pregnant? No         HPI:       HPI related to upcoming procedure: large lipomas requiring surgical resection and now revision of resected area.    Due for IUD change while under anesthesia     Diabetes has been under good control - last HgbA1C on 11/14/19 was 7.9%.  Her fasting blood sugars have been around 110.       ASTHMA - Patient has a longstanding history of moderate Asthma . Patient has been doing well overall noting NO SYMPTOMS and continues on medication regimen consisting of albuterol prn  without adverse reactions or side effects.                                                                                                                                                .  DIABETES - Patient has a longstanding history of DiabetesType Type II . Patient is being treated with diet, oral agents, exercise, SMBG, insulin injections and ASA and denies significant side effects. Control has been fair. Complicating factors include but are not limited to: hypertension, hyperlipidemia and morbid obesity .                                                                                                                             .  HYPERLIPIDEMIA - Patient has a long history of significant Hyperlipidemia for which she declines medications.                                                                                                                                                         .  HYPERTENSION - Patient has longstanding history of HTN , currently denies any symptoms referable to elevated blood pressure. Specifically denies chest pain, palpitations, dyspnea, orthopnea, PND or peripheral edema. Blood pressure readings have been in normal range. Current medication regimen is as listed below. Patient denies any side effects of medication.                                                           See problem list for active medical problems.  Problems all longstanding and stable, except as noted/documented.  See ROS for pertinent symptoms related to these conditions.           Uses chronic percoset for pain related to multiple orthopedics issues - #90 per month             Multiple allergies including latex.        MEDICAL HISTORY:     Patient Active Problem List    Diagnosis Date Noted     Chronic pain syndrome 11/23/2016     Priority: High     Patient is followed by Linda Durant MD for ongoing prescription of pain medication.  All refills should be approved by this provider, or covering partner.    Medication(s): percocet 5/325.   Maximum quantity per month: 90  Clinic visit frequency required: Q 6  months     Controlled substance agreement:  Encounter-Level CSA:     There are no encounter-level csa.        Pain Clinic evaluation in the past: No    DIRE Total Score(s):  No flowsheet data found.    Last [facility] website verification:  none   [email or web address]         Type 2 diabetes mellitus without complication (H) 10/02/2015     Priority: High     Hematoma - postoperative 11/23/2012     Priority: High     Problem list name updated by automated process. Provider to review and confirm       Hyperlipidemia LDL goal <100 02/10/2010     Priority: High     Postoperative state 11/09/2018     Priority: Medium     Intermittent asthma,  uncomplicated 12/28/2016     Priority: Medium     Latex allergy status 10/03/2016     Priority: Medium     Anxiety 05/10/2016     Priority: Medium     Major depressive disorder, recurrent episode, mild (H) 01/05/2016     Priority: Medium     Typically seasonal     celexa- low libido  wellbutrin- didn't work   paxil- didn't work        Essential hypertension 10/04/2015     Priority: Medium     S/P total knee arthroplasty 07/15/2014     Priority: Medium     Morbid obesity with BMI of 40.0-44.9, adult (H) 09/18/2012     Priority: Medium     Allergic rhinitis 03/25/2003     Priority: Medium     Problem list name updated by automated process. Provider to review       Bilateral low back pain without sciatica 10/25/2015     Priority: Low     Vitamin D deficiency disease 03/06/2013     Priority: Low     Surgery aftercare 12/16/2012     Priority: Low     IMO update changed this record. Please review for accuracy       Long term current use of insulin (H) 09/18/2012     Priority: Low     Esophageal reflux 03/25/2003     Priority: Low      Past Medical History:   Diagnosis Date     Actinic keratosis      Allergic rhinitis, cause unspecified      Anemia      Arthritis      Asthma     no meds x2 yrs     chronic back pain      Chronic infection     MRSA-nasal     Chronic pain     back     Esophageal reflux      fibrocystic breast changes      Gastro-oesophageal reflux disease      Heart murmur     mitral insuffiency     Hx of Fen-Phen use      Hypertension      migraines      Mild intermittent asthma     rare, with dog or exercise     Moderate major depression (H) 3/28/2012     MRSA (methicillin resistant Staphylococcus aureus) 6/27/2014    Nares     Need for desensitization to allergens      Obesity, unspecified      Other and unspecified hyperlipidemia      Temporomandibular joint disorders, unspecified      Thrombosis of leg     incisional area right hip     Type II or unspecified type diabetes mellitus without mention of  complication, not stated as uncontrolled      Past Surgical History:   Procedure Laterality Date     ARTHROPLASTY KNEE  7/15/2014    Procedure: ARTHROPLASTY KNEE;  Surgeon: Chapin Paul MD;  Location: [facility] OR     BACK SURGERY       C NONSPECIFIC PROCEDURE      laparoscopy x6     C NONSPECIFIC PROCEDURE  93    laparotomy x2 ovarian cyst     C NONSPECIFIC PROCEDURE  0293    oophorectomy lt side - cyst     C NONSPECIFIC PROCEDURE  0395    laminectomy L4-5. S1 herniated disc     C NONSPECIFIC PROCEDURE  96    cholecystectomy     C NONSPECIFIC PROCEDURE      ganglion cysts l wrist, rt palm     C NONSPECIFIC PROCEDURE      cyst removal back x2     C NONSPECIFIC PROCEDURE  10/02    rt thumb fusion, ganglion cyst removal     C NONSPECIFIC PROCEDURE  1/08    remove heel spur, excise exostosis     CHOLECYSTECTOMY       COLONOSCOPY N/A 4/26/2017    Procedure: COLONOSCOPY;  COLONOSCOPY;  Surgeon: Chapin Leal MD;  Location:  GI     EXCISE LESION LOWER EXTREMITY  11/20/2012    Procedure: EXCISE LESION LOWER EXTREMITY;  EXCISION LIPOMA RIGHT HIP ;  Surgeon: Jazmin Bautista MD;  Location:  [facility]     EXCISE LESION LOWER EXTREMITY  11/23/2012    Procedure: EXCISE LESION LOWER EXTREMITY;  EXCISE LESION LOWER EXTREMITY  EVACUATE RIGHT HIP HEMATOMA;  Surgeon: Jazmin Bautista MD;  Location:  OR     EXCISE MASS HIP Left 11/9/2018    Procedure: 1.  Excision left chest wall mass with excised diameter of greater than 4 cm 2.  Excision left hip mass with excised diameter of more than 20 x 20 cm  ;  Surgeon: Jazmin Bautista MD;  Location:  OR     EXCISE MASS TRUNK Left 11/9/2018    Procedure: EXCISE MASS TRUNK;  Surgeon: Jazmin Bautista MD;  Location:  OR     GYN SURGERY       HC EXPLORATION ANKLE JOINT  1/2006     HC PART EXCIS PLANTAR FASCIA  12/2006     IRRIGATION AND DEBRIDEMENT HIP, COMBINED  12/15/2012    Procedure: COMBINED IRRIGATION AND DEBRIDEMENT HIP;   evacuation hematoma, scar  revision right hip;  Surgeon: Jazmin Bautista MD;  Location: [facility] OR     wisdom teeth[       Current Outpatient Medications   Medication Sig Dispense Refill     celecoxib (CELEBREX) 200 MG capsule Take 1 capsule (200 mg) by mouth daily 90 capsule 11     aspirin 81 MG tablet Take 1 tablet (81 mg) by mouth daily 30 tablet      BD PEN NEEDLE VALDO 2ND GEN 32G X 4 MM miscellaneous USE 2 PEN NEEDLES DAILY OR AS DIRECTED 200 each 3     blood glucose (ACCU-CHEK GABI) test strip Use to test blood sugars 1-2x daily or as directed. 200 each 11     blood glucose monitoring (ACCU-CHEK MULTICLIX) lancets Use to test blood sugar 1-2 times daily or as directed. 2 Box 3     cetirizine (ZYRTEC) 10 MG tablet Take 10 mg by mouth daily       Continuous Blood Gluc Sensor (FREESTYLE HELENE 14 DAY SENSOR) MISC 1 each every 14 days 6 each 11     diazepam (VALIUM) 5 MG tablet Take 1 tablet (5 mg) by mouth every 12 hours as needed for muscle spasms 30 tablet 1     diclofenac (VOLTAREN) 1 % topical gel Place 2 g onto the skin 4 times daily 100 g 2     EPINEPHrine (EPIPEN/ADRENACLICK/OR ANY BX GENERIC EQUIV) 0.3 MG/0.3ML injection 2-pack Inject 0.3 mLs (0.3 mg) into the muscle once as needed for anaphylaxis 2 mL 0     insulin glargine (LANTUS SOLOSTAR PEN) 100 UNIT/ML pen Inject 24 Units Subcutaneous 2 times daily 45 mL 3     KRILL OIL PO Take 1 tablet by mouth daily        LANsoprazole (PREVACID) 30 MG DR capsule Take 1 capsule (30 mg) by mouth every morning (before breakfast) Take 30-60 min before a meal 90 capsule 11     liraglutide (VICTOZA PEN) 18 MG/3ML solution Inject 1.8 mg Subcutaneous daily 27 mL 3     losartan-hydrochlorothiazide (HYZAAR) 100-25 MG tablet Take 1 tablet by mouth daily 90 tablet 3     metFORMIN (GLUCOPHAGE) 1000 MG tablet Take 1 tablet (1,000 mg) by mouth 2 times daily (with meals) 180 tablet 3     ORDER FOR DME Equipment being ordered: TENS unit 1 each 0     oxyCODONE-acetaminophen (PERCOCET) 5-325 MG tablet  "Take 1-2 tablets by mouth every 4 hours as needed for severe pain 90 tablet 0     STATIN NOT PRESCRIBED, INTENTIONAL, 1 each 2 times daily Statin not prescribed intentionally due to Refusal by patient 0 each 0     triamcinolone (KENALOG) 0.5 % external ointment Topically twice daily as needed for 14 days 60 g 11     vitamin D3 (CHOLECALCIFEROL) 32185 units (250 mcg) capsule Take 1 capsule (10,000 Units) by mouth daily       OTC products: None, except as noted above    Allergies   Allergen Reactions     Eggs      Allergy found in testing     Hornets      wasps     Latex Anaphylaxis     hives  -  able to use BP cuff     Lipitor [Hmg-Coa-R Inhibitors] Cramps     Muscle cramps with statin     Metoclopramide Hcl Difficulty breathing     Neurontin [Gabapentin] Hives      Latex Allergy: YES: Precautions to take: latex free room    Social History     Tobacco Use     Smoking status: Never Smoker     Smokeless tobacco: Never Used   Substance Use Topics     Alcohol use: Yes     Alcohol/week: 0.0 standard drinks     Frequency: Never     Drinks per session: 1 or 2     Binge frequency: Never     Comment: 1 drink per year or less     History   Drug Use No       REVIEW OF SYSTEMS:   Constitutional, neuro, ENT, endocrine, pulmonary, cardiac, gastrointestinal, genitourinary, musculoskeletal, integument and psychiatric systems are negative, except as otherwise noted.    EXAM:   /72   Pulse 83   Temp 96.7  F (35.9  C) (Tympanic)   Resp 18   Ht 1.702 m (5' 7\")   Wt 129.7 kg (285 lb 14.4 oz)   SpO2 98%   BMI 44.78 kg/m      GENERAL APPEARANCE: healthy, alert and no distress     EYES: EOMI, PERRL     HENT: ear canals and TM's normal and nose and mouth without ulcers or lesions     NECK: no adenopathy, no asymmetry, masses, or scars and thyroid normal to palpation     RESP: lungs clear to auscultation - no rales, rhonchi or wheezes     CV: regular rates and rhythm, peripheral pulses strong and grade 1/6 mary murmur heard best " over the rusb     ABDOMEN:  soft, nontender, no HSM or masses and bowel sounds normal     MS: extremities normal- no gross deformities noted, no evidence of inflammation in joints, FROM in all extremities.     SKIN: no suspicious lesions or rashes     NEURO: Normal strength and tone, sensory exam grossly normal, mentation intact and speech normal     PSYCH: mentation appears normal. and affect normal/bright     LYMPHATICS: No cervical adenopathy    DIAGNOSTICS:   EKG: appears normal, NSR, normal axis, normal intervals, no acute ST/T changes c/w ischemia, no LVH by voltage criteria, unchanged from previous tracings    Recent Labs   Lab Test 11/14/19  0931 08/01/19  0810 04/15/19  1018 12/26/18  1211  12/03/18  1344  07/18/14  0800 07/17/14  0633   HGB  --   --   --  12.6  --  12.9   < >  --  10.2*   PLT  --   --   --  384  --  348   < >  --   --    INR  --   --   --   --   --   --   --  1.59* 1.62*     --  138  --    < >  --    < >  --   --    POTASSIUM 4.1  --  4.1  --    < >  --    < >  --   --    CR 0.65  --  0.65  --    < >  --    < >  --   --    A1C 7.9* 8.0* 8.7*  --    < >  --    < >  --   --     < > = values in this interval not displayed.        IMPRESSION:   Reason for surgery/procedure: REVISION LEFT HIP SCAR WITH POSSIBLE SEROMA EVACUATION; REMOVAL LEFT CHEST WALL MASS    The proposed surgical procedure is considered INTERMEDIATE risk.    REVISED CARDIAC RISK INDEX  The patient has the following serious cardiovascular risks for perioperative complications such as (MI, PE, VFib and 3  AV Block):  Diabetes Mellitus (on Insulin)  INTERPRETATION: 1 risks: Class II (low risk - 0.9% complication rate)    The patient has the following additional risks for perioperative complications:  Morbid obesity  Chronic opioid medication use      ICD-10-CM    1. Preop general physical exam Z01.818 EKG 12-lead complete w/read - Clinics     Basic metabolic panel   2. Chronic bilateral low back pain without sciatica  M54.5 celecoxib (CELEBREX) 200 MG capsule    G89.29    3. Lipoma of skin and subcutaneous tissue D17.30    4. Type 2 diabetes mellitus without complication, with long-term current use of insulin (H) E11.9     Z79.4    5. Essential hypertension I10    6. Morbid obesity with BMI of 40.0-44.9, adult (H) E66.01     Z68.41    7. Latex allergy status Z91.040    8. Chronic pain syndrome G89.4 Drug  Screen Comprehensive, Urine w/o Reported Meds (Pain Care Package)       RECOMMENDATIONS:     NO LATEX       Cardiovascular Risk  Performs 4 METs exercise without symptoms (Light housework (dusting, washing dishes) and Climb a flight of stairs) .            Diabetes Medication Use  -----Hold usual oral and non-insulin diabetic meds (e.g. Metformin, Actos, Glipizide) while NPO.   -----Take 50% of long acting insulin (e.g. Lantus, NPH) while NPO (fasting)        Anticoagulant or Antiplatelet Medication Use  NSAIDS: hold 7 days before surgery         ACE Inhibitor or Angiotensin Receptor Blocker (ARB) Use  Ace inhibitor or Angiotensin Receptor Blocker (ARB) and should HOLD this medication for the 24 hours prior to surgery.        APPROVAL GIVEN to proceed with proposed procedure, without further diagnostic evaluation    Signed Electronically by: Linda Durant MD    Copy of this evaluation report is provided to requesting physician.    [place] Preop Guidelines    Revised Cardiac Risk Index

## 2019-12-12 NOTE — PROGRESS NOTES
RADIOLOGY PROCEDURE NOTE  Patient name: Johnna Caballero  MRN: [id number]  : 1965    Pre-procedure diagnosis: Right foot pain  Post-procedure diagnosis: Same    Procedure Date/Time: 2019  12:03 PM  Procedure: Right cuboid-cuneiform and right naviculocuneiform joint injections  Estimated blood loss: None  Specimen(s) collected with description: none  The patient tolerated the procedure well with no immediate complications.  Significant findings:none    See imaging dictation for procedural details.    Provider name: PABLO Rincon  Assistant(s):None

## 2019-12-12 NOTE — LETTER
My Asthma Action Plan    Name: Johnna Caballero   YOB: 1965  Date: 12/12/2019   My doctor: Linda Durant MD   [facility]        My Rescue Medicine:   Albuterol inhaler (Proair/Ventolin/Proventil HFA)  2-4 puffs EVERY 4 HOURS as needed. Use a spacer if recommended by your provider.   My Asthma Severity:   Intermittent / Exercise Induced  Know your asthma triggers: ...             GREEN ZONE   Good Control    I feel good    No cough or wheeze    Can work, sleep and play without asthma symptoms       Take your asthma control medicine every day.     1. If exercise triggers your asthma, take your rescue medication    15 minutes before exercise or sports, and    During exercise if you have asthma symptoms  2. Spacer to use with inhaler: If you have a spacer, make sure to use it with your inhaler             YELLOW ZONE Getting Worse  I have ANY of these:    I do not feel good    Cough or wheeze    Chest feels tight    Wake up at night   1. Keep taking your Green Zone medications  2. Start taking your rescue medicine:    every 20 minutes for up to 1 hour. Then every 4 hours for 24-48 hours.  3. If you stay in the Yellow Zone for more than 12-24 hours, contact your doctor.  4. If you do not return to the Green Zone in 12-24 hours or you get worse, start taking your oral steroid medicine if prescribed by your provider.           RED ZONE Medical Alert - Get Help  I have ANY of these:    I feel awful    Medicine is not helping    Breathing getting harder    Trouble walking or talking    Nose opens wide to breathe       1. Take your rescue medicine NOW  2. If your provider has prescribed an oral steroid medicine, start taking it NOW  3. Call your doctor NOW  4. If you are still in the Red Zone after 20 minutes and you have not reached your doctor:    Take your rescue medicine again and    Call 911 or go to the emergency room right away    See your regular doctor within 2 weeks of an  Emergency Room or Urgent Care visit for follow-up treatment.          Annual Reminders:  Meet with Asthma Educator,  Flu Shot in the Fall, consider Pneumonia Vaccination for patients with asthma (aged 19 and older).    Pharmacy:    [facility] - [place] - [address]  WRITTEN PRESCRIPTION REQUESTED  [facility] - [place], [place] - [address]    Electronically signed by Linda Durant MD   Date: 12/12/19                    Asthma Triggers  How To Control Things That Make Your Asthma Worse    Triggers are things that make your asthma worse.  Look at the list below to help you find your triggers and   what you can do about them. You can help prevent asthma flare-ups by staying away from your triggers.      Trigger                                                          What you can do   Cigarette Smoke  Tobacco smoke can make asthma worse. Do not allow smoking in your home, car or around you.  Be sure no one smokes at a child s day care or school.  If you smoke, ask your health care provider for ways to help you quit.  Ask family members to quit too.  Ask your health care provider for a referral to Quit Plan to help you quit smoking, or call 5-362-159-PLAN.     Colds, Flu, Bronchitis  These are common triggers of asthma. Wash your hands often.  Don t touch your eyes, nose or mouth.  Get a flu shot every year.     Dust Mites  These are tiny bugs that live in cloth or carpet. They are too small to see. Wash sheets and blankets in hot water every week.   Encase pillows and mattress in dust mite proof covers.  Avoid having carpet if you can. If you have carpet, vacuum weekly.   Use a dust mask and HEPA vacuum.   Pollen and Outdoor Mold  Some people are allergic to trees, grass, or weed pollen, or molds. Try to keep your windows closed.  Limit time out doors when pollen count is high.   Ask you health care provider about taking medicine during allergy  season.     Animal Dander  Some people are allergic to skin flakes, urine or saliva from pets with fur or feathers. Keep pets with fur or feathers out of your home.    If you can t keep the pet outdoors, then keep the pet out of your bedroom.  Keep the bedroom door closed.  Keep pets off cloth furniture and away from stuffed toys.     Mice, Rats, and Cockroaches  Some people are allergic to the waste from these pests.   Cover food and garbage.  Clean up spills and food crumbs.  Store grease in the refrigerator.   Keep food out of the bedroom.   Indoor Mold  This can be a trigger if your home has high moisture. Fix leaking faucets, pipes, or other sources of water.   Clean moldy surfaces.  Dehumidify basement if it is damp and smelly.   Smoke, Strong Odors, and Sprays  These can reduce air quality. Stay away from strong odors and sprays, such as perfume, powder, hair spray, paints, smoke incense, paint, cleaning products, candles and new carpet.   Exercise or Sports  Some people with asthma have this trigger. Be active!  Ask your doctor about taking medicine before sports or exercise to prevent symptoms.    Warm up for 5-10 minutes before and after sports or exercise.     Other Triggers of Asthma  Cold air:  Cover your nose and mouth with a scarf.  Sometimes laughing or crying can be a trigger.  Some medicines and food can trigger asthma.

## 2019-12-13 LAB
ANION GAP SERPL CALCULATED.3IONS-SCNC: 11 MMOL/L (ref 3–14)
BUN SERPL-MCNC: 12 MG/DL (ref 7–30)
CALCIUM SERPL-MCNC: 8.8 MG/DL (ref 8.5–10.1)
CHLORIDE SERPL-SCNC: 102 MMOL/L (ref 94–109)
CO2 SERPL-SCNC: 22 MMOL/L (ref 20–32)
CREAT SERPL-MCNC: 0.6 MG/DL (ref 0.52–1.04)
GFR SERPL CREATININE-BSD FRML MDRD: >90 ML/MIN/{1.73_M2}
GLUCOSE SERPL-MCNC: 245 MG/DL (ref 70–99)
POTASSIUM SERPL-SCNC: 4 MMOL/L (ref 3.4–5.3)
SODIUM SERPL-SCNC: 135 MMOL/L (ref 133–144)

## 2019-12-13 ASSESSMENT — PATIENT HEALTH QUESTIONNAIRE - PHQ9: SUM OF ALL RESPONSES TO PHQ QUESTIONS 1-9: 0

## 2019-12-16 ENCOUNTER — HOSPITAL ENCOUNTER (OUTPATIENT)
Facility: CLINIC | Age: 54
Discharge: HOME | End: 2019-12-16
Attending: PLASTIC SURGERY | Admitting: PLASTIC SURGERY
Payer: COMMERCIAL

## 2019-12-16 ENCOUNTER — ANESTHESIA EVENT (OUTPATIENT)
Dept: SURGERY | Facility: CLINIC | Age: 54
End: 2019-12-16
Payer: COMMERCIAL

## 2019-12-16 ENCOUNTER — ANESTHESIA (OUTPATIENT)
Dept: SURGERY | Facility: CLINIC | Age: 54
End: 2019-12-16
Payer: COMMERCIAL

## 2019-12-16 VITALS
SYSTOLIC BLOOD PRESSURE: 134 MMHG | HEIGHT: 67 IN | WEIGHT: 283.8 LBS | DIASTOLIC BLOOD PRESSURE: 82 MMHG | RESPIRATION RATE: 16 BRPM | HEART RATE: 68 BPM | TEMPERATURE: 97 F | OXYGEN SATURATION: 94 % | BODY MASS INDEX: 44.54 KG/M2

## 2019-12-16 DIAGNOSIS — D17.1 LIPOMA OF CHEST WALL: Primary | ICD-10-CM

## 2019-12-16 LAB
GLUCOSE BLDC GLUCOMTR-MCNC: 187 MG/DL (ref 70–99)
GLUCOSE BLDC GLUCOMTR-MCNC: 207 MG/DL (ref 70–99)
HCG UR QL: NEGATIVE

## 2019-12-16 PROCEDURE — 40000169 ZZH STATISTIC PRE-PROCEDURE ASSESSMENT I: Performed by: PLASTIC SURGERY

## 2019-12-16 PROCEDURE — 71000012 ZZH RECOVERY PHASE 1 LEVEL 1 FIRST HR: Performed by: PLASTIC SURGERY

## 2019-12-16 PROCEDURE — 25000125 ZZHC RX 250: Performed by: NURSE ANESTHETIST, CERTIFIED REGISTERED

## 2019-12-16 PROCEDURE — 71000013 ZZH RECOVERY PHASE 1 LEVEL 1 EA ADDTL HR: Performed by: PLASTIC SURGERY

## 2019-12-16 PROCEDURE — 82962 GLUCOSE BLOOD TEST: CPT

## 2019-12-16 PROCEDURE — 37000008 ZZH ANESTHESIA TECHNICAL FEE, 1ST 30 MIN: Performed by: PLASTIC SURGERY

## 2019-12-16 PROCEDURE — 25000566 ZZH SEVOFLURANE, EA 15 MIN: Performed by: PLASTIC SURGERY

## 2019-12-16 PROCEDURE — 25000128 H RX IP 250 OP 636: Performed by: ANESTHESIOLOGY

## 2019-12-16 PROCEDURE — 27210794 ZZH OR GENERAL SUPPLY STERILE: Performed by: PLASTIC SURGERY

## 2019-12-16 PROCEDURE — 37000009 ZZH ANESTHESIA TECHNICAL FEE, EACH ADDTL 15 MIN: Performed by: PLASTIC SURGERY

## 2019-12-16 PROCEDURE — 88304 TISSUE EXAM BY PATHOLOGIST: CPT | Mod: 26 | Performed by: PLASTIC SURGERY

## 2019-12-16 PROCEDURE — 25000128 H RX IP 250 OP 636: Performed by: NURSE ANESTHETIST, CERTIFIED REGISTERED

## 2019-12-16 PROCEDURE — 25000132 ZZH RX MED GY IP 250 OP 250 PS 637: Performed by: ANESTHESIOLOGY

## 2019-12-16 PROCEDURE — 25000128 H RX IP 250 OP 636: Performed by: PLASTIC SURGERY

## 2019-12-16 PROCEDURE — 25000132 ZZH RX MED GY IP 250 OP 250 PS 637: Performed by: PLASTIC SURGERY

## 2019-12-16 PROCEDURE — 71000027 ZZH RECOVERY PHASE 2 EACH 15 MINS: Performed by: PLASTIC SURGERY

## 2019-12-16 PROCEDURE — 36000058 ZZH SURGERY LEVEL 3 EA 15 ADDTL MIN: Performed by: PLASTIC SURGERY

## 2019-12-16 PROCEDURE — 81025 URINE PREGNANCY TEST: CPT | Performed by: OBSTETRICS & GYNECOLOGY

## 2019-12-16 PROCEDURE — 25000125 ZZHC RX 250: Performed by: PLASTIC SURGERY

## 2019-12-16 PROCEDURE — 25800030 ZZH RX IP 258 OP 636: Performed by: NURSE ANESTHETIST, CERTIFIED REGISTERED

## 2019-12-16 PROCEDURE — 88304 TISSUE EXAM BY PATHOLOGIST: CPT | Performed by: PLASTIC SURGERY

## 2019-12-16 PROCEDURE — 25800025 ZZH RX 258: Performed by: PLASTIC SURGERY

## 2019-12-16 PROCEDURE — 36000056 ZZH SURGERY LEVEL 3 1ST 30 MIN: Performed by: PLASTIC SURGERY

## 2019-12-16 RX ORDER — MEPERIDINE HYDROCHLORIDE 25 MG/ML
12.5 INJECTION INTRAMUSCULAR; INTRAVENOUS; SUBCUTANEOUS
Status: DISCONTINUED | OUTPATIENT
Start: 2019-12-16 | End: 2019-12-16 | Stop reason: HOSPADM

## 2019-12-16 RX ORDER — HYDROMORPHONE HYDROCHLORIDE 1 MG/ML
.3-.5 INJECTION, SOLUTION INTRAMUSCULAR; INTRAVENOUS; SUBCUTANEOUS EVERY 10 MIN PRN
Status: DISCONTINUED | OUTPATIENT
Start: 2019-12-16 | End: 2019-12-16 | Stop reason: HOSPADM

## 2019-12-16 RX ORDER — SODIUM CHLORIDE, SODIUM LACTATE, POTASSIUM CHLORIDE, CALCIUM CHLORIDE 600; 310; 30; 20 MG/100ML; MG/100ML; MG/100ML; MG/100ML
INJECTION, SOLUTION INTRAVENOUS CONTINUOUS PRN
Status: DISCONTINUED | OUTPATIENT
Start: 2019-12-16 | End: 2019-12-16

## 2019-12-16 RX ORDER — OXYCODONE HYDROCHLORIDE 5 MG/1
5-10 TABLET ORAL EVERY 4 HOURS PRN
Qty: 20 TABLET | Refills: 0 | Status: SHIPPED | OUTPATIENT
Start: 2019-12-16 | End: 2020-02-20

## 2019-12-16 RX ORDER — SODIUM CHLORIDE, SODIUM LACTATE, POTASSIUM CHLORIDE, CALCIUM CHLORIDE 600; 310; 30; 20 MG/100ML; MG/100ML; MG/100ML; MG/100ML
INJECTION, SOLUTION INTRAVENOUS CONTINUOUS
Status: DISCONTINUED | OUTPATIENT
Start: 2019-12-16 | End: 2019-12-16 | Stop reason: HOSPADM

## 2019-12-16 RX ORDER — FENTANYL CITRATE 0.05 MG/ML
25-50 INJECTION, SOLUTION INTRAMUSCULAR; INTRAVENOUS
Status: DISCONTINUED | OUTPATIENT
Start: 2019-12-16 | End: 2019-12-16 | Stop reason: HOSPADM

## 2019-12-16 RX ORDER — ACETAMINOPHEN 500 MG
1000 TABLET ORAL ONCE
Status: COMPLETED | OUTPATIENT
Start: 2019-12-16 | End: 2019-12-16

## 2019-12-16 RX ORDER — CEFAZOLIN SODIUM IN 0.9 % NACL 3 G/100 ML
3 INTRAVENOUS SOLUTION, PIGGYBACK (ML) INTRAVENOUS
Status: COMPLETED | OUTPATIENT
Start: 2019-12-16 | End: 2019-12-16

## 2019-12-16 RX ORDER — NALOXONE HYDROCHLORIDE 0.4 MG/ML
.1-.4 INJECTION, SOLUTION INTRAMUSCULAR; INTRAVENOUS; SUBCUTANEOUS
Status: DISCONTINUED | OUTPATIENT
Start: 2019-12-16 | End: 2019-12-16 | Stop reason: HOSPADM

## 2019-12-16 RX ORDER — LIDOCAINE HYDROCHLORIDE 20 MG/ML
INJECTION, SOLUTION INFILTRATION; PERINEURAL PRN
Status: DISCONTINUED | OUTPATIENT
Start: 2019-12-16 | End: 2019-12-16

## 2019-12-16 RX ORDER — FENTANYL CITRATE 50 UG/ML
INJECTION, SOLUTION INTRAMUSCULAR; INTRAVENOUS PRN
Status: DISCONTINUED | OUTPATIENT
Start: 2019-12-16 | End: 2019-12-16

## 2019-12-16 RX ORDER — ONDANSETRON 2 MG/ML
INJECTION INTRAMUSCULAR; INTRAVENOUS PRN
Status: DISCONTINUED | OUTPATIENT
Start: 2019-12-16 | End: 2019-12-16

## 2019-12-16 RX ORDER — ONDANSETRON 4 MG/1
4 TABLET, ORALLY DISINTEGRATING ORAL EVERY 30 MIN PRN
Status: DISCONTINUED | OUTPATIENT
Start: 2019-12-16 | End: 2019-12-16 | Stop reason: HOSPADM

## 2019-12-16 RX ORDER — CEFAZOLIN SODIUM 1 G/3ML
1 INJECTION, POWDER, FOR SOLUTION INTRAMUSCULAR; INTRAVENOUS SEE ADMIN INSTRUCTIONS
Status: DISCONTINUED | OUTPATIENT
Start: 2019-12-16 | End: 2019-12-16 | Stop reason: HOSPADM

## 2019-12-16 RX ORDER — PROPOFOL 10 MG/ML
INJECTION, EMULSION INTRAVENOUS PRN
Status: DISCONTINUED | OUTPATIENT
Start: 2019-12-16 | End: 2019-12-16

## 2019-12-16 RX ORDER — MAGNESIUM HYDROXIDE 1200 MG/15ML
LIQUID ORAL PRN
Status: DISCONTINUED | OUTPATIENT
Start: 2019-12-16 | End: 2019-12-16 | Stop reason: HOSPADM

## 2019-12-16 RX ORDER — ONDANSETRON 2 MG/ML
4 INJECTION INTRAMUSCULAR; INTRAVENOUS EVERY 30 MIN PRN
Status: DISCONTINUED | OUTPATIENT
Start: 2019-12-16 | End: 2019-12-16 | Stop reason: HOSPADM

## 2019-12-16 RX ORDER — NEOSTIGMINE METHYLSULFATE 1 MG/ML
VIAL (ML) INJECTION PRN
Status: DISCONTINUED | OUTPATIENT
Start: 2019-12-16 | End: 2019-12-16

## 2019-12-16 RX ORDER — GLYCOPYRROLATE 0.2 MG/ML
INJECTION, SOLUTION INTRAMUSCULAR; INTRAVENOUS PRN
Status: DISCONTINUED | OUTPATIENT
Start: 2019-12-16 | End: 2019-12-16

## 2019-12-16 RX ORDER — DEXAMETHASONE SODIUM PHOSPHATE 4 MG/ML
INJECTION, SOLUTION INTRA-ARTICULAR; INTRALESIONAL; INTRAMUSCULAR; INTRAVENOUS; SOFT TISSUE PRN
Status: DISCONTINUED | OUTPATIENT
Start: 2019-12-16 | End: 2019-12-16

## 2019-12-16 RX ORDER — BUPIVACAINE HYDROCHLORIDE 2.5 MG/ML
INJECTION, SOLUTION EPIDURAL; INFILTRATION; INTRACAUDAL
Status: DISCONTINUED
Start: 2019-12-16 | End: 2019-12-16 | Stop reason: HOSPADM

## 2019-12-16 RX ORDER — OXYCODONE HYDROCHLORIDE 5 MG/1
5 TABLET ORAL EVERY 4 HOURS PRN
Status: DISCONTINUED | OUTPATIENT
Start: 2019-12-16 | End: 2019-12-16 | Stop reason: HOSPADM

## 2019-12-16 RX ADMIN — MIDAZOLAM 2 MG: 1 INJECTION INTRAMUSCULAR; INTRAVENOUS at 07:31

## 2019-12-16 RX ADMIN — FENTANYL CITRATE 50 MCG: 50 INJECTION, SOLUTION INTRAMUSCULAR; INTRAVENOUS at 07:31

## 2019-12-16 RX ADMIN — ROCURONIUM BROMIDE 50 MG: 10 INJECTION INTRAVENOUS at 07:38

## 2019-12-16 RX ADMIN — SODIUM CHLORIDE, POTASSIUM CHLORIDE, SODIUM LACTATE AND CALCIUM CHLORIDE: 600; 310; 30; 20 INJECTION, SOLUTION INTRAVENOUS at 07:31

## 2019-12-16 RX ADMIN — PHENYLEPHRINE HYDROCHLORIDE 100 MCG: 10 INJECTION INTRAVENOUS at 08:56

## 2019-12-16 RX ADMIN — PHENYLEPHRINE HYDROCHLORIDE 100 MCG: 10 INJECTION INTRAVENOUS at 08:46

## 2019-12-16 RX ADMIN — ACETAMINOPHEN 1000 MG: 500 TABLET, FILM COATED ORAL at 06:37

## 2019-12-16 RX ADMIN — ONDANSETRON 4 MG: 2 INJECTION INTRAMUSCULAR; INTRAVENOUS at 09:07

## 2019-12-16 RX ADMIN — PHENYLEPHRINE HYDROCHLORIDE 100 MCG: 10 INJECTION INTRAVENOUS at 08:14

## 2019-12-16 RX ADMIN — PHENYLEPHRINE HYDROCHLORIDE 100 MCG: 10 INJECTION INTRAVENOUS at 08:28

## 2019-12-16 RX ADMIN — FENTANYL CITRATE 50 MCG: 50 INJECTION, SOLUTION INTRAMUSCULAR; INTRAVENOUS at 07:38

## 2019-12-16 RX ADMIN — NEOSTIGMINE METHYLSULFATE 5 MG: 1 INJECTION, SOLUTION INTRAVENOUS at 09:14

## 2019-12-16 RX ADMIN — SODIUM CHLORIDE, POTASSIUM CHLORIDE, SODIUM LACTATE AND CALCIUM CHLORIDE: 600; 310; 30; 20 INJECTION, SOLUTION INTRAVENOUS at 08:23

## 2019-12-16 RX ADMIN — LIDOCAINE HYDROCHLORIDE 100 MG: 20 INJECTION, SOLUTION INFILTRATION; PERINEURAL at 07:38

## 2019-12-16 RX ADMIN — HYDROMORPHONE HYDROCHLORIDE 0.5 MG: 1 INJECTION, SOLUTION INTRAMUSCULAR; INTRAVENOUS; SUBCUTANEOUS at 09:21

## 2019-12-16 RX ADMIN — PROPOFOL 100 MG: 10 INJECTION, EMULSION INTRAVENOUS at 08:07

## 2019-12-16 RX ADMIN — PHENYLEPHRINE HYDROCHLORIDE 100 MCG: 10 INJECTION INTRAVENOUS at 08:53

## 2019-12-16 RX ADMIN — FENTANYL CITRATE 50 MCG: 0.05 INJECTION, SOLUTION INTRAMUSCULAR; INTRAVENOUS at 09:41

## 2019-12-16 RX ADMIN — GLYCOPYRROLATE 0.8 MG: 0.2 INJECTION, SOLUTION INTRAMUSCULAR; INTRAVENOUS at 09:14

## 2019-12-16 RX ADMIN — DEXAMETHASONE SODIUM PHOSPHATE 4 MG: 4 INJECTION, SOLUTION INTRA-ARTICULAR; INTRALESIONAL; INTRAMUSCULAR; INTRAVENOUS; SOFT TISSUE at 07:54

## 2019-12-16 RX ADMIN — DEXMEDETOMIDINE HYDROCHLORIDE 12 MCG: 100 INJECTION, SOLUTION INTRAVENOUS at 08:21

## 2019-12-16 RX ADMIN — Medication 3 G: at 07:45

## 2019-12-16 RX ADMIN — FENTANYL CITRATE 50 MCG: 0.05 INJECTION, SOLUTION INTRAMUSCULAR; INTRAVENOUS at 10:13

## 2019-12-16 RX ADMIN — PROPOFOL 200 MG: 10 INJECTION, EMULSION INTRAVENOUS at 07:38

## 2019-12-16 RX ADMIN — OXYCODONE HYDROCHLORIDE 5 MG: 5 TABLET ORAL at 10:31

## 2019-12-16 ASSESSMENT — ENCOUNTER SYMPTOMS: SEIZURES: 0

## 2019-12-16 ASSESSMENT — MIFFLIN-ST. JEOR: SCORE: 1924.94

## 2019-12-16 NOTE — ANESTHESIA POSTPROCEDURE EVALUATION
Patient: Johnna Caballero    Procedure(s):  REVISION LEFT HIP SCAR WITH SEROMA EVACUATION; REMOVAL LEFT CHEST WALL MASS   (MRSA)  DIAGNOSTIC HYSTEROSCOPY  REPLACEMENTOF MIRENA CONTRACEPTIVE INTRAUTERINE DEVICE    Diagnosis:Lipoma [D17.9]  Diagnosis Additional Information: No value filed.    Anesthesia Type:  General, ETT    Note:  Anesthesia Post Evaluation    Patient location during evaluation: PACU  Patient participation: Able to fully participate in evaluation  Level of consciousness: awake and alert  Pain management: adequate  Airway patency: patent  Cardiovascular status: acceptable and hemodynamically stable  Respiratory status: acceptable  Hydration status: euvolemic  PONV: none     Anesthetic complications: None          Last vitals:  Vitals:    12/16/19 1000 12/16/19 1015 12/16/19 1109   BP: 135/76 (!) 106/99 134/82   Pulse: 64 68    Resp: 16 10 16   Temp: 36.2  C (97.2  F) 36.1  C (97  F)    SpO2: 98% 92% 94%         Electronically Signed By: Doroteo Stewart MD  December 16, 2019  5:23 PM

## 2019-12-16 NOTE — OP NOTE
PLASTIC SURGERY OPERATIVE NOTE  Patient Name:  Johnna Caballero     Date of Service:  12/16/2019     PREOPERATIVE DIAGNOSES:   1.  Lipoma [D17.9]     POSTOPERATIVE DIAGNOSES:   1.  Lipoma [D17.9]       SURGEON:  Jazmin Bautista MD   OR STAFF:  Circulator: Carito Pina RN  Scrub Person: Perri Adrian     ANESTHESIA:  General     ESTIMATED BLOOD LOSS:  5 mL    SPECIMEN(S):    ID Type Source Tests Collected by Time Destination   A : left chest wall  mass Tissue Chest SURGICAL PATHOLOGY EXAM Jazmin Bautista MD 12/16/2019  8:23 AM         PROCEDURES:   1.  Scar revision left thigh with complex closure 24 cm and evacuation chronic seroma  2.  Excision left chest wall mass, 3 cm in diameter      DESCRIPTION OF PROCEDURE:  Patient was marked for incisions and taken to the operating room.  She was placed in the right lateral decubitus position the left thigh and chest area were prepped and draped in a sterile manner.  Redundant tissue was marked along the inferior aspect of the previous hip surgical site and dissection was carried down to soft tissue.  A seroma was encountered and this was evacuated.  300 cc of serous fluid was removed.  Scoring of the capsule was done.  Hemostasis was achieved.  Redundant skin was then marked and excised.  A 15 [place]uvian JOSSELYN drain was placed.  The incision was reapproximated interrupted 2-0 PDS in the superficial fascia followed by interrupted 3-0 Monocryl in the subcutaneous tissue and a running 4-0 subcuticular Monocryl suture.    An incision was then made over the left chest wall mass and dissection was carried down to the mass.  The mass was rather indistinct and consisted of scar tissue and adipose tissue likely secondary to her previous procedures.  Hemostasis was achieved after the mass was removed.  The incision was reapproximated up to 2-0 PDS in the superficial fascia followed by interrupted 3-0 Monocryl in the subcutaneous tissue and a running 4-0 subcuticular  Monocryl suture.    Xeroform and a light dressing were applied on both sides.    IMPLANTS:  * No implants in log *    FINDINGS:  Chronic seroma    Jazmin Bautista MD  Plastic Surgery  [facility]  [phone] (office)

## 2019-12-16 NOTE — DISCHARGE INSTRUCTIONS
Today you were given 1000 mg of Tylenol at 6:40 am. The recommended daily maximum dose is 4000 mg.     Sreekanth Garcia Drain  Home Care Instructions    What is a Sreekanth Garcia (JOSSELYN) Drain?  This is a small tube that connects to a bulb.  Its gentle suction removes extra fluid from a surgical wound.  Your doctor will remove the tube when the amount of fluid decreases.  The color and amount of fluid varies.  Right after surgery the fluid is bright red.  Over time, it changes to light pink and may become clear or the color of straw.    How should I care for my tube site?    Keep the skin around the tube dry.  Check with your doctor about how to shower.  You may need to cover the site with plastic when you shower.  Or, it may be okay to let the site get wet and put on a clean bandage after you shower.      If the bandage gets wet, you will need to change it.  How should I care for the bulb?    Keep the bulb compressed at all times except while you empty it.     Attach the bulb to your clothing with tape and a safety pin.    Try to empty the bulb at the same time every day.  Empty the bulb at least once a day, or when the bulb becomes half full.  If it becomes too full, there will not be enough suction.    To empty the bulb:    Wash your hands.    Open the bulb cap.    Drain the fluid from the bulb into the measuring cup.  If you have two drains, use two cups.      Clean the mouth of the bulb with an alcohol wipe if your nurse told you to.    Squeeze the bulb (fold it in half before you close the bulb cap) If it does not stay compressed, call your nurse or clinic.    Write the amount of drainage on the drainage record (see back page).  If you have two drains, write the amount for each bulb.    Flush the drainage down the toilet.  Rinse the measuring cup.    Wash your hands.    When should I call my doctor?   Call your doctor if:    You have a fever over 101 F (38.3 C), taken under the tongue.     The drainage increases or  smells bad.    The skin around your tube has increased redness, swelling, warmth or pain.    You have pus or fluid leaking at the tube site.    Your stitches break.    You think the tube is not draining.    The tube falls out.    You have any problems or concerns.    Your drainage record:    Empty your bulb at least once per day or when 1/2 to 1/3 full.  Write down the date, time and amount of drainage in each bulb.   Bring this record to each clinic visit.    Date Time Bulb 1: amount of  Drainage in (ml or cc) Bulb 2: amount of drainage (in ml or cc) Notes                                                      Reasons to contact your surgeon:    1. Signs of possible infection: Check your incision daily for redness, swelling, warmth, red streaks or foul drainage.   2. Elevated temperature.  3. Pain not controlled with pain medication and/or rest.   4. Uncontrolled nausea or vomiting.  5. Any questions or concerns.        **If you have questions or concerns about your procedure,  call  at [phone]**

## 2019-12-16 NOTE — OR NURSING
Incision site left groin: clean, dry, intact. No drainage.    Incision site left lateral chest: no drainage; clean, dry, intact.    Pt declines ice to both sites at this time.

## 2019-12-16 NOTE — ANESTHESIA CARE TRANSFER NOTE
Patient: Johnna Caballero    Procedure(s):  REVISION LEFT HIP SCAR WITH SEROMA EVACUATION; REMOVAL LEFT CHEST WALL MASS   (MRSA)  DIAGNOSTIC HYSTEROSCOPY  REPLACEMENTOF MIRENA CONTRACEPTIVE INTRAUTERINE DEVICE    Diagnosis: Lipoma [D17.9]  Diagnosis Additional Information: No value filed.    Anesthesia Type:   General, ETT     Note:  Airway :Face Mask  Patient transferred to:PACU  Handoff Report: Identifed the Patient, Identified the Reponsible Provider, Reviewed the pertinent medical history, Discussed the surgical course, Reviewed Intra-OP anesthesia mangement and issues during anesthesia, Set expectations for post-procedure period and Allowed opportunity for questions and acknowledgement of understanding      Vitals: (Last set prior to Anesthesia Care Transfer)    CRNA VITALS  12/16/2019 0851 - 12/16/2019 0927      12/16/2019             Pulse:  92    SpO2:  98 %    Resp Rate (observed):  8                Electronically Signed By: LUISANA Orta CRNA  December 16, 2019  9:27 AM

## 2019-12-16 NOTE — ANESTHESIA PREPROCEDURE EVALUATION
Anesthesia Pre-Procedure Evaluation    Patient: Johnna Caballero   MRN: [id number] : 1965          Preoperative Diagnosis: Lipoma [D17.9]    Procedure(s):  REVISION LEFT HIP SCAR WITH POSSIBLE SEROMA EVACUATION; REMOVAL LEFT CHEST WALL MASS   (MRSA)  DIAGNOSTIC HYSTEROSCOPY  REPLACEMENTOF MIRENA CONTRACEPTIVE INTRAUTERINE DEVICE    Past Medical History:   Diagnosis Date     Actinic keratosis      Allergic rhinitis, cause unspecified      Anemia      Arthritis      Asthma     no meds x2 yrs     chronic back pain      Chronic infection     MRSA-nasal     Chronic pain     back     Esophageal reflux      fibrocystic breast changes      Gastro-oesophageal reflux disease      Heart murmur     mitral insuffiency     Hx of Fen-Phen use      Hypertension      migraines      Mild intermittent asthma     rare, with dog or exercise     Moderate major depression (H) 3/28/2012     MRSA (methicillin resistant Staphylococcus aureus) 2014    Nares     Need for desensitization to allergens      Obesity, unspecified      Other and unspecified hyperlipidemia      Temporomandibular joint disorders, unspecified      Thrombosis of leg     incisional area right hip     Type II or unspecified type diabetes mellitus without mention of complication, not stated as uncontrolled      Past Surgical History:   Procedure Laterality Date     ARTHROPLASTY KNEE  7/15/2014    Procedure: ARTHROPLASTY KNEE;  Surgeon: Chapin Paul MD;  Location: [facility] OR     BACK SURGERY       C NONSPECIFIC PROCEDURE      laparoscopy x6     C NONSPECIFIC PROCEDURE  93    laparotomy x2 ovarian cyst     C NONSPECIFIC PROCEDURE  0293    oophorectomy lt side - cyst     C NONSPECIFIC PROCEDURE  0395    laminectomy L4-5. S1 herniated disc     C NONSPECIFIC PROCEDURE  96    cholecystectomy     C NONSPECIFIC PROCEDURE      ganglion cysts l wrist, rt palm     C NONSPECIFIC PROCEDURE      cyst removal back x2     C NONSPECIFIC PROCEDURE  10/02    rt thumb fusion,  ganglion cyst removal     C NONSPECIFIC PROCEDURE  1/08    remove heel spur, excise exostosis     CHOLECYSTECTOMY       COLONOSCOPY N/A 4/26/2017    Procedure: COLONOSCOPY;  COLONOSCOPY;  Surgeon: Chapin Leal MD;  Location: [facility] GI     EXCISE LESION LOWER EXTREMITY  11/20/2012    Procedure: EXCISE LESION LOWER EXTREMITY;  EXCISION LIPOMA RIGHT HIP ;  Surgeon: Jazmin Bautista MD;  Location:  [facility]     EXCISE LESION LOWER EXTREMITY  11/23/2012    Procedure: EXCISE LESION LOWER EXTREMITY;  EXCISE LESION LOWER EXTREMITY  EVACUATE RIGHT HIP HEMATOMA;  Surgeon: Jazmin Bautista MD;  Location:  OR     EXCISE MASS HIP Left 11/9/2018    Procedure: 1.  Excision left chest wall mass with excised diameter of greater than 4 cm 2.  Excision left hip mass with excised diameter of more than 20 x 20 cm  ;  Surgeon: Jazmin Bautista MD;  Location: [facility] OR     EXCISE MASS TRUNK Left 11/9/2018    Procedure: EXCISE MASS TRUNK;  Surgeon: Jazmin Bautista MD;  Location: [facility] OR     GYN SURGERY       HC EXPLORATION ANKLE JOINT  1/2006     HC PART EXCIS PLANTAR FASCIA  12/2006     IRRIGATION AND DEBRIDEMENT HIP, COMBINED  12/15/2012    Procedure: COMBINED IRRIGATION AND DEBRIDEMENT HIP;   evacuation hematoma, scar revision right hip;  Surgeon: Jazmin Bautista MD;  Location: [facility] OR     wisdom teeth[         Anesthesia Evaluation     .             ROS/MED HX    ENT/Pulmonary:     (+)allergic rhinitis, asthma , . .    Neurologic:      (-) seizures and CVA   Cardiovascular:     (+) Dyslipidemia, hypertension----. : . . . :. .       METS/Exercise Tolerance:  >4 METS   Hematologic:     (+) History of blood clots -      Musculoskeletal:         GI/Hepatic:     (+) GERD       Renal/Genitourinary:         Endo:     (+) type II DM Obesity, .      Psychiatric:     (+) psychiatric history anxiety and depression      Infectious Disease:         Malignancy:         Other:                          Physical  "Exam  Normal systems: dental    Airway   Mallampati: II  TM distance: >3 FB  Neck ROM: full    Dental     Cardiovascular   Rhythm and rate: regular      Pulmonary             Lab Results   Component Value Date    WBC 8.5 12/26/2018    HGB 12.6 12/26/2018    HCT 39.0 12/26/2018     12/26/2018    CRP 5.8 07/06/2017    SED 9 07/06/2017     12/12/2019    POTASSIUM 4.0 12/12/2019    CHLORIDE 102 12/12/2019    CO2 22 12/12/2019    BUN 12 12/12/2019    CR 0.60 12/12/2019     (H) 12/12/2019    JACQUE 8.8 12/12/2019    PHOS 3.7 11/23/2012    MAG 1.6 11/23/2012    ALBUMIN 3.4 11/14/2019    PROTTOTAL 7.2 11/14/2019    ALT 22 11/14/2019    AST 12 11/14/2019    ALKPHOS 103 11/14/2019    BILITOTAL 0.3 11/14/2019    LIPASE 182 10/02/2015    AMYLASE 49 10/02/2015    PTT 23 11/23/2012    INR 1.59 (H) 07/18/2014    TSH 2.00 11/14/2019    T4 0.80 06/11/2013    HCG Negative 12/16/2019       Preop Vitals  BP Readings from Last 3 Encounters:   12/16/19 139/80   12/12/19 136/72   11/19/19 132/80    Pulse Readings from Last 3 Encounters:   12/16/19 75   12/12/19 83   11/14/19 68      Resp Readings from Last 3 Encounters:   12/16/19 16   12/12/19 18   11/14/19 18    SpO2 Readings from Last 3 Encounters:   12/16/19 100%   12/12/19 98%   11/14/19 97%      Temp Readings from Last 1 Encounters:   12/16/19 35.9  C (96.6  F) (Oral)    Ht Readings from Last 1 Encounters:   12/16/19 1.702 m (5' 7\")      Wt Readings from Last 1 Encounters:   12/16/19 128.7 kg (283 lb 12.8 oz)    Estimated body mass index is 44.45 kg/m  as calculated from the following:    Height as of this encounter: 1.702 m (5' 7\").    Weight as of this encounter: 128.7 kg (283 lb 12.8 oz).       Anesthesia Plan      History & Physical Review  History and physical reviewed and following examination; no interval change.    ASA Status:  3 .    NPO Status:  > 8 hours    Plan for General and ETT with Propofol induction. Maintenance will be Balanced.    PONV " prophylaxis:  Ondansetron (or other 5HT-3) and Dexamethasone or Solumedrol  Additional equipment: Videolaryngoscope      Postoperative Care  Postoperative pain management:  IV analgesics and Oral pain medications.      Consents  Anesthetic plan, risks, benefits and alternatives discussed with:  Patient..                 Doroteo Stewart MD

## 2019-12-16 NOTE — OR NURSING
Contact precautions initiated on PACU arrival, continued throughout PACU stay.    Dressings to groin and lateral chest remain clean, dry and intact.

## 2019-12-16 NOTE — BRIEF OP NOTE
[facility]    Brief Operative Note    Pre-operative diagnosis: Lipoma [D17.9]  Post-operative diagnosis left hip scar, seroma; left chest wall mass    Procedure: Procedure(s):  REVISION LEFT HIP SCAR WITH SEROMA EVACUATION; REMOVAL LEFT CHEST WALL MASS   (MRSA)  DIAGNOSTIC HYSTEROSCOPY  REPLACEMENTOF MIRENA CONTRACEPTIVE INTRAUTERINE DEVICE  Surgeon: Surgeon(s) and Role:  Panel 1:     * Jazmin Bautista MD - Primary  Panel 2:     * Jodi Perea MD - Primary  Anesthesia: General   Estimated blood loss: Minimal  Drains: Sreekanth-Garcia left hip  Specimens:   ID Type Source Tests Collected by Time Destination   A : left chest wall  mass Tissue Chest SURGICAL PATHOLOGY EXAM Jazmin Bautista MD 12/16/2019  8:23 AM      Findings:   chronic seroma left hip.  Complications: None.  Implants: * No implants in log *

## 2019-12-16 NOTE — BRIEF OP NOTE
[facility] Brief Operative Note    Pre-operative diagnosis: Unable to see IUD strings, due for replacement   Post-operative diagnosis Same, IUD strings in cervical canal   Procedure: Hysteroscopy, removal of Mirena IUD, replacement of Mirena IUD lot number [id number]   Surgeon(s): Surgeon(s) and Role:  Panel 1:     * Jazmin Bautista MD - Primary  Panel 2:     * Jodi Perea MD - Primary   Estimated blood loss: 1/2cc   Specimens: ID Type Source Tests Collected by Time Destination   A : left chest wall  mass Tissue Chest SURGICAL PATHOLOGY EXAM Jazmin Bautista MD 12/16/2019  8:23 AM       Findings: Midline uterus, sounded to 9cm; IUD strings in cervical canal; no endometrial masses, normal cavity; strings cut to 2cm  Drains none  Complications none  Implants Mirena IUD lot number [id number]  Specimens by me - none  Pt tolerated procedure well. /Hermila

## 2019-12-16 NOTE — PROGRESS NOTES
55yo woman presents for a surgery with Dr. Bautista.  She also has a Mirena IUD that is due for replacement to manage her cycles and endometriosis.  Dr. Dowd was unable to retrieve her IUD in the office.  We discussed her options.  After Dr. Bautista's part of the surgery, we will proceed with hysteroscopy with retrieval of IUD and then replacement of Mirena IUD.  She understands the R/B/A to this procedure and desires to proceed. HERMILOT neg./Hermila

## 2019-12-17 LAB — COPATH REPORT: NORMAL

## 2019-12-18 LAB — COMPREHEN DRUG ANALYSIS UR: NORMAL

## 2019-12-23 NOTE — OP NOTE
DATE OF PROCEDURE: 12-16-19     SURGEON: Dr. Jodi Perea.       PREOPERATIVE DIAGNOSIS:  Unable to see IUD strings, failed IUD removal in the office, due for IUD replacement     POSTOPERATIVE DIAGNOSIS:  same, IUD strings seen in endocervical canal     PROCEDURE:  Hysteroscopy with removal of intrauterine device, replacement of Mirena intrauterine device brought from my office, lot # [id number]     ANESTHESIA:  General.      SURGICAL FINDINGS:   1.  Uterus sounded to 9cm  2.  IUD strings seen in endocervical canal  3.  Endometrial cavity appeared normal  4.  IUD strings cut to 2cm     PROCEDURE:  The patient was taken to the operating room and given a general anesthetic.  She underwent her surgery with Dr. Bautista.  When I entered the room the patient was already prepped and draped in the dorsal lithotomy position.  A time out was taken.  BME revealed no abnormal masses.  Uterus was midline and midposition.  A speculum was placed in the vagina, single-tooth tenaculum placed on the anterior lip of the cervix.  Uterus sounded to 9cm.  The cervix was serially dilated to accommodate the diagnostic hysteroscope.  Normal saline was the intrauterine medium used.  The IUD strings were seen in the endocervical canal.  I was then able to remove the hysteroscope and use polyp forceps to grasp the strings and remove the IUD intact. I then placed the hysteroscope again and visualized her endometrial cavity which appeared normal.   The hysteroscope was then removed.  There was no fluid overload or uterine perforation noted during the procedure.  I reprepped the cervix with betadine. Uterus again sounded to 9cm.   Mirena IUD placed without difficulty and the strings were cut to 2cm.  The tenaculum was then removed.  Adequate hemostasis was obtained.  Speculum was removed.     ESTIMATED BLOOD LOSS: 1/2cc     SPONGE AND NEEDLE COUNTS: Reported as correct.     DRAINS:  None.      COMPLICATIONS:  None.      IMPLANTS:  Mirena IUD Lot #  [id number]     SPECIMENS:  none     The patient tolerated the procedure well and was taken to the recovery room in good condition.         LARRY MUÑOZ MD

## 2020-01-07 ENCOUNTER — ANCILLARY PROCEDURE (OUTPATIENT)
Dept: GENERAL RADIOLOGY | Facility: CLINIC | Age: 55
End: 2020-01-07
Attending: NURSE PRACTITIONER
Payer: COMMERCIAL

## 2020-01-07 ENCOUNTER — OFFICE VISIT (OUTPATIENT)
Dept: PEDIATRICS | Facility: CLINIC | Age: 55
End: 2020-01-07
Payer: COMMERCIAL

## 2020-01-07 VITALS
WEIGHT: 290 LBS | RESPIRATION RATE: 16 BRPM | HEART RATE: 81 BPM | DIASTOLIC BLOOD PRESSURE: 78 MMHG | BODY MASS INDEX: 45.52 KG/M2 | OXYGEN SATURATION: 98 % | HEIGHT: 67 IN | SYSTOLIC BLOOD PRESSURE: 130 MMHG | TEMPERATURE: 97.4 F

## 2020-01-07 DIAGNOSIS — R06.09 DYSPNEA ON EXERTION: ICD-10-CM

## 2020-01-07 DIAGNOSIS — E11.65 TYPE 2 DIABETES MELLITUS WITH HYPERGLYCEMIA, WITH LONG-TERM CURRENT USE OF INSULIN (H): ICD-10-CM

## 2020-01-07 DIAGNOSIS — R63.5 WEIGHT GAIN: ICD-10-CM

## 2020-01-07 DIAGNOSIS — R05.9 COUGH: Primary | ICD-10-CM

## 2020-01-07 DIAGNOSIS — Z79.4 TYPE 2 DIABETES MELLITUS WITH HYPERGLYCEMIA, WITH LONG-TERM CURRENT USE OF INSULIN (H): ICD-10-CM

## 2020-01-07 DIAGNOSIS — R06.02 SHORTNESS OF BREATH: ICD-10-CM

## 2020-01-07 DIAGNOSIS — Z98.890 RECENT MAJOR SURGERY: ICD-10-CM

## 2020-01-07 DIAGNOSIS — R05.9 COUGH: ICD-10-CM

## 2020-01-07 PROCEDURE — 71046 X-RAY EXAM CHEST 2 VIEWS: CPT

## 2020-01-07 PROCEDURE — 99214 OFFICE O/P EST MOD 30 MIN: CPT | Performed by: NURSE PRACTITIONER

## 2020-01-07 ASSESSMENT — MIFFLIN-ST. JEOR: SCORE: 1953.06

## 2020-01-07 NOTE — PROGRESS NOTES
"Subjective     Johnna Caballero is a 54 year old female who presents to clinic today with her service dog for the following health issues:    HPI   Acute Illness   Acute illness concerns: URI sx  Onset: 1 week     Fever: no    Chills/Sweats: no    Headache (location?): no    Sinus Pressure:no    Conjunctivitis:  no    Ear Pain: no    Rhinorrhea: no    Congestion: no    Sore Throat: no     Cough: YES-productive of green / brown sputum    Wheeze: no     Decreased Appetite: no    Nausea: no    Vomiting: no    Diarrhea:  no    Dysuria/Freq.: no    Fatigue/Achiness: no    Sick/Strep Exposure: no     Therapies Tried and outcome: None     Concern - Wound Check  Onset: Dec 16th had surgery    Description:   Feels like theres fluid in lungs, Left side incision     Intensity: moderate    Progression of Symptoms:  constant    Accompanying Signs & Symptoms:  Draining     Previous history of similar problem:   None    Precipitating factors:   Worsened by: None    Alleviating factors:  Improved by: None    Therapies Tried and outcome: None     12/16 had seroma and lipoma removal surgery.  Saw Dr. Bautista (surgeon) last week Thursday; has JOSSELYN drain in and wants it to keep draining.  Had allergy shots on 12/30; became sick shortly after that.  Hx of pneumonia and bronchitis. Also hx of asthma but has not been on inhalers in \"a long time.\"  Reports ongoing cough, now productive. Also feels cough is worse when lying flat, slightly short of breath and cough worse with lying flat and activity (feels secondary to congestion). No ankle edema.  No fevers.  Blood sugars have been higher than usual, now in the 300s the past 1-2 weeks which is unusual for her.  Cough worsening.  Slightly tired, unsure if due to blood sugars? She increased her Lantus to 30 units (usually on 24 unit).    Reviewed and updated as needed this visit by Provider  Meds  Problems         Review of Systems   ROS COMP: Constitutional, HEENT, cardiovascular, pulmonary, gi " "and gu systems are negative, except as otherwise noted.      Objective    /78 (BP Location: Right arm, Patient Position: Chair, Cuff Size: Adult Large)   Pulse 81   Temp 97.4  F (36.3  C) (Tympanic)   Resp 16   Ht 1.702 m (5' 7\")   Wt 131.5 kg (290 lb)   SpO2 98%   BMI 45.42 kg/m    Body mass index is 45.42 kg/m .  Physical Exam   GENERAL: healthy, alert and no distress  EYES: Eyes grossly normal to inspection  HENT: ear canals and TM's normal, nose and mouth without ulcers or lesions  NECK: no adenopathy  RESP: lungs clear to auscultation - no rales, rhonchi or wheezes  CV: regular rate and rhythm, normal S1 S2, no S3 or S4, no murmur, click or rub, no peripheral edema and peripheral pulses strong  SKIN: multiple healed scar, JOSSELYN drain present from surgery    Diagnostic Test Results:  Labs reviewed in Epic  No results found for this or any previous visit (from the past 24 hour(s)).   Chest x-ray: my personal interpretation is normal      Assessment & Plan       ICD-10-CM    1. Cough R05 XR Chest 2 Views   2. Dyspnea on exertion R06.09 XR Chest 2 Views   3. Recent major surgery Z98.890    4. Type 2 diabetes mellitus with hyperglycemia, with long-term current use of insulin (H) E11.65     Z79.4    5. Weight gain R63.5      -Reassured patient appears well today in clinic and has normal chest x-ray. Lungs are clear, O2 sats stable, she is afebrile.  Feel cough is most likely viral as she is now producing green sputum with cough, she otherwise feels and appears well.  -Weight is up 7 pounds in 3 weeks but her weight has been fluctuating over the past year and she has no other signs of heart failure, but warrants monitoring. She does have some slight dyspnea with activity and with lying flat but attributes to drainage/congestion (also has increased cough lying flat).  -Continue on increased lantus dose for now; follow-up end of the week if blood sugars remain high. Continue with regular monitoring of blood " sugars.  -She was instructed to follow-up in 3-4 days if symptoms persist.    Patient Instructions   Chest x-ray looks fine today.  I would continue with the increased dose of Lantus; please MyChart message Dr. Durant at the end of the week if they continue to be high.  Push fluids.  May try OTC medicine like Mucinex-DM is a good one.    Return in about 3 days (around 1/10/2020), or if symptoms worsen or fail to improve.    Clare Acosta NP  [facility]

## 2020-01-07 NOTE — PATIENT INSTRUCTIONS
Chest x-ray looks fine today.  I would continue with the increased dose of Lantus; please MyChart message Dr. Durant at the end of the week if they continue to be high.  Push fluids.  May try OTC medicine like Mucinex-DM is a good one.

## 2020-01-09 ENCOUNTER — TELEPHONE (OUTPATIENT)
Dept: PEDIATRICS | Facility: CLINIC | Age: 55
End: 2020-01-09

## 2020-01-09 DIAGNOSIS — J01.90 ACUTE SINUSITIS WITH SYMPTOMS > 10 DAYS: Primary | ICD-10-CM

## 2020-01-09 NOTE — TELEPHONE ENCOUNTER
Call patient to follow-up from visit on 1/7:  -How is she feeling? How is cough?  -Any fevers?  -Any shortness of breath?  -Are blood sugars stable?    LUISANA Willson, CNP

## 2020-01-09 NOTE — TELEPHONE ENCOUNTER
[facility] is the correct pharmacy.     She will call tomorrow.    Melissa Rico RN on 1/9/2020 at 3:41 PM

## 2020-01-09 NOTE — TELEPHONE ENCOUNTER
Clare-  Cough is still there a little better with mucinex, keeping her up at night. She feels the draining. No fever. NO SOB.     Blood sugars are ok a little higher than she would like.     She feels like her ears are full of fluid. Sinuses are painful. Typical for her to get a sinus infection every year. The last time she was on abx for sinus infection it took two rounds to get rid of it.     Recommendation? Melissa Rico RN on 1/9/2020 at 3:05 PM

## 2020-01-09 NOTE — TELEPHONE ENCOUNTER
If she feels symptoms do not continue to improve tomorrow she can start Augmentin for sinusitis.     Can you verify pharmacy? I'll make a note to the pharmacy to only fill tomorrow if the patient calls and needs it filled, they will not fill automatically unless she calls.    Keep taking mucinex.    Clare Acosta, APRN, CNP

## 2020-01-09 NOTE — TELEPHONE ENCOUNTER
Noted. Rx sent-instruct her to call pharmacy if she needs tomorrow  Clare Acosta, APRN, CNP

## 2020-01-12 ENCOUNTER — MYC MEDICAL ADVICE (OUTPATIENT)
Dept: PEDIATRICS | Facility: CLINIC | Age: 55
End: 2020-01-12

## 2020-01-12 DIAGNOSIS — J01.90 ACUTE SINUSITIS WITH SYMPTOMS > 10 DAYS: Primary | ICD-10-CM

## 2020-01-14 RX ORDER — DOXYCYCLINE HYCLATE 100 MG
100 TABLET ORAL 2 TIMES DAILY
Qty: 20 TABLET | Refills: 0 | Status: SHIPPED | OUTPATIENT
Start: 2020-01-14 | End: 2020-03-02

## 2020-01-14 NOTE — TELEPHONE ENCOUNTER
Let's switch to doxycycline (pt has had this before). Can you please verify pharmacy-Rx is pending.    LUISANA Willson, CNP

## 2020-02-05 DIAGNOSIS — G89.29 CHRONIC BILATERAL LOW BACK PAIN WITHOUT SCIATICA: ICD-10-CM

## 2020-02-05 DIAGNOSIS — M54.50 CHRONIC BILATERAL LOW BACK PAIN WITHOUT SCIATICA: ICD-10-CM

## 2020-02-06 RX ORDER — CELECOXIB 200 MG/1
200 CAPSULE ORAL DAILY
Qty: 90 CAPSULE | Refills: 0 | Status: SHIPPED | OUTPATIENT
Start: 2020-02-06 | End: 2020-03-02

## 2020-02-06 NOTE — TELEPHONE ENCOUNTER
"    Requested Prescriptions   Pending Prescriptions Disp Refills     celecoxib (CELEBREX) 200 MG capsule 90 capsule 11     Sig: Take 1 capsule (200 mg) by mouth daily       NSAID Medications Failed - 2/5/2020 10:28 AM        Failed - Normal CBC on file in past 12 months     Recent Labs   Lab Test 12/26/18  1211   WBC 8.5   RBC 4.49   HGB 12.6   HCT 39.0                    Passed - Blood pressure under 140/90 in past 12 months     BP Readings from Last 3 Encounters:   01/07/20 130/78   12/16/19 134/82   12/12/19 136/72                 Passed - Normal ALT on file in past 12 months     Recent Labs   Lab Test 11/14/19  0931   ALT 22             Passed - Normal AST on file in past 12 months     Recent Labs   Lab Test 11/14/19  0931   AST 12             Passed - Recent (12 mo) or future (30 days) visit within the authorizing provider's specialty     Patient has had an office visit with the authorizing provider or a provider within the authorizing providers department within the previous 12 mos or has a future within next 30 days. See \"Patient Info\" tab in inbasket, or \"Choose Columns\" in Meds & Orders section of the refill encounter.              Passed - Patient is age 6-64 years        Passed - Medication is active on med list        Passed - No active pregnancy on record        Passed - Normal serum creatinine on file in past 12 months     Recent Labs   Lab Test 12/12/19  1413  04/20/12  0739   CR 0.60   < >  --    CREAT  --   --  0.6    < > = values in this interval not displayed.             Passed - No positive pregnancy test in past 12 months          "

## 2020-02-27 DIAGNOSIS — E11.9 TYPE 2 DIABETES MELLITUS WITHOUT COMPLICATION (H): Primary | ICD-10-CM

## 2020-03-02 ENCOUNTER — OFFICE VISIT (OUTPATIENT)
Dept: PEDIATRICS | Facility: CLINIC | Age: 55
End: 2020-03-02
Payer: COMMERCIAL

## 2020-03-02 VITALS
RESPIRATION RATE: 18 BRPM | BODY MASS INDEX: 45.75 KG/M2 | HEART RATE: 82 BPM | OXYGEN SATURATION: 98 % | HEIGHT: 67 IN | SYSTOLIC BLOOD PRESSURE: 134 MMHG | WEIGHT: 291.5 LBS | DIASTOLIC BLOOD PRESSURE: 80 MMHG | TEMPERATURE: 98.1 F

## 2020-03-02 DIAGNOSIS — R10.13 ABDOMINAL PAIN, EPIGASTRIC: Primary | ICD-10-CM

## 2020-03-02 DIAGNOSIS — M19.91 PRIMARY OSTEOARTHRITIS, UNSPECIFIED SITE: ICD-10-CM

## 2020-03-02 DIAGNOSIS — K21.9 GASTROESOPHAGEAL REFLUX DISEASE, ESOPHAGITIS PRESENCE NOT SPECIFIED: ICD-10-CM

## 2020-03-02 DIAGNOSIS — M54.50 CHRONIC BILATERAL LOW BACK PAIN WITHOUT SCIATICA: ICD-10-CM

## 2020-03-02 DIAGNOSIS — E11.65 TYPE 2 DIABETES MELLITUS WITH HYPERGLYCEMIA, WITH LONG-TERM CURRENT USE OF INSULIN (H): Primary | ICD-10-CM

## 2020-03-02 DIAGNOSIS — Z79.4 TYPE 2 DIABETES MELLITUS WITH HYPERGLYCEMIA, WITH LONG-TERM CURRENT USE OF INSULIN (H): Primary | ICD-10-CM

## 2020-03-02 DIAGNOSIS — R10.13 ABDOMINAL PAIN, EPIGASTRIC: ICD-10-CM

## 2020-03-02 DIAGNOSIS — D17.24 LIPOMA OF LEFT THIGH: ICD-10-CM

## 2020-03-02 DIAGNOSIS — G89.4 CHRONIC PAIN SYNDROME: ICD-10-CM

## 2020-03-02 DIAGNOSIS — E11.9 TYPE 2 DIABETES MELLITUS WITHOUT COMPLICATION (H): ICD-10-CM

## 2020-03-02 DIAGNOSIS — G89.29 CHRONIC BILATERAL LOW BACK PAIN WITHOUT SCIATICA: ICD-10-CM

## 2020-03-02 LAB
BASOPHILS # BLD AUTO: 0.1 10E9/L (ref 0–0.2)
BASOPHILS NFR BLD AUTO: 0.7 %
DIFFERENTIAL METHOD BLD: NORMAL
EOSINOPHIL # BLD AUTO: 0.2 10E9/L (ref 0–0.7)
EOSINOPHIL NFR BLD AUTO: 2.8 %
ERYTHROCYTE [DISTWIDTH] IN BLOOD BY AUTOMATED COUNT: 13.2 % (ref 10–15)
HBA1C MFR BLD: 9 % (ref 0–5.6)
HCT VFR BLD AUTO: 40.5 % (ref 35–47)
HGB BLD-MCNC: 12.9 G/DL (ref 11.7–15.7)
LYMPHOCYTES # BLD AUTO: 2.1 10E9/L (ref 0.8–5.3)
LYMPHOCYTES NFR BLD AUTO: 30.8 %
MCH RBC QN AUTO: 27.5 PG (ref 26.5–33)
MCHC RBC AUTO-ENTMCNC: 31.9 G/DL (ref 31.5–36.5)
MCV RBC AUTO: 86 FL (ref 78–100)
MONOCYTES # BLD AUTO: 0.4 10E9/L (ref 0–1.3)
MONOCYTES NFR BLD AUTO: 6.6 %
NEUTROPHILS # BLD AUTO: 4 10E9/L (ref 1.6–8.3)
NEUTROPHILS NFR BLD AUTO: 59.1 %
PLATELET # BLD AUTO: 324 10E9/L (ref 150–450)
RBC # BLD AUTO: 4.69 10E12/L (ref 3.8–5.2)
WBC # BLD AUTO: 6.7 10E9/L (ref 4–11)

## 2020-03-02 PROCEDURE — 85025 COMPLETE CBC W/AUTO DIFF WBC: CPT | Performed by: PEDIATRICS

## 2020-03-02 PROCEDURE — 90715 TDAP VACCINE 7 YRS/> IM: CPT | Performed by: PEDIATRICS

## 2020-03-02 PROCEDURE — 99214 OFFICE O/P EST MOD 30 MIN: CPT | Mod: 25 | Performed by: PEDIATRICS

## 2020-03-02 PROCEDURE — 36415 COLL VENOUS BLD VENIPUNCTURE: CPT | Performed by: PEDIATRICS

## 2020-03-02 PROCEDURE — 83036 HEMOGLOBIN GLYCOSYLATED A1C: CPT | Performed by: PEDIATRICS

## 2020-03-02 PROCEDURE — 80053 COMPREHEN METABOLIC PANEL: CPT | Performed by: PEDIATRICS

## 2020-03-02 PROCEDURE — 90471 IMMUNIZATION ADMIN: CPT | Performed by: PEDIATRICS

## 2020-03-02 PROCEDURE — 99207 C FOOT EXAM  NO CHARGE: CPT | Mod: 25 | Performed by: PEDIATRICS

## 2020-03-02 RX ORDER — LANSOPRAZOLE 30 MG/1
30 CAPSULE, DELAYED RELEASE ORAL 2 TIMES DAILY
Qty: 180 CAPSULE | Refills: 3 | Status: SHIPPED | OUTPATIENT
Start: 2020-03-02 | End: 2020-12-04

## 2020-03-02 RX ORDER — CELECOXIB 200 MG/1
200 CAPSULE ORAL DAILY
Qty: 90 CAPSULE | Refills: 3 | Status: SHIPPED | OUTPATIENT
Start: 2020-03-02 | End: 2020-07-16

## 2020-03-02 RX ORDER — LIRAGLUTIDE 6 MG/ML
1.8 INJECTION SUBCUTANEOUS DAILY
Qty: 27 ML | Refills: 3 | Status: SHIPPED | OUTPATIENT
Start: 2020-03-02 | End: 2020-06-30

## 2020-03-02 RX ORDER — OXYCODONE AND ACETAMINOPHEN 5; 325 MG/1; MG/1
1-2 TABLET ORAL EVERY 4 HOURS PRN
Qty: 90 TABLET | Refills: 0 | Status: SHIPPED | OUTPATIENT
Start: 2020-03-02 | End: 2020-09-14

## 2020-03-02 ASSESSMENT — MIFFLIN-ST. JEOR: SCORE: 1959.87

## 2020-03-02 NOTE — LETTER
[facility]  03/02/20    Patient: Johnna Caballero  YOB: 1965  Medical Record Number: [id number]                                                                  Opioid / Opioid Plus Controlled Substance Agreement                                         Percoset 5/325 #90 per month    I understand that my care provider has prescribed an opioid (narcotic) controlled substance to help manage my condition(s). I am taking this medicine to help me function or work. I know this is strong medicine, and that it can cause serious side effects. Opioid medicine can be sedating, addicting and may cause a dependency on the drug. They can affect my ability to drive or think, and cause depression. They need to be taken exactly as prescribed. Combining opioids with certain medicines or chemicals (such as cocaine, sedatives and tranquilizers, sleeping pills, meth) can be dangerous or even fatal. Also, if I stop opioids suddenly, I may have severe withdrawal symptoms. Last, I understand that opioids do not work for all types of pain nor for all patients. If not helpful, I may be asked to stop them.        The risks, benefits, and side effects of these medicine(s) were explained to me. I agree that:    1. I will take part in other treatments as advised by my care team. This may be psychiatry or counseling, physical therapy, behavioral therapy, group treatment or a referral to a pain clinic. I will reduce or stop my medicine when my care team tells me to do so.  2. I will take my medicines as prescribed. I will not change the dose or schedule unless my care team tells me to. There will be no refills if I  run out early.   I may be contactedwithout warning and asked to complete a urine drug test or pill count at any time.   3. I will keep all my appointments, and understand this is part of the monitoring of opioids. My care team may require an office visit for EVERY opioid/controlled substance refill. If I miss  appointments or don t follow instructions, my care team may stop my medicine.  4. I will not ask other providers to prescribe controlled substances, and I will not accept controlled substances from other people. If I need another prescribed controlled substance for a new reason, I will tell my care team within 1 business day.  5. I will use one pharmacy to fill all of my controlled substance prescriptions, and it is up to me to make sure that I do not run out of my medicines on weekends or holidays. If my care team is willing to refill my opioid prescription without a visit, I must request refills only during office hours, refills may take up to 3 days to process, and it may take up to 5 to 7 days for my medicine to be mailed and ready at my pharmacy. Prescriptions will not be mailed anywhere except my pharmacy.        [id number]  Rev 12/18         Registration to scan to EHR                             Page 1 of 2               Controlled Substance Agreement [facility]  03/02/20  Patient: Johnna Caballero  YOB: 1965  Medical Record Number: [id number]                                                                  6. I am responsible for my prescriptions. If the medicine/prescription is lost or stolen, it will not be replaced. I also agree not to share controlled substance medicines with anyone.  7. I agree to not use ANY illegal or recreational drugs. This includes marijuana, cocaine, bath salts or other drugs. I agree not to use alcohol unless my care team says I may.          I agree to give urine samples whenever asked. If I don t give a urine sample, the care team may stop my medicine.    8. If I enroll in the [place] Medical Marijuana program, I will tell my care team. I will also sign an agreement to share my medical records with my care team.   9. I will bring in my list of medicines (or my medicine bottles) each time I come to the clinic.   10. I will tell my care team  right away if I become pregnant or have a new medical problem treated outside of my regular clinic.  11. I understand that this medicine can affect my thinking and judgment. It may be unsafe for me to drive, use machinery and do dangerous tasks. I will not do any of these things until I know how the medicine affects me. If my dose changes, I will wait to see how it affects me. I will contact my care team if I have concerns about medicine side effects.    I understand that if I do not follow any of the conditions above, my prescriptions or treatment may be stopped.      I agree that my provider, clinic care team, and pharmacy may work with any city, state or federal law enforcement agency that investigates the misuse, sale, or other diversion of my controlled medicine. I will allow my provider to discuss my care with or share a copy of this agreement with any other treating provider, pharmacy or emergency room where I receive care. I agree to give up (waive) any right of privacy or confidentiality with respect to these consents.     I have read this agreement and have asked questions about anything I did not understand.      ________________________________________________________________________  Patient signature - Date/Time -  Johnna Caballero                                      ________________________________________________________________________  Witness signature                                                            ________________________________________________________________________  Provider signature - Linda Durant MD      [id number]  Rev 12/18         Registration to scan to EHR                         Page 2 of 2                   Controlled Substance Agreement Opioid           Page 1 of 2  Opioid Pain Medicines (also known as Narcotics)  What You Need to Know    What are opioids?   Opioids are pain medicines that must be prescribed by a doctor.  They are also known as narcotics.     Examples are:     morphine (MS Contin, Philomena)    oxycodone (Oxycontin)    oxycodone and acetaminophen (Percocet)    hydrocodone and acetaminophen (Vicodin, Norco)     fentanyl patch (Duragesic)     hydromorphone (Dilaudid)     methadone     What do opioids do well?   Opioids are best for short-term pain after a surgery or injury. They also work well for cancer pain. Unlike other pain medicines, they do not cause liver or kidney failure or ulcers. They may help some people with long-lasting (chronic) pain.     What do opioids NOT do well?   Opioids never get rid of pain entirely, and they do not work well for most patients with chronic pain. Opioids do not reduce swelling, one of the causes of pain. They also don t work well for nerve pain.                           For informational purposes only.  Not to replace the advice of your care provider.  Copyright [address] [facility]. All right reserved. [employer] 608127-Gba 02/18.      Page 2 of 2    Risks and side effects   Talk to your doctor before you start or decide to keep taking one of these medicines. Side effects include:    Lowering your breathing rate enough to cause death    Overdose, including death, especially if taking higher than prescribed doses    Long-term opioid use    Worse depression symptoms; less pleasure in things you usually enjoy    Feeling tired or sluggish    Slower thoughts or cloudy thinking    Being more sensitive to pain over time; pain is harder to control    Trouble sleeping or restless sleep    Changes in hormone levels (for example, less testosterone)    Changes in sex drive or ability to have sex    Constipation    Unsafe driving    Itching and sweating    Feeling dizzy    Nausea, vomiting and dry mouth    What else should I know about opioids?  When someone takes opioids for too long or too often, they become dependent. This means that if you stop or reduce the medicine too quickly, you will have withdrawal  symptoms.    Dependence is not the same as addiction. Addiction is when people keep using a substance that harms their body, their mind or their relations with others. If you have a history of drug or alcohol abuse, taking opioids can cause a relapse.    Over time, opioids don t work as well. Most people will need higher and higher doses. The higher the dose, the more serious the side effects. We don t know the long-term effects of opioids.      Prescribed opioids aren't the best way to manage chronic pain    Other ways to manage pain include:      Ibuprofen or acetaminophen.  You should always try this first.      Treat health problems that may be causing pain.      acupuncture or massage, deep breathing, meditation, visual imagery, aromatherapy.      Use heat or ice at the pain site      Physical therapy and exercise      Stop smoking      See a counselor or therapist                                                  People who have used opioids for a long time may have a lower quality of life, worse depression, higher levels of pain and more visits to doctors.    Never share your opioids with others. Be sure to store opioids in a secure place, locked if possible.Young children can easily swallow them and overdose.     You can overdose on opioids.  Signs of overdose include decrease or loss of consciousness, slowed breathing, trouble waking and blue lips.  If someone is worried about overdose, they should call 911.    If you are at risk for overdose, you may get naloxone (Narcan, a medicine that reverses the effects of opioids.  If you overdose, a friend or family member can give you Narcan while waiting for the ambulance.  They need to know the signs of overdose and how to give Narcan.    While you're taking opioids:    Don't use alcohol or street drugs. Taking them together can cause death.    Don't take any of these medicines unless your doctor says its okay.  Taking these with opioids can cause  death.    Benzodiazepines (such as lorazepam         or diazepam)    Muscle relaxers (such as cyclobenzaprine)    sleeping pills    other opioids    Safe disposal of opioids  Find your area drug take-back program, your pharmacy mail-back program, buy a special disposal bag (such as Deterra) from your pharmacy or flush them down the toilet.  Use the guidelines at:  www.fda.gov/drugs/resourcesforyou

## 2020-03-02 NOTE — PROGRESS NOTES
Subjective     Johnna Caballero is a 54 year old female who presents to clinic today for the following health issues:    History of Present Illness        Diabetes:   She presents for follow up of diabetes.  She is checking home blood glucose one time daily. She checks blood glucose before meals and after meals.  Blood glucose is sometimes over 200 and never under 70. She is aware of hypoglycemia symptoms including shakiness and dizziness. She is concerned about blood sugar frequently over 200 and other. She is not experiencing numbness or burning in feet, excessive thirst, blurry vision, weight changes or redness, sores or blisters on feet.         She eats 2-3 servings of fruits and vegetables daily.She consumes 0 sweetened beverage(s) daily.She exercises with enough effort to increase her heart rate 10 to 19 minutes per day.  She exercises with enough effort to increase her heart rate 3 or less days per week.   She is taking medications regularly.           Abdominal pain - around belly button and then spreads out.   Acid reflux getting worse over time.  Diet is the same.  Under a tremendous amount of stress.    When upright, pain is improved.  No bowel movement changes.  No blood in stools.  No weight loss.  Long hx of GERD.   Doesn't feel that the prevacid is controlling symptoms any longer.  Last EGD in the early 1990's.       December surgery - had a drain for a prolonged period of time - 6 weeks.   Just followed by with Dr Bautista and thought everything looked great.    Blood sugars have been much higher on Elijah.   Blood sugars running as high as 300.  High stress time recently.  Due for A1C recheck.  Has been more sedentary while caring for a loved one in the hospital who has since passed away.    Chronic pain - has controlled substance agreement with me, working to use percoset sparingly and has not filled since November.  Due for refill today.          Reviewed and updated as needed this visit by  "Provider  Tobacco         Review of Systems   ROS COMP: Constitutional, HEENT, cardiovascular, pulmonary, gi and msk systems are negative, except as otherwise noted.      Objective    /80   Pulse 82   Temp 98.1  F (36.7  C) (Oral)   Resp 18   Ht 1.702 m (5' 7\")   Wt 132.2 kg (291 lb 8 oz)   SpO2 98%   BMI 45.66 kg/m    Body mass index is 45.66 kg/m .  Physical Exam   GENERAL: healthy, alert and no distress  RESP: lungs clear to auscultation - no rales, rhonchi or wheezes  CV: regular rate and rhythm, normal S1 S2, no S3 or S4, no murmur, click or rub, no peripheral edema and peripheral pulses strong  ABDOMEN: soft, nontender, without hepatosplenomegaly or masses and bowel sounds normal  PSYCH: mentation appears normal, affect normal/bright  Diabetic foot exam: normal DP and PT pulses, no trophic changes or ulcerative lesions and normal sensory exam    Diagnostic Test Results:  Labs reviewed in Epic        Assessment & Plan       ICD-10-CM    1. Type 2 diabetes mellitus with hyperglycemia, with long-term current use of insulin (H) E11.65 insulin glargine (LANTUS PEN) 100 UNIT/ML pen    Z79.4 liraglutide (VICTOZA PEN) 18 MG/3ML solution     metFORMIN (GLUCOPHAGE) 1000 MG tablet     FOOT EXAM    Control worsened recently.  Patient plans to wear jayla monitor and send me some blood sugar readings in 2 weeks.   Will need to adjust her regimen if not seeing improvemeng   2. Chronic bilateral low back pain without sciatica M54.5 celecoxib (CELEBREX) 200 MG capsule    G89.29    3. Lipoma of left thigh D17.24 oxyCODONE-acetaminophen (PERCOCET) 5-325 MG tablet   4. Chronic pain syndrome G89.4 oxyCODONE-acetaminophen (PERCOCET) 5-325 MG tablet   5. Primary osteoarthritis, unspecified site M19.91 oxyCODONE-acetaminophen (PERCOCET) 5-325 MG tablet    On CSA - uses percoset for chronic osteoarthritis s/p dozens of surgeries   6. Gastroesophageal reflux disease, esophagitis presence not specified K21.9 LANsoprazole " (PREVACID) 30 MG DR capsule    Plan to increase prevacid to BID dosing and add H2 blocker.  Will alert me if not improving.  Check blood counts today.   7. Abdominal pain, epigastric R10.13 CBC with platelets differential, CMP    Lab work today - measures as above to help with acid reflux.  Patient believes that stress is playing a large role in her symptoms.                Patient Instructions   Start taking your prevacid twice daily    Add famotidine (pepcid) at least once daily    Alert me if abdominal pain not getting better    TDaP today    Percoset refilled    Walk when you are in [place]    Please send me blood sugar readings in 2 weeks and we can adjust your medications further at that point.  I'm hoping when your stress level is lower and you are able to move more      Return in about 3 months (around 6/2/2020) for diabetes visit.    Linda Durant MD  [facility]

## 2020-03-02 NOTE — PATIENT INSTRUCTIONS
Start taking your prevacid twice daily    Add famotidine (pepcid) at least once daily    Alert me if abdominal pain not getting better    TDaP today    Percoset refilled    Walk when you are in [place]    Please send me blood sugar readings in 2 weeks and we can adjust your medications further at that point.  I'm hoping when your stress level is lower and you are able to move more

## 2020-03-03 LAB
ALBUMIN SERPL-MCNC: 3.1 G/DL (ref 3.4–5)
ALP SERPL-CCNC: 94 U/L (ref 40–150)
ALT SERPL W P-5'-P-CCNC: 29 U/L (ref 0–50)
ANION GAP SERPL CALCULATED.3IONS-SCNC: 12 MMOL/L (ref 3–14)
AST SERPL W P-5'-P-CCNC: 22 U/L (ref 0–45)
BILIRUB SERPL-MCNC: 0.2 MG/DL (ref 0.2–1.3)
BUN SERPL-MCNC: 10 MG/DL (ref 7–30)
CALCIUM SERPL-MCNC: 9 MG/DL (ref 8.5–10.1)
CHLORIDE SERPL-SCNC: 108 MMOL/L (ref 94–109)
CO2 SERPL-SCNC: 17 MMOL/L (ref 20–32)
CREAT SERPL-MCNC: 0.59 MG/DL (ref 0.52–1.04)
GFR SERPL CREATININE-BSD FRML MDRD: >90 ML/MIN/{1.73_M2}
GLUCOSE SERPL-MCNC: 145 MG/DL (ref 70–99)
POTASSIUM SERPL-SCNC: 4.8 MMOL/L (ref 3.4–5.3)
PROT SERPL-MCNC: 7.5 G/DL (ref 6.8–8.8)
SODIUM SERPL-SCNC: 137 MMOL/L (ref 133–144)

## 2020-03-03 ASSESSMENT — ASTHMA QUESTIONNAIRES: ACT_TOTALSCORE: 25

## 2020-03-11 ENCOUNTER — HOSPITAL ENCOUNTER (OUTPATIENT)
Facility: CLINIC | Age: 55
End: 2020-03-11
Attending: INTERNAL MEDICINE | Admitting: INTERNAL MEDICINE
Payer: COMMERCIAL

## 2020-03-20 DIAGNOSIS — Z79.4 TYPE 2 DIABETES MELLITUS WITHOUT COMPLICATION, WITH LONG-TERM CURRENT USE OF INSULIN (H): ICD-10-CM

## 2020-03-20 DIAGNOSIS — E11.9 TYPE 2 DIABETES MELLITUS WITHOUT COMPLICATION, WITH LONG-TERM CURRENT USE OF INSULIN (H): ICD-10-CM

## 2020-03-20 RX ORDER — FLASH GLUCOSE SENSOR
KIT MISCELLANEOUS
Qty: 6 EACH | Refills: 0 | Status: SHIPPED | OUTPATIENT
Start: 2020-03-20 | End: 2020-10-12

## 2020-03-22 RX ORDER — PEN NEEDLE, DIABETIC 32GX 5/32"
NEEDLE, DISPOSABLE MISCELLANEOUS
Qty: 180 EACH | Refills: 0 | Status: SHIPPED | OUTPATIENT
Start: 2020-03-22 | End: 2020-05-26

## 2020-03-24 ENCOUNTER — OFFICE VISIT (OUTPATIENT)
Dept: PEDIATRICS | Facility: CLINIC | Age: 55
End: 2020-03-24
Payer: COMMERCIAL

## 2020-03-24 ENCOUNTER — TELEPHONE (OUTPATIENT)
Dept: PEDIATRICS | Facility: CLINIC | Age: 55
End: 2020-03-24

## 2020-03-24 ENCOUNTER — HOSPITAL ENCOUNTER (OUTPATIENT)
Dept: CT IMAGING | Facility: CLINIC | Age: 55
Discharge: HOME | End: 2020-03-24
Attending: PHYSICIAN ASSISTANT | Admitting: PHYSICIAN ASSISTANT
Payer: COMMERCIAL

## 2020-03-24 VITALS
DIASTOLIC BLOOD PRESSURE: 80 MMHG | WEIGHT: 288 LBS | BODY MASS INDEX: 45.2 KG/M2 | HEIGHT: 67 IN | OXYGEN SATURATION: 97 % | TEMPERATURE: 97.4 F | RESPIRATION RATE: 18 BRPM | SYSTOLIC BLOOD PRESSURE: 132 MMHG | HEART RATE: 85 BPM

## 2020-03-24 DIAGNOSIS — R10.826 EPIGASTRIC ABDOMINAL TENDERNESS WITH REBOUND TENDERNESS: ICD-10-CM

## 2020-03-24 DIAGNOSIS — E11.65 TYPE 2 DIABETES MELLITUS WITH HYPERGLYCEMIA, WITH LONG-TERM CURRENT USE OF INSULIN (H): ICD-10-CM

## 2020-03-24 DIAGNOSIS — Z79.4 TYPE 2 DIABETES MELLITUS WITH HYPERGLYCEMIA, WITH LONG-TERM CURRENT USE OF INSULIN (H): ICD-10-CM

## 2020-03-24 DIAGNOSIS — R10.816 EPIGASTRIC ABDOMINAL TENDERNESS WITHOUT REBOUND TENDERNESS: Primary | ICD-10-CM

## 2020-03-24 LAB
ALBUMIN SERPL-MCNC: 3.3 G/DL (ref 3.4–5)
ALP SERPL-CCNC: 110 U/L (ref 40–150)
ALT SERPL W P-5'-P-CCNC: 24 U/L (ref 0–50)
AMYLASE SERPL-CCNC: 38 U/L (ref 30–110)
ANION GAP SERPL CALCULATED.3IONS-SCNC: 10 MMOL/L (ref 3–14)
AST SERPL W P-5'-P-CCNC: 16 U/L (ref 0–45)
BASOPHILS # BLD AUTO: 0.1 10E9/L (ref 0–0.2)
BASOPHILS NFR BLD AUTO: 1 %
BILIRUB SERPL-MCNC: 0.3 MG/DL (ref 0.2–1.3)
BUN SERPL-MCNC: 10 MG/DL (ref 7–30)
CALCIUM SERPL-MCNC: 8.7 MG/DL (ref 8.5–10.1)
CHLORIDE SERPL-SCNC: 102 MMOL/L (ref 94–109)
CO2 SERPL-SCNC: 24 MMOL/L (ref 20–32)
CREAT SERPL-MCNC: 0.63 MG/DL (ref 0.52–1.04)
CRP SERPL-MCNC: 9.2 MG/L (ref 0–8)
DIFFERENTIAL METHOD BLD: NORMAL
EOSINOPHIL # BLD AUTO: 0.2 10E9/L (ref 0–0.7)
EOSINOPHIL NFR BLD AUTO: 2.5 %
ERYTHROCYTE [DISTWIDTH] IN BLOOD BY AUTOMATED COUNT: 13.1 % (ref 10–15)
ERYTHROCYTE [SEDIMENTATION RATE] IN BLOOD BY WESTERGREN METHOD: 10 MM/H (ref 0–30)
GFR SERPL CREATININE-BSD FRML MDRD: >90 ML/MIN/{1.73_M2}
GLUCOSE SERPL-MCNC: 172 MG/DL (ref 70–99)
HCT VFR BLD AUTO: 42.1 % (ref 35–47)
HGB BLD-MCNC: 13.7 G/DL (ref 11.7–15.7)
LIPASE SERPL-CCNC: 128 U/L (ref 73–393)
LYMPHOCYTES # BLD AUTO: 1.9 10E9/L (ref 0.8–5.3)
LYMPHOCYTES NFR BLD AUTO: 26.2 %
MCH RBC QN AUTO: 27.7 PG (ref 26.5–33)
MCHC RBC AUTO-ENTMCNC: 32.5 G/DL (ref 31.5–36.5)
MCV RBC AUTO: 85 FL (ref 78–100)
MONOCYTES # BLD AUTO: 0.4 10E9/L (ref 0–1.3)
MONOCYTES NFR BLD AUTO: 6 %
NEUTROPHILS # BLD AUTO: 4.7 10E9/L (ref 1.6–8.3)
NEUTROPHILS NFR BLD AUTO: 64.3 %
PLATELET # BLD AUTO: 304 10E9/L (ref 150–450)
POTASSIUM SERPL-SCNC: 3.5 MMOL/L (ref 3.4–5.3)
PROT SERPL-MCNC: 7.6 G/DL (ref 6.8–8.8)
RBC # BLD AUTO: 4.95 10E12/L (ref 3.8–5.2)
SODIUM SERPL-SCNC: 136 MMOL/L (ref 133–144)
WBC # BLD AUTO: 7.3 10E9/L (ref 4–11)

## 2020-03-24 PROCEDURE — 83690 ASSAY OF LIPASE: CPT | Performed by: PHYSICIAN ASSISTANT

## 2020-03-24 PROCEDURE — 74177 CT ABD & PELVIS W/CONTRAST: CPT

## 2020-03-24 PROCEDURE — 82150 ASSAY OF AMYLASE: CPT | Performed by: PHYSICIAN ASSISTANT

## 2020-03-24 PROCEDURE — 25000128 H RX IP 250 OP 636: Performed by: PHYSICIAN ASSISTANT

## 2020-03-24 PROCEDURE — 36415 COLL VENOUS BLD VENIPUNCTURE: CPT | Performed by: PHYSICIAN ASSISTANT

## 2020-03-24 PROCEDURE — 80053 COMPREHEN METABOLIC PANEL: CPT | Performed by: PHYSICIAN ASSISTANT

## 2020-03-24 PROCEDURE — 85652 RBC SED RATE AUTOMATED: CPT | Performed by: PHYSICIAN ASSISTANT

## 2020-03-24 PROCEDURE — 25000125 ZZHC RX 250: Performed by: PHYSICIAN ASSISTANT

## 2020-03-24 PROCEDURE — 85025 COMPLETE CBC W/AUTO DIFF WBC: CPT | Performed by: PHYSICIAN ASSISTANT

## 2020-03-24 PROCEDURE — 86140 C-REACTIVE PROTEIN: CPT | Performed by: PHYSICIAN ASSISTANT

## 2020-03-24 PROCEDURE — 99214 OFFICE O/P EST MOD 30 MIN: CPT | Performed by: PHYSICIAN ASSISTANT

## 2020-03-24 RX ORDER — IOPAMIDOL 755 MG/ML
500 INJECTION, SOLUTION INTRAVASCULAR ONCE
Status: COMPLETED | OUTPATIENT
Start: 2020-03-24 | End: 2020-03-24

## 2020-03-24 RX ADMIN — IOPAMIDOL 100 ML: 755 INJECTION, SOLUTION INTRAVENOUS at 13:02

## 2020-03-24 RX ADMIN — SODIUM CHLORIDE 65 ML: 9 INJECTION, SOLUTION INTRAVENOUS at 13:03

## 2020-03-24 ASSESSMENT — MIFFLIN-ST. JEOR: SCORE: 1943.99

## 2020-03-24 NOTE — TELEPHONE ENCOUNTER
Patient called in.  Abdominal Pain.    Went to [place] for two weeks with improvement in pain.   Pain is much worse since returning from [place].  Worse at night.   Currently pain 6-7/10 intensity.  Has GERD, thinks she may have an ulcer.   Takes Prevacid and Pepcid as ordered.     Got back from [place] 3/18/20.    Has had abdominal pain for months, but it is getting worse.     Has endoscopy scheduled for April 1.     Denies blood in stool or vomit.     Pain radiates into the right side into the back.     Denies cough or fever.   Denies shortness of breath.    Advised phone visit.  Patient agreed.   Phone visit with Dr. Lyon scheduled for 2:40 pm 3/24/20.    Sin Rodriguez RN on 3/24/2020 at 8:54 AM

## 2020-03-24 NOTE — PROGRESS NOTES
"Subjective     Johnna Caballero is a 54 year old female, with a history of DM, HLD, HTN, intermittent asthma, who presents to clinic today for the following health issues:    HPI   ABDOMINAL PAIN     Onset: couple of months and worse this am    Description:   Character: Sharp and Cramping  Location: upper middle, and right side with some right sided flank pain  Radiation: Back    Intensity: 8-9/10 this am    Progression of Symptoms:  Improving - worse at night and this morning     Accompanying Signs & Symptoms:  Fever/Chills?: no   Gas/Bloating: no   Nausea: no   Vomitting: no   Diarrhea?: no   Reflux symptoms in AM  Constipation:no   Dysuria or Hematuria: no    History:   Trauma: no   Previous similar pain: YES- 1993    Previous tests done: Upper Endoscopy - 1993 acid reflux     Precipitating factors:   Does the pain change with:     Food: no      BM: YES - lessens pain     Urination: no     Alleviating factors:  Pepsid increase     Therapies Tried and outcome: Pepsid 20 mg twice daily; PPI twice daily x 3 weeks    Lessens but symptoms persist.    LMP:  not applicable   History of cholecystectomy.  No history of kidney stones, pancreatitis, ulcers    Review of Systems   ROS COMP: Constitutional, HEENT, cardiovascular, pulmonary, gi and gu systems are negative, except as otherwise noted.      Objective    /80 (BP Location: Right arm, Patient Position: Chair, Cuff Size: Adult Large)   Pulse 85   Temp 97.4  F (36.3  C) (Tympanic)   Resp 18   Ht 1.702 m (5' 7\")   Wt 130.6 kg (288 lb)   SpO2 97%   BMI 45.11 kg/m    Body mass index is 45.11 kg/m .  Physical Exam   GENERAL: alert and no distress  NECK: no adenopathy  RESP: lungs clear to auscultation - no rales, rhonchi or wheezes  CV: regular rate and rhythm, normal S1 S2, no S3 or S4  ABDOMEN: soft, epigastric tenderness, no hepatosplenomegaly, no masses and bowel sounds normal  Back: no CVAT    Diagnostic Test Results:  Results for orders placed or " performed in visit on 03/24/20 (from the past 24 hour(s))   Comprehensive metabolic panel   Result Value Ref Range    Sodium 136 133 - 144 mmol/L    Potassium 3.5 3.4 - 5.3 mmol/L    Chloride 102 94 - 109 mmol/L    Carbon Dioxide 24 20 - 32 mmol/L    Anion Gap 10 3 - 14 mmol/L    Glucose 172 (H) 70 - 99 mg/dL    Urea Nitrogen 10 7 - 30 mg/dL    Creatinine 0.63 0.52 - 1.04 mg/dL    GFR Estimate >90 >60 mL/min/[1.73_m2]    GFR Estimate If Black >90 >60 mL/min/[1.73_m2]    Calcium 8.7 8.5 - 10.1 mg/dL    Bilirubin Total 0.3 0.2 - 1.3 mg/dL    Albumin 3.3 (L) 3.4 - 5.0 g/dL    Protein Total 7.6 6.8 - 8.8 g/dL    Alkaline Phosphatase 110 40 - 150 U/L    ALT 24 0 - 50 U/L    AST 16 0 - 45 U/L   CBC with platelets differential   Result Value Ref Range    WBC 7.3 4.0 - 11.0 10e9/L    RBC Count 4.95 3.8 - 5.2 10e12/L    Hemoglobin 13.7 11.7 - 15.7 g/dL    Hematocrit 42.1 35.0 - 47.0 %    MCV 85 78 - 100 fl    MCH 27.7 26.5 - 33.0 pg    MCHC 32.5 31.5 - 36.5 g/dL    RDW 13.1 10.0 - 15.0 %    Platelet Count 304 150 - 450 10e9/L    % Neutrophils 64.3 %    % Lymphocytes 26.2 %    % Monocytes 6.0 %    % Eosinophils 2.5 %    % Basophils 1.0 %    Absolute Neutrophil 4.7 1.6 - 8.3 10e9/L    Absolute Lymphocytes 1.9 0.8 - 5.3 10e9/L    Absolute Monocytes 0.4 0.0 - 1.3 10e9/L    Absolute Eosinophils 0.2 0.0 - 0.7 10e9/L    Absolute Basophils 0.1 0.0 - 0.2 10e9/L    Diff Method Automated Method    Erythrocyte sedimentation rate auto   Result Value Ref Range    Sed Rate 10 0 - 30 mm/h   Amylase   Result Value Ref Range    Amylase 38 30 - 110 U/L           Assessment & Plan   (R10.472) Epigastric abdominal tenderness without rebound tenderness  (primary encounter diagnosis)  Comment: given symptoms and PMH, proceed with labs and imaging for further evaluation.  Plan: Comprehensive metabolic panel, CBC with         platelets differential, CRP inflammation,         Erythrocyte sedimentation rate auto, Amylase,         Lipase, Helicobacter  pylori Antigen Stool, CT         Abdomen Pelvis w Contrast            (E11.65,  Z79.4) Type 2 diabetes mellitus with hyperglycemia, with long-term current use of insulin (H)  Comment:   Plan: CT Abdomen Pelvis w Contrast             Fer Torres PA-C  [facility]

## 2020-03-25 DIAGNOSIS — R10.816 EPIGASTRIC ABDOMINAL TENDERNESS WITHOUT REBOUND TENDERNESS: ICD-10-CM

## 2020-03-25 PROCEDURE — 87338 HPYLORI STOOL AG IA: CPT | Performed by: PHYSICIAN ASSISTANT

## 2020-03-26 LAB — H PYLORI AG STL QL IA: NEGATIVE

## 2020-05-19 ENCOUNTER — OFFICE VISIT (OUTPATIENT)
Dept: PODIATRY | Facility: CLINIC | Age: 55
End: 2020-05-19
Payer: COMMERCIAL

## 2020-05-19 VITALS
HEIGHT: 67 IN | SYSTOLIC BLOOD PRESSURE: 130 MMHG | WEIGHT: 280 LBS | BODY MASS INDEX: 43.95 KG/M2 | DIASTOLIC BLOOD PRESSURE: 74 MMHG

## 2020-05-19 DIAGNOSIS — M72.2 PLANTAR FASCIITIS, LEFT: ICD-10-CM

## 2020-05-19 DIAGNOSIS — E11.9 TYPE 2 DIABETES MELLITUS WITHOUT COMPLICATION, WITHOUT LONG-TERM CURRENT USE OF INSULIN (H): Primary | ICD-10-CM

## 2020-05-19 DIAGNOSIS — M21.41 BILATERAL PES PLANUS: ICD-10-CM

## 2020-05-19 DIAGNOSIS — E66.01 MORBID OBESITY WITH BMI OF 40.0-44.9, ADULT (H): ICD-10-CM

## 2020-05-19 DIAGNOSIS — M21.42 BILATERAL PES PLANUS: ICD-10-CM

## 2020-05-19 PROCEDURE — 99213 OFFICE O/P EST LOW 20 MIN: CPT | Mod: 25 | Performed by: PODIATRIST

## 2020-05-19 PROCEDURE — 20550 NJX 1 TENDON SHEATH/LIGAMENT: CPT | Mod: LT | Performed by: PODIATRIST

## 2020-05-19 ASSESSMENT — MIFFLIN-ST. JEOR: SCORE: 1907.7

## 2020-05-19 NOTE — PROGRESS NOTES
PATIENT HISTORY:    Johnna Caballero is a 54 year old female who presents to clinic for pain to the left heel.  She notes that is been going on for about 3 months.  Has been doing stretching, using the night splint, and since.  Pain is 8 out of 10.  Very sore when she gets up or after she has been on her foot for a while.  Denies specific injury.  Notes that she does wear sandals around the house.  States she had plantar fasciitis in her right foot years ago and it feels like that is what is going on in her left foot.    Review of Systems:  Patient denies fever, chills, rash, wound, stiffness,numbness, weakness, heart burn, blood in stool, chest pain with activity, calf pain when walking, shortness of breath with activity, chronic cough, easy bleeding/bruising, swelling of ankles, excessive thirst, fatigue, depression, anxiety.  Patient admits to limping.     PAST MEDICAL HISTORY:   Past Medical History:   Diagnosis Date     Actinic keratosis      Allergic rhinitis, cause unspecified      Anemia      Arthritis      Asthma     no meds x2 yrs     chronic back pain      Chronic infection     MRSA-nasal     Chronic pain     back     Esophageal reflux      fibrocystic breast changes      Gastro-oesophageal reflux disease      Heart murmur     mitral insuffiency     Hx of Fen-Phen use      Hypertension      migraines      Mild intermittent asthma     rare, with dog or exercise     Moderate major depression (H) 3/28/2012     MRSA (methicillin resistant Staphylococcus aureus) 6/27/2014    Peg     Need for desensitization to allergens      Obesity, unspecified      Other and unspecified hyperlipidemia      Temporomandibular joint disorders, unspecified      Thrombosis of leg     incisional area right hip     Type II or unspecified type diabetes mellitus without mention of complication, not stated as uncontrolled         PAST SURGICAL HISTORY:   Past Surgical History:   Procedure Laterality Date     ARTHROPLASTY KNEE   7/15/2014    Procedure: ARTHROPLASTY KNEE;  Surgeon: Chapin Paul MD;  Location: [facility] OR     BACK SURGERY       C NONSPECIFIC PROCEDURE      laparoscopy x6     C NONSPECIFIC PROCEDURE  93    laparotomy x2 ovarian cyst     C NONSPECIFIC PROCEDURE  0293    oophorectomy lt side - cyst     C NONSPECIFIC PROCEDURE  0395    laminectomy L4-5. S1 herniated disc     C NONSPECIFIC PROCEDURE  96    cholecystectomy     C NONSPECIFIC PROCEDURE      ganglion cysts l wrist, rt palm     C NONSPECIFIC PROCEDURE      cyst removal back x2     C NONSPECIFIC PROCEDURE  10/02    rt thumb fusion, ganglion cyst removal     C NONSPECIFIC PROCEDURE  1/08    remove heel spur, excise exostosis     CHOLECYSTECTOMY       COLONOSCOPY N/A 4/26/2017    Procedure: COLONOSCOPY;  COLONOSCOPY;  Surgeon: Chapin Leal MD;  Location:  GI     EXCISE LESION LOWER EXTREMITY  11/20/2012    Procedure: EXCISE LESION LOWER EXTREMITY;  EXCISION LIPOMA RIGHT HIP ;  Surgeon: Jazmin Bautista MD;  Location:  [facility]     EXCISE LESION LOWER EXTREMITY  11/23/2012    Procedure: EXCISE LESION LOWER EXTREMITY;  EXCISE LESION LOWER EXTREMITY  EVACUATE RIGHT HIP HEMATOMA;  Surgeon: Jazmin Bautista MD;  Location:  OR     EXCISE MASS HIP Left 11/9/2018    Procedure: 1.  Excision left chest wall mass with excised diameter of greater than 4 cm 2.  Excision left hip mass with excised diameter of more than 20 x 20 cm  ;  Surgeon: Jazmin Bautista MD;  Location: [facility] OR     EXCISE MASS TRUNK Left 11/9/2018    Procedure: EXCISE MASS TRUNK;  Surgeon: Jazmin Bautista MD;  Location: [facility] OR     EXCISE MASS TRUNK N/A 12/16/2019    Procedure: REVISION LEFT HIP SCAR WITH SEROMA EVACUATION; REMOVAL LEFT CHEST WALL MASS   (MRSA);  Surgeon: Jazmin Bautista MD;  Location:  OR     GYN SURGERY       HC EXPLORATION ANKLE JOINT  1/2006     HC PART EXCIS PLANTAR FASCIA  12/2006     HYSTEROSCOPY DIAGNOSTIC N/A 12/16/2019    Procedure: DIAGNOSTIC  HYSTEROSCOPY;  Surgeon: Jodi Perea MD;  Location: [facility] OR     IRRIGATION AND DEBRIDEMENT HIP, COMBINED  12/15/2012    Procedure: COMBINED IRRIGATION AND DEBRIDEMENT HIP;   evacuation hematoma, scar revision right hip;  Surgeon: Jazmin Bautista MD;  Location: [facility] OR     REPLACE INTRAUTERINE DEVICE N/A 12/16/2019    Procedure: REPLACEMENTOF MIRENA CONTRACEPTIVE INTRAUTERINE DEVICE;  Surgeon: Jodi Perea MD;  Location: [facility] OR     wisdom teeth[          MEDICATIONS:   Current Outpatient Medications:      aspirin 81 MG tablet, Take 1 tablet (81 mg) by mouth daily, Disp: 30 tablet, Rfl:      BD PEN NEEDLE VALDO 2ND GEN 32G X 4 MM miscellaneous, USE 2 PEN NEEDLES DAILY OR AS DIRECTED, Disp: 180 each, Rfl: 0     blood glucose (ACCU-CHEK GABI) test strip, Use to test blood sugars 1-2x daily or as directed., Disp: 200 each, Rfl: 11     blood glucose monitoring (ACCU-CHEK MULTICLIX) lancets, Use to test blood sugar 1-2 times daily or as directed., Disp: 2 Box, Rfl: 3     celecoxib (CELEBREX) 200 MG capsule, Take 1 capsule (200 mg) by mouth daily, Disp: 90 capsule, Rfl: 3     cetirizine (ZYRTEC) 10 MG tablet, Take 10 mg by mouth daily, Disp: , Rfl:      Continuous Blood Gluc Sensor (FREESTYLE HELENE 14 DAY SENSOR) [facility], USE ONE EVERY 14 DAYS, Disp: 6 each, Rfl: 0     diazepam (VALIUM) 5 MG tablet, Take 1 tablet (5 mg) by mouth every 12 hours as needed for muscle spasms, Disp: 30 tablet, Rfl: 1     diclofenac (VOLTAREN) 1 % topical gel, Place 2 g onto the skin 4 times daily, Disp: 100 g, Rfl: 2     EPINEPHrine (EPIPEN/ADRENACLICK/OR ANY BX GENERIC EQUIV) 0.3 MG/0.3ML injection 2-pack, Inject 0.3 mLs (0.3 mg) into the muscle once as needed for anaphylaxis, Disp: 2 mL, Rfl: 0     insulin glargine (LANTUS PEN) 100 UNIT/ML pen, Inject 24 Units Subcutaneous 2 times daily, Disp: 45 mL, Rfl: 3     KRILL OIL PO, Take 1 tablet by mouth daily , Disp: , Rfl:      LANsoprazole (PREVACID) 30 MG DR capsule, Take 1 capsule  (30 mg) by mouth 2 times daily Take 30-60 min before a meal, Disp: 180 capsule, Rfl: 3     liraglutide (VICTOZA PEN) 18 MG/3ML solution, Inject 1.8 mg Subcutaneous daily, Disp: 27 mL, Rfl: 3     losartan-hydrochlorothiazide (HYZAAR) 100-25 MG tablet, Take 1 tablet by mouth daily, Disp: 90 tablet, Rfl: 3     metFORMIN (GLUCOPHAGE) 1000 MG tablet, Take 1 tablet (1,000 mg) by mouth 2 times daily (with meals), Disp: 180 tablet, Rfl: 3     ORDER FOR DME, Equipment being ordered: TENS unit, Disp: 1 each, Rfl: 0     oxyCODONE-acetaminophen (PERCOCET) 5-325 MG tablet, Take 1-2 tablets by mouth every 4 hours as needed for severe pain, Disp: 90 tablet, Rfl: 0     STATIN NOT PRESCRIBED, INTENTIONAL,, 1 each 2 times daily Statin not prescribed intentionally due to Refusal by patient, Disp: 0 each, Rfl: 0     sucralfate (CARAFATE) 1 GM tablet, Take 1 tablet (1 g) by mouth 4 times daily Before meals, Disp: 120 tablet, Rfl: 4     triamcinolone (KENALOG) 0.5 % external ointment, Topically twice daily as needed for 14 days, Disp: 60 g, Rfl: 11     vitamin D3 (CHOLECALCIFEROL) 47829 units (250 mcg) capsule, Take 1 capsule (10,000 Units) by mouth daily, Disp: , Rfl:      ALLERGIES:    Allergies   Allergen Reactions     Eggs      Allergy found in testing     Hornets      wasps     Latex Anaphylaxis     hives  -  able to use BP cuff     Lipitor [Hmg-Coa-R Inhibitors] Cramps     Muscle cramps with statin     Metoclopramide Hcl Difficulty breathing     Neurontin [Gabapentin] Hives        SOCIAL HISTORY:   Social History     Socioeconomic History     Marital status: Single     Spouse name: Not on file     Number of children: 0     Years of education: 16     Highest education level: Bachelor's degree (e.g., BA, AB, BS)   Occupational History     Occupation: Textic deputy     Employer: [employer]   Social Needs     Financial resource strain: Not hard at all     Food insecurity     Worry: Never true     Inability: Never true  "    Transportation needs     Medical: No     Non-medical: No   Tobacco Use     Smoking status: Never Smoker     Smokeless tobacco: Never Used   Substance and Sexual Activity     Alcohol use: Yes     Alcohol/week: 0.0 standard drinks     Frequency: Never     Drinks per session: 1 or 2     Binge frequency: Never     Comment: 1 drink per year or less     Drug use: No     Sexual activity: Not Currently     Partners: Male     Birth control/protection: I.U.D.   Lifestyle     Physical activity     Days per week: 2 days     Minutes per session: 20 min     Stress: Not at all   Relationships     Social connections     Talks on phone: More than three times a week     Gets together: Once a week     Attends Judaism service: Never     Active member of club or organization: No     Attends meetings of clubs or organizations: Never     Relationship status: Never      Intimate partner violence     Fear of current or ex partner: Not on file     Emotionally abused: Not on file     Physically abused: Not on file     Forced sexual activity: Not on file   Other Topics Concern     Parent/sibling w/ CABG, MI or angioplasty before 65F 55M? No   Social History Narrative     Not on file        FAMILY HISTORY:   Family History   Adopted: Yes   Problem Relation Age of Onset     Respiratory Brother         1/2 sib     Psychotic Disorder Mother         depression, chronic fatigue     Psychotic Disorder Brother         bipolar     C.A.D. Maternal Grandfather      Breast Cancer Maternal Grandmother      Diabetes Maternal Uncle         EXAM:Vitals: /74   Ht 1.702 m (5' 7\")   Wt 127 kg (280 lb)   BMI 43.85 kg/m      A1C: 9.0 (3/2020)     General appearance: Patient is alert and fully cooperative with history & exam.  No sign of distress is noted during the visit.     Psychiatric: Affect is pleasant & appropriate.  Patient appears motivated to improve health.     Respiratory: Breathing is regular & unlabored while sitting.     HEENT: " Hearing is intact to spoken word.  Speech is clear.  No gross evidence of visual impairment that would impact ambulation.     Dermatologic: Skin is intact to both lower extremities without significant lesions, rash or abrasion.  No paronychia or evidence of soft tissue infection is noted.     Vascular: DP & PT pulses are intact & regular bilaterally.   edema and varicosities noted.  CFT and skin temperature is normal to both lower extremities.     Neurologic: Lower extremity sensation is diminished to feet.      Musculoskeletal: Patient is ambulatory without assistive device or brace. Decrease arch height. Decrease dorsiflexion right ankle.  Pain on palpation of the left plantar heel.       ASSESSMENT:    Type 2 diabetes mellitus without complication, without long-term current use of insulin (H)  Morbid obesity with BMI of 40.0-44.9, adult (H)  Plantar fasciitis, left  Bilateral pes planus     PLAN:  Review[facility] in [facility]. The potential causes and nature of plantar fasciitis were discussed with the patient.  We reviewed the natural history/prognosis of the condition and risks if left untreated.  These include chronic pain, other sites of pain due to gait changes, and potential plantar fascial rupture.      We discussed possible causes of the condition as it relates to the patients specific situation.      Conservative treatment options were reviewed:  appropriate shoes, avoidance of barefoot walking, inserts/orthoses, stretching, ice, massage, immobilization and NSAIDs.     We also reviewed the options of injection therapy and surgery.  However, it was made clear that surgery is only considered when conservative therapy fails.  The risks and benefits of injection therapy, and surgery were discussed.     After thorough discussion and answering all questions, the patient elected to try injection today.  We discussed not going barefoot around the house and having a supportive shoe or sandal.  She will  continue using the night splint and doing stretches.  Discussed that if this does not improve in 2 weeks to call and we will order an ankle MRI to assess for possible tear or other pathology.  All questions were answered to patient satisfaction.  Did discuss increased risk of immunocompromisation with the cortisone injection..      Procedure: After verbal consent, the patients max point of tenderness was marked out on the left plantar heel.  This area was prepped and draped using sterile technique.  An injection of 1cc of 1% lidocaine plain and 1 1/2 cc of Kenalog-40 was injected into the max point of tenderness.  This was distributed in a fanning motion.  Patient tolerated the procedure and anesthesia well.      Suzanna Strong DPM, Podiatry/Foot and Ankle Surgery    Weight management plan: Patient was referred to their PCP to discuss a diet and exercise plan.    Recommended to Johnna Caballero to follow up with Primary Care provider regarding elevated blood pressure.

## 2020-05-19 NOTE — PATIENT INSTRUCTIONS
[facility]  [id number]  [place] [address]  Phone: [phone]  Fax: [phone]    Follow up: as needed    PLANTAR FASCIITIS    Plantar fasciitis is often referred to as heel spurs or heel pain. Plantar fasciitis is a very common problem that affects people of all foot shapes, age, weight and activity level. Pain may be in the arch or on the weight-bearing surface of the heel. The pain may come on without injury or identifiable cause. Pain is generally present when first getting out of bed in the morning or up from a seated break.     CAUSES  The plantar fascia is a dense fibrous band of tissue that stretches across the bottom surface of the foot. The fascia helps support the foot muscles and arch. Plantar fasciitis is thought to be caused by mechanical strain or overload. Frequent walking without shoes or wearing unsupportive shoes is thought to cause structural overload and ultimately inflammation of the plantar fascia. Some people have heel spurs that can be seen on x-ray. The heel spur is actually a minor component of plantar fascitis and is largely ignored.       SELF TREATMENT   The easiest solution is to stop walking around your home without shoes. Plantar fasciitis is largely a shoe problem. Shoes are either not being worn often enough or your current shoes are inadequate for your weight, foot structure or activity level. The majority of shoes on the market today are not sufficient to resist development of plantar fasciitis or to promote healing. Assume that your current shoes are inadequate and will need to be replaced. Even high quality shoes wear out with 6 months to one year of frequent use. Weight loss is another option. Losing ten pounds in the next two months may be enough to resolve the problem. Ice applied to the area of pain two to three times per day for ten minutes each session can be very helpful. This should continue until the problem resolves. Achilles  tendon stretching is essential. Stretch multiple times daily to promote healing and to prevent recurrence in the future.     MEDICAL TREATMENT  Medical treatments often include custom arch supports, cortisone injections, physical therapy, splints to be worn in bed, prescription medications and surgery. The home treatments listed above will be necessary regardless of these advanced medical treatments. Surgery is rarely needed but is very helpful in selected cases.     PROGNOSIS  Plantar fasciitis can last from one day to a lifetime. Some people get intermittent fascitis that is very short-lived. Others suffer daily for years. Excessive body weight, frequent bare foot walking, long hours on the feet, inadequate shoes, predisposing foot structures and excessive activity such as running are all potential issues that lead to chronic and/or recurring plantar fascitis. Having plantar fasciitis means that you are forever prone to this problem and will require modification of some of the above factors. Most people seek treatment within one to four months. Healing usually requires a similar one to four month time frame. Healing time is relative to the amount of effort spent treating the problem.   Plantar fasciitis is highly recurrent. Risk factors often continue, including return to bare foot walking, inadequate shoes, excessive body weight, excessive activities, etc. Your life style and foot structure may predispose you to recurrent plantar fasciitis. A daily prevention regimen can be very helpful. Ongoing use of shoe inserts, careful attention to appropriate shoes, daily Achilles stretching, etc. may prevent recurrence. Prompt attention at the earliest warning signs of heel pain can resolve the problem in as short as a few days.     EXERCISES    Stair Exercise: Step on the stairs with the ball of your foot and hold your position for at least 15 seconds, then slowly step down with the heels of your foot. You can do this  daily and as often as you want.   Picking the Towel: Sit comfortably and then pick the towel up with your toes. You can use any object other than a towel as long as the material can be soft and you can pick it up with your toes.  Rolling the Bottle: Use a small ball or frozen water bottle and then roll it around with your foot.   Flex the Toes: Sit comfortably and then flex your toes by pointing it towards the floor or towards your body. This will relax and flex your foot and exercise your plantar fascia, the calf, and the Achilles tendon. The inability of the foot to stretch often causes the bunching up of the plantar fascia area leading to the pain.  Calf/Achilles Stretching: Lay on you back and raise one foot, then point your toes towards the floor. See photo below:               Hold each stretch for 10 seconds. Stretch 10 times per set, three sets per day. Morning, afternoon and evening. If your heel pain is very severe in the morning, consider doing the first set of stretches before you get out of bed.    THERAPIES DISCUSSED:  1.  Supportive Shoes: minimizing barefoot ambulation helps to provide cushion, padding and support to the ligament that is inflamed. Socks, flip flops, flats and some slippers are not typically sufficient to provide support. Shoes should be worn even indoors  2.  Insert/Orthotics: ones with an arch support built in to them provide further stress relief for the ligament. See the information below on recommended inserts.  3. Icing: using a frozen water bottle or orange, and rolling it along the bottom of the arch/heel can help to alleviate discomfort, and can act as a tissue massage to the painful, inflamed ligament.  There is evidence that shows icing at least three times daily can be beneficial  4.  Antiinflammatory (NSAID): Ibuprofen, Aleve, as well as Tylenol can be used to help decrease symptoms and improve pain levels. If you have high blood pressure, heart disease, stomach or kidney  problems, use antiinflammatories sparingly. Tylenol should not be used if you have liver problems.   5. Activity Modifications: if there are certain things that you do, whether it's going barefoot or certain shoes/activities, you should try to minimize those activities as much as possible until your symptoms are sufficiently resolved. Certainly, some activities, such as running on the treadmill, are easier to take a break from versus others, such as work or chores at home. If there are certain activities that hurt your heel, and you keep doing those activities that hurt your heel, your heel will keep hurting.  **If these initial therapies are insufficient, we have our tier 2 therapies that can more aggressively work to improve your symptoms and get you back to the activities that you enjoy!

## 2020-05-19 NOTE — LETTER
5/19/2020         RE: Johnna Caballero  [address]  [facility] [address]        Dear Colleague,    Thank you for referring your patient, Johnna Caballero, to the [facility]. Please see a copy of my visit note below.    PATIENT HISTORY:    Johnna Caballero is a 54 year old female who presents to clinic for pain to the left heel.  She notes that is been going on for about 3 months.  Has been doing stretching, using the night splint, and since.  Pain is 8 out of 10.  Very sore when she gets up or after she has been on her foot for a while.  Denies specific injury.  Notes that she does wear sandals around the house.  States she had plantar fasciitis in her right foot years ago and it feels like that is what is going on in her left foot.    Review of Systems:  Patient denies fever, chills, rash, wound, stiffness,numbness, weakness, heart burn, blood in stool, chest pain with activity, calf pain when walking, shortness of breath with activity, chronic cough, easy bleeding/bruising, swelling of ankles, excessive thirst, fatigue, depression, anxiety.  Patient admits to limping.     PAST MEDICAL HISTORY:   Past Medical History:   Diagnosis Date     Actinic keratosis      Allergic rhinitis, cause unspecified      Anemia      Arthritis      Asthma     no meds x2 yrs     chronic back pain      Chronic infection     MRSA-nasal     Chronic pain     back     Esophageal reflux      fibrocystic breast changes      Gastro-oesophageal reflux disease      Heart murmur     mitral insuffiency     Hx of Fen-Phen use      Hypertension      migraines      Mild intermittent asthma     rare, with dog or exercise     Moderate major depression (H) 3/28/2012     MRSA (methicillin resistant Staphylococcus aureus) 6/27/2014    Nares     Need for desensitization to allergens      Obesity, unspecified      Other and unspecified hyperlipidemia      Temporomandibular joint disorders, unspecified      Thrombosis of leg     incisional  area right hip     Type II or unspecified type diabetes mellitus without mention of complication, not stated as uncontrolled         PAST SURGICAL HISTORY:   Past Surgical History:   Procedure Laterality Date     ARTHROPLASTY KNEE  7/15/2014    Procedure: ARTHROPLASTY KNEE;  Surgeon: Chapin Paul MD;  Location: [facility] OR     BACK SURGERY       C NONSPECIFIC PROCEDURE      laparoscopy x6     C NONSPECIFIC PROCEDURE  93    laparotomy x2 ovarian cyst     C NONSPECIFIC PROCEDURE  0293    oophorectomy lt side - cyst     C NONSPECIFIC PROCEDURE  0395    laminectomy L4-5. S1 herniated disc     C NONSPECIFIC PROCEDURE  96    cholecystectomy     C NONSPECIFIC PROCEDURE      ganglion cysts l wrist, rt palm     C NONSPECIFIC PROCEDURE      cyst removal back x2     C NONSPECIFIC PROCEDURE  10/02    rt thumb fusion, ganglion cyst removal     C NONSPECIFIC PROCEDURE  1/08    remove heel spur, excise exostosis     CHOLECYSTECTOMY       COLONOSCOPY N/A 4/26/2017    Procedure: COLONOSCOPY;  COLONOSCOPY;  Surgeon: Chapin Leal MD;  Location:  GI     EXCISE LESION LOWER EXTREMITY  11/20/2012    Procedure: EXCISE LESION LOWER EXTREMITY;  EXCISION LIPOMA RIGHT HIP ;  Surgeon: Jazmin Bautista MD;  Location:  [facility]     EXCISE LESION LOWER EXTREMITY  11/23/2012    Procedure: EXCISE LESION LOWER EXTREMITY;  EXCISE LESION LOWER EXTREMITY  EVACUATE RIGHT HIP HEMATOMA;  Surgeon: Jazmin Bautista MD;  Location: [facility] OR     EXCISE MASS HIP Left 11/9/2018    Procedure: 1.  Excision left chest wall mass with excised diameter of greater than 4 cm 2.  Excision left hip mass with excised diameter of more than 20 x 20 cm  ;  Surgeon: Jazmin Bautista MD;  Location: [facility] OR     EXCISE MASS TRUNK Left 11/9/2018    Procedure: EXCISE MASS TRUNK;  Surgeon: Jazmin Bautista MD;  Location: [facility] OR     EXCISE MASS TRUNK N/A 12/16/2019    Procedure: REVISION LEFT HIP SCAR WITH SEROMA EVACUATION; REMOVAL LEFT CHEST WALL MASS    (MRSA);  Surgeon: Jazmin Bautista MD;  Location:  OR     GYN SURGERY       HC EXPLORATION ANKLE JOINT  1/2006     HC PART EXCIS PLANTAR FASCIA  12/2006     HYSTEROSCOPY DIAGNOSTIC N/A 12/16/2019    Procedure: DIAGNOSTIC HYSTEROSCOPY;  Surgeon: Jodi Perea MD;  Location:  OR     IRRIGATION AND DEBRIDEMENT HIP, COMBINED  12/15/2012    Procedure: COMBINED IRRIGATION AND DEBRIDEMENT HIP;   evacuation hematoma, scar revision right hip;  Surgeon: Jazmin Bautista MD;  Location:  OR     REPLACE INTRAUTERINE DEVICE N/A 12/16/2019    Procedure: REPLACEMENTOF MIRENA CONTRACEPTIVE INTRAUTERINE DEVICE;  Surgeon: Jodi Perea MD;  Location:  OR     wisdom teeth[          MEDICATIONS:   Current Outpatient Medications:      aspirin 81 MG tablet, Take 1 tablet (81 mg) by mouth daily, Disp: 30 tablet, Rfl:      BD PEN NEEDLE VALDO 2ND GEN 32G X 4 MM miscellaneous, USE 2 PEN NEEDLES DAILY OR AS DIRECTED, Disp: 180 each, Rfl: 0     blood glucose (ACCU-CHEK GABI) test strip, Use to test blood sugars 1-2x daily or as directed., Disp: 200 each, Rfl: 11     blood glucose monitoring (ACCU-CHEK MULTICLIX) lancets, Use to test blood sugar 1-2 times daily or as directed., Disp: 2 Box, Rfl: 3     celecoxib (CELEBREX) 200 MG capsule, Take 1 capsule (200 mg) by mouth daily, Disp: 90 capsule, Rfl: 3     cetirizine (ZYRTEC) 10 MG tablet, Take 10 mg by mouth daily, Disp: , Rfl:      Continuous Blood Gluc Sensor (FREESTYLE HELENE 14 DAY SENSOR) [facility] [place] USE ONE EVERY 14 DAYS, Disp: 6 each, Rfl: 0     diazepam (VALIUM) 5 MG tablet, Take 1 tablet (5 mg) by mouth every 12 hours as needed for muscle spasms, Disp: 30 tablet, Rfl: 1     diclofenac (VOLTAREN) 1 % topical gel, Place 2 g onto the skin 4 times daily, Disp: 100 g, Rfl: 2     EPINEPHrine (EPIPEN/ADRENACLICK/OR ANY BX GENERIC EQUIV) 0.3 MG/0.3ML injection 2-pack, Inject 0.3 mLs (0.3 mg) into the muscle once as needed for anaphylaxis, Disp: 2 mL, Rfl: 0     insulin  glargine (LANTUS PEN) 100 UNIT/ML pen, Inject 24 Units Subcutaneous 2 times daily, Disp: 45 mL, Rfl: 3     KRILL OIL PO, Take 1 tablet by mouth daily , Disp: , Rfl:      LANsoprazole (PREVACID) 30 MG DR capsule, Take 1 capsule (30 mg) by mouth 2 times daily Take 30-60 min before a meal, Disp: 180 capsule, Rfl: 3     liraglutide (VICTOZA PEN) 18 MG/3ML solution, Inject 1.8 mg Subcutaneous daily, Disp: 27 mL, Rfl: 3     losartan-hydrochlorothiazide (HYZAAR) 100-25 MG tablet, Take 1 tablet by mouth daily, Disp: 90 tablet, Rfl: 3     metFORMIN (GLUCOPHAGE) 1000 MG tablet, Take 1 tablet (1,000 mg) by mouth 2 times daily (with meals), Disp: 180 tablet, Rfl: 3     ORDER FOR DME, Equipment being ordered: TENS unit, Disp: 1 each, Rfl: 0     oxyCODONE-acetaminophen (PERCOCET) 5-325 MG tablet, Take 1-2 tablets by mouth every 4 hours as needed for severe pain, Disp: 90 tablet, Rfl: 0     STATIN NOT PRESCRIBED, INTENTIONAL,, 1 each 2 times daily Statin not prescribed intentionally due to Refusal by patient, Disp: 0 each, Rfl: 0     sucralfate (CARAFATE) 1 GM tablet, Take 1 tablet (1 g) by mouth 4 times daily Before meals, Disp: 120 tablet, Rfl: 4     triamcinolone (KENALOG) 0.5 % external ointment, Topically twice daily as needed for 14 days, Disp: 60 g, Rfl: 11     vitamin D3 (CHOLECALCIFEROL) 91129 units (250 mcg) capsule, Take 1 capsule (10,000 Units) by mouth daily, Disp: , Rfl:      ALLERGIES:    Allergies   Allergen Reactions     Eggs      Allergy found in testing     Hornets      wasps     Latex Anaphylaxis     hives  -  able to use BP cuff     Lipitor [Hmg-Coa-R Inhibitors] Cramps     Muscle cramps with statin     Metoclopramide Hcl Difficulty breathing     Neurontin [Gabapentin] Hives        SOCIAL HISTORY:   Social History     Socioeconomic History     Marital status: Single     Spouse name: Not on file     Number of children: 0     Years of education: 16     Highest education level: Bachelor's degree (e.g., BA, AB,  "BS)   Occupational History     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     Employer: [employer]   Social Needs     Financial resource strain: Not hard at all     Food insecurity     Worry: Never true     Inability: Never true     Transportation needs     Medical: No     Non-medical: No   Tobacco Use     Smoking status: Never Smoker     Smokeless tobacco: Never Used   Substance and Sexual Activity     Alcohol use: Yes     Alcohol/week: 0.0 standard drinks     Frequency: Never     Drinks per session: 1 or 2     Binge frequency: Never     Comment: 1 drink per year or less     Drug use: No     Sexual activity: Not Currently     Partners: Male     Birth control/protection: I.U.D.   Lifestyle     Physical activity     Days per week: 2 days     Minutes per session: 20 min     Stress: Not at all   Relationships     Social connections     Talks on phone: More than three times a week     Gets together: Once a week     Attends Judaism service: Never     Active member of club or organization: No     Attends meetings of clubs or organizations: Never     Relationship status: Never      Intimate partner violence     Fear of current or ex partner: Not on file     Emotionally abused: Not on file     Physically abused: Not on file     Forced sexual activity: Not on file   Other Topics Concern     Parent/sibling w/ CABG, MI or angioplasty before 65F 55M? No   Social History Narrative     Not on file        FAMILY HISTORY:   Family History   Adopted: Yes   Problem Relation Age of Onset     Respiratory Brother         1/2 sib     Psychotic Disorder Mother         depression, chronic fatigue     Psychotic Disorder Brother         bipolar     C.A.D. Maternal Grandfather      Breast Cancer Maternal Grandmother      Diabetes Maternal Uncle         EXAM:Vitals: /74   Ht 1.702 m (5' 7\")   Wt 127 kg (280 lb)   BMI 43.85 kg/m      A1C: 9.0 (3/2020)     General appearance: Patient is alert and fully cooperative with history " & exam.  No sign of distress is noted during the visit.     Psychiatric: Affect is pleasant & appropriate.  Patient appears motivated to improve health.     Respiratory: Breathing is regular & unlabored while sitting.     HEENT: Hearing is intact to spoken word.  Speech is clear.  No gross evidence of visual impairment that would impact ambulation.     Dermatologic: Skin is intact to both lower extremities without significant lesions, rash or abrasion.  No paronychia or evidence of soft tissue infection is noted.     Vascular: DP & PT pulses are intact & regular bilaterally.   edema and varicosities noted.  CFT and skin temperature is normal to both lower extremities.     Neurologic: Lower extremity sensation is diminished to feet.      Musculoskeletal: Patient is ambulatory without assistive device or brace. Decrease arch height. Decrease dorsiflexion right ankle.  Pain on palpation of the left plantar heel.       ASSESSMENT:    Type 2 diabetes mellitus without complication, without long-term current use of insulin (H)  Morbid obesity with BMI of 40.0-44.9, adult (H)  Plantar fasciitis, left  Bilateral pes planus     PLAN:  [facility]. The potential causes and nature of plantar fasciitis were discussed with the patient.  We reviewed the natural history/prognosis of the condition and risks if left untreated.  These include chronic pain, other sites of pain due to gait changes, and potential plantar fascial rupture.      We discussed possible causes of the condition as it relates to the patients specific situation.      Conservative treatment options were reviewed:  appropriate shoes, avoidance of barefoot walking, inserts/orthoses, stretching, ice, massage, immobilization and NSAIDs.     We also reviewed the options of injection therapy and surgery.  However, it was made clear that surgery is only considered when conservative therapy fails.  The risks and benefits of injection therapy, and  surgery were discussed.     After thorough discussion and answering all questions, the patient elected to try injection today.  We discussed not going barefoot around the house and having a supportive shoe or sandal.  She will continue using the night splint and doing stretches.  Discussed that if this does not improve in 2 weeks to call and we will order an ankle MRI to assess for possible tear or other pathology.  All questions were answered to patient satisfaction.  Did discuss increased risk of immunocompromisation with the cortisone injection..      Procedure: After verbal consent, the patients max point of tenderness was marked out on the left plantar heel.  This area was prepped and draped using sterile technique.  An injection of 1cc of 1% lidocaine plain and 1 1/2 cc of Kenalog-40 was injected into the max point of tenderness.  This was distributed in a fanning motion.  Patient tolerated the procedure and anesthesia well.      Suzanna Strong DPM, Podiatry/Foot and Ankle Surgery    Weight management plan: Patient was referred to their PCP to discuss a diet and exercise plan.    Recommended to Johnna Caballero to follow up with Primary Care provider regarding elevated blood pressure.        Again, thank you for allowing me to participate in the care of your patient.        Sincerely,        Suzanna Strong DPM, Podiatry/Foot and Ankle Surgery

## 2020-05-22 ENCOUNTER — TRANSFERRED RECORDS (OUTPATIENT)
Dept: HEALTH INFORMATION MANAGEMENT | Facility: CLINIC | Age: 55
End: 2020-05-22

## 2020-06-22 DIAGNOSIS — Z11.59 ENCOUNTER FOR SCREENING FOR OTHER VIRAL DISEASES: Primary | ICD-10-CM

## 2020-06-26 DIAGNOSIS — Z11.59 ENCOUNTER FOR SCREENING FOR OTHER VIRAL DISEASES: ICD-10-CM

## 2020-06-26 PROCEDURE — U0003 INFECTIOUS AGENT DETECTION BY NUCLEIC ACID (DNA OR RNA); SEVERE ACUTE RESPIRATORY SYNDROME CORONAVIRUS 2 (SARS-COV-2) (CORONAVIRUS DISEASE [COVID-19]), AMPLIFIED PROBE TECHNIQUE, MAKING USE OF HIGH THROUGHPUT TECHNOLOGIES AS DESCRIBED BY CMS-2020-01-R: HCPCS | Performed by: FAMILY MEDICINE

## 2020-06-27 LAB
SARS-COV-2 RNA SPEC QL NAA+PROBE: NOT DETECTED
SPECIMEN SOURCE: NORMAL

## 2020-06-29 ENCOUNTER — HOSPITAL ENCOUNTER (OUTPATIENT)
Facility: CLINIC | Age: 55
Discharge: HOME | End: 2020-06-29
Attending: INTERNAL MEDICINE | Admitting: INTERNAL MEDICINE
Payer: COMMERCIAL

## 2020-06-29 VITALS
DIASTOLIC BLOOD PRESSURE: 69 MMHG | HEIGHT: 67 IN | WEIGHT: 282 LBS | RESPIRATION RATE: 12 BRPM | HEART RATE: 82 BPM | BODY MASS INDEX: 44.26 KG/M2 | OXYGEN SATURATION: 96 % | SYSTOLIC BLOOD PRESSURE: 134 MMHG

## 2020-06-29 LAB
GLUCOSE BLDC GLUCOMTR-MCNC: 183 MG/DL (ref 70–99)
UPPER GI ENDOSCOPY: NORMAL

## 2020-06-29 PROCEDURE — 25000128 H RX IP 250 OP 636: Performed by: INTERNAL MEDICINE

## 2020-06-29 PROCEDURE — 82962 GLUCOSE BLOOD TEST: CPT

## 2020-06-29 PROCEDURE — 25000125 ZZHC RX 250: Performed by: INTERNAL MEDICINE

## 2020-06-29 PROCEDURE — 43235 EGD DIAGNOSTIC BRUSH WASH: CPT | Performed by: INTERNAL MEDICINE

## 2020-06-29 PROCEDURE — 40000104 ZZH STATISTIC MODERATE SEDATION < 10 MIN: Performed by: INTERNAL MEDICINE

## 2020-06-29 RX ORDER — ONDANSETRON 4 MG/1
4 TABLET, ORALLY DISINTEGRATING ORAL EVERY 6 HOURS PRN
Status: CANCELLED | OUTPATIENT
Start: 2020-06-29

## 2020-06-29 RX ORDER — NALOXONE HYDROCHLORIDE 0.4 MG/ML
.1-.4 INJECTION, SOLUTION INTRAMUSCULAR; INTRAVENOUS; SUBCUTANEOUS
Status: CANCELLED | OUTPATIENT
Start: 2020-06-29 | End: 2020-06-30

## 2020-06-29 RX ORDER — FENTANYL CITRATE 50 UG/ML
INJECTION, SOLUTION INTRAMUSCULAR; INTRAVENOUS PRN
Status: DISCONTINUED | OUTPATIENT
Start: 2020-06-29 | End: 2020-06-29 | Stop reason: HOSPADM

## 2020-06-29 RX ORDER — ONDANSETRON 2 MG/ML
4 INJECTION INTRAMUSCULAR; INTRAVENOUS EVERY 6 HOURS PRN
Status: CANCELLED | OUTPATIENT
Start: 2020-06-29

## 2020-06-29 RX ORDER — LIDOCAINE 40 MG/G
CREAM TOPICAL
Status: DISCONTINUED | OUTPATIENT
Start: 2020-06-29 | End: 2020-06-29 | Stop reason: HOSPADM

## 2020-06-29 RX ORDER — ONDANSETRON 2 MG/ML
4 INJECTION INTRAMUSCULAR; INTRAVENOUS
Status: DISCONTINUED | OUTPATIENT
Start: 2020-06-29 | End: 2020-06-29 | Stop reason: HOSPADM

## 2020-06-29 RX ORDER — FLUMAZENIL 0.1 MG/ML
0.2 INJECTION, SOLUTION INTRAVENOUS
Status: CANCELLED | OUTPATIENT
Start: 2020-06-29 | End: 2020-06-29

## 2020-06-29 ASSESSMENT — MIFFLIN-ST. JEOR: SCORE: 1916.77

## 2020-07-01 ENCOUNTER — TRANSFERRED RECORDS (OUTPATIENT)
Dept: HEALTH INFORMATION MANAGEMENT | Facility: CLINIC | Age: 55
End: 2020-07-01

## 2020-07-01 LAB — RETINOPATHY: NEGATIVE

## 2020-07-15 ENCOUNTER — MYC MEDICAL ADVICE (OUTPATIENT)
Dept: PEDIATRICS | Facility: CLINIC | Age: 55
End: 2020-07-15

## 2020-07-15 DIAGNOSIS — M54.50 CHRONIC BILATERAL LOW BACK PAIN WITHOUT SCIATICA: ICD-10-CM

## 2020-07-15 DIAGNOSIS — G89.29 CHRONIC BILATERAL LOW BACK PAIN WITHOUT SCIATICA: ICD-10-CM

## 2020-07-16 RX ORDER — CELECOXIB 200 MG/1
200 CAPSULE ORAL DAILY
Qty: 90 CAPSULE | Refills: 2 | Status: SHIPPED | OUTPATIENT
Start: 2020-07-16 | End: 2021-06-15

## 2020-08-03 ENCOUNTER — TELEPHONE (OUTPATIENT)
Dept: PEDIATRICS | Facility: CLINIC | Age: 55
End: 2020-08-03

## 2020-08-03 DIAGNOSIS — M54.9 BACK PAIN: ICD-10-CM

## 2020-08-03 DIAGNOSIS — M54.50 CHRONIC BILATERAL LOW BACK PAIN WITHOUT SCIATICA: Primary | ICD-10-CM

## 2020-08-03 DIAGNOSIS — G89.29 CHRONIC BILATERAL LOW BACK PAIN WITHOUT SCIATICA: Primary | ICD-10-CM

## 2020-08-03 DIAGNOSIS — M54.2 NECK PAIN: ICD-10-CM

## 2020-08-03 DIAGNOSIS — M25.512 ACUTE PAIN OF LEFT SHOULDER: ICD-10-CM

## 2020-08-03 NOTE — TELEPHONE ENCOUNTER
Order/Referral Request:  Who is requesting: Patieint  Orders being requested: renew referral for PT  Reason service is needed/diagnosis: Neck and left side pain  When are orders needed by: as soon as possible  Has this been discussed with Provider: Yes  Does patient have a preference on a Group/Provider/Facility? [facility], same as the one sent on 5/31/2017  Does patient have an appointment scheduled: No  Where to send Orders: Fax  Okay to leave detailed message?  Yes at Home number on file [phone] (home)    Routing

## 2020-08-10 ENCOUNTER — E-VISIT (OUTPATIENT)
Dept: PEDIATRICS | Facility: CLINIC | Age: 55
End: 2020-08-10
Payer: COMMERCIAL

## 2020-08-10 DIAGNOSIS — B37.31 CANDIDAL VULVOVAGINITIS: ICD-10-CM

## 2020-08-10 DIAGNOSIS — N89.8 VAGINAL ITCHING: Primary | ICD-10-CM

## 2020-08-10 DIAGNOSIS — B37.9 YEAST INFECTION: ICD-10-CM

## 2020-08-10 PROCEDURE — 99421 OL DIG E/M SVC 5-10 MIN: CPT | Performed by: INTERNAL MEDICINE

## 2020-08-11 ENCOUNTER — TRANSFERRED RECORDS (OUTPATIENT)
Dept: HEALTH INFORMATION MANAGEMENT | Facility: CLINIC | Age: 55
End: 2020-08-11

## 2020-08-11 DIAGNOSIS — N89.8 VAGINAL ITCHING: ICD-10-CM

## 2020-08-11 LAB
SPECIMEN SOURCE: ABNORMAL
WET PREP SPEC: ABNORMAL

## 2020-08-11 PROCEDURE — 87210 SMEAR WET MOUNT SALINE/INK: CPT | Performed by: INTERNAL MEDICINE

## 2020-08-11 RX ORDER — FLUCONAZOLE 150 MG/1
150 TABLET ORAL ONCE
Qty: 2 TABLET | Refills: 0 | Status: SHIPPED | OUTPATIENT
Start: 2020-08-11 | End: 2020-08-11

## 2020-08-11 NOTE — PATIENT INSTRUCTIONS
Thank you for choosing us for your care. I have placed an order for a prescription so that you can start treatment. View your full visit summary for details by clicking on the link below. Your pharmacist will able to address any questions you may have about the medication.     If you re not feeling better within 2-3 days, please schedule an appointment.  You can schedule an appointment right here in [facility], or call [phone]  If the visit is for the same symptoms as your e-visit, we ll refund the cost of your e-visit if seen within seven days.    Thank you for choosing us for your care. Given your symptoms, I would like you to do a lab-only visit to determine what is causing them.  I have placed the orders.  Please schedule an appointment with the lab right here in [employer], or call [phone].  I will let you know when the results are back and next steps to take.    Yeast Infection (Candida Vaginal Infection)    You have a Candida vaginal infection. This is also known as a yeast infection. It is most often caused by a type of yeast (fungus) called Candida. Candida are normally found in the vagina. But if they increase in number, this can lead to infection and cause symptoms.  Symptoms of a yeast infection can include:    Clumpy or thin, white discharge, which may look like cottage cheese    Itching or burning    Burning with urination  Certain factors can make a yeast infection more likely. These can include:    Taking certain medicines, such as antibiotics or birth control pills    Pregnancy    Diabetes    Weak immune system  A yeast infection is most often treated with antifungal medicine. This may be given as a vaginal cream or pills you take by mouth. Treatment may last for about 1 to 7 days. Women with severe or recurrent infections may need longer courses of treatment.  Home care    If you re prescribed medicine, be sure to use it as directed. Finish all of the medicine, even if your symptoms go  away. Note: Don t try to treat yourself using over-the-counter products without talking to your provider first. He or she will let you know if this is a good option for you.    Ask your provider what steps you can take to help reduce your risk of having a yeast infection in the future.  Follow-up care  Follow up with your healthcare provider, or as directed.  When to seek medical advice  Call your healthcare provider right away if:    You have a fever of 100.4 F (38 C) or higher, or as directed by your provider.    Your symptoms worsen, or they don t go away within a few days of starting treatment.    You have new pain in the lower belly or pelvic region.    You have side effects that bother you or a reaction to the cream or pills you re prescribed.    You or any partners you have sex with have new symptoms, such as a rash, joint pain, or sores.  Date Last Reviewed: 10/1/2017    7151-0602 [employer]. [address] [place] [address], [place] [address]. All rights reserved. This information is not intended as a substitute for professional medical care. Always follow your healthcare professional's instructions.

## 2020-09-06 DIAGNOSIS — I10 ESSENTIAL HYPERTENSION: Primary | ICD-10-CM

## 2020-09-09 RX ORDER — HYDROCHLOROTHIAZIDE 25 MG/1
TABLET ORAL
Qty: 90 TABLET | Refills: 1 | Status: SHIPPED | OUTPATIENT
Start: 2020-09-09 | End: 2020-11-25

## 2020-09-09 RX ORDER — LOSARTAN POTASSIUM 100 MG/1
TABLET ORAL
Qty: 90 TABLET | Refills: 1 | Status: SHIPPED | OUTPATIENT
Start: 2020-09-09 | End: 2020-11-25

## 2020-09-12 ENCOUNTER — MYC MEDICAL ADVICE (OUTPATIENT)
Dept: PEDIATRICS | Facility: CLINIC | Age: 55
End: 2020-09-12

## 2020-09-12 DIAGNOSIS — D17.24 LIPOMA OF LEFT THIGH: ICD-10-CM

## 2020-09-12 DIAGNOSIS — M19.91 PRIMARY OSTEOARTHRITIS, UNSPECIFIED SITE: ICD-10-CM

## 2020-09-12 DIAGNOSIS — G89.4 CHRONIC PAIN SYNDROME: ICD-10-CM

## 2020-09-14 RX ORDER — OXYCODONE AND ACETAMINOPHEN 5; 325 MG/1; MG/1
1-2 TABLET ORAL EVERY 4 HOURS PRN
Qty: 90 TABLET | Refills: 0 | Status: SHIPPED | OUTPATIENT
Start: 2020-09-14 | End: 2020-11-25

## 2020-10-11 DIAGNOSIS — E11.9 TYPE 2 DIABETES MELLITUS WITHOUT COMPLICATION, WITH LONG-TERM CURRENT USE OF INSULIN (H): ICD-10-CM

## 2020-10-11 DIAGNOSIS — Z79.4 TYPE 2 DIABETES MELLITUS WITHOUT COMPLICATION, WITH LONG-TERM CURRENT USE OF INSULIN (H): ICD-10-CM

## 2020-10-12 RX ORDER — FLASH GLUCOSE SENSOR
KIT MISCELLANEOUS
Qty: 6 EACH | Refills: 11 | Status: SHIPPED | OUTPATIENT
Start: 2020-10-12 | End: 2021-05-06

## 2020-10-12 NOTE — TELEPHONE ENCOUNTER
Routing refill request to provider for review/approval because:  Drug not on the [facility] refill protocol     Jennifer Hanley RN   [facility] -- Triage Nurse

## 2020-10-13 RX ORDER — PEN NEEDLE, DIABETIC 32GX 5/32"
NEEDLE, DISPOSABLE MISCELLANEOUS
Qty: 180 EACH | Refills: 0 | Status: SHIPPED | OUTPATIENT
Start: 2020-10-13 | End: 2021-05-06

## 2020-10-13 NOTE — TELEPHONE ENCOUNTER
Medication is being filled for 1 time refill only due to:  Patient needs to be seen because needs appt.    Please call to scheduled DM appt with PCP    Abisai Ricks RN, BSN

## 2020-10-13 NOTE — TELEPHONE ENCOUNTER
Called patient and scheduled her for visit in November.     Closing encounter at this time.     Paige Gannon, CMA

## 2020-11-03 ENCOUNTER — MYC MEDICAL ADVICE (OUTPATIENT)
Dept: PEDIATRICS | Facility: CLINIC | Age: 55
End: 2020-11-03

## 2020-11-25 ENCOUNTER — MYC MEDICAL ADVICE (OUTPATIENT)
Dept: PEDIATRICS | Facility: CLINIC | Age: 55
End: 2020-11-25

## 2020-11-25 ENCOUNTER — OFFICE VISIT (OUTPATIENT)
Dept: PEDIATRICS | Facility: CLINIC | Age: 55
End: 2020-11-25
Payer: COMMERCIAL

## 2020-11-25 VITALS
TEMPERATURE: 98 F | RESPIRATION RATE: 18 BRPM | DIASTOLIC BLOOD PRESSURE: 80 MMHG | OXYGEN SATURATION: 97 % | BODY MASS INDEX: 45.56 KG/M2 | WEIGHT: 290.9 LBS | HEART RATE: 63 BPM | SYSTOLIC BLOOD PRESSURE: 132 MMHG

## 2020-11-25 DIAGNOSIS — Z79.4 LONG TERM CURRENT USE OF INSULIN (H): ICD-10-CM

## 2020-11-25 DIAGNOSIS — D17.24 LIPOMA OF LEFT THIGH: ICD-10-CM

## 2020-11-25 DIAGNOSIS — Z79.4 TYPE 2 DIABETES MELLITUS WITH HYPERGLYCEMIA, WITH LONG-TERM CURRENT USE OF INSULIN (H): Primary | ICD-10-CM

## 2020-11-25 DIAGNOSIS — F33.0 MAJOR DEPRESSIVE DISORDER, RECURRENT EPISODE, MILD (H): ICD-10-CM

## 2020-11-25 DIAGNOSIS — G89.4 CHRONIC PAIN SYNDROME: ICD-10-CM

## 2020-11-25 DIAGNOSIS — E11.65 TYPE 2 DIABETES MELLITUS WITH HYPERGLYCEMIA, WITH LONG-TERM CURRENT USE OF INSULIN (H): Primary | ICD-10-CM

## 2020-11-25 DIAGNOSIS — I10 ESSENTIAL HYPERTENSION: ICD-10-CM

## 2020-11-25 DIAGNOSIS — J45.20 INTERMITTENT ASTHMA, UNCOMPLICATED: ICD-10-CM

## 2020-11-25 DIAGNOSIS — U07.1 INFECTION DUE TO 2019 NOVEL CORONAVIRUS: ICD-10-CM

## 2020-11-25 DIAGNOSIS — M19.91 PRIMARY OSTEOARTHRITIS, UNSPECIFIED SITE: ICD-10-CM

## 2020-11-25 PROBLEM — Z98.890 POSTOPERATIVE STATE: Status: RESOLVED | Noted: 2018-11-09 | Resolved: 2020-11-25

## 2020-11-25 LAB
ALBUMIN SERPL-MCNC: 3.3 G/DL (ref 3.4–5)
ALP SERPL-CCNC: 112 U/L (ref 40–150)
ALT SERPL W P-5'-P-CCNC: 22 U/L (ref 0–50)
ANION GAP SERPL CALCULATED.3IONS-SCNC: 10 MMOL/L (ref 3–14)
AST SERPL W P-5'-P-CCNC: 16 U/L (ref 0–45)
BILIRUB SERPL-MCNC: 0.3 MG/DL (ref 0.2–1.3)
BUN SERPL-MCNC: 11 MG/DL (ref 7–30)
CALCIUM SERPL-MCNC: 8.5 MG/DL (ref 8.5–10.1)
CHLORIDE SERPL-SCNC: 103 MMOL/L (ref 94–109)
CHOLEST SERPL-MCNC: 162 MG/DL
CO2 SERPL-SCNC: 24 MMOL/L (ref 20–32)
CREAT SERPL-MCNC: 0.58 MG/DL (ref 0.52–1.04)
CREAT UR-MCNC: 107 MG/DL
GFR SERPL CREATININE-BSD FRML MDRD: >90 ML/MIN/{1.73_M2}
GLUCOSE SERPL-MCNC: 193 MG/DL (ref 70–99)
HBA1C MFR BLD: 10.7 % (ref 0–5.6)
HDLC SERPL-MCNC: 34 MG/DL
LDLC SERPL CALC-MCNC: 83 MG/DL
MICROALBUMIN UR-MCNC: 35 MG/L
MICROALBUMIN/CREAT UR: 32.52 MG/G CR (ref 0–25)
NONHDLC SERPL-MCNC: 128 MG/DL
POTASSIUM SERPL-SCNC: 3.4 MMOL/L (ref 3.4–5.3)
PROT SERPL-MCNC: 7.4 G/DL (ref 6.8–8.8)
SODIUM SERPL-SCNC: 137 MMOL/L (ref 133–144)
TRIGL SERPL-MCNC: 223 MG/DL
TSH SERPL DL<=0.005 MIU/L-ACNC: 2.18 MU/L (ref 0.4–4)

## 2020-11-25 PROCEDURE — 99000 SPECIMEN HANDLING OFFICE-LAB: CPT | Performed by: PEDIATRICS

## 2020-11-25 PROCEDURE — 80307 DRUG TEST PRSMV CHEM ANLYZR: CPT | Mod: 90 | Performed by: PEDIATRICS

## 2020-11-25 PROCEDURE — 83036 HEMOGLOBIN GLYCOSYLATED A1C: CPT | Performed by: PEDIATRICS

## 2020-11-25 PROCEDURE — 80053 COMPREHEN METABOLIC PANEL: CPT | Performed by: PEDIATRICS

## 2020-11-25 PROCEDURE — 99214 OFFICE O/P EST MOD 30 MIN: CPT | Performed by: PEDIATRICS

## 2020-11-25 PROCEDURE — 84443 ASSAY THYROID STIM HORMONE: CPT | Performed by: PEDIATRICS

## 2020-11-25 PROCEDURE — 36415 COLL VENOUS BLD VENIPUNCTURE: CPT | Performed by: PEDIATRICS

## 2020-11-25 PROCEDURE — 82043 UR ALBUMIN QUANTITATIVE: CPT | Performed by: PEDIATRICS

## 2020-11-25 PROCEDURE — 80061 LIPID PANEL: CPT | Performed by: PEDIATRICS

## 2020-11-25 RX ORDER — OXYCODONE AND ACETAMINOPHEN 5; 325 MG/1; MG/1
1-2 TABLET ORAL EVERY 4 HOURS PRN
Qty: 90 TABLET | Refills: 0 | Status: SHIPPED | OUTPATIENT
Start: 2020-11-25 | End: 2021-02-22

## 2020-11-25 RX ORDER — LOSARTAN POTASSIUM 100 MG/1
TABLET ORAL
Qty: 90 TABLET | Refills: 1 | Status: SHIPPED | OUTPATIENT
Start: 2020-11-25 | End: 2021-05-17

## 2020-11-25 RX ORDER — HYDROCHLOROTHIAZIDE 25 MG/1
TABLET ORAL
Qty: 90 TABLET | Refills: 1 | Status: SHIPPED | OUTPATIENT
Start: 2020-11-25 | End: 2021-05-17

## 2020-11-25 ASSESSMENT — ANXIETY QUESTIONNAIRES
IF YOU CHECKED OFF ANY PROBLEMS ON THIS QUESTIONNAIRE, HOW DIFFICULT HAVE THESE PROBLEMS MADE IT FOR YOU TO DO YOUR WORK, TAKE CARE OF THINGS AT HOME, OR GET ALONG WITH OTHER PEOPLE: NOT DIFFICULT AT ALL
GAD7 TOTAL SCORE: 8
7. FEELING AFRAID AS IF SOMETHING AWFUL MIGHT HAPPEN: MORE THAN HALF THE DAYS
1. FEELING NERVOUS, ANXIOUS, OR ON EDGE: MORE THAN HALF THE DAYS
5. BEING SO RESTLESS THAT IT IS HARD TO SIT STILL: NOT AT ALL
3. WORRYING TOO MUCH ABOUT DIFFERENT THINGS: MORE THAN HALF THE DAYS
2. NOT BEING ABLE TO STOP OR CONTROL WORRYING: NOT AT ALL
6. BECOMING EASILY ANNOYED OR IRRITABLE: MORE THAN HALF THE DAYS

## 2020-11-25 ASSESSMENT — PATIENT HEALTH QUESTIONNAIRE - PHQ9
5. POOR APPETITE OR OVEREATING: NOT AT ALL
SUM OF ALL RESPONSES TO PHQ QUESTIONS 1-9: 2

## 2020-11-25 NOTE — PROGRESS NOTES
Subjective     Johnna Caballero is a 55 year old female who presents to clinic today for the following health issues:    HPI            Diabetes Follow-up    How often are you checking your blood sugar? Patient has lost continuous blood sugar monitor   What time of day are you checking your blood sugars (select all that apply)?  Before meals  Have you had any blood sugars above 200?  Unknown  Have you had any blood sugars below 70?  Unknown     What symptoms do you notice when your blood sugar is low?  None    What concerns do you have today about your diabetes? None     Do you have any of these symptoms? (Select all that apply)  No numbness or tingling in feet.  No redness, sores or blisters on feet.  No complaints of excessive thirst.  No reports of blurry vision.  No significant changes to weight.      BP Readings from Last 2 Encounters:   11/25/20 132/80   06/29/20 134/69     Hemoglobin A1C (%)   Date Value   03/02/2020 9.0 (H)   11/14/2019 7.9 (H)     LDL Cholesterol Calculated (mg/dL)   Date Value   04/15/2019 112 (H)   04/06/2018 104 (H)       Diabetes - currently taking lantus 30 units once daily.  Francois not staying on and fewer blood sugars lately.   Recent COVID infection limited movement, plans to get back into walking.   Discussed moving francois monitor site to an area that won't get knocked off as often - patient also planning to check with insurance about Dexcom coverage.    HTN - controlled on current agents, no new cardiac symptoms.    Depression/anxiety - increased anxiety around recent illness, otherwise, doing well    COVID - got at work - tested positive in early November.  Had a cold and no fever.  Still has a cough and congestion.  Very fatigued during illness, recovering with time.     Abdominal pain - s/p extensive work up this spring with normal EGD - symptoms resolved after stopping victoza    Reflux well controlled - down to lower dose prevacid    Left thumb injury - planning surgery in  December with Dr De Jesus.    Chronic pain - has controlled substance agreement with me, last signed 3/2020.   Pain related to osteoarthritis at many sites, s/p dozens of surgeries.   No issues with pain medications.   Patient does not drink alcohol.      Review of Systems   Constitutional, pulm, cv, abd, msk, psych  systems are negative, except as otherwise noted.      Objective    /80   Pulse 63   Temp 98  F (36.7  C) (Temporal)   Resp 18   Wt 132 kg (290 lb 14.4 oz)   SpO2 97%   BMI 45.56 kg/m    Body mass index is 45.56 kg/m .   Wt Readings from Last 4 Encounters:   11/25/20 132 kg (290 lb 14.4 oz)   06/29/20 127.9 kg (282 lb)   05/19/20 127 kg (280 lb)   03/24/20 130.6 kg (288 lb)       Physical Exam   GENERAL: healthy, alert and no distress  EYES: Eyes grossly normal to inspection, PERRL and conjunctivae and sclerae normal  HENT: ear canals and TM's normal, nose and mouth without ulcers or lesions  NECK: no adenopathy, no asymmetry, masses, or scars and thyroid normal to palpation  RESP: lungs clear to auscultation - no rales, rhonchi or wheezes  CV: regular rate and rhythm, normal S1 S2, no S3 or S4, no murmur, click or rub, no peripheral edema and peripheral pulses strong  PSYCH: mentation appears normal, affect normal/bright    Labs pending        Assessment & Plan       ICD-10-CM    1. Type 2 diabetes mellitus with hyperglycemia, with long-term current use of insulin (H)  E11.65 Hemoglobin A1c    Z79.4 Albumin Random Urine Quantitative with Creat Ratio     Comprehensive metabolic panel     Lipid panel reflex to direct LDL Fasting     TSH with free T4 reflex    Plan to restart Elijah monitor - to alert me if needing to switch to Dexcom.  Labs today, start to gather more blood sugar information and adjust insulin as necessary.  Restart dietary interventions and increase movement.   Had to stop victoza due to GI side effects.   2. Primary osteoarthritis, unspecified site  M19.91 Drug  Screen  "Comprehensive, Urine w/o Reported Meds (Pain Care Package)    Percoset refilled today.   3. Essential hypertension  I10 hydrochlorothiazide (HYDRODIURIL) 25 MG tablet     losartan (COZAAR) 100 MG tablet  Well controlled, continue current medications  Repeat labs today   4. Major depressive disorder, recurrent episode, mild (H)  F33.0 Continue current medications and monitor mood   5. Infection due to 2019 novel coronavirus  U07.1 Recovering well, monitor   6. Chronic pain syndrome  G89.4 Drug  Screen Comprehensive, Urine w/o Reported Meds (Pain Care Package)   7. Intermittent asthma, uncomplicated  J45.20 ACT down to 18 today after COVID infection - anticipate continued improvement monitor   8. Long term current use of insulin (H)  Z79.4 Adjusting as needed based on blood sugars          BMI:   Estimated body mass index is 45.56 kg/m  as calculated from the following:    Height as of 6/29/20: 1.702 m (5' 7\").    Weight as of this encounter: 132 kg (290 lb 14.4 oz).   Weight management plan: Discussed healthy diet and exercise guidelines         See Patient Instructions    Return in about 3 months (around 2/25/2021) for Follow up, with me.    Linda Durant MD  [facility]    "

## 2020-11-25 NOTE — PATIENT INSTRUCTIONS
Labs today - I'll send you the results through [facility]    Get back to exercising when you can    Pneumonia shot - we'll Mechoopda back to this next time

## 2020-11-26 ASSESSMENT — ANXIETY QUESTIONNAIRES: GAD7 TOTAL SCORE: 8

## 2020-11-26 ASSESSMENT — ASTHMA QUESTIONNAIRES: ACT_TOTALSCORE: 18

## 2020-11-29 LAB — COMPREHEN DRUG ANALYSIS UR: NORMAL

## 2020-12-04 ENCOUNTER — VIRTUAL VISIT (OUTPATIENT)
Dept: PHARMACY | Facility: CLINIC | Age: 55
End: 2020-12-04
Payer: COMMERCIAL

## 2020-12-04 ENCOUNTER — MYC MEDICAL ADVICE (OUTPATIENT)
Dept: PEDIATRICS | Facility: CLINIC | Age: 55
End: 2020-12-04

## 2020-12-04 DIAGNOSIS — Z78.9 TAKES DIETARY SUPPLEMENTS: ICD-10-CM

## 2020-12-04 DIAGNOSIS — Z79.4 TYPE 2 DIABETES MELLITUS WITH HYPERGLYCEMIA, WITH LONG-TERM CURRENT USE OF INSULIN (H): ICD-10-CM

## 2020-12-04 DIAGNOSIS — K21.9 GASTROESOPHAGEAL REFLUX DISEASE, UNSPECIFIED WHETHER ESOPHAGITIS PRESENT: ICD-10-CM

## 2020-12-04 DIAGNOSIS — J45.20 INTERMITTENT ASTHMA, UNCOMPLICATED: Primary | ICD-10-CM

## 2020-12-04 DIAGNOSIS — E11.65 TYPE 2 DIABETES MELLITUS WITH HYPERGLYCEMIA, WITH LONG-TERM CURRENT USE OF INSULIN (H): ICD-10-CM

## 2020-12-04 DIAGNOSIS — G89.4 CHRONIC PAIN SYNDROME: ICD-10-CM

## 2020-12-04 DIAGNOSIS — E78.5 HYPERLIPIDEMIA LDL GOAL <100: ICD-10-CM

## 2020-12-04 DIAGNOSIS — J30.9 ALLERGIC RHINITIS, UNSPECIFIED SEASONALITY, UNSPECIFIED TRIGGER: ICD-10-CM

## 2020-12-04 PROCEDURE — 99605 MTMS BY PHARM NP 15 MIN: CPT | Mod: TEL | Performed by: PHARMACIST

## 2020-12-04 PROCEDURE — 99607 MTMS BY PHARM ADDL 15 MIN: CPT | Mod: TEL | Performed by: PHARMACIST

## 2020-12-04 RX ORDER — LANSOPRAZOLE 30 MG/1
30 CAPSULE, DELAYED RELEASE ORAL
Qty: 180 CAPSULE | Refills: 3
Start: 2020-12-04 | End: 2021-01-04 | Stop reason: DRUGHIGH

## 2020-12-04 NOTE — TELEPHONE ENCOUNTER
MTM visit today. Nothing stands out from medication list, will need to complete a search.    Thanks!

## 2020-12-04 NOTE — PROGRESS NOTES
MTM ENCOUNTER  SUBJECTIVE/OBJECTIVE:                           Johnna Caballero is a 55 year old female called for an initial visit. She was referred to me from . Dr. Durant.      Reason for visit: she is wondering about why her Drug screening is flagging for dextromethorphan.    Allergies/ADRs: Reviewed in chart  Tobacco: She reports that she has never smoked. She has never used smokeless tobacco.  Alcohol: not currently using - maybe 1 -2 beers in a year  Caffeine: 1 diet soda a day  Activity: household chores, yard work  Past Medical History: Reviewed in chart    Medication Adherence/Access: no issues reported    Asthma: Using Proair twice daily which does help. No chest tightness, wheezing. Cough remains but a little better. Laying down notices more coughing.  Asthma Action Plan on file: Yes but update due soon.  Notices symptoms worsen during Cold season.  ACT Total Scores 8/1/2019 3/2/2020 11/25/2020   ACT TOTAL SCORE - - -   ASTHMA ER VISITS - - -   ASTHMA HOSPITALIZATIONS - - -   ACT TOTAL SCORE (Goal Greater than or Equal to 20) 25 25 18   In the past 12 months, how many times did you visit the emergency room for your asthma without being admitted to the hospital? 0 0 0   In the past 12 months, how many times were you hospitalized overnight because of your asthma? 0 0 0     Diabetes:  Pt currently taking metformin IR 1000 mg twice daily, Lantus 42 units daily (split twice daily ~ 28qAM +14 qPM).   Had to stop Victoza due to abdominal pain and possible pancreatitis from therapy. She notes the pain resolved after stopping the therapy (she actually had tried re-challenged as she really liked the Victoza).   Patient is not experiencing side effects.  SMBG: recently started the Elijah using her smart phone and the monitor. Patient said it has only been 3 days, did not provide me with specific readings. Willing to connect/share her readings virtually though.  Frequency of hypoglycemia? never. Symptoms of low blood  "sugar - none.    Symptoms of high blood sugar? fatigue  Eye exam: up to date  Foot exam: up to date  Aspirin: Taking 81mg daily and denies side effects  Follows with Endo as needed. Recently had a visit, 2 days ago.   ACEi/ARB: Yes: losartan.   Lab Results   Component Value Date    UMALCR 32.52 (H) 11/25/2020      Lab Results   Component Value Date    A1C 10.7 11/25/2020    A1C 9.0 03/02/2020    A1C 7.9 11/14/2019    A1C 8.0 08/01/2019    A1C 8.7 04/15/2019     Wt Readings from Last 4 Encounters:   11/25/20 290 lb 14.4 oz (132 kg)   06/29/20 282 lb (127.9 kg)   05/19/20 280 lb (127 kg)   03/24/20 288 lb (130.6 kg)       Estimated body mass index is 45.56 kg/m  as calculated from the following:    Height as of 6/29/20: 5' 7\" (1.702 m).    Weight as of 11/25/20: 290 lb 14.4 oz (132 kg).    Hypertension: Current medications include hydrochlorothiazide 25 mg qAM and losartan 100 mg qAM.  Patient does self-monitor blood pressure. Patient reports no current medication side effects.  Has a home wrist cuff BP but not used lately. Also has a close relative who is a nurse that can check her james. She reports blood pressure at the endocrinologist office was 149/90. At the endocrinologist' office blood pressure was elevated and had been recommended to start amlodipine. Patient feels blood pressure is often higher in the clinics and not sure if she wants to start this.  BP Readings from Last 3 Encounters:   11/25/20 132/80   06/29/20 134/69   05/19/20 130/74     Hyperlipidemia: Current therapy includes no current medications. Hx of statin intolerance cramps.   Recent Labs   Lab Test 11/25/20  0741 04/15/19  1018 04/13/15  0803 04/13/15  0803 03/22/14  0820   CHOL 162 190   < > 160 165   HDL 34* 41*   < > 37* 30*   LDL 83 112*   < > 88 115   TRIG 223* 187*   < > 173* 94   CHOLHDLRATIO  --   --   --  4.3 5.4*    < > = values in this interval not displayed.     Supplements: Takes Zinc 50 mg daily, vitamin d 10,000 unit once daily " (reports has been on this long-term since her lab changes in 2015) and Vitamin c 1000 mg once daily.  Lab Results   Component Value Date    VITDT 43 01/12/2015    VITDT 60 12/17/2013    VITDT 26 (L) 09/18/2013    VITDT 32 02/19/2013    VITDT 23 (L) 12/06/2012     Allergic Rhinitis: Current medications include cetirizine 10 mg once daily. Primary symptoms are none. 6 allergy shots once a month. Patient feels that current therapy is effective.   Follows with an allergist.    GERD: Current medications include: Prevacid (lansoprazole) 30 mg once daily. Pt reports no current symptoms.  Patient feels that current regimen is effective.    Pain: has hand and shoulder pain. Takes Percocet 5-325 mg as needed and Celebrex 200 mg daily. Also started CBD oil from a company that she trust that has testing. She feels the CBD has allowed her to cut back on the Percocet use. May go a week without Perocet and avoids taking it during the day.  S/E: Percocet gives her a headache but goes away. Has tried Norco but not effective.      Today's Vitals: There were no vitals taken for this visit.      ASSESSMENT:                              Medication Adherence: No issues identified    Asthma: patient would benefit from step up to an ICS-LABA inhaler. With current symptoms suggest she start with daily use then taper to as needed. Per HALEY guidelines for asthma control, ICA-LABA (formoterol inoarticular) demonstrating reduced ER/hospital visit for exacerbation compared to TAMI.    Hypertension: patient would benefit from continuing to monitor blood pressure rather than starting new therapy right away as that was the first elevated reading. Last OV was meeting goal < 140/90 mm Hg. If continues to high agree may consider amlodipine 5 mg daily.    Hyperlipidemia: patient would benefit from a CAC test if resistant to statin therapy.    Supplements: may want to consider a vitamin D level check as she is on high dose therapy.    Allergic Rhinitis:  stable.    GERD: stable.    Pain: stable.    PLAN:                               1. MTM to look into primary literature on DM on the drug screening --> Unclear reason for dextromethorphan, the only possible cause would be opioids such as oxycodone but the screening did not reveal any oxycodone metabolites and literature reports that is very rare.      2. Start Jardiance 10 mg in the morning. We will recheck your A1c and kidney/electrolytes 3 months from the last a1c. Monitor for lows. Savings link provided.     3. Start Dulera 2 puff twice daily. As your symptoms improve decrease to 1 puff twice daily and then 1-2 puffs twice daily as needed. Use this instead of the Proair.      4. I sent a request to your Elijah to connect virtually!     5. Agreed - do not start amlodipine. Start recheck blood pressure first. Your goal is less than 140/90 mm Hg. If the home blood pressure reading is high, make sure you get a manual check (such as from your sister)    6. MTM to discuss with PCP if vitamin D level should be checked and to consider Coronary calcium testing in the future.      I spent 60 minutes with this patient today. All changes were made via collaborative practice agreement with Linda Durant. A copy of the visit note was provided to the patient's primary care provider.    Will follow up in 1 month.    The patient was sent via [employer] a summary of these recommendations.     Misty Banuelos, PharmD  Medication Therapy Management Pharmacist  Pager#: [phone]      Patient consented to a telehealth visit: yes  Telemedicine Visit Details  Type of service:  Telephone visit  Start Time: 3:10PM  End Time: 4:10 PM  Originating Location (patient location): [place]  Distant Location (provider location):  [facility]  Mode of Communication:  Telephone

## 2020-12-04 NOTE — Clinical Note
Hi, see my plan for details including her question of the DM flagging (that is sort of a mystery at this point). Please see #6 for additional thoughts. With her intolerance of statins, she maybe a good candidate to get the CAC as a starting point (I did not review with her). Also she tells me she has been on high dose (10,000 unit(s)) for a long time, did you want a future repeat lab? I thought I would check with you first. Usually if patients don't have signs of vitamin D toxicity and has had a level drawn that wnl and on high dose, I typically do not suggest rechecking. Thanks!

## 2020-12-04 NOTE — TELEPHONE ENCOUNTER
Forwarded to [facility] pharmacy.    Any guidance on positive drug screens for which patient hasn't had the substances mentioned?      Linda Durant MD  Internal Medicine - Pediatrics

## 2020-12-07 ENCOUNTER — TELEPHONE (OUTPATIENT)
Dept: PEDIATRICS | Facility: CLINIC | Age: 55
End: 2020-12-07

## 2020-12-07 NOTE — TELEPHONE ENCOUNTER
Central Prior Authorization Team   Phone: [phone]      PA Initiation    Medication: mometasone-formoterol (DULERA) 100-5 MCG/ACT inhaler - INITIATED  Insurance Company: [employer] - Phone [phone] Fax [phone]  Pharm[place]illing the [place] [facility] [address]  Filling Pharmacy Phone: [phone]  Filling Pharmacy Fax: [phone]  Start Date: 12/7/2020

## 2020-12-07 NOTE — TELEPHONE ENCOUNTER
Prior Authorization Retail Medication Request    Medication/Dose: mometasone-formoterol (DULERA) 100-5 MCG/ACT inhaler     ICD code (if different than what is on RX):  J45.20  Previously Tried and Failed:    Rationale:      Insurance Name:  [employer] ACCESS    Insurance ID:  [id number]        Pharmacy Information (if different than what is on RX)  Name:  [facility]  Phone:  [phone]

## 2020-12-08 ENCOUNTER — TRANSFERRED RECORDS (OUTPATIENT)
Dept: HEALTH INFORMATION MANAGEMENT | Facility: CLINIC | Age: 55
End: 2020-12-08

## 2020-12-08 NOTE — TELEPHONE ENCOUNTER
Central Prior Authorization Team   Phone: [phone]      Prior Authorization Approval    Authorization Effective Date: 12/7/2020  Authorization Expiration Date: 12/7/2023  Medication: mometasone-formoterol (DULERA) 100-5 MCG/ACT inhaler - APPROVED  Approved Dose/Quantity: 3 FOR 30  Reference #:     Insurance Company: [employer] - Phone [phone] Fax [phone]  Expected CoPay:       CoPay Card Available:      Foundation Assistance Needed:    Which Pharmacy is filling the prescription (Not needed for infusion/clinic administered): [facility], [place] [address]  Pharmacy Notified: Yes  Patient Notified: Yes (**Instructed pharmacy to notify patient when script is ready to /ship.**)

## 2020-12-09 ENCOUNTER — OFFICE VISIT (OUTPATIENT)
Dept: PODIATRY | Facility: CLINIC | Age: 55
End: 2020-12-09
Payer: COMMERCIAL

## 2020-12-09 VITALS
HEIGHT: 67 IN | DIASTOLIC BLOOD PRESSURE: 80 MMHG | WEIGHT: 293 LBS | BODY MASS INDEX: 45.99 KG/M2 | SYSTOLIC BLOOD PRESSURE: 130 MMHG

## 2020-12-09 DIAGNOSIS — M79.671 FOOT PAIN, BILATERAL: Primary | ICD-10-CM

## 2020-12-09 DIAGNOSIS — M79.672 FOOT PAIN, BILATERAL: Primary | ICD-10-CM

## 2020-12-09 DIAGNOSIS — M79.2 NEUROPATHIC PAIN OF FOOT, RIGHT: ICD-10-CM

## 2020-12-09 DIAGNOSIS — M72.2 PLANTAR FASCIITIS, LEFT: ICD-10-CM

## 2020-12-09 DIAGNOSIS — E11.42 DIABETIC POLYNEUROPATHY ASSOCIATED WITH TYPE 2 DIABETES MELLITUS (H): ICD-10-CM

## 2020-12-09 PROCEDURE — 20550 NJX 1 TENDON SHEATH/LIGAMENT: CPT | Mod: LT | Performed by: PODIATRIST

## 2020-12-09 PROCEDURE — 99213 OFFICE O/P EST LOW 20 MIN: CPT | Mod: 25 | Performed by: PODIATRIST

## 2020-12-09 RX ORDER — TRIAMCINOLONE ACETONIDE 40 MG/ML
40 INJECTION, SUSPENSION INTRA-ARTICULAR; INTRAMUSCULAR ONCE
Status: DISCONTINUED | OUTPATIENT
Start: 2020-12-09 | End: 2021-01-04

## 2020-12-09 ASSESSMENT — MIFFLIN-ST. JEOR: SCORE: 1966.21

## 2020-12-09 NOTE — PATIENT INSTRUCTIONS
Thank you for choosing [facility] / Foot [facility]le Surgery!    DR. WALTON'S CLINIC:  [facility]   [id number]   [place] [address]   [phone]  [phone]      SCHEDULE SURGERY: [phone]   BILLING QUESTIONS: [phone]   AFTER HOURS: [phone]   APPOINTMENTS: [phone]   CONSUMER PRICE LINE: [phone]      Follow up: as needed    Please read through the following handouts and if you have any questions, please feel free to call us or send a [employer] message!    PLANTAR FASCIITIS  Plantar fasciitis is often referred to as heel spurs or heel pain. Plantar fasciitis is a very common problem that affects people of all foot shapes, age, weight and activity level. Pain may be in the arch or on the weight-bearing surface of the heel. The pain may come on without injury or identifiable cause. Pain is generally present when first getting out of bed in the morning or up from a seated break.     CAUSES  The plantar fascia is a dense fibrous band of tissue that stretches across the bottom surface of the foot. The fascia helps support the foot muscles and arch. Plantar fasciitis is thought to be caused by mechanical strain or overload. Frequent walking without shoes or wearing unsupportive shoes is thought to cause structural overload and ultimately inflammation of the plantar fascia. Some people have heel spurs that can be seen on x-ray. The heel spur is actually a minor component of plantar fascitis and is largely ignored.       SELF TREATMENT   The easiest solution is to stop walking around your home without shoes. Plantar fasciitis is largely a shoe problem. Shoes are either not being worn often enough or your current shoes are inadequate for your weight, foot structure or activity level. The majority of shoes on the market today are not sufficient to resist development of plantar fasciitis or to promote healing. Assume that your current shoes are inadequate  and will need to be replaced. Even high quality shoes wear out with 6 months to one year of frequent use. Weight loss is another option. Losing ten pounds in the next two months may be enough to resolve the problem. Ice applied to the area of pain two to three times per day for ten minutes each session can be very helpful. Warm foot soaks in epsom salts can also relieve pain. This should continue until the problem resolves. Achilles tendon stretching is essential. Stretch multiple times daily to promote healing and to prevent recurrence in the future. Over all stretching of the body is helpful as well such as the calves, thighs and lower back. Normally when one area of the body is tight, other areas are too. Gentle Yoga can be good for this.     Over the counter topical anti inflammatories can be helpful such as biofreeze, bengay, salon pas, ect...  Oral ibuprofen or aleve is recommended as well to try to calm down inflammation.     Night splints can be helpful to gradually stretch the foot at night as a lot of pain is when you get up in the morning. Taking a towel or thera band and stretching the foot back multiple times before you get ou of bed can be beneficial as well.     MEDICAL TREATMENT  Medical treatments often include custom arch supports, cortisone injections, physical therapy, splints to be worn in bed, prescription medications and surgery. The home treatments listed above will be necessary regardless of these advanced medical treatments. Surgery is rarely needed but is very helpful in selected cases.     PROGNOSIS  Plantar fasciitis can last from one day to a lifetime. Some people get intermittent fascitis that is very short-lived. Others suffer daily for years. Excessive body weight, frequent bare foot walking, long hours on the feet, inadequate shoes, predisposing foot structures and excessive activity such as running are all potential issues that lead to chronic and/or recurring plantar fascitis.  Having plantar fasciitis means that you are forever prone to this problem and will require modification of some of the above factors. Most people seek treatment within one to four months. Healing usually requires a similar one to four month time frame. Healing time is relative to the amount of effort spent treating the problem.   Plantar fasciitis is highly recurrent. Risk factors often continue, including return to bare foot walking, inadequate shoes, excessive body weight, excessive activities, etc. Your life style and foot structure may predispose you to recurrent plantar fasciitis. A daily prevention regimen can be very helpful. Ongoing use of shoe inserts, careful attention to appropriate shoes, daily Achilles stretching, etc. may prevent recurrence. Prompt attention at the earliest warning signs of heel pain can resolve the problem in as short as a few days.     EXERCISES  Stair Exercise: Step on the stairs with the ball of your foot and hold your position for at least 15 seconds, then slowly step down with the heels of your foot. You can do this daily and as often as you want.   Picking the Towel: Sit comfortably and then pick the towel up with your toes. You can use any object other than a towel as long as the material can be soft and you can pick it up with your toes.  Rolling the Bottle: Use a small ball or frozen water bottle and then roll it around with your foot.   Flex the Toes: Sit comfortably and then flex your toes by pointing it towards the floor or towards your body. This will relax and flex your foot and exercise your plantar fascia, the calf, and the Achilles tendon. The inability of the foot to stretch often causes the bunching up of the plantar fascia area leading to the pain.  Calf/Achilles Stretching: Lay on you back and raise one foot, then point your toes towards the floor. See photo below:               Hold each stretch for 10 seconds. Stretch 10 times per set, three sets per day.  Morning, afternoon and evening. If your heel pain is very severe in the morning, consider doing the first set of stretches before you get out of bed.      OVER THE COUNTER INSERT RECOMMENDATIONS  SuperFeet   Sofsole Fit Spenco   Power Step   Walk-Fit Arch Cradles     Most of these can be found at your local [employer]oes, [employer], or online.  **A good high quality over the counter insert should cost around $40-$50      [employer] LOCATIONS  [place]  [address]  [phone]   [address]  [phone] [place]  [address]  [phone]   [place]  [address]  [phone]   [place]  [address] [facility] [facility]  [phone] [place]  [address] [place] [address] [place]  [phone]   [place]  [address]  [phone]   [place]  [address] [place] [address]  [phone] [place]  [address]  [phone]       NEUROPATHY  Peripheral neuropathy is damage of the peripheral nerves. Your peripheral nerves--the nerves in your toes and fingertips--are the ones on the periphery of your body. When the nerves are damaged, they don't function properly. People with peripheral neuropathy have decreased or abnormal sensation in their toes and fingers. Sometimes, they develop problems moving these parts of the body as well.    When a person develops neuropathy, they usually begin to feel numbness or tingling in their feet and sometime in their legs.  Other symptoms may include painful burning or hot feet, tingling or feeling like insects or ants are crawling on your feet or legs.      In the [place], the most common cause of peripheral neuropathy is diabetes. According to the American Diabetes Association, 60 to 70 percent of people with diabetes will develop neuropathy within their lifetime.  Other causes of peripheral neuropathy include:  Certain medications, including some chemotherapy drugs.   Heredity. Some people have a family history of  peripheral neuropathy.   Advanced age. Peripheral neuropathy is more common as people age.   Arthritis. Certain type of arthritis can cause peripheral neuropathy.   Alcoholism. According to the [place] National Library of Medicine, up to half of all long-term heavy alcohol users develop peripheral neuropathy.   Neurological disorders. Certain neurological disorders, including spina bifida and fibromyalgia, are associated with peripheral neuropathy.   Injury. Acute injury to the peripheral nerves may also cause peripheral neuropathy.     Pain Management Strategies:  1.Blood Sugar Control - Most important in diabetics  2. Medications such as: Amytriptylline, duloxetine, gabapentin, lyrica, tramadol  3. Nutritional therapy: Vitamin B6 (100mg daily), Vitamin B12 (75mcg daily), Vitamin D 2000 IU daily), Alpha-Lipoic Acid (600-1800mg daily), Acetyl-L-Carnitine (500-1000mg TID, L-methyl folate (1500mcg daily)  4. TENS (Trans-electrical nerve stimulation) with physical therapy  5. Compounding pain creams    RECOMMENDATION TO AVOID PROBLEMS  1.Wash your feet with lukewarm water and a mild soap and then dry them thoroughly, especially between the toes.  2. Examine your feet daily looking for cuts, corns, blisters, cracks, ect., especially after wearing new shoes. Make sure to look between your toes. If you cannot see the bottom of your feet, set a mirror on the floor and hold your foot over it, or ask a spouse, friend or family member to examine your feet for you. Contact your doctor immediately if new problems are noted or if sores are not healing.  3. Immediately apply moisturizer to the tops and bottoms of your feet, avoiding areas between the toes. Hand lotion (Intesive Care, Juany, Eucerin, Neutrogena, Curel, ect...) is sufficient unless your doctor prescribes a medicated lotion. Apply sunscreen to your feet when going swimming outside.  4. Use clean comfortable shoes, wear white socks (if you have any bleeding or drainage,  you will see it on white socks). Socks should not have thick seams or cut off the circulation around the leg. Break in new shoes slowly and rotate with older shoes until broken in. Check the inside of your shoes with your hand to look for areas of irritation or objects that may have fallen into your shoes.    5. Keep slippers by the side of your bed for use during the night.  6. Shoes should be fitted by a professional and should not cause areas of irritation.  Check your feet regularly when wearing a new pair of shoes and replace them as needed.  7. Cut your nails straight across, but then gently round any sharp edges with a cardboard nail file.  If you have neuropathy, peripheral vascular disease or cannot see that well to trim your own toenails, see a medical professional for care.  8. Taking Vitamin B6, B12, and Alpha Lipoic Acid supplements can sometimes help.    DO NOT  1.  Do not soak your feet if you have an open sore.  Use only lukewarm water and always check the temperature with your hand as hot water can easily burn your feet.    2.  Never use a hot water bottle or heating pad on your feet.  Also do not apply cold compresses to your feet.  With decreased sensation, you could burn or freeze your feet.    3. Do not apply any of these to your feet:   -  Over the counter medicine for corns or warts   -  Harsh chemicals like boric acid   -  Do not self-treat corns, cuts, blisters or infections. Always consult your doctor.  4.  Do not wear sandals, slippers or walk barefoot, especially on hot sand or concrete or other harsh surfaces.  5.  If you smoke, stop!

## 2020-12-09 NOTE — PROGRESS NOTES
"Podiatry / Foot and Ankle Surgery Progress Note    December 9, 2020    Subject: Patient was seen for follow up on left heel pain and continued right foot pain.  She notes that the injections to both helped.  The right foot 1 is wearing off the left heel pain is very sore, 8 out of 10 at its worst.  Worse with pressure.  She has been wearing shoes around the house but has not noted much change.  She is also tried a night splint, stretching, topical NSAIDs, iontophoresis and has not worked for her.  She is wondering what else can be done for the left foot.  For the right foot she states it feels cold all the time and is wondering what can be done for that.    Objective:  Vitals: /80   Ht 1.702 m (5' 7\")   Wt 133.4 kg (294 lb)   BMI 46.05 kg/m    BMI= Body mass index is 46.05 kg/m .    A1C: 10.7 (11/2020)    General:  Patient is alert and orientated.  NAD  Dermatologic: Skin is intact to both lower extremities without significant lesions, rash or abrasion.  No paronychia or evidence of soft tissue infection is noted.     Vascular: DP & PT pulses are intact & regular bilaterally.   edema and varicosities noted.  CFT and skin temperature is normal to both lower extremities.     Neurologic: Lower extremity sensation is diminished to feet.      Musculoskeletal: Patient is ambulatory without assistive device or brace. Decrease arch height. Decrease dorsiflexion right ankle.  Pain on palpation of the left plantar heel.        ASSESSMENT:    Type 2 diabetes mellitus without complication, without long-term current use of insulin (H)  Morbid obesity with BMI of 40.0-44.9, adult (H)  Plantar fasciitis, left  Bilateral pes planus     PLAN:  Reviewed patient's chart in [facility]. The potential causes and nature of plantar fasciitis were discussed with the patient.  We reviewed the natural history/prognosis of the condition and risks if left untreated.  These include chronic pain, other sites of pain due to gait changes, and " potential plantar fascial rupture.      We discussed possible causes of the condition as it relates to the patients specific situation.      Conservative treatment options were reviewed:  appropriate shoes, avoidance of barefoot walking, inserts/orthoses, stretching, ice, massage, immobilization and NSAIDs.     We also reviewed the options of injection therapy and surgery.  However, it was made clear that surgery is only considered when conservative therapy fails.  The risks and benefits of injection therapy, and surgery were discussed.     After thorough discussion and answering all questions, the patient elected to try injection again today.  See procedure note below.   We will also do physical therapy for stretching and strengthening all over her body.  If pain continues recommend possible boot and/or MRI.  If positive plantar fasciitis discussed that surgery might be the next step.  She does not want surgery at this time. All questions were answered to patient satisfaction.  Did discuss increased risk of immunocompromisation with the cortisone injection.    As for the right foot I feel that this is neuropathic pain.  Patient does not want gabapentin or Lyrica and states that the topical pain cream did not help as well as the nitro cream.  I recommend pain management as I have nothing else to offer her for this.  She agrees to this.  Put in a referral.     Procedure: After verbal consent, the patients max point of tenderness was marked out on the left plantar heel.  This area was prepped and draped using sterile technique.  An injection of 1cc of 1% lidocaine plain and 1 1/2 cc of Kenalog-40 was injected into the max point of tenderness.  This was distributed in a fanning motion.  Patient tolerated the procedure and anesthesia well.      Suzanna Strong DPM, Podiatry/Foot and Ankle Surgery    Weight management plan: Patient was referred to their PCP to discuss a diet and exercise plan.    Recommended to Johnna LEWIS  Federico to follow up with Primary Care provider regarding elevated blood pressure.

## 2020-12-09 NOTE — LETTER
"    12/9/2020         RE: Johnna Caballero  [id number]  [facility] [address]        Dear Colleague,    Thank you for referring your patient, Johnna Caballero, to the [facility]. Please see a copy of my visit note below.    Podiatry / Foot and Ankle Surgery Progress Note    December 9, 2020    Subject: Patient was seen for follow up on left heel pain and continued right foot pain.  She notes that the injections to both helped.  The right foot 1 is wearing off the left heel pain is very sore, 8 out of 10 at its worst.  Worse with pressure.  She has been wearing shoes around the house but has not noted much change.  She is also tried a night splint, stretching, topical NSAIDs, iontophoresis and has not worked for her.  She is wondering what else can be done for the left foot.  For the right foot she states it feels cold all the time and is wondering what can be done for that.    Objective:  Vitals: /80   Ht 1.702 m (5' 7\")   Wt 133.4 kg (294 lb)   BMI 46.05 kg/m    BMI= Body mass index is 46.05 kg/m .    A1C: 10.7 (11/2020)    General:  Patient is alert and orientated.  NAD  Dermatologic: Skin is intact to both lower extremities without significant lesions, rash or abrasion.  No paronychia or evidence of soft tissue infection is noted.     Vascular: DP & PT pulses are intact & regular bilaterally.   edema and varicosities noted.  CFT and skin temperature is normal to both lower extremities.     Neurologic: Lower extremity sensation is diminished to feet.      Musculoskeletal: Patient is ambulatory without assistive device or brace. Decrease arch height. Decrease dorsiflexion right ankle.  Pain on palpation of the left plantar heel.        ASSESSMENT:    Type 2 diabetes mellitus without complication, without long-term current use of insulin (H)  Morbid obesity with BMI of 40.0-44.9, adult (H)  Plantar fasciitis, left  Bilateral pes planus     PLAN:  Reviewed patient's chart in " epic. The potential causes and nature of plantar fasciitis were discussed with the patient.  We reviewed the natural history/prognosis of the condition and risks if left untreated.  These include chronic pain, other sites of pain due to gait changes, and potential plantar fascial rupture.      We discussed possible causes of the condition as it relates to the patients specific situation.      Conservative treatment options were reviewed:  appropriate shoes, avoidance of barefoot walking, inserts/orthoses, stretching, ice, massage, immobilization and NSAIDs.     We also reviewed the options of injection therapy and surgery.  However, it was made clear that surgery is only considered when conservative therapy fails.  The risks and benefits of injection therapy, and surgery were discussed.     After thorough discussion and answering all questions, the patient elected to try injection again today.  See procedure note below.   We will also do physical therapy for stretching and strengthening all over her body.  If pain continues recommend possible boot and/or MRI.  If positive plantar fasciitis discussed that surgery might be the next step.  She does not want surgery at this time. All questions were answered to patient satisfaction.  Did discuss increased risk of immunocompromisation with the cortisone injection.    As for the right foot I feel that this is neuropathic pain.  Patient does not want gabapentin or Lyrica and states that the topical pain cream did not help as well as the nitro cream.  I recommend pain management as I have nothing else to offer her for this.  She agrees to this.  Put in a referral.     Procedure: After verbal consent, the patients max point of tenderness was marked out on the left plantar heel.  This area was prepped and draped using sterile technique.  An injection of 1cc of 1% lidocaine plain and 1 1/2 cc of Kenalog-40 was injected into the max point of tenderness.  This was distributed in  a fanning motion.  Patient tolerated the procedure and anesthesia well.      Suzanna Strong DPM, Podiatry/Foot and Ankle Surgery    Weight management plan: Patient was referred to their PCP to discuss a diet and exercise plan.    Recommended to Johnna Caballero to follow up with Primary Care provider regarding elevated blood pressure.        Again, thank you for allowing me to participate in the care of your patient.        Sincerely,        Suzanna Strong DPM, Podiatry/Foot and Ankle Surgery

## 2021-01-04 ENCOUNTER — VIRTUAL VISIT (OUTPATIENT)
Dept: PHARMACY | Facility: CLINIC | Age: 56
End: 2021-01-04
Payer: COMMERCIAL

## 2021-01-04 DIAGNOSIS — Z78.9 TAKES DIETARY SUPPLEMENTS: ICD-10-CM

## 2021-01-04 DIAGNOSIS — Z79.4 TYPE 2 DIABETES MELLITUS WITH HYPERGLYCEMIA, WITH LONG-TERM CURRENT USE OF INSULIN (H): ICD-10-CM

## 2021-01-04 DIAGNOSIS — J45.20 INTERMITTENT ASTHMA, UNCOMPLICATED: ICD-10-CM

## 2021-01-04 DIAGNOSIS — G89.4 CHRONIC PAIN SYNDROME: ICD-10-CM

## 2021-01-04 DIAGNOSIS — E11.65 TYPE 2 DIABETES MELLITUS WITH HYPERGLYCEMIA, WITH LONG-TERM CURRENT USE OF INSULIN (H): ICD-10-CM

## 2021-01-04 DIAGNOSIS — E78.5 HYPERLIPIDEMIA LDL GOAL <100: ICD-10-CM

## 2021-01-04 DIAGNOSIS — K21.9 GASTROESOPHAGEAL REFLUX DISEASE, UNSPECIFIED WHETHER ESOPHAGITIS PRESENT: Primary | ICD-10-CM

## 2021-01-04 DIAGNOSIS — J30.9 ALLERGIC RHINITIS, UNSPECIFIED SEASONALITY, UNSPECIFIED TRIGGER: ICD-10-CM

## 2021-01-04 DIAGNOSIS — I10 ESSENTIAL HYPERTENSION: ICD-10-CM

## 2021-01-04 PROCEDURE — 99607 MTMS BY PHARM ADDL 15 MIN: CPT | Mod: TEL | Performed by: PHARMACIST

## 2021-01-04 PROCEDURE — 99605 MTMS BY PHARM NP 15 MIN: CPT | Mod: TEL | Performed by: PHARMACIST

## 2021-01-04 RX ORDER — MECOBALAMIN 5000 MCG
15 TABLET,DISINTEGRATING ORAL DAILY
Qty: 90 CAPSULE | Refills: 0 | Status: SHIPPED | OUTPATIENT
Start: 2021-01-04 | End: 2021-07-20

## 2021-01-04 RX ORDER — ERGOCALCIFEROL 1.25 MG/1
50000 CAPSULE ORAL DAILY
COMMUNITY
End: 2021-11-10

## 2021-01-04 RX ORDER — MULTIVITAMIN,THERAPEUTIC
1 TABLET ORAL DAILY
COMMUNITY
End: 2023-05-16

## 2021-01-04 NOTE — PATIENT INSTRUCTIONS
Recommendations from today's MTM visit:                                                      1. May continue Dulera as needed use when you have symptoms of an asthma flare up again    2. Decrease lansoprazole 15 mg daily    3. A1c recheck, if next A1c not at goal then we will discuss increase in Jardiance.     4. If you have any blood sugar lows (less than 70 or 80), decrease Lantus by ~10%     It was great to speak with you today.  I value your experience and would be very thankful for your time with providing feedback on our clinic survey. You may receive a survey via email or text message in the next few days.     Next MTM visit: 1-6 months depending on your labs in Feb.     To schedule another MTM appointment, please call the clinic directly or you may call the MTM scheduling line at [phone] or toll-free at [phone].     My Clinical Pharmacist's contact information:                                                      It was a pleasure talking with you today!  Please feel free to contact me with any questions or concerns you have.      Misty Banuelos, PharmD  Medication Therapy Management Pharmacist  Pager#: [phone]

## 2021-01-04 NOTE — PROGRESS NOTES
Medication Therapy Management (MTM) Encounter    ASSESSMENT/PLAN:                            Medication Adherence/Access: No issues identified    Asthma: needs update ACT but from report improved. Continue ICS-LABA (*formoterol) as needed use.    Hypertension: last blood pressure at goal < 140/90.    Hyperlipidemia: patient would benefit from a CAC test given resistant to statins in past (last MTM visit routed to PCP to consider in future).    Supplements: unchanged, future vitamin d level on file.    Allergic Rhinitis: stable.    GERD: reasonable to trial dose reduction to reduce long-term complications.    Pain: stable.    Type 2 Diabetes: Patient is not meeting A1c goal of < 7%. Though SMBG have improved. Patient to continue to monitor at this time. Future may consider increase in Jardiance.       PLAN:   1. May continue Dulera as needed use  2. Decrease lansoprazole 15 mg daily.  3. A1c recheck, if next A1c not at goal then increase Jardiance.   4. Patient encouraged to reduce Lantus by 10% if blood sugar decrease below 70-80.     Will follow up in after labs in Feb if A1c not at goal then 1 month, if at goal then 3-6 months.    SUBJECTIVE/OBJECTIVE:                           Johnna Caballero is a 55 year old female called for a follow-up visit. She was referred to me from Dr. Durant.  Today's visit is a follow-up MTM visit from 12/4/20. First of 2021.     Reason for visit: no medication question.    Allergies/ADRs: Reviewed in chart  Tobacco: She reports that she has never smoked. She has never used smokeless tobacco.  Alcohol: not currently using - maybe 1 -2 beers in over 2 year  Activity: household chores, yard work  Past Medical History: Reviewed in chart    Medication Adherence/Access: no issues reported    Asthma: Using none at the moment. When she had a cough she used the Dulera 2 puffs twice daily and then weaned off. Felt this was more effective that the Proair.  No chest tightness, wheezing, or  "cough.  Asthma Action Plan on file: Yes but update due soon.  Notices symptoms worsen during Cold season.  ACT Total Scores 8/1/2019 3/2/2020 11/25/2020   ACT TOTAL SCORE - - -   ASTHMA ER VISITS - - -   ASTHMA HOSPITALIZATIONS - - -   ACT TOTAL SCORE (Goal Greater than or Equal to 20) 25 25 18   In the past 12 months, how many times did you visit the emergency room for your asthma without being admitted to the hospital? 0 0 0   In the past 12 months, how many times were you hospitalized overnight because of your asthma? 0 0 0     Diabetes:  Pt currently taking Jardiance 10 mg daily, metformin IR 1000 mg twice daily, Lantus 44 units daily (split twice daily ~ 34 units qAM +10-12 qPM).   Had to stop Victoza due to abdominal pain and possible pancreatitis from therapy. She notes the pain resolved after stopping the therapy (she actually had tried re-challenged as she really liked the Victoza).    Patient is not experiencing side effects.  SMBG:       Frequency of hypoglycemia? never. Symptoms of low blood sugar - none.    Symptoms of high blood sugar? fatigue  Eye exam: up to date  Foot exam: up to date  Aspirin: Taking 81mg daily and denies side effects  Follows with Endo as needed. Recently had a visit, 2 days ago.   ACEi/ARB: Yes: losartan.   Lab Results   Component Value Date    UMALCR 32.52 (H) 11/25/2020      Lab Results   Component Value Date    A1C 10.7 11/25/2020    A1C 9.0 03/02/2020    A1C 7.9 11/14/2019    A1C 8.0 08/01/2019    A1C 8.7 04/15/2019     Wt Readings from Last 4 Encounters:   11/25/20 290 lb 14.4 oz (132 kg)   06/29/20 282 lb (127.9 kg)   05/19/20 280 lb (127 kg)   03/24/20 288 lb (130.6 kg)       Estimated body mass index is 45.56 kg/m  as calculated from the following:    Height as of 6/29/20: 5' 7\" (1.702 m).    Weight as of 11/25/20: 290 lb 14.4 oz (132 kg).    Hypertension: Current medications include hydrochlorothiazide 25 mg qAM and losartan 100 mg qAM.  Patient does not self-monitor " blood pressure. Patient reports no current medication side effects. Patient feels blood pressure is often higher in the clinics and not sure if she wants to start this.  BP Readings from Last 3 Encounters:   12/09/20 130/80   11/25/20 132/80   06/29/20 134/69       Hyperlipidemia: Current therapy includes no current medications. Hx of statin intolerance cramps.   Recent Labs   Lab Test 11/25/20  0741 04/15/19  1018 04/13/15  0803 04/13/15  0803 03/22/14  0820   CHOL 162 190   < > 160 165   HDL 34* 41*   < > 37* 30*   LDL 83 112*   < > 88 115   TRIG 223* 187*   < > 173* 94   CHOLHDLRATIO  --   --   --  4.3 5.4*    < > = values in this interval not displayed.     Supplements: Takes vitamin d 5,000 unit once daily (reports has been on this long-term since her lab changes in 2015, was on 02705 unit(s) daily before), multivitamin. No longer on zinc and vitamin c.   Lab Results   Component Value Date    VITDT 43 01/12/2015    VITDT 60 12/17/2013    VITDT 26 (L) 09/18/2013    VITDT 32 02/19/2013    VITDT 23 (L) 12/06/2012     Allergic Rhinitis: Current medications include cetirizine 10 mg once daily. Primary symptoms are none. 6 allergy shots once a month. Patient feels that current therapy is effective.   Follows with an allergist.    GERD: Current medications include: Prevacid (lansoprazole) 30 mg once daily. Pt reports no current symptoms.  Patient feels that current regimen is effective but would like to decrease if possible.    Pain: has hand and shoulder pain. Takes Percocet 5-325 mg as needed uses several times a week, Celebrex 200 mg daily, diclofenac gel as needed 1-2 times a week on foot, CBD oil twice daily under tongue. Also started CBD oil from a company that she trust that has testing. She feels the CBD has allowed her to cut back on the Percocet use. May go a week without Perocet and avoids taking it during the day.  S/E: Percocet gives her a headache but goes away. Has tried Norco but not effective.         Today's Vitals: There were no vitals taken for this visit.        I spent 21 minutes with this patient today. All changes were made via collaborative practice agreement with Linda Durant. A copy of the visit note was provided to the patient's primary care provider.    The patient was sent via [employer] a summary of these recommendations.     Misty Banuelos, PharmD  Medication Therapy Management Pharmacist  Pager#: [phone]    Patient consented to a telehealth visit: yes  Telemedicine Visit Details  Type of service:  Telephone visit  Start Time: 10:30 AM  End Time: 10:51 AM  Originating Location (patient location): [place]  Distant Location (provider location):  [facility]  Mode of Communication:  Telephone        Medication Therapy Recommendations  Esophageal reflux    Current Medication: LANsoprazole (PREVACID) 15 MG DR capsule   Rationale: Dose too high - Dosage too high - Safety   Recommendation: Decrease Dose   Status: Accepted per CPA         Type 2 diabetes mellitus with hyperglycemia, with long-term current use of insulin (H)    Current Medication: empagliflozin (JARDIANCE) 10 MG TABS tablet   Rationale: Dose too low - Dosage too low - Effectiveness   Recommendation: Order Lab   Status: Accepted per CPA

## 2021-01-15 ENCOUNTER — HEALTH MAINTENANCE LETTER (OUTPATIENT)
Age: 56
End: 2021-01-15

## 2021-02-19 SDOH — ECONOMIC STABILITY: INCOME INSECURITY: IN THE LAST 12 MONTHS, WAS THERE A TIME WHEN YOU WERE NOT ABLE TO PAY THE MORTGAGE OR RENT ON TIME?: NO

## 2021-02-22 ENCOUNTER — OFFICE VISIT (OUTPATIENT)
Dept: PEDIATRICS | Facility: CLINIC | Age: 56
End: 2021-02-22
Payer: OTHER MISCELLANEOUS

## 2021-02-22 ENCOUNTER — MYC REFILL (OUTPATIENT)
Dept: PHARMACY | Facility: CLINIC | Age: 56
End: 2021-02-22

## 2021-02-22 VITALS
HEIGHT: 66 IN | HEART RATE: 72 BPM | RESPIRATION RATE: 16 BRPM | SYSTOLIC BLOOD PRESSURE: 116 MMHG | BODY MASS INDEX: 44.39 KG/M2 | DIASTOLIC BLOOD PRESSURE: 64 MMHG | WEIGHT: 276.2 LBS | TEMPERATURE: 97.6 F

## 2021-02-22 DIAGNOSIS — E11.65 TYPE 2 DIABETES MELLITUS WITH HYPERGLYCEMIA, WITH LONG-TERM CURRENT USE OF INSULIN (H): ICD-10-CM

## 2021-02-22 DIAGNOSIS — M19.91 PRIMARY OSTEOARTHRITIS, UNSPECIFIED SITE: ICD-10-CM

## 2021-02-22 DIAGNOSIS — Z79.4 TYPE 2 DIABETES MELLITUS WITH HYPERGLYCEMIA, WITH LONG-TERM CURRENT USE OF INSULIN (H): ICD-10-CM

## 2021-02-22 DIAGNOSIS — G89.4 CHRONIC PAIN SYNDROME: ICD-10-CM

## 2021-02-22 DIAGNOSIS — I10 ESSENTIAL HYPERTENSION: ICD-10-CM

## 2021-02-22 DIAGNOSIS — Z12.31 VISIT FOR SCREENING MAMMOGRAM: ICD-10-CM

## 2021-02-22 DIAGNOSIS — M75.102 TEAR OF LEFT ROTATOR CUFF, UNSPECIFIED TEAR EXTENT, UNSPECIFIED WHETHER TRAUMATIC: ICD-10-CM

## 2021-02-22 DIAGNOSIS — Z01.818 PREOP GENERAL PHYSICAL EXAM: Primary | ICD-10-CM

## 2021-02-22 LAB
ANION GAP SERPL CALCULATED.3IONS-SCNC: 8 MMOL/L (ref 3–14)
BUN SERPL-MCNC: 14 MG/DL (ref 7–30)
CALCIUM SERPL-MCNC: 9.6 MG/DL (ref 8.5–10.1)
CHLORIDE SERPL-SCNC: 106 MMOL/L (ref 94–109)
CO2 SERPL-SCNC: 25 MMOL/L (ref 20–32)
CREAT SERPL-MCNC: 0.72 MG/DL (ref 0.52–1.04)
ERYTHROCYTE [DISTWIDTH] IN BLOOD BY AUTOMATED COUNT: 13.5 % (ref 10–15)
GFR SERPL CREATININE-BSD FRML MDRD: >90 ML/MIN/{1.73_M2}
GLUCOSE SERPL-MCNC: 144 MG/DL (ref 70–99)
HBA1C MFR BLD: 8.8 % (ref 0–5.6)
HCT VFR BLD AUTO: 44.8 % (ref 35–47)
HGB BLD-MCNC: 14.3 G/DL (ref 11.7–15.7)
MCH RBC QN AUTO: 27.6 PG (ref 26.5–33)
MCHC RBC AUTO-ENTMCNC: 31.9 G/DL (ref 31.5–36.5)
MCV RBC AUTO: 87 FL (ref 78–100)
PLATELET # BLD AUTO: 295 10E9/L (ref 150–450)
POTASSIUM SERPL-SCNC: 3.7 MMOL/L (ref 3.4–5.3)
RBC # BLD AUTO: 5.18 10E12/L (ref 3.8–5.2)
SODIUM SERPL-SCNC: 139 MMOL/L (ref 133–144)
WBC # BLD AUTO: 7.7 10E9/L (ref 4–11)

## 2021-02-22 PROCEDURE — 80048 BASIC METABOLIC PNL TOTAL CA: CPT | Performed by: INTERNAL MEDICINE

## 2021-02-22 PROCEDURE — 99214 OFFICE O/P EST MOD 30 MIN: CPT | Performed by: INTERNAL MEDICINE

## 2021-02-22 PROCEDURE — 36415 COLL VENOUS BLD VENIPUNCTURE: CPT | Performed by: INTERNAL MEDICINE

## 2021-02-22 PROCEDURE — 85027 COMPLETE CBC AUTOMATED: CPT | Performed by: INTERNAL MEDICINE

## 2021-02-22 PROCEDURE — 93000 ELECTROCARDIOGRAM COMPLETE: CPT | Performed by: INTERNAL MEDICINE

## 2021-02-22 PROCEDURE — 83036 HEMOGLOBIN GLYCOSYLATED A1C: CPT | Performed by: INTERNAL MEDICINE

## 2021-02-22 PROCEDURE — 77067 SCR MAMMO BI INCL CAD: CPT | Mod: TC | Performed by: RADIOLOGY

## 2021-02-22 PROCEDURE — 77063 BREAST TOMOSYNTHESIS BI: CPT | Mod: TC | Performed by: RADIOLOGY

## 2021-02-22 RX ORDER — OXYCODONE AND ACETAMINOPHEN 5; 325 MG/1; MG/1
1-2 TABLET ORAL EVERY 4 HOURS PRN
Qty: 90 TABLET | Refills: 0 | Status: SHIPPED | OUTPATIENT
Start: 2021-02-22 | End: 2021-05-17

## 2021-02-22 ASSESSMENT — MIFFLIN-ST. JEOR: SCORE: 1869.58

## 2021-02-22 NOTE — PATIENT INSTRUCTIONS
We will need to wait for your Hemoglobin A1c to come back before we can clear you for surgery.    If we proceed with surgery, please do the following with your medications:  1. Hold aspirin for 1 week prior to surgery.  2. Hold hydrochlorothiazide the morning of surgery. OK to take losartan.  3. Give 80% of your Lantus dose - 30 units the morning of surgery  4. Hold metformin the morning of surgery.   5. Hold Jardiance for 3 days prior to surgery.  6. Remove continuous glucose monitor before surgery.  7. Hold celebrex 3 days prior to surgery.   Preparing for Your Surgery  Getting started  A nurse will call you to review your health history and instructions. They will give you an arrival time based on your scheduled surgery time.  Please be ready to share the following:    Your doctor's clinic name and phone number    Your medical, surgical and anesthesia history    A list of allergies and sensitivities    A list of medicines, including herbal treatments and over-the-counter drugs    Whether the patient has a legal guardian (ask how to send us the papers in advance)  If you have a child who's having surgery, please ask for a copy of Preparing for Your Child's Surgery.    Preparing for surgery    Within 30 days of surgery: Have a pre-op exam (sometimes called an H&P, or History and Physical). This can be done at a clinic or pre-operative center.  ? If you're having a , you may not need this exam. Talk to your care team    At your pre-op exam, talk to your care team about all medicines you take. If you need to stop any medicines before surgery, ask when to start taking them again.  ? We do this for your safety. Many medicines can make you bleed too much during surgery. Some change how well surgery (anesthesia) drugs work.    Call your insurance company to let them know you're having surgery. (If you don't have insurance, call [phone].)    Call your clinic if there's any change in your health. This  includes signs of a cold or flu (sore throat, runny nose, cough, rash, fever). It also includes a scrape or scratch near the surgery site.    If you have questions on the day of surgery, call your hospital or surgery center.  Eating and drinking guidelines  For your safety: Unless your surgeon tells you otherwise, follow the guidelines below.    Eat and drink as usual until 8 hours before surgery. After that, no food or milk.    Drink clear liquids until 2 hours before surgery. These are liquids you can see through, like water, Gatorade and Propel Water. You may also have black coffee and tea (no cream or milk).    Nothing by mouth within 2 hours of surgery. This includes gum, candy and breath mints.    If you drink, stop drinking alcohol the night before surgery.    If your care team tells you to take medicine on the morning of surgery, it's okay to take it with a sip of water.  Preventing infection    Shower or bathe the night before and morning of your surgery. Follow the instructions your clinic gave you. (If no instructions, use regular soap.)    Don't shave or clip hair near your surgery site. We'll remove the hair if needed.    Don't smoke or vape the morning of surgery. You may chew nicotine gum up to 2 hours before surgery. A nicotine patch is okay.  ? Note: Some surgeries require you to completely quit smoking and nicotine. Check with your surgeon.    Your care team will make every effort to keep you safe from infection. We will:  ? Clean our hands often with soap and water (or an alcohol-based hand rub).  ? Clean the skin at your surgery site with a special soap that kills germs.  ? Give you a special gown to keep you warm. (Cold raises the risk of infection.)  ? Wear special hair covers, masks, gowns and gloves during surgery.  ? Give antibiotic medicine, if prescribed. Not all surgeries need antibiotics.  What to bring on the day of surgery    Photo ID and insurance card    Copy of your health care  directive, if you have one    Glasses and hearing aides (bring cases)  ? You can't wear contacts during surgery    Inhaler and eye drops, if you use them (tell us about these when you arrive)    CPAP machine or breathing device, if you use them    A few personal items, if spending the night    If you have . . .  ? A pacemaker or ICD (cardiac defibrillator): Bring the ID card.  ? An implanted stimulator: Bring the remote control.  ? A legal guardian: Bring a copy of the certified (court-stamped) guardianship papers.  Please remove any jewelry, including body piercings. Leave jewelry and other valuables at home.  If you're going home the day of surgery  Important: If you don't follow the rules below, we must cancel your surgery.     Arrange for someone to drive you home after surgery. You may not drive, take a taxi or take public transportation by yourself (unless you'll have local anesthesia only).    Arrange for a responsible adult to stay with you overnight. If you don't, we may keep you in the hospital overnight, and you may need to pay the costs yourself.  Questions?   If you have any questions for your care team, list them here: _________________________________________________________________________________________________________________________________________________________________________________________________________________________________________________________________________________________________________________________  For informational purposes only. Not to replace the advice of your health care provider. Copyright   2003, 2019 [facility]. All rights reserved. Clinically reviewed by Flori Baum MD. [employer] 200858 - REV 4/20.

## 2021-02-22 NOTE — PROGRESS NOTES
[facility] [place]  [address]  [address]  [place] [address]  Phone: [phone]  Fax: [phone]  Primary Provider: Linda Durant  Pre-op Performing Provider: LUIS F MORTENSEN      PREOPERATIVE EVALUATION:  Today's date: 2/22/2021    Johnna Caballero is a 55 year old female who presents for a preoperative evaluation.    Surgical Information:  Surgery/Procedure: left thumb surgery followed by left rotator cuff surgery  Surgery Location: [facility]  Surgeon: Dalton  Surgery Date: 2/25/21  Time of Surgery: TBD  Where patient plans to recover: At home with family  Fax number for surgical facility: [phone]    Type of Anesthesia Anticipated: General    Assessment & Plan     The proposed surgical procedure is considered INTERMEDIATE risk.      ICD-10-CM    1. Preop general physical exam  Z01.818 EKG 12-lead complete w/read - Clinics     Basic metabolic panel  (Ca, Cl, CO2, Creat, Gluc, K, Na, BUN)     Hemoglobin A1c     CBC with platelets   2. Primary osteoarthritis, unspecified site  M19.91 oxyCODONE-acetaminophen (PERCOCET) 5-325 MG tablet   3. Tear of left rotator cuff, unspecified tear extent, unspecified whether traumatic  M75.102    4. Type 2 diabetes mellitus with hyperglycemia, with long-term current use of insulin (H)  E11.65 Hemoglobin A1c    Z79.4 insulin glargine (LANTUS PEN) 100 UNIT/ML pen   5. Essential hypertension  I10 Basic metabolic panel  (Ca, Cl, CO2, Creat, Gluc, K, Na, BUN)   6. Chronic pain syndrome  G89.4 oxyCODONE-acetaminophen (PERCOCET) 5-325 MG tablet          Risks and Recommendations:  The patient has the following additional risks and recommendations for perioperative complications:   - Morbid obesity (BMI >40)    Diabetes:  - Patient is on insulin therapy; diabetic NPO guidelines provided and discussed.    Medication Instructions:  Patient is to take all scheduled medications on the day of surgery EXCEPT for modifications  listed below:   - aspirin: Discontinue aspirin 7-10 days prior to procedure to reduce bleeding risk. It should be resumed postoperatively.    - ACE/ARB: May be continued on the day of surgery.    - Diuretics: HOLD on the day of surgery.   - Long acting insulin (e.g. glargine, detemir): Take 80% of the usual evening or morning dose before surgery.   - metformin: HOLD day of surgery.   - SGLT2 Inhibitor (canagliflozin, dapagliflozin, or empagliflozin): HOLD 3 days before surgery.    - Continuous Glucose Monitor (CGM): Patient was made aware on the day of surgery, they should be prepared to remove the Continuous Glucose Monitor (CGM) prior to the operation in order to avoid damage to the equipment during the procedure. The CGM will not be the source of glucose monitoring during the operation.   - celecoxib (Celebrex): HOLD 3 days before surgery. May continue without modification for management of severe pain.     RECOMMENDATION:  APPROVAL GIVEN to proceed with proposed procedure pending review of diagnostic evaluation. Patient can proceed with thumb surgery at discretion of her surgeon given HgbA1c of 8.8, but ideally needs better pre-operative glycemic control prior to rotator cuff surgery. Will refer to MTM to assist with management and insulin titration.     Review of the result(s) of each unique test - HgbA1c, ECG    Discussion of management or test interpretation with external physician/other qualified healthcare professional/appropriate source - MTM            Subjective     HPI related to upcoming procedure:   Patient is planning on having L thumb surgery - needs to have ligament repair and bone chip removed. Due to overuse, under worker's compensation due to overuse in setting of injury of R thumb in 2001.     Has L shoulder rotator cuff repair surgery scheduled for 3- to repair tear.       Preop Questions 2/19/2021   1. Have you ever had a heart attack or stroke? No   2. Have you ever had surgery on  your heart or blood vessels, such as a stent placement, a coronary artery bypass, or surgery on an artery in your head, neck, heart, or legs? No   3. Do you have chest pain with activity? No   4. Do you have a history of  heart failure? No   5. Do you currently have a cold, bronchitis or symptoms of other infection? No   6. Do you have a cough, shortness of breath, or wheezing? No   7. Do you or anyone in your family have previous history of blood clots? UNKNOWN - adopted   8. Do you or does anyone in your family have a serious bleeding problem such as prolonged bleeding following surgeries or cuts? UNKNOWN - adopted   9. Have you ever had problems with anemia or been told to take iron pills? Hx of post-operative anemia   10. Have you had any abnormal blood loss such as black, tarry or bloody stools, or abnormal vaginal bleeding? Hx of post-operative anemia   11. Have you ever had a blood transfusion? No   12. Are you willing to have a blood transfusion if it is medically needed before, during, or after your surgery? Yes   13. Have you or any of your relatives ever had problems with anesthesia? No   14. Do you have sleep apnea, excessive snoring or daytime drowsiness? No   15. Do you have any artifical heart valves or other implanted medical devices like a pacemaker, defibrillator, or continuous glucose monitor? No   16. Do you have artificial joints? YES - Knees (bilateral)   17. Are you allergic to latex? YES: avoid latex   18. Is there any chance that you may be pregnant? No       Health Care Directive:  Patient does not have a Health Care Directive or Living Will: Advance Directive received and scanned. Click on Code in the patient header to view.    Preoperative Review of :   reviewed - controlled substances reflected in medication list. Prescribed by PCP.       Status of Chronic Conditions:  See problem list for active medical problems.  Problems all longstanding and stable, except as noted/documented.   See ROS for pertinent symptoms related to these conditions.    ASTHMA: patient has a history of mild asthma, not currently on medications for management. Otherwise asymptomatic.   DIABETES: patient with a longstanding hx of Type 2 diabetes, has previously been poorly controlled. Working closely with MTM for management, currently on Jardiance, metformin and Lantus. Using continuous glucose monitor. Has been dealing with some lows so Lantus dosing has been fluctuating.   HYPERTENSION: managed with hydrochlorothiazide and losartan.           Review of Systems  Constitutional, neuro, ENT, endocrine, pulmonary, cardiac, gastrointestinal, genitourinary, musculoskeletal, integument and psychiatric systems are negative, except as otherwise noted.    Patient Active Problem List    Diagnosis Date Noted     Chronic pain syndrome 11/23/2016     Priority: High     Patient is followed by Linda Durant MD for ongoing prescription of pain medication.  All refills should be approved by this provider, or covering partner.    Medication(s): percocet 5/325.   Maximum quantity per month: 90  Clinic visit frequency required: Q 6  months     Controlled substance agreement:  Encounter-Level CSA:     There are no encounter-level csa.        Pain Clinic evaluation in the past: No    DIRE Total Score(s):  No flowsheet data found.    Last [facility] website verification:  none   [email or web address]         Type 2 diabetes mellitus without complication (H) 10/02/2015     Priority: High     Hyperlipidemia LDL goal <100 02/10/2010     Priority: High     Intermittent asthma, uncomplicated 12/28/2016     Priority: Medium     Latex allergy status 10/03/2016     Priority: Medium     Anxiety 05/10/2016     Priority: Medium     Major depressive disorder, recurrent episode, mild (H) 01/05/2016     Priority: Medium     Typically seasonal     celexa- low libido  wellbutrin- didn't work   paxil- didn't work        Essential hypertension 10/04/2015      Priority: Medium     S/P total knee arthroplasty 07/15/2014     Priority: Medium     Morbid obesity with BMI of 40.0-44.9, adult (H) 09/18/2012     Priority: Medium     Allergic rhinitis 03/25/2003     Priority: Medium     Problem list name updated by automated process. Provider to review       Bilateral low back pain without sciatica 10/25/2015     Priority: Low     Vitamin D deficiency disease 03/06/2013     Priority: Low     Long term current use of insulin (H) 09/18/2012     Priority: Low     Esophageal reflux 03/25/2003     Priority: Low      Past Medical History:   Diagnosis Date     Actinic keratosis      Allergic rhinitis, cause unspecified      Anemia      Arthritis      Asthma     no meds x2 yrs     chronic back pain      Chronic infection     MRSA-nasal     Chronic pain     back     Esophageal reflux      fibrocystic breast changes      Gastro-oesophageal reflux disease      Heart murmur     mitral insuffiency     Hematoma - postoperative 11/23/2012     Problem list name updated by automated process. Provider to review and confirm     Hx of Fen-Phen use      Hypertension      migraines      Mild intermittent asthma     rare, with dog or exercise     Moderate major depression (H) 3/28/2012     MRSA (methicillin resistant Staphylococcus aureus) 6/27/2014    Nares     Need for desensitization to allergens      Obesity, unspecified      Other and unspecified hyperlipidemia      Temporomandibular joint disorders, unspecified      Thrombosis of leg     incisional area right hip     Type II or unspecified type diabetes mellitus without mention of complication, not stated as uncontrolled      Past Surgical History:   Procedure Laterality Date     ARTHROPLASTY KNEE  7/15/2014    Procedure: ARTHROPLASTY KNEE;  Surgeon: Chapin Paul MD;  Location: [facility] OR     BACK SURGERY       CHOLECYSTECTOMY       COLONOSCOPY N/A 4/26/2017    Procedure: COLONOSCOPY;  COLONOSCOPY;  Surgeon: Chapin Leal MD;  Location:  [facility] GI     ESOPHAGOSCOPY, GASTROSCOPY, DUODENOSCOPY (EGD), COMBINED N/A 6/29/2020    Procedure: ESOPHAGOGASTRODUODENOSCOPY (EGD);  Surgeon: Devon Jacinto MD;  Location:  GI     EXCISE LESION LOWER EXTREMITY  11/20/2012    Procedure: EXCISE LESION LOWER EXTREMITY;  EXCISION LIPOMA RIGHT HIP ;  Surgeon: Jazmin Bautista MD;  Location:  [facility]     EXCISE LESION LOWER EXTREMITY  11/23/2012    Procedure: EXCISE LESION LOWER EXTREMITY;  EXCISE LESION LOWER EXTREMITY  EVACUATE RIGHT HIP HEMATOMA;  Surgeon: Jazmin Bautista MD;  Location:  OR     EXCISE MASS HIP Left 11/9/2018    Procedure: 1.  Excision left chest wall mass with excised diameter of greater than 4 cm 2.  Excision left hip mass with excised diameter of more than 20 x 20 cm  ;  Surgeon: Jazmin Bautista MD;  Location: [facility] OR     EXCISE MASS TRUNK Left 11/9/2018    Procedure: EXCISE MASS TRUNK;  Surgeon: Jazmin Bautista MD;  Location:  OR     EXCISE MASS TRUNK N/A 12/16/2019    Procedure: REVISION LEFT HIP SCAR WITH SEROMA EVACUATION; REMOVAL LEFT CHEST WALL MASS   (MRSA);  Surgeon: Jazmin Bautista MD;  Location:  OR     GYN SURGERY       HC EXPLORATION ANKLE JOINT  1/2006     HC PART EXCIS PLANTAR FASCIA  12/2006     HYSTEROSCOPY DIAGNOSTIC N/A 12/16/2019    Procedure: DIAGNOSTIC HYSTEROSCOPY;  Surgeon: Jodi Perea MD;  Location:  OR     IRRIGATION AND DEBRIDEMENT HIP, COMBINED  12/15/2012    Procedure: COMBINED IRRIGATION AND DEBRIDEMENT HIP;   evacuation hematoma, scar revision right hip;  Surgeon: Jazmin Bautista MD;  Location:  OR     REPLACE INTRAUTERINE DEVICE N/A 12/16/2019    Procedure: REPLACEMENTOF MIRENA CONTRACEPTIVE INTRAUTERINE DEVICE;  Surgeon: Jodi Perea MD;  Location:  OR     wisdom teeth[       Z NONSPECIFIC PROCEDURE      laparoscopy x6     ZZC NONSPECIFIC PROCEDURE  93    laparotomy x2 ovarian cyst     ZZC NONSPECIFIC PROCEDURE  0293    oophorectomy lt side  - cyst     [facility] NONSPECIFIC PROCEDURE  0395    laminectomy L4-5. S1 herniated disc     Z NONSPECIFIC PROCEDURE  96    cholecystectomy     [facility] NONSPECIFIC PROCEDURE      ganglion cysts l wrist, rt palm     Z NONSPECIFIC PROCEDURE      cyst removal back x2     Z NONSPECIFIC PROCEDURE  10/02    rt thumb fusion, ganglion cyst removal     [facility] NONSPECIFIC PROCEDURE  1/08    remove heel spur, excise exostosis     Current Outpatient Medications   Medication Sig Dispense Refill     aspirin 81 MG tablet Take 1 tablet (81 mg) by mouth daily 30 tablet      BD PEN NEEDLE VALDO 2ND GEN 32G X 4 MM miscellaneous USE 2 PEN NEEDLES DAILY OR AS DIRECTED 180 each 0     blood glucose (ACCU-CHEK GABI) test strip Use to test blood sugars 1-2x daily or as directed. 200 each 11     blood glucose monitoring (ACCU-CHEK MULTICLIX) lancets Use to test blood sugar 1-2 times daily or as directed. 2 Box 3     celecoxib (CELEBREX) 200 MG capsule Take 1 capsule (200 mg) by mouth daily 90 capsule 2     cetirizine (ZYRTEC) 10 MG tablet Take 10 mg by mouth daily       Continuous Blood Gluc Sensor (FREESTYLE HELENE 14 DAY SENSOR) [facility] USE AS DIRECTED EVERY 14 DAYS 6 each 11     diclofenac (VOLTAREN) 1 % topical gel Place 2 g onto the skin 4 times daily 100 g 2     empagliflozin (JARDIANCE) 10 MG TABS tablet Take 1 tablet (10 mg) by mouth daily 90 tablet 0     ergocalciferol (ERGOCALCIFEROL) 1.25 MG (60322 UT) capsule Take 50,000 Units by mouth daily       HEMP OIL OR EXTRACT OR OTHER CBD CANNABINOID, NOT MEDICAL CANNABIS,        hydrochlorothiazide (HYDRODIURIL) 25 MG tablet TAKE ONE TABLET BY MOUTH ALONG WITH ONE LOSARTAN 100MG TABLET ONCE DAILY 90 tablet 1     insulin glargine (LANTUS PEN) 100 UNIT/ML pen Inject 44 Units Subcutaneous daily Patient splits into twice daily doses. 45 mL 3     LANsoprazole (PREVACID) 15 MG DR capsule Take 1 capsule (15 mg) by mouth daily 90 capsule 0     losartan (COZAAR) 100 MG tablet TAKE ONE TABLET BY MOUTH ALONG  "WITH ONE HYDROCHLOROTHIAZIDE 25MG TABLET ONCE DAILY 90 tablet 1     metFORMIN (GLUCOPHAGE) 1000 MG tablet Take 1 tablet (1,000 mg) by mouth 2 times daily (with meals) 180 tablet 3     mometasone-formoterol (DULERA) 100-5 MCG/ACT inhaler Inhale 2 puffs into the lungs 2 times daily as needed (SOB) 3 Inhaler 1     multivitamin, therapeutic (THERA-VIT) TABS tablet Take 1 tablet by mouth daily       ORDER FOR DME Equipment being ordered: TENS unit (Patient not taking: Reported on 12/9/2020) 1 each 0     oxyCODONE-acetaminophen (PERCOCET) 5-325 MG tablet Take 1-2 tablets by mouth every 4 hours as needed for severe pain 90 tablet 0     STATIN NOT PRESCRIBED (INTENTIONAL) Please choose reason not prescribed from choices below.       triamcinolone (KENALOG) 0.5 % external ointment Topically twice daily as needed for 14 days 60 g 11       Allergies   Allergen Reactions     Eggs      Allergy found in testing     Hornets      wasps     Latex Anaphylaxis     hives  -  able to use BP cuff     Lipitor [Hmg-Coa-R Inhibitors] Cramps     Muscle cramps with statin     Metoclopramide Hcl Difficulty breathing     Neurontin [Gabapentin] Hives        Social History     Tobacco Use     Smoking status: Never Smoker     Smokeless tobacco: Never Used   Substance Use Topics     Alcohol use: Yes     Alcohol/week: 0.0 standard drinks     Frequency: Never     Drinks per session: Patient refused     Binge frequency: Never     Comment: 1 drink per year or less       History   Drug Use No         Objective     /64 (BP Location: Right arm, Patient Position: Sitting, Cuff Size: Adult Large)   Pulse 72   Temp 97.6  F (36.4  C) (Tympanic)   Resp 16   Ht 1.676 m (5' 6\")   Wt 125.3 kg (276 lb 3.2 oz)   BMI 44.58 kg/m      Physical Exam    GENERAL APPEARANCE: healthy, alert and no distress     EYES: EOMI, PERRL     HENT: ear canals and TM's normal and nose and mouth without ulcers or lesions     NECK: no adenopathy, no asymmetry, masses, or " scars and thyroid normal to palpation     RESP: lungs clear to auscultation - no rales, rhonchi or wheezes     CV: regular rates and rhythm, normal S1 S2, no S3 or S4 and no murmur, click or rub     ABDOMEN:  soft, nontender, no HSM or masses and bowel sounds normal     MS: extremities normal- no gross deformities noted, no evidence of inflammation in joints, FROM in all extremities.     SKIN: no suspicious lesions or rashes     NEURO: Normal strength and tone, sensory exam grossly normal, mentation intact and speech normal     PSYCH: mentation appears normal. and affect normal/bright     LYMPHATICS: No cervical adenopathy    Recent Labs   Lab Test 11/25/20  0741 03/24/20  1123 03/02/20  0903 03/02/20  0903   HGB  --  13.7  --  12.9   PLT  --  304  --  324    136   < >  --    POTASSIUM 3.4 3.5   < >  --    CR 0.58 0.63   < >  --    A1C 10.7*  --   --  9.0*    < > = values in this interval not displayed.        Diagnostics:  Recent Results (from the past 24 hour(s))   Basic metabolic panel  (Ca, Cl, CO2, Creat, Gluc, K, Na, BUN)    Collection Time: 02/22/21 10:50 AM   Result Value Ref Range    Sodium 139 133 - 144 mmol/L    Potassium 3.7 3.4 - 5.3 mmol/L    Chloride 106 94 - 109 mmol/L    Carbon Dioxide 25 20 - 32 mmol/L    Anion Gap 8 3 - 14 mmol/L    Glucose 144 (H) 70 - 99 mg/dL    Urea Nitrogen 14 7 - 30 mg/dL    Creatinine 0.72 0.52 - 1.04 mg/dL    GFR Estimate >90 >60 mL/min/[1.73_m2]    GFR Estimate If Black >90 >60 mL/min/[1.73_m2]    Calcium 9.6 8.5 - 10.1 mg/dL   Hemoglobin A1c    Collection Time: 02/22/21 10:50 AM   Result Value Ref Range    Hemoglobin A1C 8.8 (H) 0 - 5.6 %   CBC with platelets    Collection Time: 02/22/21 10:50 AM   Result Value Ref Range    WBC 7.7 4.0 - 11.0 10e9/L    RBC Count 5.18 3.8 - 5.2 10e12/L    Hemoglobin 14.3 11.7 - 15.7 g/dL    Hematocrit 44.8 35.0 - 47.0 %    MCV 87 78 - 100 fl    MCH 27.6 26.5 - 33.0 pg    MCHC 31.9 31.5 - 36.5 g/dL    RDW 13.5 10.0 - 15.0 %     Platelet Count 295 150 - 450 10e9/L      EKG: appears normal, NSR, normal axis, normal intervals, no acute ST/T changes c/w ischemia, no LVH by voltage criteria    Revised Cardiac Risk Index (RCRI):  The patient has the following serious cardiovascular risks for perioperative complications:   - Diabetes Mellitus (on Insulin) = 1 point     RCRI Interpretation: 1 point: Class II (low risk - 0.9% complication rate)             Signed Electronically by: Perri Jung MD  Copy of this evaluation report is provided to requesting physician.    [facility] Preop Guidelines    Revised Cardiac Risk Index

## 2021-02-23 NOTE — TELEPHONE ENCOUNTER
Prescription approved per [facility] Refill Protocol.  Loni Wellington RN, BSN  Message handled by CLINIC NURSE.

## 2021-03-01 ENCOUNTER — TRANSFERRED RECORDS (OUTPATIENT)
Dept: HEALTH INFORMATION MANAGEMENT | Facility: CLINIC | Age: 56
End: 2021-03-01

## 2021-03-01 LAB — PAP SMEAR - HIM PATIENT REPORTED: NEGATIVE

## 2021-03-02 ENCOUNTER — ALLIED HEALTH/NURSE VISIT (OUTPATIENT)
Dept: PHARMACY | Facility: CLINIC | Age: 56
End: 2021-03-02
Payer: COMMERCIAL

## 2021-03-02 ENCOUNTER — TRANSFERRED RECORDS (OUTPATIENT)
Dept: HEALTH INFORMATION MANAGEMENT | Facility: CLINIC | Age: 56
End: 2021-03-02

## 2021-03-02 DIAGNOSIS — E11.9 TYPE 2 DIABETES MELLITUS WITHOUT COMPLICATION, WITHOUT LONG-TERM CURRENT USE OF INSULIN (H): Primary | ICD-10-CM

## 2021-03-02 PROCEDURE — 99607 MTMS BY PHARM ADDL 15 MIN: CPT | Mod: TEL | Performed by: PHARMACIST

## 2021-03-02 PROCEDURE — 99606 MTMS BY PHARM EST 15 MIN: CPT | Mod: TEL | Performed by: PHARMACIST

## 2021-03-02 RX ORDER — INSULIN ASPART 100 [IU]/ML
INJECTION, SOLUTION INTRAVENOUS; SUBCUTANEOUS
Qty: 15 ML | Refills: 0 | Status: SHIPPED | OUTPATIENT
Start: 2021-03-02 | End: 2021-03-08

## 2021-03-02 NOTE — PATIENT INSTRUCTIONS
Recommendations from today's MTM visit:                                                      1. Hold Jardiance due to S/E.     2. Increase Lantus 52 units once daily (may split 26-27 units twice daily).     3. Start Novolog if PPBG > 200, then start Novolog 4 units before meal. Educated to start before late lunch meal and to start if having high carbohydrate snack or meal at 8pm as well.     It was great to speak with you today.  I value your experience and would be very thankful for your time with providing feedback on our clinic survey. You may receive a survey via email or text message in the next few days.     Next MTM visit: 3/12 2pm phone call    To schedule another MTM appointment, please call the clinic directly or you may call the MTM scheduling line at [phone] or toll-free at [phone].     My Clinical Pharmacist's contact information:                                                      It was a pleasure talking with you today!  Please feel free to contact me with any questions or concerns you have.      Misty Banuelos, PharmD  Medication Therapy Management Pharmacist

## 2021-03-02 NOTE — PROGRESS NOTES
Medication Therapy Management (MTM) Encounter    ASSESSMENT:                            Medication Adherence/Access: No issues identified    Type 2 Diabetes: Patient is not meeting A1c goal of < 7%. Though A1c has improved. SMBG have improved. Due to concern for potential S/E from Jardiance and increased yeast infections, suggest she stop Jardiance at this time. Increase basal insulin by 4 units. Also given upcoming surgery, would consider addition of mealtime insulin to help prevent PPBG elevates (especially if coming off Jardiance).     PLAN:                            1. Hold Jardiance due to S/E.    2. Increase Lantus 52 units once daily (may split 26-27 units twice daily).    3. Start Novolog if PPBG > 200, then start Novolog 4 units before meal. Educated to start before late lunch meal and to start if having high carbohydrate snack or meal at 8pm as well.     Follow-up: 3/12    SUBJECTIVE/OBJECTIVE:                          Johnna Caballero is a 55 year old female called for a follow-up visit. She was referred to me from Dr. Bo for Pre-op blood sugar management.  Today's visit is a follow-up MTM visit from 1/4/21.     Reason for visit: sugars were great in [place] but noticed blood sugar are worse since coming home. She had her thumb surgery one week ago and will have another surgery on 3/23 for shoulder.     Allergies/ADRs: Reviewed in chart  Tobacco: She reports that she has never smoked. She has never used smokeless tobacco.  Alcohol: not currently using  Past Medical History: Reviewed in chart    Medication Adherence/Access: no issues reported    Diabetes:  Pt currently taking Jardiance 10 mg daily, metformin IR 1000 mg twice daily, Lantus 48 units daily.   She is experiencing nausea and more yeast infections since starting Jardiance. She is wondering if this can be from Jardiance.   Had to stop Victoza due to abdominal pain and possible pancreatitis from therapy.   She notes the pain resolved after  stopping the therapy (she actually had tried re-challenged as she really liked the Victoza).    Patient is not experiencing side effects.  SMBG:           Frequency of hypoglycemia? None - she did in [place]. Symptoms of low blood sugar - none.    Eye exam: up to date   Foot exam: due  ACEi/ARB: Yes: losartan.   Diet: snacks more and not eating 3 times a day like she was in [place]. She doesn't like to cook. She has a friend interested in diet changes and will try to work on her diet with her friend here. She has lost 20 lbs which she was very excited about.  Lab Results   Component Value Date    UMALCR 32.52 (H) 11/25/2020      Lab Results   Component Value Date    A1C 8.8 02/22/2021    A1C 10.7 11/25/2020    A1C 9.0 03/02/2020    A1C 7.9 11/14/2019    A1C 8.0 08/01/2019     Wt Readings from Last 4 Encounters:   02/22/21 276 lb 3.2 oz (125.3 kg)   12/09/20 294 lb (133.4 kg)   11/25/20 290 lb 14.4 oz (132 kg)   06/29/20 282 lb (127.9 kg)       Today's Vitals: There were no vitals taken for this visit.  ----------------      I spent 23 minutes with this patient today. All changes were made via collaborative practice agreement with Linda Durant. A copy of the visit note was provided to the patient's primary care and referring provider.    The patient was sent via [employer] a summary of these recommendations.     Misty Banuelos, PharmD  Medication Therapy Management Pharmacist  Pager#: [phone]    Telemedicine Visit Details  Type of service:  Telephone visit  Start Time: 1:25 PM  End Time: 1:48 PM  Originating Location (patient location): Home  Distant Location (provider location):  [facility]      Medication Therapy Recommendations  Type 2 diabetes mellitus without complication (H)    Current Medication: empagliflozin (JARDIANCE) 10 MG TABS tablet (Discontinued)   Rationale: Undesirable effect - Adverse medication event - Safety   Recommendation: Change Medication - insulin glargine 100 UNIT/ML pen - stop  Jardiance, increase Lantus 52 u daily   Status: Accepted per CPA          Current Medication: insulin aspart (NOVOLOG FLEXPEN) 100 UNIT/ML pen   Rationale: Synergistic therapy - Needs additional medication therapy - Indication   Recommendation: Start Medication   Status: Accepted per CPA

## 2021-03-03 ENCOUNTER — MYC MEDICAL ADVICE (OUTPATIENT)
Dept: PEDIATRICS | Facility: CLINIC | Age: 56
End: 2021-03-03

## 2021-03-04 ENCOUNTER — TELEPHONE (OUTPATIENT)
Dept: PEDIATRICS | Facility: CLINIC | Age: 56
End: 2021-03-04

## 2021-03-04 DIAGNOSIS — E11.9 TYPE 2 DIABETES MELLITUS WITHOUT COMPLICATION, WITHOUT LONG-TERM CURRENT USE OF INSULIN (H): Primary | ICD-10-CM

## 2021-03-04 NOTE — TELEPHONE ENCOUNTER
Prior Authorization Retail Medication Request    Medication/Dose: insulin aspart (NOVOLOG FLEXPEN) 100 UNIT/ML pen  ICD code (if different than what is on RX):  E11.9  Previously Tried and Failed:    Rationale:      Insurance Name:  [employer]  Insurance ID:  [id number]          Pharmacy Information (if different than what is on RX)  Name:  [facility]  Phone:  [phone]

## 2021-03-05 NOTE — TELEPHONE ENCOUNTER
Central Prior Authorization Team   Phone: [phone]    PA Initiation    Medication: insulin aspart (NOVOLOG FLEXPEN) 100 UNIT/ML pen  Insurance Company: [employer] - Phone [phone] Fax [phone]  [facility] [place] [facility] [place], [place] - [address]  Filling Pharmacy Phone: [phone]  Filling Pharmacy Fax: [phone]  Start Date: 3/5/2021

## 2021-03-08 RX ORDER — INSULIN LISPRO 100 [IU]/ML
INJECTION, SOLUTION INTRAVENOUS; SUBCUTANEOUS
Qty: 15 ML | Refills: 0 | Status: SHIPPED | OUTPATIENT
Start: 2021-03-08 | End: 2021-06-04

## 2021-03-08 NOTE — TELEPHONE ENCOUNTER
PRIOR AUTHORIZATION DENIED    Medication: insulin aspart (NOVOLOG FLEXPEN) 100 UNIT/ML pen-DENIED    Denial Date: 3/5/2021    Denial Rational: PATIENT DID NOT MEET CRITERIA - HUMALOG IS COVERED ALTERNATIVE.        Appeal Information:  IF PATIENT IS UNABLE TO TRY/FAIL ALTERNATIVE(S) PLEASE SUPPLY PA TEAM WITH A LETTER OF MEDICAL NECESSITY WITH CLINICAL REASON.

## 2021-03-08 NOTE — TELEPHONE ENCOUNTER
Forwarded to [facility] - would you mind adjusting the script given the patient formulary?  Thanks!    Linda Durant MD  Internal Medicine - Pediatrics

## 2021-03-12 ENCOUNTER — VIRTUAL VISIT (OUTPATIENT)
Dept: PHARMACY | Facility: CLINIC | Age: 56
End: 2021-03-12
Payer: COMMERCIAL

## 2021-03-12 DIAGNOSIS — E11.9 TYPE 2 DIABETES MELLITUS WITHOUT COMPLICATION, WITHOUT LONG-TERM CURRENT USE OF INSULIN (H): Primary | ICD-10-CM

## 2021-03-12 PROCEDURE — 99606 MTMS BY PHARM EST 15 MIN: CPT | Mod: TEL | Performed by: PHARMACIST

## 2021-03-12 RX ORDER — EMPAGLIFLOZIN 10 MG/1
10 TABLET, FILM COATED ORAL DAILY
COMMUNITY
Start: 2021-02-24 | End: 2021-06-01

## 2021-03-12 NOTE — PATIENT INSTRUCTIONS
Recommendations from today's MTM visit:                                                      1. Start Humalog 4 units prior to meal if blood sugar is over 150 mg/dL before eating.     2. If recurrent yeast infection let  know - we may need to stop the Jardiance.     3. A1c due sometime after 5/22.   It was great to speak with you today.  I value your experience and would be very thankful for your time with providing feedback on our clinic survey. You may receive a survey via email or text message in the next few days.     Next MTM visit: a1c, then MTM follow-up ~ early June    To schedule another MTM appointment, please call the clinic directly or you may call the MTM scheduling line at [phone] or toll-free at [phone].     My Clinical Pharmacist's contact information:                                                      It was a pleasure talking with you today!  Please feel free to contact me with any questions or concerns you have.      Misty Banuelos, PharmD  Medication Therapy Management Pharmacist  Pager#: [phone]

## 2021-03-23 DIAGNOSIS — Z79.4 TYPE 2 DIABETES MELLITUS WITH HYPERGLYCEMIA, WITH LONG-TERM CURRENT USE OF INSULIN (H): ICD-10-CM

## 2021-03-23 DIAGNOSIS — E11.65 TYPE 2 DIABETES MELLITUS WITH HYPERGLYCEMIA, WITH LONG-TERM CURRENT USE OF INSULIN (H): ICD-10-CM

## 2021-03-24 RX ORDER — BLOOD SUGAR DIAGNOSTIC
STRIP MISCELLANEOUS
Qty: 200 STRIP | Refills: 0 | Status: SHIPPED | OUTPATIENT
Start: 2021-03-24

## 2021-03-24 NOTE — TELEPHONE ENCOUNTER
Prescription approved per [facility] Refill Protocol.    Rebecca Lea RN

## 2021-03-30 ENCOUNTER — TRANSFERRED RECORDS (OUTPATIENT)
Dept: HEALTH INFORMATION MANAGEMENT | Facility: CLINIC | Age: 56
End: 2021-03-30

## 2021-05-05 DIAGNOSIS — Z79.4 TYPE 2 DIABETES MELLITUS WITHOUT COMPLICATION, WITH LONG-TERM CURRENT USE OF INSULIN (H): ICD-10-CM

## 2021-05-05 DIAGNOSIS — E11.9 TYPE 2 DIABETES MELLITUS WITHOUT COMPLICATION, WITH LONG-TERM CURRENT USE OF INSULIN (H): ICD-10-CM

## 2021-05-06 ENCOUNTER — TRANSFERRED RECORDS (OUTPATIENT)
Dept: HEALTH INFORMATION MANAGEMENT | Facility: CLINIC | Age: 56
End: 2021-05-06

## 2021-05-06 RX ORDER — FLASH GLUCOSE SENSOR
KIT MISCELLANEOUS
Qty: 6 EACH | Refills: 1 | Status: SHIPPED | OUTPATIENT
Start: 2021-05-06 | End: 2021-11-11 | Stop reason: ALTCHOICE

## 2021-05-06 RX ORDER — PEN NEEDLE, DIABETIC 32GX 5/32"
NEEDLE, DISPOSABLE MISCELLANEOUS
Qty: 180 EACH | Refills: 0 | Status: SHIPPED | OUTPATIENT
Start: 2021-05-06 | End: 2022-08-22

## 2021-05-06 NOTE — TELEPHONE ENCOUNTER
Refill extended until MTM appointment  Prescription approved per [facility] Refill Protocol.    Loni Wellington RN, BSN  Message handled by CLINIC NURSE.

## 2021-05-17 ENCOUNTER — TELEPHONE (OUTPATIENT)
Dept: PEDIATRICS | Facility: CLINIC | Age: 56
End: 2021-05-17

## 2021-05-17 ENCOUNTER — OFFICE VISIT (OUTPATIENT)
Dept: PEDIATRICS | Facility: CLINIC | Age: 56
End: 2021-05-17
Payer: COMMERCIAL

## 2021-05-17 VITALS
HEIGHT: 67 IN | WEIGHT: 287.4 LBS | OXYGEN SATURATION: 98 % | HEART RATE: 72 BPM | DIASTOLIC BLOOD PRESSURE: 62 MMHG | TEMPERATURE: 98.1 F | SYSTOLIC BLOOD PRESSURE: 116 MMHG | BODY MASS INDEX: 45.11 KG/M2

## 2021-05-17 DIAGNOSIS — K64.4 EXTERNAL HEMORRHOIDS: ICD-10-CM

## 2021-05-17 DIAGNOSIS — I10 ESSENTIAL HYPERTENSION: ICD-10-CM

## 2021-05-17 DIAGNOSIS — F33.0 MAJOR DEPRESSIVE DISORDER, RECURRENT EPISODE, MILD (H): ICD-10-CM

## 2021-05-17 DIAGNOSIS — Z79.4 TYPE 2 DIABETES MELLITUS WITHOUT COMPLICATION, WITH LONG-TERM CURRENT USE OF INSULIN (H): Primary | ICD-10-CM

## 2021-05-17 DIAGNOSIS — E11.9 TYPE 2 DIABETES MELLITUS WITHOUT COMPLICATION, WITHOUT LONG-TERM CURRENT USE OF INSULIN (H): ICD-10-CM

## 2021-05-17 DIAGNOSIS — G89.4 CHRONIC PAIN SYNDROME: ICD-10-CM

## 2021-05-17 DIAGNOSIS — M19.91 PRIMARY OSTEOARTHRITIS, UNSPECIFIED SITE: ICD-10-CM

## 2021-05-17 DIAGNOSIS — E11.9 TYPE 2 DIABETES MELLITUS WITHOUT COMPLICATION, WITH LONG-TERM CURRENT USE OF INSULIN (H): Primary | ICD-10-CM

## 2021-05-17 LAB
ALBUMIN SERPL-MCNC: 3.3 G/DL (ref 3.4–5)
ALP SERPL-CCNC: 73 U/L (ref 40–150)
ALT SERPL W P-5'-P-CCNC: 20 U/L (ref 0–50)
ANION GAP SERPL CALCULATED.3IONS-SCNC: 6 MMOL/L (ref 3–14)
AST SERPL W P-5'-P-CCNC: 15 U/L (ref 0–45)
BILIRUB SERPL-MCNC: 0.3 MG/DL (ref 0.2–1.3)
BUN SERPL-MCNC: 18 MG/DL (ref 7–30)
CALCIUM SERPL-MCNC: 8.6 MG/DL (ref 8.5–10.1)
CHLORIDE SERPL-SCNC: 105 MMOL/L (ref 94–109)
CHOLEST SERPL-MCNC: 158 MG/DL
CO2 SERPL-SCNC: 26 MMOL/L (ref 20–32)
CREAT SERPL-MCNC: 0.62 MG/DL (ref 0.52–1.04)
CREAT UR-MCNC: 47 MG/DL
GFR SERPL CREATININE-BSD FRML MDRD: >90 ML/MIN/{1.73_M2}
GLUCOSE SERPL-MCNC: 102 MG/DL (ref 70–99)
HBA1C MFR BLD: 8.1 % (ref 0–5.6)
HDLC SERPL-MCNC: 43 MG/DL
LDLC SERPL CALC-MCNC: 92 MG/DL
MICROALBUMIN UR-MCNC: <5 MG/L
MICROALBUMIN/CREAT UR: NORMAL MG/G CR (ref 0–25)
NONHDLC SERPL-MCNC: 115 MG/DL
POTASSIUM SERPL-SCNC: 3.7 MMOL/L (ref 3.4–5.3)
PROT SERPL-MCNC: 7.3 G/DL (ref 6.8–8.8)
SODIUM SERPL-SCNC: 137 MMOL/L (ref 133–144)
TRIGL SERPL-MCNC: 113 MG/DL
TSH SERPL DL<=0.005 MIU/L-ACNC: 2.06 MU/L (ref 0.4–4)

## 2021-05-17 PROCEDURE — 99215 OFFICE O/P EST HI 40 MIN: CPT | Performed by: PEDIATRICS

## 2021-05-17 PROCEDURE — 84443 ASSAY THYROID STIM HORMONE: CPT | Performed by: PEDIATRICS

## 2021-05-17 PROCEDURE — 80061 LIPID PANEL: CPT | Performed by: PEDIATRICS

## 2021-05-17 PROCEDURE — 99207 PR FOOT EXAM NO CHARGE: CPT | Mod: 25 | Performed by: PEDIATRICS

## 2021-05-17 PROCEDURE — 83036 HEMOGLOBIN GLYCOSYLATED A1C: CPT | Performed by: PEDIATRICS

## 2021-05-17 PROCEDURE — 36415 COLL VENOUS BLD VENIPUNCTURE: CPT | Performed by: PEDIATRICS

## 2021-05-17 PROCEDURE — 82043 UR ALBUMIN QUANTITATIVE: CPT | Performed by: PEDIATRICS

## 2021-05-17 PROCEDURE — 80053 COMPREHEN METABOLIC PANEL: CPT | Performed by: PEDIATRICS

## 2021-05-17 RX ORDER — LOSARTAN POTASSIUM 100 MG/1
TABLET ORAL
Qty: 90 TABLET | Refills: 3 | Status: SHIPPED | OUTPATIENT
Start: 2021-05-17 | End: 2022-05-09

## 2021-05-17 RX ORDER — TRIAMCINOLONE ACETONIDE 5 MG/G
OINTMENT TOPICAL
Qty: 60 G | Refills: 11 | Status: SHIPPED | OUTPATIENT
Start: 2021-05-17 | End: 2022-09-13

## 2021-05-17 RX ORDER — OXYCODONE AND ACETAMINOPHEN 5; 325 MG/1; MG/1
1-2 TABLET ORAL EVERY 4 HOURS PRN
Qty: 90 TABLET | Refills: 0 | Status: SHIPPED | OUTPATIENT
Start: 2021-05-17 | End: 2021-11-09

## 2021-05-17 RX ORDER — HYDROCHLOROTHIAZIDE 25 MG/1
TABLET ORAL
Qty: 90 TABLET | Refills: 3 | Status: SHIPPED | OUTPATIENT
Start: 2021-05-17 | End: 2022-05-09

## 2021-05-17 ASSESSMENT — MIFFLIN-ST. JEOR: SCORE: 1936.27

## 2021-05-17 NOTE — PATIENT INSTRUCTIONS
Labs today    Walking - I love the plan to walk regularly - this will help your blood sugar    Controlled substance agreement renewed.    Let me know when you need refills

## 2021-05-17 NOTE — TELEPHONE ENCOUNTER
The patient came into the clinic to do a lab only appointment.  The patient has an appointment with Dr. Durant coming up to discuss her results and to do a follow up.  There was an order for an A1C but also pended orders.  The lab is requesting that Dr. Durant sign the pended orders so they can run the labs.     Routing to Provider to advise.    Mirna Becker    May 17, 2021 at 9:59 AM

## 2021-05-17 NOTE — LETTER
[facility]  -- Controlled Medication Agreement    5/17/2021   Jonhna Caballero   1965   [id number]     [facility] 5/325 - #90 per 3 months      I understand that my provider is prescribing controlled medication (i.e., opioids, tranquilizers, barbiturates) to assist me in managing my chronic pain that has not responded to other treatments.  These medications are intended to decrease pain in order to improve function and/or ability to work.  The risks, benefits, and side effects or these medications have been explained to me and I agree to the following conditions for this type of treatment.    1. I will participate in other treatments (i.e., physical therapy, behavioral therapy, groups,) that my provider recommends.  I will be ready to taper or discontinue medications as other reasonable and effective treatments become available.  I understand I may be expected to see a health psychologist and a physical therapist at the discretion of my physician for ongoing functional assessment.  I will follow through with any recommendations made at this visit.    2. I will take my medications exactly as prescribed and will not change the medication dosage or schedule without my provider s approval.  Refills will not be given if I  run out early .  3. I will keep all regular appointments at the clinic (this includes nurse appointments and appointments with PT or behavioral medicine).  If I have three or more missed or cancelled appointments my medications may be discontinued.  4. I will not request or accept prescriptions for controlled substances from other physicians or individuals for my chronic pain condition.  If I develop another condition that requires the prescription of a controlled medication or if I am hospitalized for any reason, I will inform the clinic within one business day of receiving any treatment or medications.  5. I will designate one pharmacy where all of my prescriptions will be  filled.  6. I will bring in the containers of all medications prescribed each time I see my provider or a nurse even if there is no medication remaining.  This must be the original container from the pharmacy.  7. Refills of controlled medications will be made only during regular office hours, during a scheduled appointment with my provider or nurse.   8. I am responsible for my prescriptions.  If the medication is lost or stolen, I understand it will not be replaced.  9. I agree to abstain from all illegal and recreational drugs and will provide urine or blood specimens to monitor my compliance.  10. I will notify my nurse or provider immediately if I become pregnant.  11. I understand that controlled medications can affect my thinking and judgment and may interfere with my ability to drive.  I will not drive if I have this concern and will not drive if any dosage adjustments are made until my body has adjusted.        I understand that if I violate any of the above conditions my prescription medications and/or treatment may be terminated.  If the violation includes obtaining any controlled substances from other healthcare providers or individuals a report may be made to my physician, pharmacy, and other authorities including the police.    I have read this agreement and it has been explained to me.  I fully understand the consequences of violating this agreement.        _________________                             ___________                _________________       Patient Signature                                Date                              Witness      _________________  Linda Durant MD
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
## 2021-05-25 ENCOUNTER — RECORDS - HEALTHEAST (OUTPATIENT)
Dept: ADMINISTRATIVE | Facility: CLINIC | Age: 56
End: 2021-05-25

## 2021-05-29 DIAGNOSIS — Z79.4 TYPE 2 DIABETES MELLITUS WITHOUT COMPLICATION, WITH LONG-TERM CURRENT USE OF INSULIN (H): Primary | ICD-10-CM

## 2021-05-29 DIAGNOSIS — E11.9 TYPE 2 DIABETES MELLITUS WITHOUT COMPLICATION, WITH LONG-TERM CURRENT USE OF INSULIN (H): Primary | ICD-10-CM

## 2021-06-01 RX ORDER — EMPAGLIFLOZIN 10 MG/1
TABLET, FILM COATED ORAL
Qty: 90 TABLET | Refills: 3 | Status: SHIPPED | OUTPATIENT
Start: 2021-06-01 | End: 2022-05-09

## 2021-06-01 NOTE — TELEPHONE ENCOUNTER
Routing refill request to provider for review/approval because:  Medication is reported/historical    Woo Pascual RN

## 2021-06-03 NOTE — PROGRESS NOTES
Medication Therapy Management (MTM) Encounter    ASSESSMENT:                            Medication Adherence/Access: No issues identified    Type 2 Diabetes: Patient is not meeting A1c goal of < 7%. However based on her jayla review, it does seem her blood sugar are improved. She had been experiencing hypoglycemia and recently reduced dose, recommend she continue to do so if continues to have lows. Patient should ct to monitor for recurrent yeast infections.      PLAN:                            1. Reviewed labs.  2. A1c future order mid August  3. Recommend reduce Lantus by 10-20% if continues to have lows.  4. Encouraged patient to continue fantastic dietary changes thus far.   5. Continue to monitor for recurrent yeast infections.   6. Per HIM, sent [facility] for ACT. Updated.     Follow-up: No follow-ups on file.      SUBJECTIVE/OBJECTIVE:                          Johnna Caballero is a 55 year old female called for a follow-up visit. She was referred to me from Dr. Durant.  Today's visit is a follow-up MTM visit from 3/12/21.     Reason for visit: she has restarted Jardiance as she feels the yeast infection might not have been due to the medication - Jardiance.  3 yeast infections    Allergies/ADRs: Reviewed in chart  Tobacco: She reports that she has never smoked. She has never used smokeless tobacco.  Alcohol: not currently using  Past Medical History: Reviewed in chart    Medication Adherence/Access: no issues reported    Diabetes:  Pt currently taking metformin IR 1000 mg twice daily, Lantus 42 units qAM, Jardiance 10 mg daily. She decided to restart the Jardiance. She did have a yeast infection 3 times but she thinks it was because the first infection was not cleared up. She is working her obgyn.  She is not post-op from her procedure and reports healing well.   Patient is not experiencing side effects.  SMBG:       Frequency of hypoglycemia? None. Symptoms of low blood sugar - none.    Eye exam: up to date   Foot  exam: due  ACEi/ARB: Yes: losartan.   Diet: her friend is helping her with recipes.  she is experimenting to see which foods are causing elevations, that is typically when she has blood sugar in the 200 from jayla reader. She is having a friend help cook low carb meals for her.   Lab Results   Component Value Date    A1C 8.1 05/17/2021    A1C 8.8 02/22/2021    A1C 10.7 11/25/2020    A1C 9.0 03/02/2020    A1C 7.9 11/14/2019     The 10-year ASCVD risk score (Padmajacheko RUSHING Jr., et al., 2013) is: 3.8%    Values used to calculate the score:      Age: 55 years      Sex: Female      Is Non- : No      Diabetic: Yes      Tobacco smoker: No      Systolic Blood Pressure: 116 mmHg      Is BP treated: Yes      HDL Cholesterol: 43 mg/dL      Total Cholesterol: 158 mg/dL      Today's Vitals: There were no vitals taken for this visit.  ----------------      I spent 28 minutes with this patient today. All changes were made via collaborative practice agreement with Linda Durant. A copy of the visit note was provided to the patient's primary care provider.    The patient was sent via gDecide a summary of these recommendations.     Flip OlivaresD  Medication Therapy Management Pharmacist  Pager#: [phone]    Telemedicine Visit Details  Type of service:  Telephone visit  Start Time: 10:34 AM  End Time: 11:02 AM  Originating Location (patient location): [place]  Distant Location (provider location):  [facility] [place]      Medication Therapy Recommendations  No medication therapy recommendations to display

## 2021-06-04 ENCOUNTER — VIRTUAL VISIT (OUTPATIENT)
Dept: PHARMACY | Facility: CLINIC | Age: 56
End: 2021-06-04
Payer: COMMERCIAL

## 2021-06-04 ENCOUNTER — MYC MEDICAL ADVICE (OUTPATIENT)
Dept: PHARMACY | Facility: CLINIC | Age: 56
End: 2021-06-04

## 2021-06-04 DIAGNOSIS — E11.9 TYPE 2 DIABETES MELLITUS WITHOUT COMPLICATION, WITH LONG-TERM CURRENT USE OF INSULIN (H): Primary | ICD-10-CM

## 2021-06-04 DIAGNOSIS — Z79.4 TYPE 2 DIABETES MELLITUS WITHOUT COMPLICATION, WITH LONG-TERM CURRENT USE OF INSULIN (H): Primary | ICD-10-CM

## 2021-06-04 PROCEDURE — 99607 MTMS BY PHARM ADDL 15 MIN: CPT | Performed by: PHARMACIST

## 2021-06-04 PROCEDURE — 99606 MTMS BY PHARM EST 15 MIN: CPT | Performed by: PHARMACIST

## 2021-06-06 ASSESSMENT — PATIENT HEALTH QUESTIONNAIRE - PHQ9
SUM OF ALL RESPONSES TO PHQ QUESTIONS 1-9: 2
10. IF YOU CHECKED OFF ANY PROBLEMS, HOW DIFFICULT HAVE THESE PROBLEMS MADE IT FOR YOU TO DO YOUR WORK, TAKE CARE OF THINGS AT HOME, OR GET ALONG WITH OTHER PEOPLE: NOT DIFFICULT AT ALL
SUM OF ALL RESPONSES TO PHQ QUESTIONS 1-9: 2

## 2021-06-07 ENCOUNTER — TRANSFERRED RECORDS (OUTPATIENT)
Dept: HEALTH INFORMATION MANAGEMENT | Facility: CLINIC | Age: 56
End: 2021-06-07

## 2021-06-07 ASSESSMENT — PATIENT HEALTH QUESTIONNAIRE - PHQ9: SUM OF ALL RESPONSES TO PHQ QUESTIONS 1-9: 2

## 2021-06-08 ASSESSMENT — ASTHMA QUESTIONNAIRES: ACT_TOTALSCORE: 25

## 2021-06-08 NOTE — PATIENT INSTRUCTIONS
Recommendations from today's MTM visit:                                                      Great job making dietary changes!     Continue to reduce your insulin by 10-20% if you continue to have 2 lows a week.     Please let us know if you have a recurrent yeast infection.    a1c recheck in August.    Follow-up: a1c, then follow-up over the phone 8/27    It was great to speak with you today.  I value your experience and would be very thankful for your time with providing feedback on our clinic survey. You may receive a survey via email or text message in the next few days.     To schedule another MTM appointment, please call the clinic directly or you may call the MTM scheduling line at [phone] or toll-free at [phone].     My Clinical Pharmacist's contact information:                                                      Please feel free to contact me with any questions or concerns you have.      Misty Banuelos, PharmD  Medication Therapy Management Pharmacist

## 2021-06-14 DIAGNOSIS — G89.29 CHRONIC BILATERAL LOW BACK PAIN WITHOUT SCIATICA: ICD-10-CM

## 2021-06-14 DIAGNOSIS — M54.50 CHRONIC BILATERAL LOW BACK PAIN WITHOUT SCIATICA: ICD-10-CM

## 2021-06-15 RX ORDER — CELECOXIB 200 MG/1
CAPSULE ORAL
Qty: 90 CAPSULE | Refills: 2 | Status: SHIPPED | OUTPATIENT
Start: 2021-06-15 | End: 2022-03-21

## 2021-06-15 NOTE — TELEPHONE ENCOUNTER
Prescription approved per [facility] Refill Protocol.    JEREL ZepedaN, RN  [facility]

## 2021-06-17 ENCOUNTER — TRANSFERRED RECORDS (OUTPATIENT)
Dept: HEALTH INFORMATION MANAGEMENT | Facility: CLINIC | Age: 56
End: 2021-06-17

## 2021-07-02 ENCOUNTER — OFFICE VISIT (OUTPATIENT)
Dept: PODIATRY | Facility: CLINIC | Age: 56
End: 2021-07-02
Payer: COMMERCIAL

## 2021-07-02 VITALS
BODY MASS INDEX: 45.04 KG/M2 | WEIGHT: 287 LBS | DIASTOLIC BLOOD PRESSURE: 66 MMHG | SYSTOLIC BLOOD PRESSURE: 114 MMHG | HEIGHT: 67 IN

## 2021-07-02 DIAGNOSIS — E66.01 MORBID OBESITY WITH BMI OF 40.0-44.9, ADULT (H): ICD-10-CM

## 2021-07-02 DIAGNOSIS — E11.42 DIABETIC POLYNEUROPATHY ASSOCIATED WITH TYPE 2 DIABETES MELLITUS (H): Primary | ICD-10-CM

## 2021-07-02 DIAGNOSIS — M21.42 BILATERAL PES PLANUS: ICD-10-CM

## 2021-07-02 DIAGNOSIS — M72.2 PLANTAR FASCIITIS, LEFT: ICD-10-CM

## 2021-07-02 DIAGNOSIS — M21.41 BILATERAL PES PLANUS: ICD-10-CM

## 2021-07-02 DIAGNOSIS — G57.61 MORTON'S NEUROMA OF RIGHT FOOT: ICD-10-CM

## 2021-07-02 PROCEDURE — 20550 NJX 1 TENDON SHEATH/LIGAMENT: CPT | Mod: LT | Performed by: PODIATRIST

## 2021-07-02 PROCEDURE — 99213 OFFICE O/P EST LOW 20 MIN: CPT | Mod: 25 | Performed by: PODIATRIST

## 2021-07-02 PROCEDURE — 64455 NJX AA&/STRD PLTR COM DG NRV: CPT | Mod: RT | Performed by: PODIATRIST

## 2021-07-02 RX ORDER — TRIAMCINOLONE ACETONIDE 40 MG/ML
40 INJECTION, SUSPENSION INTRA-ARTICULAR; INTRAMUSCULAR ONCE
Status: COMPLETED | OUTPATIENT
Start: 2021-07-02 | End: 2021-07-02

## 2021-07-02 RX ADMIN — TRIAMCINOLONE ACETONIDE 40 MG: 40 INJECTION, SUSPENSION INTRA-ARTICULAR; INTRAMUSCULAR at 15:00

## 2021-07-02 ASSESSMENT — MIFFLIN-ST. JEOR: SCORE: 1934.45

## 2021-07-02 NOTE — PROGRESS NOTES
"Podiatry / Foot and Ankle Surgery Progress Note    July 2, 2021    Subject: Patient was seen for follow up on continued left heel pain.  She notes that the last injection lasted about 7 months.  Denies fever, nausea, vomiting.  Pain is now 8 out of 10 again.  She is also having pain to her right foot where she has had the neuroma removed twice.  Wondering what can be done with that.    Objective:  Vitals: /66   Ht 1.702 m (5' 7\")   Wt 130.2 kg (287 lb)   BMI 44.95 kg/m    BMI= Body mass index is 44.95 kg/m .    A1C: 10.7 (11/2020)     General:  Patient is alert and orientated.  NAD  Dermatologic: Skin is intact to both lower extremities without significant lesions, rash or abrasion.  No paronychia or evidence of soft tissue infection is noted.     Vascular: DP & PT pulses are intact & regular bilaterally.   edema and varicosities noted.  CFT and skin temperature is normal to both lower extremities.     Neurologic: Lower extremity sensation is diminished to feet.      Musculoskeletal: Patient is ambulatory without assistive device or brace. Decrease arch height. Decrease dorsiflexion right ankle.  Pain on palpation of the left plantar heel.  Pain on palpation of the right third intermetatarsal space.     ASSESSMENT:    Diabetic polyneuropathy associated with type 2 diabetes mellitus (H)  Plantar fasciitis, left  Bilateral pes planus  Simental's neuroma of right foot  Morbid obesity with BMI of 40.0-44.9, adult (H)     Medical Decision Making:  Reviewed patient's chart in epic.  For the left foot she would like an injection again today for the plantar fasciitis.  We discussed that if this does not help that she will call and we will order an MRI of the ankle.  Also talked about possible gout or systemic arthritis as a cause for pain.  Suggested indomethacin but patient does not want to take that at this time.    As for the right foot I feel that this is neuropathic pain.    She could have a stump neuroma from " previous surgeries.  We will try an injection today to see if it helps with some of the pain otherwise I do not have much to offer her for the right foot.  All questions were answered to patient satisfaction and she will call further questions or concerns.     Procedure: After verbal consent, the patients max point of tenderness was marked out on the left plantar heel.  This area was prepped and draped using sterile technique.  An injection of 1cc of 1% lidocaine plain and 1 1/2 cc of Kenalog-40 was injected into the max point of tenderness.  This was distributed in a fanning motion.  Patient tolerated the procedure and anesthesia well.    Procedure 2:  After verbal consent, the right 3rd metatarsal space was marked out.  The foot was prepped and draped using sterile technique.  A mixture of 1cc of 2% lidocaine plain and 1 1/2 cc of kenalog -40 was injected into the right 3rd intermetatarsl nerve.  Patient tolerated procedure and anesthesia well.           Suzanna Strong DPM, Podiatry/Foot and Ankle Surgery    Recommended to Johnna Caballero to follow up with Primary Care provider regarding elevated blood pressure.

## 2021-07-02 NOTE — LETTER
"    7/2/2021         RE: Johnna Caballero  [address]  [facility] [address]        Dear Colleague,    Thank you for referring your patient, Johnna Caballero, to the [facility] PODIATRY. Please see a copy of my visit note below.    Podiatry / Foot and Ankle Surgery Progress Note    July 2, 2021    Subject: Patient was seen for follow up on continued left heel pain.  She notes that the last injection lasted about 7 months.  Denies fever, nausea, vomiting.  Pain is now 8 out of 10 again.  She is also having pain to her right foot where she has had the neuroma removed twice.  Wondering what can be done with that.    Objective:  Vitals: /66   Ht 1.702 m (5' 7\")   Wt 130.2 kg (287 lb)   BMI 44.95 kg/m    BMI= Body mass index is 44.95 kg/m .    A1C: 10.7 (11/2020)     General:  Patient is alert and orientated.  NAD  Dermatologic: Skin is intact to both lower extremities without significant lesions, rash or abrasion.  No paronychia or evidence of soft tissue infection is noted.     Vascular: DP & PT pulses are intact & regular bilaterally.   edema and varicosities noted.  CFT and skin temperature is normal to both lower extremities.     Neurologic: Lower extremity sensation is diminished to feet.      Musculoskeletal: Patient is ambulatory without assistive device or brace. Decrease arch height. Decrease dorsiflexion right ankle.  Pain on palpation of the left plantar heel.  Pain on palpation of the right third intermetatarsal space.     ASSESSMENT:    Diabetic polyneuropathy associated with type 2 diabetes mellitus (H)  Plantar fasciitis, left  Bilateral pes planus  Simental's neuroma of right foot  Morbid obesity with BMI of 40.0-44.9, adult (H)     Medical Decision Making:  Reviewed patient's chart in epic.  For the left foot she would like an injection again today for the plantar fasciitis.  We discussed that if this does not help that she will call and we will order an MRI of the ankle.  " Also talked about possible gout or systemic arthritis as a cause for pain.  Suggested indomethacin but patient does not want to take that at this time.    As for the right foot I feel that this is neuropathic pain.    She could have a stump neuroma from previous surgeries.  We will try an injection today to see if it helps with some of the pain otherwise I do not have much to offer her for the right foot.  All questions were answered to patient satisfaction and she will call further questions or concerns.     Procedure: After verbal consent, the patients max point of tenderness was marked out on the left plantar heel.  This area was prepped and draped using sterile technique.  An injection of 1cc of 1% lidocaine plain and 1 1/2 cc of Kenalog-40 was injected into the max point of tenderness.  This was distributed in a fanning motion.  Patient tolerated the procedure and anesthesia well.    Procedure 2:  After verbal consent, the right 3rd metatarsal space was marked out.  The foot was prepped and draped using sterile technique.  A mixture of 1cc of 2% lidocaine plain and 1 1/2 cc of kenalog -40 was injected into the right 3rd intermetatarsl nerve.  Patient tolerated procedure and anesthesia well.           Suzanna Strong DPM, Podiatry/Foot and Ankle Surgery    Recommended to Johnna Caballero to follow up with Primary Care provider regarding elevated blood pressure.        Again, thank you for allowing me to participate in the care of your patient.        Sincerely,        Suzanna Strong DPM, Podiatry/Foot and Ankle Surgery

## 2021-07-07 ENCOUNTER — TRANSFERRED RECORDS (OUTPATIENT)
Dept: HEALTH INFORMATION MANAGEMENT | Facility: CLINIC | Age: 56
End: 2021-07-07

## 2021-07-07 LAB — RETINOPATHY: NEGATIVE

## 2021-07-15 ENCOUNTER — TELEPHONE (OUTPATIENT)
Dept: PODIATRY | Facility: CLINIC | Age: 56
End: 2021-07-15

## 2021-07-15 NOTE — TELEPHONE ENCOUNTER
Patient calls stating approximately 90 minutes ago she had kicked a ball on cement while wearing a shoe and somehow stubbed her right great toe. It is very painful and was bleeding around the medial nail bed. She has history of fusion of the same toe in 2009 as well as ingrown nail removal not in the recent past.   Bleeding has stopped and she does not see any open areas on her toe. She was able to ice and elevate her foot.   Offered appointment tomorrow with Dr. Reese at 8:00 am. She was not able to come at that time. Appointment scheduled for 7/20/21 with Dr. Strong with an 8:30 arrival time. Recommended she continue to ice and elevate and can take Tylenol for pain. Recommended if symptoms worsened to go to Urgent Care.   Asked that she call back for further concerns.   She verbalized understanding.     KIM Fernandez RN

## 2021-07-19 DIAGNOSIS — Z79.4 TYPE 2 DIABETES MELLITUS WITHOUT COMPLICATION, WITH LONG-TERM CURRENT USE OF INSULIN (H): ICD-10-CM

## 2021-07-19 DIAGNOSIS — K21.9 GASTROESOPHAGEAL REFLUX DISEASE, UNSPECIFIED WHETHER ESOPHAGITIS PRESENT: ICD-10-CM

## 2021-07-19 DIAGNOSIS — E11.9 TYPE 2 DIABETES MELLITUS WITHOUT COMPLICATION, WITH LONG-TERM CURRENT USE OF INSULIN (H): ICD-10-CM

## 2021-07-20 ENCOUNTER — ANCILLARY PROCEDURE (OUTPATIENT)
Dept: GENERAL RADIOLOGY | Facility: CLINIC | Age: 56
End: 2021-07-20
Attending: PODIATRIST
Payer: COMMERCIAL

## 2021-07-20 ENCOUNTER — OFFICE VISIT (OUTPATIENT)
Dept: PODIATRY | Facility: CLINIC | Age: 56
End: 2021-07-20
Payer: COMMERCIAL

## 2021-07-20 VITALS
SYSTOLIC BLOOD PRESSURE: 114 MMHG | BODY MASS INDEX: 41.44 KG/M2 | WEIGHT: 264 LBS | HEIGHT: 67 IN | DIASTOLIC BLOOD PRESSURE: 80 MMHG

## 2021-07-20 DIAGNOSIS — M79.671 FOOT PAIN, RIGHT: ICD-10-CM

## 2021-07-20 DIAGNOSIS — E11.42 DIABETIC POLYNEUROPATHY ASSOCIATED WITH TYPE 2 DIABETES MELLITUS (H): ICD-10-CM

## 2021-07-20 DIAGNOSIS — M79.671 FOOT PAIN, RIGHT: Primary | ICD-10-CM

## 2021-07-20 DIAGNOSIS — S93.501A SPRAIN OF GREAT TOE OF RIGHT FOOT, INITIAL ENCOUNTER: ICD-10-CM

## 2021-07-20 PROCEDURE — 73630 X-RAY EXAM OF FOOT: CPT | Mod: RT | Performed by: RADIOLOGY

## 2021-07-20 PROCEDURE — 99213 OFFICE O/P EST LOW 20 MIN: CPT | Performed by: PODIATRIST

## 2021-07-20 RX ORDER — MECOBALAMIN 5000 MCG
TABLET,DISINTEGRATING ORAL
Qty: 90 CAPSULE | Refills: 0 | Status: SHIPPED | OUTPATIENT
Start: 2021-07-20 | End: 2021-11-08

## 2021-07-20 RX ORDER — EMPAGLIFLOZIN 10 MG/1
TABLET, FILM COATED ORAL
Qty: 90 TABLET | Refills: 0 | OUTPATIENT
Start: 2021-07-20

## 2021-07-20 ASSESSMENT — MIFFLIN-ST. JEOR: SCORE: 1830.13

## 2021-07-20 NOTE — TELEPHONE ENCOUNTER
Jardiance prescription should have refills on file. Last filled   90 tablet 3 6/1/2021     Lansoprazole Prescription approved per [facility] Refill Protocol.  Sujit ESQUIVEL RN

## 2021-07-20 NOTE — PROGRESS NOTES
"Podiatry / Foot and Ankle Surgery Progress Note    July 20, 2021    Subject: Patient was seen for follow up on new pain to the right great toe.  She states about a week ago she stubbed it on her driveway.  It instantly became purple and bruised.  She continued to have pain this past week which is why she came in to make sure there was no fractures.  Pain is 6-7 out of 10.  Denies fever, nausea, vomiting.    Objective:  Vitals: /80   Ht 1.702 m (5' 7\")   Wt 119.7 kg (264 lb)   BMI 41.35 kg/m       A1C: 8.1 (5/2021)     General:  Patient is alert and orientated.  NAD.    Dermatologic: Ecchymosis noted to the right great toe area.  No open lesions or signs of acute infection noted.     Vascular: DP & PT pulses are intact & regular bilaterally.   edema and varicosities noted.  CFT and skin temperature is normal to both lower extremities.     Neurologic: Lower extremity sensation is diminished to feet.      Musculoskeletal: Patient is ambulatory without assistive device or brace. Decrease arch height. Decrease dorsiflexion right ankle.  Pain on palpation of the left plantar heel.  Pain on palpation of the right third intermetatarsal space.    Radiographs: Right foot x-ray: I have looked at and reviewed xrays personally -there does not appear to be any fracture of the right great toe.  Previously fused IPJ intact.     ASSESSMENT:     Foot pain, right  Diabetic polyneuropathy associated with type 2 diabetes mellitus (H)  Sprain of great toe of right foot, initial encounter    Medical Decision Making/Plan: Reviewed and discussed x-rays with patient.  At this time there is no fractures.  We will have her wear a stiff soled shoe just to help decrease motion of the right great toe as the bruising heals.  As the foot starts to feel better she can start to progress her activity as tolerated.  All questions were answered to patient's satisfaction she will call further questions or concerns.      Patient Risk Factor:  " Patient is a medium risk factor for infection.     Suzanna Strong DPM, Podiatry/Foot and Ankle Surgery    Recommended to Johnna Caballero to follow up with Primary Care provider regarding elevated blood pressure.

## 2021-07-20 NOTE — LETTER
"    7/20/2021         RE: Johnna Caballero  [address]  [facility] [address]        Dear Colleague,    Thank you for referring your patient, Johnna Caballero, to the [facility] PODIATRY. Please see a copy of my visit note below.    Podiatry / Foot and Ankle Surgery Progress Note    July 20, 2021    Subject: Patient was seen for follow up on new pain to the right great toe.  She states about a week ago she stubbed it on her driveway.  It instantly became purple and bruised.  She continued to have pain this past week which is why she came in to make sure there was no fractures.  Pain is 6-7 out of 10.  Denies fever, nausea, vomiting.    Objective:  Vitals: /80   Ht 1.702 m (5' 7\")   Wt 119.7 kg (264 lb)   BMI 41.35 kg/m       A1C: 8.1 (5/2021)     General:  Patient is alert and orientated.  NAD.    Dermatologic: Ecchymosis noted to the right great toe area.  No open lesions or signs of acute infection noted.     Vascular: DP & PT pulses are intact & regular bilaterally.   edema and varicosities noted.  CFT and skin temperature is normal to both lower extremities.     Neurologic: Lower extremity sensation is diminished to feet.      Musculoskeletal: Patient is ambulatory without assistive device or brace. Decrease arch height. Decrease dorsiflexion right ankle.  Pain on palpation of the left plantar heel.  Pain on palpation of the right third intermetatarsal space.    Radiographs: Right foot x-ray: I have looked at and reviewed xrays personally -there does not appear to be any fracture of the right great toe.  Previously fused IPJ intact.     ASSESSMENT:     Foot pain, right  Diabetic polyneuropathy associated with type 2 diabetes mellitus (H)  Sprain of great toe of right foot, initial encounter    Medical Decision Making/Plan: Reviewed and discussed x-rays with patient.  At this time there is no fractures.  We will have her wear a stiff soled shoe just to help decrease motion of the " right great toe as the bruising heals.  As the foot starts to feel better she can start to progress her activity as tolerated.  All questions were answered to patient's satisfaction she will call further questions or concerns.      Patient Risk Factor:  Patient is a medium risk factor for infection.     Suzanna Strong DPM, Podiatry/Foot and Ankle Surgery    Recommended to Johnna Caballero to follow up with Primary Care provider regarding elevated blood pressure.        Again, thank you for allowing me to participate in the care of your patient.        Sincerely,        Suzanna Strong DPM, Podiatry/Foot and Ankle Surgery

## 2021-07-20 NOTE — PATIENT INSTRUCTIONS
Thank you for choosing [facility] Podiatry / Foot & Ankle Surgery!    DR. WALTON'S CLINIC:  [facility] [place] SCHEDULE SURGERY: [phone] [address] BILLING QUESTIONS: [phone]   [place] [address] APPOINTMENTS: [phone]   PH: [phone] CONSUMER PRICE LINE:[phone]   FAX: [phone]

## 2021-08-19 ENCOUNTER — TRANSFERRED RECORDS (OUTPATIENT)
Dept: HEALTH INFORMATION MANAGEMENT | Facility: CLINIC | Age: 56
End: 2021-08-19

## 2021-09-02 ENCOUNTER — TRANSFERRED RECORDS (OUTPATIENT)
Dept: HEALTH INFORMATION MANAGEMENT | Facility: CLINIC | Age: 56
End: 2021-09-02

## 2021-09-05 ENCOUNTER — HEALTH MAINTENANCE LETTER (OUTPATIENT)
Age: 56
End: 2021-09-05

## 2021-09-30 ENCOUNTER — TRANSFERRED RECORDS (OUTPATIENT)
Dept: HEALTH INFORMATION MANAGEMENT | Facility: CLINIC | Age: 56
End: 2021-09-30

## 2021-10-12 ENCOUNTER — MYC MEDICAL ADVICE (OUTPATIENT)
Dept: PEDIATRICS | Facility: CLINIC | Age: 56
End: 2021-10-12

## 2021-10-12 DIAGNOSIS — E11.9 TYPE 2 DIABETES MELLITUS WITHOUT COMPLICATION, WITH LONG-TERM CURRENT USE OF INSULIN (H): ICD-10-CM

## 2021-10-12 DIAGNOSIS — Z79.4 TYPE 2 DIABETES MELLITUS WITHOUT COMPLICATION, WITH LONG-TERM CURRENT USE OF INSULIN (H): ICD-10-CM

## 2021-10-13 NOTE — TELEPHONE ENCOUNTER
Routing refill request to provider for review/approval because:  Drug not active on patient's medication list  Abisai Ricks RN, BSN

## 2021-11-06 DIAGNOSIS — K21.9 GASTROESOPHAGEAL REFLUX DISEASE, UNSPECIFIED WHETHER ESOPHAGITIS PRESENT: ICD-10-CM

## 2021-11-08 RX ORDER — MECOBALAMIN 5000 MCG
TABLET,DISINTEGRATING ORAL
Qty: 90 CAPSULE | Refills: 1 | Status: SHIPPED | OUTPATIENT
Start: 2021-11-08 | End: 2022-01-07

## 2021-11-09 ENCOUNTER — LAB (OUTPATIENT)
Dept: LAB | Facility: CLINIC | Age: 56
End: 2021-11-09
Payer: COMMERCIAL

## 2021-11-09 ENCOUNTER — OFFICE VISIT (OUTPATIENT)
Dept: PEDIATRICS | Facility: CLINIC | Age: 56
End: 2021-11-09
Payer: COMMERCIAL

## 2021-11-09 VITALS
DIASTOLIC BLOOD PRESSURE: 66 MMHG | HEART RATE: 77 BPM | RESPIRATION RATE: 20 BRPM | WEIGHT: 280.8 LBS | BODY MASS INDEX: 44.07 KG/M2 | OXYGEN SATURATION: 98 % | SYSTOLIC BLOOD PRESSURE: 128 MMHG | TEMPERATURE: 98.1 F | HEIGHT: 67 IN

## 2021-11-09 DIAGNOSIS — I10 ESSENTIAL HYPERTENSION: ICD-10-CM

## 2021-11-09 DIAGNOSIS — B37.31 YEAST VAGINITIS: ICD-10-CM

## 2021-11-09 DIAGNOSIS — M19.91 PRIMARY OSTEOARTHRITIS, UNSPECIFIED SITE: ICD-10-CM

## 2021-11-09 DIAGNOSIS — J01.00 ACUTE MAXILLARY SINUSITIS, RECURRENCE NOT SPECIFIED: ICD-10-CM

## 2021-11-09 DIAGNOSIS — E11.65 TYPE 2 DIABETES MELLITUS WITH HYPERGLYCEMIA, WITH LONG-TERM CURRENT USE OF INSULIN (H): Primary | ICD-10-CM

## 2021-11-09 DIAGNOSIS — E11.9 TYPE 2 DIABETES MELLITUS WITHOUT COMPLICATION, WITH LONG-TERM CURRENT USE OF INSULIN (H): ICD-10-CM

## 2021-11-09 DIAGNOSIS — Z79.4 TYPE 2 DIABETES MELLITUS WITH HYPERGLYCEMIA, WITH LONG-TERM CURRENT USE OF INSULIN (H): Primary | ICD-10-CM

## 2021-11-09 DIAGNOSIS — Z85.820 HISTORY OF MELANOMA: ICD-10-CM

## 2021-11-09 DIAGNOSIS — Z79.4 TYPE 2 DIABETES MELLITUS WITHOUT COMPLICATION, WITH LONG-TERM CURRENT USE OF INSULIN (H): ICD-10-CM

## 2021-11-09 DIAGNOSIS — M54.12 CERVICAL RADICULOPATHY: ICD-10-CM

## 2021-11-09 DIAGNOSIS — G89.4 CHRONIC PAIN SYNDROME: ICD-10-CM

## 2021-11-09 LAB
DEPRECATED CALCIDIOL+CALCIFEROL SERPL-MC: 65 UG/L (ref 20–75)
HBA1C MFR BLD: 7.5 % (ref 0–5.6)

## 2021-11-09 PROCEDURE — 36415 COLL VENOUS BLD VENIPUNCTURE: CPT

## 2021-11-09 PROCEDURE — 82306 VITAMIN D 25 HYDROXY: CPT

## 2021-11-09 PROCEDURE — 99214 OFFICE O/P EST MOD 30 MIN: CPT | Performed by: PEDIATRICS

## 2021-11-09 PROCEDURE — 83036 HEMOGLOBIN GLYCOSYLATED A1C: CPT

## 2021-11-09 RX ORDER — OXYCODONE AND ACETAMINOPHEN 5; 325 MG/1; MG/1
1-2 TABLET ORAL EVERY 4 HOURS PRN
Qty: 90 TABLET | Refills: 0 | Status: SHIPPED | OUTPATIENT
Start: 2021-11-09 | End: 2022-05-09

## 2021-11-09 RX ORDER — FLUCONAZOLE 150 MG/1
150 TABLET ORAL ONCE
Qty: 1 TABLET | Refills: 0 | Status: SHIPPED | OUTPATIENT
Start: 2021-11-09 | End: 2021-11-09

## 2021-11-09 ASSESSMENT — MIFFLIN-ST. JEOR: SCORE: 1901.33

## 2021-11-09 NOTE — PROGRESS NOTES
"  Assessment & Plan       ICD-10-CM    1. Type 2 diabetes mellitus with hyperglycemia, with long-term current use of insulin (H)  E11.65 metFORMIN (GLUCOPHAGE) 1000 MG tablet    Control improving.  Plan to continue current medications and restart CGM.   Patient working diligently on dietary changes.      Z79.4    2. Essential hypertension  I10 Well controlled, continue current medications     3. Acute maxillary sinusitis, recurrence not specified  J01.00 amoxicillin-clavulanate (AUGMENTIN) 875-125 MG tablet  To alert me if not improving with treatment     4. Yeast vaginitis  B37.3 fluconazole (DIFLUCAN) 150 MG tablet  For yeast infection she routinely gets with antibiotics     5. Chronic pain syndrome  G89.4 oxyCODONE-acetaminophen (PERCOCET) 5-325 MG tablet    Per CSA - using appropriately     6. Primary osteoarthritis, unspecified site  M19.91 oxyCODONE-acetaminophen (PERCOCET) 5-325 MG tablet   7. History of melanoma  Z85.820 Following with dermatology regularly     8. Cervical radiculopathy  M54.12 Following with orthopedics, anticipate surgery next year     956}     BMI:   Estimated body mass index is 43.98 kg/m  as calculated from the following:    Height as of this encounter: 1.702 m (5' 7\").    Weight as of this encounter: 127.4 kg (280 lb 12.8 oz).   Weight management plan: Discussed healthy diet and exercise guidelines    See Patient Instructions    Return in about 6 months (around 5/9/2022) for Follow up, with me, in person.    Linda Durant MD  [facility] ROGERIO Feng is a 56 year old who presents for the following health issuesHPI     Diabetes Follow-up      How often are you checking your blood sugar? Not at all    What concerns do you have today about your diabetes? None and Other: assumed frequent highs     Do you have any of these symptoms? (Select all that apply)  No numbness or tingling in feet.  No redness, sores or blisters on feet.  No complaints of excessive " "thirst.  No reports of blurry vision.  No significant changes to weight.  Frustration with Elijah sensor falling off repeatedly     Pt reports poss new medication allergy but unsure of med name - will find and send in    BP Readings from Last 2 Encounters:   11/09/21 128/66   07/20/21 114/80     Hemoglobin A1C (%)   Date Value   11/09/2021 7.5 (H)   05/17/2021 8.1 (H)   02/22/2021 8.8 (H)     LDL Cholesterol Calculated (mg/dL)   Date Value   05/17/2021 92   11/25/2020 83       Cervical spine herniation - working with orthopedics.  Has been problematic over the last 3 years.  Needs a cervical fusion - hoping to put this off until next fall    Mood has been in a good place.    Chronic pain related to numerous orthopedic surgeries and joint problems - uses percoset sparingly, on average #90 tabs per 6 months.    Diabetes - had been worried about sugars being high, but A1C today lower!  Currently using Lantus - 46 units daily.  Working on eating lower carbohydrate foods and limiting portions.  Active over the summer.       4 weeks of URI symptoms - sore throat, cough - 4 days of keflex (that she already had at home) didn't help.  Not concerned that this is COVID.  Has had COVID already.    No fevers.    [place] trip this winter Pat[place]/Feb with her parents - looking forward to it.     Thinking about finding seasonal work.    Eye exam this summer with Dr Luque - no concerns.          Review of Systems   Constitutional, HEENT, cardiovascular, pulmonary, gi and gu systems are negative, except as otherwise noted.      Objective    /66 (BP Location: Right arm, Patient Position: Sitting, Cuff Size: Adult Large)   Pulse 77   Temp 98.1  F (36.7  C) (Tympanic)   Resp 20   Ht 1.702 m (5' 7\")   Wt 127.4 kg (280 lb 12.8 oz)   SpO2 98%   BMI 43.98 kg/m    Body mass index is 43.98 kg/m .   Wt Readings from Last 4 Encounters:   11/09/21 127.4 kg (280 lb 12.8 oz)   07/20/21 119.7 kg (264 lb)   07/02/21 130.2 kg (287 lb) "   05/17/21 130.4 kg (287 lb 6.4 oz)       Physical Exam   GENERAL: healthy, alert and no distress  EYES: Eyes grossly normal to inspection, PERRL and conjunctivae and sclerae normal  HENT: normal cephalic/atraumatic, both ears: clear effusion, nasal mucosa edematous , oropharynx clear, oral mucous membranes moist and sinuses: maxillary, frontal tenderness on both sides  NECK: bilateral anterior cervical adenopathy, no asymmetry, masses, or scars and thyroid normal to palpation  RESP: lungs clear to auscultation - no rales, rhonchi or wheezes  CV: regular rate and rhythm, normal S1 S2, no S3 or S4, no murmur, click or rub, no peripheral edema and peripheral pulses strong  PSYCH: mentation appears normal, affect normal/bright      Linda Durant MD

## 2021-11-09 NOTE — PATIENT INSTRUCTIONS
Keep up your great nutrition and movement.   I love the changes we are seeing!    Continue current meds    Fredy if you need new/different sensors

## 2021-11-11 ENCOUNTER — TELEPHONE (OUTPATIENT)
Dept: PEDIATRICS | Facility: CLINIC | Age: 56
End: 2021-11-11
Payer: COMMERCIAL

## 2021-11-11 NOTE — TELEPHONE ENCOUNTER
Per [employer] someone submitted a PA for Freestyle on 11/5/2021 and it has been approved. A PA request was sent to the PA team for Dexcom. Is patient going to be using Freestyle or Dexcom? Here is the approval letter for Freestyle.    Please adviseKatina Prior Authorization Team  Phone: [phone]    PA APPROVED FOR FREESTYLE AND DEXCOM DOES NOT NEED A PA        Prior Authorization Not Needed per Insurance    Medication: DEXCOM G6 CGM- PA NOT NEEDED  Insurance Company: [employer] - Phone [phone] Fax [phone]  Expected CoPay:      [facility] [place], [place] - [address]  Pharmacy Notified:  no  Patient Notified:  no

## 2021-11-11 NOTE — TELEPHONE ENCOUNTER
Prior Authorization Retail Medication Request    Medication/Dose: Dexcom G6 CGM  ICD code (if different than what is on RX):    Previously Tried and Failed:  Self testing  Rationale:  Patient uses Lantus, Metformin & Jardiance. Would like better compliance with glucose testing and decrease A1C to below 7.5.     Insurance Name:  [employer] Ph: [phone]   Insurance ID:  [id number]       Pharmacy Information (if different than what is on RX)  Name:    Phone:

## 2021-11-23 ENCOUNTER — TRANSFERRED RECORDS (OUTPATIENT)
Dept: HEALTH INFORMATION MANAGEMENT | Facility: CLINIC | Age: 56
End: 2021-11-23
Payer: COMMERCIAL

## 2021-12-13 ENCOUNTER — MYC MEDICAL ADVICE (OUTPATIENT)
Dept: PEDIATRICS | Facility: CLINIC | Age: 56
End: 2021-12-13
Payer: COMMERCIAL

## 2021-12-13 ENCOUNTER — E-VISIT (OUTPATIENT)
Dept: PEDIATRICS | Facility: CLINIC | Age: 56
End: 2021-12-13
Payer: COMMERCIAL

## 2021-12-13 DIAGNOSIS — N76.0 BACTERIAL VAGINOSIS: ICD-10-CM

## 2021-12-13 DIAGNOSIS — B96.89 BACTERIAL VAGINOSIS: ICD-10-CM

## 2021-12-13 DIAGNOSIS — B37.31 CANDIDAL VULVOVAGINITIS: ICD-10-CM

## 2021-12-13 PROCEDURE — 99421 OL DIG E/M SVC 5-10 MIN: CPT | Performed by: PEDIATRICS

## 2021-12-13 RX ORDER — FLUCONAZOLE 150 MG/1
150 TABLET ORAL ONCE
Qty: 1 TABLET | Refills: 0 | Status: SHIPPED | OUTPATIENT
Start: 2021-12-13 | End: 2021-12-13

## 2021-12-13 RX ORDER — METRONIDAZOLE 7.5 MG/G
1 GEL VAGINAL AT BEDTIME
Qty: 70 G | Refills: 0 | Status: SHIPPED | OUTPATIENT
Start: 2021-12-13 | End: 2022-09-13

## 2021-12-13 RX ORDER — METRONIDAZOLE 500 MG/1
500 TABLET ORAL 2 TIMES DAILY
Qty: 14 TABLET | Refills: 0 | Status: SHIPPED | OUTPATIENT
Start: 2021-12-13 | End: 2021-12-20

## 2021-12-13 RX ORDER — TERCONAZOLE 80 MG/1
80 SUPPOSITORY VAGINAL AT BEDTIME
Qty: 3 SUPPOSITORY | Refills: 0 | Status: SHIPPED | OUTPATIENT
Start: 2021-12-13 | End: 2021-12-16

## 2021-12-14 NOTE — PATIENT INSTRUCTIONS
Thank you for choosing us for your care. I have placed an order for a prescription so that you can start treatment. View your full visit summary for details by clicking on the link below. Your pharmacist will able to address any questions you may have about the medication.     If you re not feeling better within 2-3 days, please schedule an appointment.  You can schedule an appointment right here in [place], or call [phone]  If the visit is for the same symptoms as your eVisit, we ll refund the cost of your eVisit if seen within seven days.      Yeast Infection (Candida Vaginal Infection)    You have a Candida vaginal infection. This is also known as a yeast infection. It's most often caused by a type of yeast (fungus) called Candida. Candida are normally found in the vagina. But if they increase in number, this can lead to infection and cause symptoms.   Symptoms of a yeast infection can include:     Clumpy or thin, white discharge, which may look like cottage cheese    Itching or burning    Burning with urination  Certain factors can make a yeast infection more likely. These can include:     Taking certain medicines, such as antibiotics or birth control pills    Pregnancy    Diabetes    Weak immune system  A yeast infection is most often treated with antifungal medicine. This may be given as a vaginal cream or pills you take by mouth. Treatment may last for about 1 to 7 days. Women with severe or recurrent infections may need longer courses of treatment.   Home care    If you re prescribed medicine, be sure to use it as directed. Finish all of the medicine, even if your symptoms go away. Don t try to treat yourself using over-the-counter products without talking with your provider first. They will let you know if this is a good option for you.    Ask your provider what steps you can take to help reduce your risk of having a yeast infection in the future.    Follow-up care  Follow up with your healthcare  provider, or as directed.   When to seek medical advice  Call your healthcare provider right away if:     You have a fever of 100.4 F (38 C) or higher, or as directed by your provider.    Your symptoms worsen, or they don t go away within a few days of starting treatment.    You have new pain in the lower belly or pelvic region.    You have side effects that bother you or a reaction to the cream or pills you re prescribed.    You or any partners you have sex with have new symptoms, such as a rash, joint pain, or sores.  [employer] last reviewed this educational content on 2020-2021 [employer]. All rights reserved. This information is not intended as a substitute for professional medical care. Always follow your healthcare professional's instructions.          Bacterial Vaginosis    You have a vaginal infection called bacterial vaginosis (BV). Both good and bad bacteria are present in a healthy vagina. BV occurs when these bacteria get out of balance. The number of bad bacteria increase. And the number of good bacteria decrease. BV is linked with sexual activity, but it's not a sexually transmitted infection (STI).   BV may or may not cause symptoms. If symptoms do occur, they can include:     Thin, gray, milky-white, or sometimes green discharge    Unpleasant odor or  fishy  smell    Itching, burning, or pain in or around the vagina  It is not known what causes BV, but certain factors can make the problem more likely. These can include:     Douching    Spermicides    Use of antibiotics    Change in hormone levels with pregnancy, breastfeeding, or menopause    Having sex with a new partner    Having sex with more than one partner  BV will sometimes go away on its own. But treatment is often advised. This is because untreated BV can raise the risk of more serious health problems such as:     Pelvic inflammatory disease (PID)     delivery (giving birth to a baby early if you re  pregnant)    HIV and some other sexually transmitted infections (STIs)    Infection after surgery on the reproductive organs  Home care  General care    BV is most often treated with medicines called antibiotics. These may be given as pills or as a vaginal cream. If antibiotics are prescribed, be sure to use them exactly as directed. And complete all of the medicine, even if your symptoms go away.    Don't douche or having sex during treatment.    If you have sex with a female partner, ask your healthcare provider if she should also be treated.  Prevention    Don't douche.    Don't have sex. If you do have sex, then take steps to lower your risk:  ? Use condoms when having sex.  ? Limit the number of sex partners you have.    Follow-up care  Follow up with your healthcare provider, or as advised.   When to get medical advice  Call your healthcare provider right away if:     You have a fever of 100.4 F (38 C) or higher, or as directed by your provider.    Your symptoms get worse, or they don t go away within a few days of starting treatment.    You have new pain in the lower belly or pelvic region.    You have side effects that bother you or a reaction to the pills or cream you re prescribed.    You or any of your sex partners have new symptoms, such as a rash, joint pain, or sores.  [employer] last reviewed this educational content on 6/1/2020 2000-2021 [employer]. All rights reserved. This information is not intended as a substitute for professional medical care. Always follow your healthcare professional's instructions.

## 2021-12-27 ENCOUNTER — TRANSFERRED RECORDS (OUTPATIENT)
Dept: HEALTH INFORMATION MANAGEMENT | Facility: CLINIC | Age: 56
End: 2021-12-27
Payer: COMMERCIAL

## 2021-12-28 ENCOUNTER — OFFICE VISIT (OUTPATIENT)
Dept: PODIATRY | Facility: CLINIC | Age: 56
End: 2021-12-28
Payer: COMMERCIAL

## 2021-12-28 VITALS
WEIGHT: 286 LBS | BODY MASS INDEX: 44.89 KG/M2 | HEIGHT: 67 IN | SYSTOLIC BLOOD PRESSURE: 126 MMHG | DIASTOLIC BLOOD PRESSURE: 72 MMHG

## 2021-12-28 DIAGNOSIS — M72.2 PLANTAR FASCIITIS, LEFT: ICD-10-CM

## 2021-12-28 DIAGNOSIS — M79.671 FOOT PAIN, BILATERAL: ICD-10-CM

## 2021-12-28 DIAGNOSIS — M79.672 FOOT PAIN, BILATERAL: ICD-10-CM

## 2021-12-28 DIAGNOSIS — G57.61 MORTON'S NEUROMA OF RIGHT FOOT: ICD-10-CM

## 2021-12-28 DIAGNOSIS — E11.42 DIABETIC POLYNEUROPATHY ASSOCIATED WITH TYPE 2 DIABETES MELLITUS (H): Primary | ICD-10-CM

## 2021-12-28 PROCEDURE — 99213 OFFICE O/P EST LOW 20 MIN: CPT | Mod: 25 | Performed by: PODIATRIST

## 2021-12-28 PROCEDURE — 20550 NJX 1 TENDON SHEATH/LIGAMENT: CPT | Mod: LT | Performed by: PODIATRIST

## 2021-12-28 RX ORDER — TRIAMCINOLONE ACETONIDE 40 MG/ML
40 INJECTION, SUSPENSION INTRA-ARTICULAR; INTRAMUSCULAR
Status: DISCONTINUED | OUTPATIENT
Start: 2021-12-28 | End: 2023-11-13

## 2021-12-28 RX ORDER — BUPIVACAINE HYDROCHLORIDE 5 MG/ML
2 INJECTION, SOLUTION EPIDURAL; INTRACAUDAL
Status: DISCONTINUED | OUTPATIENT
Start: 2021-12-28 | End: 2022-12-07

## 2021-12-28 RX ADMIN — BUPIVACAINE HYDROCHLORIDE 2 ML: 5 INJECTION, SOLUTION EPIDURAL; INTRACAUDAL at 14:00

## 2021-12-28 RX ADMIN — TRIAMCINOLONE ACETONIDE 40 MG: 40 INJECTION, SUSPENSION INTRA-ARTICULAR; INTRAMUSCULAR at 14:00

## 2021-12-28 ASSESSMENT — MIFFLIN-ST. JEOR: SCORE: 1924.92

## 2021-12-28 NOTE — LETTER
"    12/28/2021         RE: Johnna Caballero  [address]  [facility] [address]        Dear Colleague,    Thank you for referring your patient, Johnna Caballero, to the [facility] PODIATRY. Please see a copy of my visit note below.    Podiatry / Foot and Ankle Surgery Progress Note    December 28, 2021    Subject: Patient was seen for recurrent pain to the left heel.  She notes that the injection lasted for about 5 to 6 months.  She is starting to get pain in the heel again and would like another injection.  Also notes pain and numbness to the right foot when she is out in the cold.  She is wondering if anything can be done for that.    Objective:  Vitals: Ht 1.702 m (5' 7\")   BMI 43.98 kg/m    BMI= Body mass index is 43.98 kg/m .    A1C: 8.1 (5/2021)     Dermatologic: Skin is intact to both lower extremities without significant lesions, rash or abrasion.  No paronychia or evidence of soft tissue infection is noted.     Vascular: DP & PT pulses are intact & regular bilaterally.   edema and varicosities noted.  CFT and skin temperature is normal to both lower extremities.     Neurologic: Lower extremity sensation is diminished to feet.      Musculoskeletal: Patient is ambulatory without assistive device or brace. Decrease arch height. Decrease dorsiflexion right ankle.  Pain on palpation of the left plantar heel.  Pain on palpation of the right third intermetatarsal space.     ASSESSMENT:    Diabetic polyneuropathy associated with type 2 diabetes mellitus (H)  Plantar fasciitis, left  Bilateral pes planus  Simental's neuroma of right foot  Morbid obesity with BMI of 40.0-44.9, adult (H)     Medical Decision Making:  Reviewed patient's chart in epic.  For the left foot she would like an injection again today for the plantar fasciitis.    She notes the injections continue to help with pain and she is interested in surgery as she had 2 procedures for plantar fasciitis on her right foot and it did " not help.  Also discussed that besides the injections and surgery I do not have any other options for the patient.     As for the right foot I feel that this is neuropathic pain.      Will order compounding cream of 1% lidocaine and nifedipine that she can apply to the feet for this.  It appears to be triggered by the cold. All questions were answered to patient satisfaction and she will call further questions or concerns.     Procedure: Medium Joint Injection/Arthrocentesis: L ankle    Date/Time: 12/28/2021 2:00 PM  Performed by: Suzanna Strong DPM, Podiatry/Foot and Ankle Surgery  Authorized by: Suzanna Strong DPM, Podiatry/Foot and Ankle Surgery     Indications:  Pain  Needle Size:  25 G  Guidance: surface landmarks    Approach:  Medial  Location:  Ankle  Location comment:  Left plantar fascial injection  Site:  L ankle  Medications:  40 mg triamcinolone 40 MG/ML; 2 mL bupivacaine (PF) 0.5 %  Outcome:  Tolerated well, no immediate complications  Procedure discussed: discussed risks, benefits, and alternatives    Consent Given by:  Patient  Timeout: timeout called immediately prior to procedure    Prep: patient was prepped and draped in usual sterile fashion             Patient Risk Factor:  Patient is a medium risk factor for infection.      Suzanna Strong DPM, Podiatry/Foot and Ankle Surgery        Again, thank you for allowing me to participate in the care of your patient.        Sincerely,        Suzanna Strong DPM, Podiatry/Foot and Ankle Surgery

## 2021-12-28 NOTE — PATIENT INSTRUCTIONS
Thank you for choosing [facility] Podiatry / Foot & Ankle Surgery!    DR. WALTON'S CLINIC:  [facility]   [address]   [place] [address]   [phone]  [phone]      SCHEDULE SURGERY: [phone]   BILLING QUESTIONS: [phone]   APPOINTMENTS: [phone]   RADIOLOGY: [phone]   CONSUMER PRICE LINE: [phone]      Follow up: as needed    Next steps: Continue wearing supportive shoes and sandals.  Not going barefoot or in socks.  Continue stretches to the feet.    PLANTAR FASCIITIS  Plantar fasciitis is often referred to as heel spurs or heel pain. Plantar fasciitis is a very common problem that affects people of all foot shapes, age, weight and activity level. Pain may be in the arch or on the weight-bearing surface of the heel. The pain may come on without injury or identifiable cause. Pain is generally present when first getting out of bed in the morning or up from a seated break.     CAUSES  The plantar fascia is a dense fibrous band of tissue that stretches across the bottom surface of the foot. The fascia helps support the foot muscles and arch. Plantar fasciitis is thought to be caused by mechanical strain or overload. Frequent walking without shoes or wearing unsupportive shoes is thought to cause structural overload and ultimately inflammation of the plantar fascia. Some people have heel spurs that can be seen on x-ray. The heel spur is actually a minor component of plantar fascitis and is largely ignored.       SELF TREATMENT   The easiest solution is to stop walking around your home without shoes. Plantar fasciitis is largely a shoe problem. Shoes are either not being worn often enough or your current shoes are inadequate for your weight, foot structure or activity level. The majority of shoes on the market today are not sufficient to resist development of plantar fasciitis or to promote healing. Assume that your current shoes are inadequate and will  need to be replaced. Even high quality shoes wear out with 6 months to one year of frequent use. Weight loss is another option. Losing ten pounds in the next two months may be enough to resolve the problem. Ice applied to the area of pain two to three times per day for ten minutes each session can be very helpful. Warm foot soaks in epsom salts can also relieve pain. This should continue until the problem resolves. Achilles tendon stretching is essential. Stretch multiple times daily to promote healing and to prevent recurrence in the future. Over all stretching of the body is helpful as well such as the calves, thighs and lower back. Normally when one area of the body is tight, other areas are too. Gentle Yoga can be good for this.     Over the counter topical anti inflammatories can be helpful such as biofreeze, bengay, salon pas, ect...  Oral ibuprofen or aleve is recommended as well to try to calm down inflammation.     Night splints can be helpful to gradually stretch the foot at night as a lot of pain is when you get up in the morning. Taking a towel or thera band and stretching the foot back multiple times before you get ou of bed can be beneficial as well.     MEDICAL TREATMENT  Medical treatments often include custom arch supports, cortisone injections, physical therapy, splints to be worn in bed, prescription medications and surgery. The home treatments listed above will be necessary regardless of these advanced medical treatments. Surgery is rarely needed but is very helpful in selected cases.     PROGNOSIS  Plantar fasciitis can last from one day to a lifetime. Some people get intermittent fascitis that is very short-lived. Others suffer daily for years. Excessive body weight, frequent bare foot walking, long hours on the feet, inadequate shoes, predisposing foot structures and excessive activity such as running are all potential issues that lead to chronic and/or recurring plantar fascitis. Having  plantar fasciitis means that you are forever prone to this problem and will require modification of some of the above factors. Most people seek treatment within one to four months. Healing usually requires a similar one to four month time frame. Healing time is relative to the amount of effort spent treating the problem.   Plantar fasciitis is highly recurrent. Risk factors often continue, including return to bare foot walking, inadequate shoes, excessive body weight, excessive activities, etc. Your life style and foot structure may predispose you to recurrent plantar fasciitis. A daily prevention regimen can be very helpful. Ongoing use of shoe inserts, careful attention to appropriate shoes, daily Achilles stretching, etc. may prevent recurrence. Prompt attention at the earliest warning signs of heel pain can resolve the problem in as short as a few days.     EXERCISES  Stair Exercise: Step on the stairs with the ball of your foot and hold your position for at least 15 seconds, then slowly step down with the heels of your foot. You can do this daily and as often as you want.   Picking the Towel: Sit comfortably and then pick the towel up with your toes. You can use any object other than a towel as long as the material can be soft and you can pick it up with your toes.  Rolling the Bottle: Use a small ball or frozen water bottle and then roll it around with your foot.   Flex the Toes: Sit comfortably and then flex your toes by pointing it towards the floor or towards your body. This will relax and flex your foot and exercise your plantar fascia, the calf, and the Achilles tendon. The inability of the foot to stretch often causes the bunching up of the plantar fascia area leading to the pain.  Calf/Achilles Stretching: Lay on you back and raise one foot, then point your toes towards the floor. See photo below:               Hold each stretch for 10 seconds. Stretch 10 times per set, three sets per day. Morning,  "afternoon and evening. If your heel pain is very severe in the morning, consider doing the first set of stretches before you get out of bed.      OVER THE COUNTER INSERT RECOMMENDATIONS  [employer]   Sofsole Fit Spenco   Power Step   Walk-Fit Arch Cradles     Most of these can be found at your local [employer], [employer], or online.  **A good high quality over the counter insert should cost around $40-$50      [employer] LOCATIONS  [place]  [address]  [phone]   [place] [address]  [phone] [place]  [address]  [phone]   [place]  [address]  [phone]   [place]  [address]  [phone] [place]  [address] [place] [address] [place]  [phone]   [place]  [address]  [phone]   [place]  [address] [address]  [phone] [place]  [address] [phone]       DIABETES AND YOUR FEET  Diabetes can result in several problems in the feet including ulcers (open sores) and amputations. Two of the most important reasons why people develop foot problems when they have diabetes is : 1. Neuropathy (loss of feeling)  2. Vascular disease (loss or decrease of blood flow).    Neuropathy is a term used to describe a loss of nerve function.  Patients with diabetes are at risk of developing neuropathy if their sugars continue to run high and are above the normal value. One theory for neuropathy is that the \"extra\" sugar in the body enters the nerves and is broken down. These by-products build up in the nerve causing it to swell and impairing nerve function. Often times, this can be prevented by controlling your sugars, dieting and exercise.    When a person develops neuropathy, they usually begin to feel numbness or tingling in their feet and sometime in their legs.  Other symptoms may include painful burning or hot feet, tingling or feeling like insects or ants are crawling on your feet or legs.  If the diabetes is " sever and the sugars run high for long periods of time, neuropathy can also occur in the hands.    Vascular disease  is a term used to describe a loss or decrease in circulation (blood flow). There is a problem in getting blood and oxygen to areas that need it. Similar to neuropathy, sugars can build up in the walls of the arteries (blood vessels) and cause them to become swollen, thickened and hardened. This decreases the amount of blood that can go to an area that needs it. Though this is common in the legs of diabetic patients, it can also affect other arteries (blood vessels) in the body such as in the heart and eyes.    In the legs, vascular disease usually results in cramping. Patients who develop leg cramps after walking the same distance every time (i.e. One block, half a mile, ect.) need to let their doctors know so that their circulation may be checked. Cramps causing severe pain in the feet and/or legs while sleeping and the cramps go away when you stand or hang your legs off the side of the bed, may also be a sign of poor blood circulation.  Occasional cramping in cold weather or on rare occasions with activity may not be due to poor circulation, but you should inform your doctor.    PREVENTION OF THESE DISEASES  The key to prevention is good blood sugar control. Poor blood sugar control is a big reason many of these problems start. Physical activity (exercise) is a very good way to help decrease your blood sugars. Exercise can lower your blood sugar, blood pressure, and cholesterol. It also reduces your risk for heart disease and stroke, relieves stress, and strengthens your heart, muscles and bones.  In addition, regular activity helps insulin work better, improves your blood circulation, and keeps your joints flexible. If you're trying to lose weight, a combination of exercise and wise food choices can help you reach your target weight and maintain it.      PAIN MANAGEMENT (**Please speak with your  "primary doctor about any medications**)  1.Blood Sugar Control - Most important  2. Medications such as:  Amytriptylline, duloxetine, gabapentin, lyrica, tramadol (talk with your primary care doctor about this).     NUTRITION:  Nutrition is also important to help with healing. If your body does not have what it needs, it can't heal.   1. Increasing your protein intake is important.  With wounds you need 60-90gm of protein a day to help with healing. Over the counter protein shakes such as Dave, Glucerna, Ensure, ect... can help to supplement your daily protein intake.   2. It is also important to take Vitamins to help with healing.  Vitamins such as B12, B6 and Vitamin D3 are important for healing. These can be gotten over the counter at pharmacies or at stores like [employer] or the [employer].    I can also prescribe a dietary supplement called \"Rheumate\" that has a lot of essential vitamins in one capsule.  This may not be covered by insurance though.     FOOT CARE RECOMMENDATIONS   1. Wash your feet with lukewarm water and a mild soap and then dry them thoroughly, especially between the toes.     2. Examine your feet daily looking for cuts, corns, blisters, cracks, ect, especially after wearing new shoes. Make sure to look between your toes. If you cannot see the bottom of your feet, set a mirror on the floor and hold your foot over it, or ask a spouse, friend or family member to examine your feet for you. Contact your doctor immediately if new problems are noted or if sores are not healing.     3. Immediately apply moisturizer to the tops and bottoms of your feet, avoiding areas between the toes. Hand lotion (Intesive Care, Juany, Eucerin, Neutrogena, Curel, ect) is sufficient unless your doctor prescribes a medicated lotion. Apply sunscreen to your feet when going swimming outside.     4. Use clean comfortable shoes, wear white socks (if you have any bleeding or drainage, you will see it on white socks). Socks " should not have thick seams or cut off the circulation around the leg. Break in new shoes slowly and rotate with older shoes until broken in. Check the inside of your shoes with your hand to look for areas of irritation or objects that may have fallen into your shoes.       5. Keep slippers by the side of your bed for use during the night.     6.  Shoes should be fitted by a professional and should not cause areas of irritation.  Check your feet regularly when wearing a new pair of shoes and replace them as needed.     7.  Talk to your doctor about proper exercise. Exercise and stretching stimulate blood flow to your feet and maintain proper glucose levels.     8.  Monitor your blood glucose level as instructed by your doctor. Notify your doctor immediately if your blood sugar is abnormally high or low.    9. Cut your nails straight across, but then gently round any sharp edges with a cardboard nail file. If you have neuropathy, peripheral vascular disease or cannot see that well to trim your own toenails contact [employer] ([phone]) or [employer] ([phone]).      THINGS TO AVOID DOING   1.  Do not soak your feet if you have an open sore. Use only lukewarm water and always check the temperature with your hand as hot water can easily burn your feet.       2.  Never use a hot water bottle or heating pad on your feet. Also do not apply cold compresses to your feet. With decreased sensation, you could burn or freeze your feet.       3.  Do not apply any of these to your feet:    -  Over the counter medicine for corns or warts    -  Harsh chemicals like boric acid    -  Do not self-treat corns, cuts, blisters or infections. Always consult your doctor.       4.  Do not wear sandals, slippers or walk barefoot, especially on hot sand or concrete or other harsh surfaces.     5.  If you smoke, stop!!!            Flu vaccines are now available at all [facility] clinics and retail pharmacies across the [place]  [facility]. Appointments are required for clinic locations. To schedule an appointment online, please log into [employer] or create an account if you are a new user. You can also call [phone], or simply walk in at one of the [facility] retail pharmacy locations.

## 2021-12-28 NOTE — PROGRESS NOTES
"Podiatry / Foot and Ankle Surgery Progress Note    December 28, 2021    Subject: Patient was seen for recurrent pain to the left heel.  She notes that the injection lasted for about 5 to 6 months.  She is starting to get pain in the heel again and would like another injection.  Also notes pain and numbness to the right foot when she is out in the cold.  She is wondering if anything can be done for that.    Objective:  Vitals: Ht 1.702 m (5' 7\")   BMI 43.98 kg/m    BMI= Body mass index is 43.98 kg/m .    A1C: 8.1 (5/2021)     Dermatologic: Skin is intact to both lower extremities without significant lesions, rash or abrasion.  No paronychia or evidence of soft tissue infection is noted.     Vascular: DP & PT pulses are intact & regular bilaterally.   edema and varicosities noted.  CFT and skin temperature is normal to both lower extremities.     Neurologic: Lower extremity sensation is diminished to feet.      Musculoskeletal: Patient is ambulatory without assistive device or brace. Decrease arch height. Decrease dorsiflexion right ankle.  Pain on palpation of the left plantar heel.  Pain on palpation of the right third intermetatarsal space.     ASSESSMENT:    Diabetic polyneuropathy associated with type 2 diabetes mellitus (H)  Plantar fasciitis, left  Bilateral pes planus  Simental's neuroma of right foot  Morbid obesity with BMI of 40.0-44.9, adult (H)     Medical Decision Making:  Reviewed patient's chart in epic.  For the left foot she would like an injection again today for the plantar fasciitis.    She notes the injections continue to help with pain and she is interested in surgery as she had 2 procedures for plantar fasciitis on her right foot and it did not help.  Also discussed that besides the injections and surgery I do not have any other options for the patient.     As for the right foot I feel that this is neuropathic pain.      Will order compounding cream of 1% lidocaine and nifedipine that she can " apply to the feet for this.  It appears to be triggered by the cold. All questions were answered to patient satisfaction and she will call further questions or concerns.     Procedure: Medium Joint Injection/Arthrocentesis: L ankle    Date/Time: 12/28/2021 2:00 PM  Performed by: Suzanna Strong DPM, Podiatry/Foot and Ankle Surgery  Authorized by: Suzanna Strong DPM, Podiatry/Foot and Ankle Surgery     Indications:  Pain  Needle Size:  25 G  Guidance: surface landmarks    Approach:  Medial  Location:  Ankle  Location comment:  Left plantar fascial injection  Site:  L ankle  Medications:  40 mg triamcinolone 40 MG/ML; 2 mL bupivacaine (PF) 0.5 %  Outcome:  Tolerated well, no immediate complications  Procedure discussed: discussed risks, benefits, and alternatives    Consent Given by:  Patient  Timeout: timeout called immediately prior to procedure    Prep: patient was prepped and draped in usual sterile fashion             Patient Risk Factor:  Patient is a medium risk factor for infection.      Suzanna Strong DPM, Podiatry/Foot and Ankle Surgery

## 2022-01-03 ENCOUNTER — TELEPHONE (OUTPATIENT)
Dept: ORTHOPEDICS | Facility: CLINIC | Age: 57
End: 2022-01-03
Payer: COMMERCIAL

## 2022-01-03 DIAGNOSIS — G89.29 CHRONIC HEEL PAIN, LEFT: Primary | ICD-10-CM

## 2022-01-03 DIAGNOSIS — E11.42 DIABETIC POLYNEUROPATHY ASSOCIATED WITH TYPE 2 DIABETES MELLITUS (H): ICD-10-CM

## 2022-01-03 DIAGNOSIS — M79.672 CHRONIC HEEL PAIN, LEFT: Primary | ICD-10-CM

## 2022-01-03 NOTE — TELEPHONE ENCOUNTER
Pharmacy called about clarification on the medicine. They need the dose/strength of the meds. Please give them a call. At [phone] thx

## 2022-01-03 NOTE — TELEPHONE ENCOUNTER
Please advise regarding 12/28/21 prescription for compounded medication. See note below of clarification needed from pharmacy.     KIM Fernandez RN

## 2022-01-03 NOTE — TELEPHONE ENCOUNTER
Patient called second time about her foot pain difficult to walk. Please give her a call back . Thank you

## 2022-01-04 NOTE — TELEPHONE ENCOUNTER
Spoke with pharmacist.  Will do 1% of the lidocaine and 0.2% of nifedipine.     Suzanna Strong DPM

## 2022-01-04 NOTE — TELEPHONE ENCOUNTER
"Phone call to patient and apologized for the delay in getting back to her.   She was currently cutting up a tree in her driveway that had fallen. [place] and has to remove it in order to get out of the driveway.     Per chart review, she had a cortisone injection of her left ankle on 12/28/21 for left heel pain.     She states her pain is a little better today. However, the pain was worse after the injection for 4 days. She iced twice daily without much relief. She denies swelling or redness of the injection site. Pain is now back to where it was prior to the injection.   It feels like a sharp pain \"like I'm stepping on a nail\" whenever she bears weight.     Per chart review, note stated \" Also discussed that besides the injections and surgery I do not have any other options for the patient.     Patient informed of the above. She asked if surgeries are being done right now with COVID19. Let her know it was based on necessity and bed availability.   Will discuss options with provider and get back with her.     Please advise if surgery is an option for patient.     KIM Fernandez RN    "

## 2022-01-04 NOTE — TELEPHONE ENCOUNTER
I'm concerned that she may have torn her plantar fascia especially if she has been doing a bunch of physical activity the last few days like cutting up trees. That could be why it is more sore.     I recommend a tall aircast boot at this time to wear at all times when walking and recommend MRI of the left ankle to assess for plantar fascial tear.     Will put in an order for the boot and the MRI.  Please let patient know and we will call her with the results.  If there is a tear of the plantar fascia, would definitely recommend surgery to complete the cut.    She can get the boot at [facility] in [place] and schedule the mri of the ankle at:  [phone].    Thanks,    Suzanna Strong DPM

## 2022-01-04 NOTE — TELEPHONE ENCOUNTER
Return call to patient.   Informed patient of Dr. Strong's recommendations.     Patient requested MRI to be sent to [facility] due to wanting OPEN MRI due to claustrophobia.     Patient informed that due to images needed of ankle, she should not have to enter tube fully. She requested we send orders to [facility].   Fax confirmed via Right[employer]ax.    Patient also confirmed she has tall pneumatic walking boot at home, and will start wearing.     Rita Au, ATC

## 2022-01-05 ENCOUNTER — TRANSFERRED RECORDS (OUTPATIENT)
Dept: HEALTH INFORMATION MANAGEMENT | Facility: CLINIC | Age: 57
End: 2022-01-05
Payer: COMMERCIAL

## 2022-01-07 ENCOUNTER — OFFICE VISIT (OUTPATIENT)
Dept: PEDIATRICS | Facility: CLINIC | Age: 57
End: 2022-01-07
Payer: COMMERCIAL

## 2022-01-07 VITALS
BODY MASS INDEX: 44.32 KG/M2 | TEMPERATURE: 97.6 F | SYSTOLIC BLOOD PRESSURE: 138 MMHG | RESPIRATION RATE: 16 BRPM | DIASTOLIC BLOOD PRESSURE: 85 MMHG | WEIGHT: 283 LBS | HEART RATE: 78 BPM | OXYGEN SATURATION: 98 %

## 2022-01-07 DIAGNOSIS — F33.0 MAJOR DEPRESSIVE DISORDER, RECURRENT EPISODE, MILD (H): ICD-10-CM

## 2022-01-07 DIAGNOSIS — R10.13 EPIGASTRIC PAIN: ICD-10-CM

## 2022-01-07 DIAGNOSIS — N89.8 VAGINAL ITCHING: Primary | ICD-10-CM

## 2022-01-07 DIAGNOSIS — B37.31 CANDIDIASIS OF VAGINA: ICD-10-CM

## 2022-01-07 DIAGNOSIS — K21.9 GASTROESOPHAGEAL REFLUX DISEASE, UNSPECIFIED WHETHER ESOPHAGITIS PRESENT: ICD-10-CM

## 2022-01-07 DIAGNOSIS — E11.42 DIABETIC POLYNEUROPATHY ASSOCIATED WITH TYPE 2 DIABETES MELLITUS (H): ICD-10-CM

## 2022-01-07 LAB
CLUE CELLS: ABNORMAL
ERYTHROCYTE [DISTWIDTH] IN BLOOD BY AUTOMATED COUNT: 12.8 % (ref 10–15)
HCT VFR BLD AUTO: 48.4 % (ref 35–47)
HGB BLD-MCNC: 15.6 G/DL (ref 11.7–15.7)
LIPASE SERPL-CCNC: 124 U/L (ref 73–393)
MCH RBC QN AUTO: 28.6 PG (ref 26.5–33)
MCHC RBC AUTO-ENTMCNC: 32.2 G/DL (ref 31.5–36.5)
MCV RBC AUTO: 89 FL (ref 78–100)
PLATELET # BLD AUTO: 312 10E3/UL (ref 150–450)
RBC # BLD AUTO: 5.45 10E6/UL (ref 3.8–5.2)
TRICHOMONAS, WET PREP: ABNORMAL
WBC # BLD AUTO: 8.8 10E3/UL (ref 4–11)
WBC'S/HIGH POWER FIELD, WET PREP: ABNORMAL
YEAST, WET PREP: PRESENT

## 2022-01-07 PROCEDURE — 87210 SMEAR WET MOUNT SALINE/INK: CPT | Performed by: NURSE PRACTITIONER

## 2022-01-07 PROCEDURE — 36415 COLL VENOUS BLD VENIPUNCTURE: CPT | Performed by: NURSE PRACTITIONER

## 2022-01-07 PROCEDURE — 80053 COMPREHEN METABOLIC PANEL: CPT | Performed by: NURSE PRACTITIONER

## 2022-01-07 PROCEDURE — 83690 ASSAY OF LIPASE: CPT | Performed by: NURSE PRACTITIONER

## 2022-01-07 PROCEDURE — 99214 OFFICE O/P EST MOD 30 MIN: CPT | Performed by: NURSE PRACTITIONER

## 2022-01-07 PROCEDURE — 85027 COMPLETE CBC AUTOMATED: CPT | Performed by: NURSE PRACTITIONER

## 2022-01-07 RX ORDER — TERCONAZOLE 8 MG/G
1 CREAM VAGINAL AT BEDTIME
Qty: 35 G | Refills: 1 | Status: SHIPPED | OUTPATIENT
Start: 2022-01-07 | End: 2022-12-07

## 2022-01-07 RX ORDER — LANSOPRAZOLE 30 MG/1
30 CAPSULE, DELAYED RELEASE ORAL DAILY
Qty: 90 CAPSULE | Refills: 0 | Status: SHIPPED | OUTPATIENT
Start: 2022-01-07 | End: 2022-03-29

## 2022-01-07 RX ORDER — TERCONAZOLE 8 MG/G
1 CREAM VAGINAL AT BEDTIME
Qty: 35 G | Refills: 0 | Status: SHIPPED | OUTPATIENT
Start: 2022-01-07 | End: 2022-01-07

## 2022-01-07 NOTE — PROGRESS NOTES
Assessment & Plan     Vaginal itching  - Wet prep - lab collect; Future  - Wet prep - lab collect    Major depressive disorder, recurrent episode, mild (H)  - Stable. PHQ9 2    Diabetic polyneuropathy associated with type 2 diabetes mellitus (H)  - Nausea possible SE of Jardiance, but not abdominal pain. Had similar symptoms with Victoza. Will monitor symptoms and if they persist, reach out to MTM, though I don't think current symptoms SE of Jardiance    Body mass index (BMI) 40.0-44.9, adult (H)  - Previously did not tolerate GLP1    Epigastric pain  - Labs unremarkable. No acute findings or red flag symptoms. Ensure hydration, bland diet and monitor symptoms. If they persist, reach out to MTM and consider imaging  - Comprehensive metabolic panel (BMP + Alb, Alk Phos, ALT, AST, Total. Bili, TP); Future  - CBC with platelets; Future  - Lipase; Future  - LANsoprazole (PREVACID) 30 MG DR capsule; Take 1 capsule (30 mg) by mouth daily  - Comprehensive metabolic panel (BMP + Alb, Alk Phos, ALT, AST, Total. Bili, TP)  - CBC with platelets  - Lipase    Gastroesophageal reflux disease, unspecified whether esophagitis present  - Increase PPI yesterday. Continue with 30 mg BID  - LANsoprazole (PREVACID) 30 MG DR capsule; Take 1 capsule (30 mg) by mouth daily    Candidiasis of vagina  - Previously prescribed by GYN for yeast infection  - terconazole (TERAZOL 3) 0.8 % vaginal cream; Place 1 applicator (5 g) vaginally At Bedtime        Return in about 2 weeks (around 1/21/2022), or if symptoms worsen or fail to improve.    LUISANA [facility] ROGERIO Feng is a 56 year old who presents for the following health issues   HPI     Vaginal Symptoms and also having pain on right side, may be related a medication possibly  Onset/Duration: Dec 2021  Description:  Vaginal Discharge: none   Itching (Pruritis): YES-slightly  Burning sensation:  YES  Odor: no  Accompanying Signs &  "Symptoms:  Urinary symptoms: no  Abdominal pain: YES  Fever: no  History:   Sexually active: no currently  New Partner: not currently  Possibility of Pregnancy:  no  Recent antibiotic use: last used antibiotic before Christmas  Previous vaginitis issues: YES  Precipitating or alleviating factors: Ongoing issues  Therapies tried and outcome: none      Treated with Augmentin and Cefdinir for sinusitis in November  Did E-visit in December for vaginal symptoms. No labs done.   Treated with diflucan, Flagyl and MetroGel    Complains of nausea, \"cramping\" in epigastric area and RUQ  S/p cholecystectomy  Feels \"stabbing\" in back  Had similar  Symptoms last year and then Victoza stopped and symptoms resolved  Started on Jardiance 6-9 months ago  Symptoms constant  Increase Prevacid to 30 mg this morning  Also notes more reflux, dyspepsia  No vomiting, early satiety, bowels normal        Review of Systems   Constitutional, HEENT, cardiovascular, pulmonary, gi and gu systems are negative, except as otherwise noted.      Objective    /85   Pulse 78   Temp 97.6  F (36.4  C) (Oral)   Resp 16   Wt 128.4 kg (283 lb)   SpO2 98%   BMI 44.32 kg/m    Body mass index is 44.32 kg/m .  Physical Exam   GENERAL: healthy, alert and no distress  RESP: lungs clear to auscultation - no rales, rhonchi or wheezes  CV: regular rate and rhythm, normal S1 S2, no S3 or S4, no murmur, click or rub, no peripheral edema and peripheral pulses strong  ABDOMEN: soft, nontender, no hepatosplenomegaly, no masses and bowel sounds normal   (female): patient declines, did self swab, no pelvic pain    Results for orders placed or performed in visit on 01/07/22   Comprehensive metabolic panel (BMP + Alb, Alk Phos, ALT, AST, Total. Bili, TP)     Status: Abnormal   Result Value Ref Range    Sodium 137 133 - 144 mmol/L    Potassium 3.7 3.4 - 5.3 mmol/L    Chloride 104 94 - 109 mmol/L    Carbon Dioxide (CO2) 27 20 - 32 mmol/L    Anion Gap 6 3 - 14 " mmol/L    Urea Nitrogen 17 7 - 30 mg/dL    Creatinine 0.67 0.52 - 1.04 mg/dL    Calcium 9.1 8.5 - 10.1 mg/dL    Glucose 188 (H) 70 - 99 mg/dL    Alkaline Phosphatase 96 40 - 150 U/L    AST 11 0 - 45 U/L    ALT 24 0 - 50 U/L    Protein Total 7.6 6.8 - 8.8 g/dL    Albumin 3.5 3.4 - 5.0 g/dL    Bilirubin Total 0.2 0.2 - 1.3 mg/dL    GFR Estimate >90 >60 mL/min/1.73m2   CBC with platelets     Status: Abnormal   Result Value Ref Range    WBC Count 8.8 4.0 - 11.0 10e3/uL    RBC Count 5.45 (H) 3.80 - 5.20 10e6/uL    Hemoglobin 15.6 11.7 - 15.7 g/dL    Hematocrit 48.4 (H) 35.0 - 47.0 %    MCV 89 78 - 100 fL    MCH 28.6 26.5 - 33.0 pg    MCHC 32.2 31.5 - 36.5 g/dL    RDW 12.8 10.0 - 15.0 %    Platelet Count 312 150 - 450 10e3/uL   Lipase     Status: Normal   Result Value Ref Range    Lipase 124 73 - 393 U/L   Wet prep - lab collect     Status: Abnormal    Specimen: Vagina; Swab   Result Value Ref Range    Trichomonas Absent Absent    Yeast Present (A) Absent    Clue Cells Absent Absent    WBCs/high power field None None

## 2022-01-08 LAB
ALBUMIN SERPL-MCNC: 3.5 G/DL (ref 3.4–5)
ALP SERPL-CCNC: 96 U/L (ref 40–150)
ALT SERPL W P-5'-P-CCNC: 24 U/L (ref 0–50)
ANION GAP SERPL CALCULATED.3IONS-SCNC: 6 MMOL/L (ref 3–14)
AST SERPL W P-5'-P-CCNC: 11 U/L (ref 0–45)
BILIRUB SERPL-MCNC: 0.2 MG/DL (ref 0.2–1.3)
BUN SERPL-MCNC: 17 MG/DL (ref 7–30)
CALCIUM SERPL-MCNC: 9.1 MG/DL (ref 8.5–10.1)
CHLORIDE BLD-SCNC: 104 MMOL/L (ref 94–109)
CO2 SERPL-SCNC: 27 MMOL/L (ref 20–32)
CREAT SERPL-MCNC: 0.67 MG/DL (ref 0.52–1.04)
GFR SERPL CREATININE-BSD FRML MDRD: >90 ML/MIN/1.73M2
GLUCOSE BLD-MCNC: 188 MG/DL (ref 70–99)
POTASSIUM BLD-SCNC: 3.7 MMOL/L (ref 3.4–5.3)
PROT SERPL-MCNC: 7.6 G/DL (ref 6.8–8.8)
SODIUM SERPL-SCNC: 137 MMOL/L (ref 133–144)

## 2022-01-11 ENCOUNTER — TELEPHONE (OUTPATIENT)
Dept: PODIATRY | Facility: CLINIC | Age: 57
End: 2022-01-11
Payer: COMMERCIAL

## 2022-01-11 DIAGNOSIS — M79.672 FOOT PAIN, BILATERAL: Primary | ICD-10-CM

## 2022-01-11 DIAGNOSIS — M79.671 FOOT PAIN, BILATERAL: Primary | ICD-10-CM

## 2022-01-11 PROBLEM — E11.42 DIABETIC POLYNEUROPATHY ASSOCIATED WITH TYPE 2 DIABETES MELLITUS (H): Status: ACTIVE | Noted: 2022-01-11

## 2022-01-11 NOTE — TELEPHONE ENCOUNTER
MRI of the right ankle and foot shows minimal plantar fasciitis. No tearing of the plantar fascia. Minimal peroneal tendonitis but no tendon tear or fracture.     Would recommend the boot for 4 weeks and would also recommend physical therapy.     Can put order in for physical therapy if patient would like.     Please let patient know.     Suzanna Strong DPM

## 2022-01-11 NOTE — TELEPHONE ENCOUNTER
Spoke with patient and let her know the information below. She would like to try physical therapy and would like the referral sent to [facility] Fax# [phone].    Referral placed and faxed. No further questions at this time.     Mallika Lin, ATC

## 2022-02-08 DIAGNOSIS — E11.65 TYPE 2 DIABETES MELLITUS WITH HYPERGLYCEMIA, WITH LONG-TERM CURRENT USE OF INSULIN (H): Primary | ICD-10-CM

## 2022-02-08 DIAGNOSIS — Z79.4 TYPE 2 DIABETES MELLITUS WITH HYPERGLYCEMIA, WITH LONG-TERM CURRENT USE OF INSULIN (H): Primary | ICD-10-CM

## 2022-02-10 RX ORDER — INSULIN LISPRO 100 [IU]/ML
INJECTION, SOLUTION INTRAVENOUS; SUBCUTANEOUS
Qty: 15 ML | Refills: 1 | Status: SHIPPED | OUTPATIENT
Start: 2022-02-10 | End: 2022-05-09

## 2022-02-10 NOTE — TELEPHONE ENCOUNTER
Routing refill request to provider for review/approval because:  Drug not active on patient's medication list    Amy Larsen RN on 2/10/2022 at 10:49 AM

## 2022-02-20 ENCOUNTER — HEALTH MAINTENANCE LETTER (OUTPATIENT)
Age: 57
End: 2022-02-20

## 2022-03-18 DIAGNOSIS — G89.29 CHRONIC BILATERAL LOW BACK PAIN WITHOUT SCIATICA: ICD-10-CM

## 2022-03-18 DIAGNOSIS — M54.50 CHRONIC BILATERAL LOW BACK PAIN WITHOUT SCIATICA: ICD-10-CM

## 2022-03-21 RX ORDER — CELECOXIB 200 MG/1
CAPSULE ORAL
Qty: 90 CAPSULE | Refills: 0 | Status: SHIPPED | OUTPATIENT
Start: 2022-03-21 | End: 2022-05-09

## 2022-03-21 NOTE — TELEPHONE ENCOUNTER
Routing refill request to provider for review/approval because:  Labs not current:  CBC    Visit is up to date. RN will issue one time 90 day john refill. Please advise on labs.     Sujit ESQUIVEL RN

## 2022-05-03 ENCOUNTER — DOCUMENTATION ONLY (OUTPATIENT)
Dept: LAB | Facility: CLINIC | Age: 57
End: 2022-05-03
Payer: COMMERCIAL

## 2022-05-03 DIAGNOSIS — E11.9 TYPE 2 DIABETES MELLITUS WITHOUT COMPLICATION, WITH LONG-TERM CURRENT USE OF INSULIN (H): ICD-10-CM

## 2022-05-03 DIAGNOSIS — Z79.4 TYPE 2 DIABETES MELLITUS WITHOUT COMPLICATION, WITH LONG-TERM CURRENT USE OF INSULIN (H): ICD-10-CM

## 2022-05-03 DIAGNOSIS — G89.4 CHRONIC PAIN SYNDROME: Primary | ICD-10-CM

## 2022-05-09 ENCOUNTER — OFFICE VISIT (OUTPATIENT)
Dept: PEDIATRICS | Facility: CLINIC | Age: 57
End: 2022-05-09

## 2022-05-09 ENCOUNTER — LAB (OUTPATIENT)
Dept: LAB | Facility: CLINIC | Age: 57
End: 2022-05-09
Payer: COMMERCIAL

## 2022-05-09 ENCOUNTER — TELEPHONE (OUTPATIENT)
Dept: PEDIATRICS | Facility: CLINIC | Age: 57
End: 2022-05-09

## 2022-05-09 VITALS
HEIGHT: 67 IN | HEART RATE: 77 BPM | BODY MASS INDEX: 44.83 KG/M2 | RESPIRATION RATE: 16 BRPM | OXYGEN SATURATION: 99 % | SYSTOLIC BLOOD PRESSURE: 122 MMHG | WEIGHT: 285.6 LBS | DIASTOLIC BLOOD PRESSURE: 62 MMHG | TEMPERATURE: 98.1 F

## 2022-05-09 DIAGNOSIS — G89.29 CHRONIC BILATERAL LOW BACK PAIN WITHOUT SCIATICA: ICD-10-CM

## 2022-05-09 DIAGNOSIS — E11.9 TYPE 2 DIABETES MELLITUS WITHOUT COMPLICATION, WITH LONG-TERM CURRENT USE OF INSULIN (H): ICD-10-CM

## 2022-05-09 DIAGNOSIS — Z79.4 TYPE 2 DIABETES MELLITUS WITHOUT COMPLICATION, WITH LONG-TERM CURRENT USE OF INSULIN (H): ICD-10-CM

## 2022-05-09 DIAGNOSIS — I10 ESSENTIAL HYPERTENSION: ICD-10-CM

## 2022-05-09 DIAGNOSIS — R05.8 ALLERGIC COUGH: ICD-10-CM

## 2022-05-09 DIAGNOSIS — J45.20 INTERMITTENT ASTHMA, UNCOMPLICATED: ICD-10-CM

## 2022-05-09 DIAGNOSIS — G89.4 CHRONIC PAIN SYNDROME: ICD-10-CM

## 2022-05-09 DIAGNOSIS — G89.29 CHRONIC NECK PAIN: ICD-10-CM

## 2022-05-09 DIAGNOSIS — M25.512 LEFT SHOULDER PAIN, UNSPECIFIED CHRONICITY: ICD-10-CM

## 2022-05-09 DIAGNOSIS — M19.91 PRIMARY OSTEOARTHRITIS, UNSPECIFIED SITE: ICD-10-CM

## 2022-05-09 DIAGNOSIS — M54.50 CHRONIC BILATERAL LOW BACK PAIN WITHOUT SCIATICA: ICD-10-CM

## 2022-05-09 DIAGNOSIS — E66.01 MORBID OBESITY WITH BMI OF 40.0-44.9, ADULT (H): ICD-10-CM

## 2022-05-09 DIAGNOSIS — M54.2 CHRONIC NECK PAIN: ICD-10-CM

## 2022-05-09 DIAGNOSIS — J45.20 INTERMITTENT ASTHMA, UNCOMPLICATED: Primary | ICD-10-CM

## 2022-05-09 DIAGNOSIS — E11.65 TYPE 2 DIABETES MELLITUS WITH HYPERGLYCEMIA, WITH LONG-TERM CURRENT USE OF INSULIN (H): Primary | ICD-10-CM

## 2022-05-09 DIAGNOSIS — Z79.4 TYPE 2 DIABETES MELLITUS WITH HYPERGLYCEMIA, WITH LONG-TERM CURRENT USE OF INSULIN (H): Primary | ICD-10-CM

## 2022-05-09 LAB
ALBUMIN SERPL-MCNC: 3.5 G/DL (ref 3.4–5)
ALP SERPL-CCNC: 91 U/L (ref 40–150)
ALT SERPL W P-5'-P-CCNC: 26 U/L (ref 0–50)
AMPHETAMINES UR QL: NOT DETECTED
ANION GAP SERPL CALCULATED.3IONS-SCNC: 5 MMOL/L (ref 3–14)
AST SERPL W P-5'-P-CCNC: 17 U/L (ref 0–45)
BARBITURATES UR QL SCN: NOT DETECTED
BENZODIAZ UR QL SCN: NOT DETECTED
BILIRUB SERPL-MCNC: 0.4 MG/DL (ref 0.2–1.3)
BUN SERPL-MCNC: 15 MG/DL (ref 7–30)
BUPRENORPHINE UR QL: NOT DETECTED
CALCIUM SERPL-MCNC: 9.1 MG/DL (ref 8.5–10.1)
CANNABINOIDS UR QL: DETECTED
CHLORIDE BLD-SCNC: 105 MMOL/L (ref 94–109)
CHOLEST SERPL-MCNC: 186 MG/DL
CO2 SERPL-SCNC: 27 MMOL/L (ref 20–32)
COCAINE UR QL SCN: NOT DETECTED
CREAT SERPL-MCNC: 0.64 MG/DL (ref 0.52–1.04)
CREAT UR-MCNC: 72 MG/DL
D-METHAMPHET UR QL: NOT DETECTED
FASTING STATUS PATIENT QL REPORTED: YES
GFR SERPL CREATININE-BSD FRML MDRD: >90 ML/MIN/1.73M2
GLUCOSE BLD-MCNC: 147 MG/DL (ref 70–99)
HBA1C MFR BLD: 7.7 % (ref 0–5.6)
HDLC SERPL-MCNC: 48 MG/DL
LDLC SERPL CALC-MCNC: 116 MG/DL
METHADONE UR QL SCN: NOT DETECTED
MICROALBUMIN UR-MCNC: 7 MG/L
MICROALBUMIN/CREAT UR: 9.72 MG/G CR (ref 0–25)
NONHDLC SERPL-MCNC: 138 MG/DL
OPIATES UR QL SCN: NOT DETECTED
OXYCODONE UR QL SCN: NOT DETECTED
PCP UR QL SCN: NOT DETECTED
POTASSIUM BLD-SCNC: 3.7 MMOL/L (ref 3.4–5.3)
PROPOXYPH UR QL: NOT DETECTED
PROT SERPL-MCNC: 7.8 G/DL (ref 6.8–8.8)
SODIUM SERPL-SCNC: 137 MMOL/L (ref 133–144)
TRICYCLICS UR QL SCN: NOT DETECTED
TRIGL SERPL-MCNC: 112 MG/DL
TSH SERPL DL<=0.005 MIU/L-ACNC: 1.8 MU/L (ref 0.4–4)

## 2022-05-09 PROCEDURE — 83036 HEMOGLOBIN GLYCOSYLATED A1C: CPT

## 2022-05-09 PROCEDURE — 99215 OFFICE O/P EST HI 40 MIN: CPT | Performed by: PEDIATRICS

## 2022-05-09 PROCEDURE — 99207 PR FOOT EXAM NO CHARGE: CPT | Mod: 25 | Performed by: PEDIATRICS

## 2022-05-09 PROCEDURE — 82043 UR ALBUMIN QUANTITATIVE: CPT

## 2022-05-09 PROCEDURE — 96127 BRIEF EMOTIONAL/BEHAV ASSMT: CPT | Performed by: PEDIATRICS

## 2022-05-09 PROCEDURE — 80306 DRUG TEST PRSMV INSTRMNT: CPT

## 2022-05-09 PROCEDURE — 80053 COMPREHEN METABOLIC PANEL: CPT

## 2022-05-09 PROCEDURE — 80061 LIPID PANEL: CPT

## 2022-05-09 PROCEDURE — 36415 COLL VENOUS BLD VENIPUNCTURE: CPT

## 2022-05-09 PROCEDURE — 84443 ASSAY THYROID STIM HORMONE: CPT

## 2022-05-09 RX ORDER — HYDROCHLOROTHIAZIDE 25 MG/1
TABLET ORAL
Qty: 90 TABLET | Refills: 3 | Status: SHIPPED | OUTPATIENT
Start: 2022-05-09 | End: 2023-04-11

## 2022-05-09 RX ORDER — HYDROXYZINE HYDROCHLORIDE 25 MG/1
12.5 TABLET, FILM COATED ORAL 3 TIMES DAILY PRN
Qty: 90 TABLET | Refills: 11 | Status: SHIPPED | OUTPATIENT
Start: 2022-05-09 | End: 2023-06-21

## 2022-05-09 RX ORDER — CELECOXIB 200 MG/1
200 CAPSULE ORAL DAILY
Qty: 90 CAPSULE | Refills: 3 | Status: SHIPPED | OUTPATIENT
Start: 2022-05-09 | End: 2023-06-07

## 2022-05-09 RX ORDER — OXYCODONE AND ACETAMINOPHEN 5; 325 MG/1; MG/1
1-2 TABLET ORAL EVERY 4 HOURS PRN
Qty: 90 TABLET | Refills: 0 | Status: SHIPPED | OUTPATIENT
Start: 2022-05-09 | End: 2022-09-13

## 2022-05-09 RX ORDER — INSULIN LISPRO 100 [IU]/ML
8 INJECTION, SOLUTION INTRAVENOUS; SUBCUTANEOUS
Qty: 15 ML | Refills: 4 | Status: SHIPPED | OUTPATIENT
Start: 2022-05-09 | End: 2023-05-15

## 2022-05-09 RX ORDER — LOSARTAN POTASSIUM 100 MG/1
TABLET ORAL
Qty: 90 TABLET | Refills: 3 | Status: SHIPPED | OUTPATIENT
Start: 2022-05-09 | End: 2023-04-11

## 2022-05-09 ASSESSMENT — PATIENT HEALTH QUESTIONNAIRE - PHQ9
SUM OF ALL RESPONSES TO PHQ QUESTIONS 1-9: 2
SUM OF ALL RESPONSES TO PHQ QUESTIONS 1-9: 2
10. IF YOU CHECKED OFF ANY PROBLEMS, HOW DIFFICULT HAVE THESE PROBLEMS MADE IT FOR YOU TO DO YOUR WORK, TAKE CARE OF THINGS AT HOME, OR GET ALONG WITH OTHER PEOPLE: NOT DIFFICULT AT ALL

## 2022-05-09 ASSESSMENT — PAIN SCALES - GENERAL: PAINLEVEL: MODERATE PAIN (4)

## 2022-05-09 ASSESSMENT — ASTHMA QUESTIONNAIRES: ACT_TOTALSCORE: 19

## 2022-05-09 NOTE — PROGRESS NOTES
Assessment & Plan       ICD-10-CM    1. Type 2 diabetes mellitus with hyperglycemia, with long-term current use of insulin (H)  E11.65 FOOT EXAM    Z79.4 insulin lispro (HUMALOG KWIKPEN) 100 UNIT/ML (1 unit dial) KWIKPEN     metFORMIN (GLUCOPHAGE) 1000 MG tablet    suboptimal control - plan to increase jardiance to 20mg and follow up in 3 months     2. Essential hypertension  I10 hydrochlorothiazide (HYDRODIURIL) 25 MG tablet     losartan (COZAAR) 100 MG tablet  Well controlled, continue current medications     3. Intermittent asthma, uncomplicated  J45.20 Refilled albuterol     4. Chronic pain syndrome  G89.4 oxyCODONE-acetaminophen (PERCOCET) 5-325 MG tablet    Using rarely, no concerns about use    Medication risks reviewed, declined narcan     5. Chronic bilateral low back pain without sciatica  M54.50 celecoxib (CELEBREX) 200 MG capsule    Continue celebrex    G89.29    6. Chronic neck pain  M54.2 celecoxib (CELEBREX) 200 MG capsule    G89.29 hydrOXYzine (ATARAX) 25 MG tablet     Physical Therapy Referral   7. Left shoulder pain, unspecified chronicity  M25.512 Physical Therapy Referral   8. Type 2 diabetes mellitus without complication, with long-term current use of insulin (H)  E11.9 empagliflozin (JARDIANCE) 10 MG TABS tablet    Z79.4 insulin glargine (LANTUS PEN) 100 UNIT/ML pen    See plan above   9. Primary osteoarthritis, unspecified site  M19.91 oxyCODONE-acetaminophen (PERCOCET) 5-325 MG tablet   10. Allergic cough  R05.8 mometasone-formoterol (DULERA) 100-5 MCG/ACT inhaler    Noticed improvement when using this post COVID      11. Morbid obesity with BMI of 40.0-44.9, adult (H)  E66.01 Complicates management of diabetes, hypertension and orthopedic surgeries    Z68.41            I spent a total of 45 minutes on the day of the visit.   Time spent doing chart review, history and exam, documentation and further activities per the note       BMI:   Estimated body mass index is 44.73 kg/m  as calculated  "from the following:    Height as of this encounter: 1.702 m (5' 7\").    Weight as of this encounter: 129.5 kg (285 lb 9.6 oz).   Weight management plan: Discussed healthy diet and exercise guidelines    See Patient Instructions    Return in about 3 months (around 8/9/2022) for Follow up, with me, in person.    Linda Durant MD  [facility] ROGERIO Feng is a 56 year old who presents for the following health issues     History of Present Illness       Mental Health Follow-up:                    Today's PHQ-9         PHQ-9 Total Score: 2  PHQ-9 Q9 Thoughts of better off dead/self-harm past 2 weeks :   (P) Not at all    How difficult have these problems made it for you to do your work, take care of things at home, or get along with other people: Not difficult at all        Diabetes:   She presents for follow up of diabetes.  She is not checking blood glucose. She is concerned about frequent infections. She is not experiencing numbness or burning in feet, excessive thirst, blurry vision, weight changes or redness, sores or blisters on feet.         Reason for visit:  Diabetics poss lipoma  Symptom onset:  More than a month  Symptoms include:  Pain on left butt cheek  Symptom intensity:  Moderate  Symptom progression:  Worsening  Had these symptoms before:  Yes  Has tried/received treatment for these symptoms:  No  What makes it worse:  Lying on left side and back  What makes it better:  Nope    She eats 2-3 servings of fruits and vegetables daily.She consumes 0 sweetened beverage(s) daily.She exercises with enough effort to increase her heart rate 10 to 19 minutes per day.  She exercises with enough effort to increase her heart rate 4 days per week.   She is taking medications regularly.     Diabetes - hasn't restarted the Dexcom yet due to dealing with tree branches, control has been slightly worse since last visit    Working on diet and adding in more vegetables, weight stable    Joint " "aches - celebrex helps a bit, using percoset sparingly, no longer using CBD.   Left shoulder and left neck pain - bothering her more recently    2 percoset at night and hydroxyzine    Laminectomy L4, L5, S1 historically    Yeast and BV in December - saw Ob/Gyn (Noemí) and had BV again - had to be treated twice again.   Had double course of metronidazole.  Using Garden of Life probiotic and boric acid vaginal suppositories twice weekly - this is starting to help    Estradiol added by Ob/gyn - hot flashes improved    Depression - in a really good place right now.  Spent time in [place] and [place] this winter    Left shoulder - recent surgery - bothering her more - especially with leaning forward at night    Podiatry - bad plantar fasciitis - Dr Strong following    Many large lipomas - now feels one pressing on nerves in the left buttocks - visited with Dr Bautista about it a couple years ago and plans to return        Review of Systems   Constitutional, HEENT, cardiovascular, pulmonary, gi and endo, psych systems are negative, except as otherwise noted.      Objective    /72 (BP Location: Right arm, Patient Position: Chair, Cuff Size: Adult Large)   Pulse 77   Temp 98.1  F (36.7  C) (Tympanic)   Resp 16   Ht 1.702 m (5' 7\")   Wt 129.5 kg (285 lb 9.6 oz)   SpO2 99%   BMI 44.73 kg/m    Body mass index is 44.73 kg/m .   Wt Readings from Last 4 Encounters:   05/09/22 129.5 kg (285 lb 9.6 oz)   01/07/22 128.4 kg (283 lb)   12/28/21 129.7 kg (286 lb)   11/09/21 127.4 kg (280 lb 12.8 oz)       Physical Exam   GENERAL: healthy, alert and no distress  RESP: lungs clear to auscultation - no rales, rhonchi or wheezes  CV: regular rate and rhythm, normal S1 S2, no S3 or S4, no murmur, click or rub, no peripheral edema and peripheral pulses strong  PSYCH: mentation appears normal, affect normal/bright  Diabetic foot exam: normal DP and PT pulses, no trophic changes or ulcerative lesions and normal sensory " exam      Linda Durant MD

## 2022-05-09 NOTE — TELEPHONE ENCOUNTER
Prior Authorization Retail Medication Request    Medication/Dose: mometasone-formoterol (DULERA) 100-5 MCG/ACT inhaler  ICD code (if different than what is on RX):  R05.8  Previously Tried and Failed:  NA  Rationale:  Patient needs to treat allergic cough     Insurance Name:  [employer]  Insurance ID:  mometasone-formoterol (DULERA) 100-5 MCG/ACT inhaler      Pharmacy Information (if different than what is on RX)  Name:  [facility]  Phone:  [phone]

## 2022-05-09 NOTE — PATIENT INSTRUCTIONS
Increase jardiance to 20mg daily    Hydroxyzine - try tablet ( take half)    New PHYSICAL THERAPY orders to [facility] - think about calling again

## 2022-05-10 ASSESSMENT — PATIENT HEALTH QUESTIONNAIRE - PHQ9: SUM OF ALL RESPONSES TO PHQ QUESTIONS 1-9: 2

## 2022-05-12 ENCOUNTER — MYC MEDICAL ADVICE (OUTPATIENT)
Dept: PEDIATRICS | Facility: CLINIC | Age: 57
End: 2022-05-12
Payer: COMMERCIAL

## 2022-05-12 NOTE — TELEPHONE ENCOUNTER
PA Initiation    Medication: mometasone-formoterol (DULERA) 100-5 MCG/ACT inhaler  Insurance Company: [employer] - Phone [phone] Fax [phone]  Pharmacy Filling the Rx: [facility] [place] [facility] [address]  Filling Pharmacy Phone: [phone]  Filling Pharmacy Fax:    Start Date: 5/12/2022    Central Prior Authorization Team   Phone: [phone]

## 2022-05-17 NOTE — TELEPHONE ENCOUNTER
PRIOR AUTHORIZATION DENIED    Medication: mometasone-formoterol (DULERA) 100-5 MCG/ACT inhaler    Denial Date: 5/16/2022    Denial Rational:       Appeal Information:

## 2022-05-18 ENCOUNTER — TRANSFERRED RECORDS (OUTPATIENT)
Dept: HEALTH INFORMATION MANAGEMENT | Facility: CLINIC | Age: 57
End: 2022-05-18

## 2022-05-19 RX ORDER — BUDESONIDE AND FORMOTEROL FUMARATE DIHYDRATE 80; 4.5 UG/1; UG/1
2 AEROSOL RESPIRATORY (INHALATION) 2 TIMES DAILY
Qty: 10.2 G | Refills: 11 | Status: SHIPPED | OUTPATIENT
Start: 2022-05-19 | End: 2022-09-13

## 2022-05-25 ENCOUNTER — TELEPHONE (OUTPATIENT)
Dept: PEDIATRICS | Facility: CLINIC | Age: 57
End: 2022-05-25
Payer: COMMERCIAL

## 2022-05-25 DIAGNOSIS — Z79.4 TYPE 2 DIABETES MELLITUS WITH HYPERGLYCEMIA, WITH LONG-TERM CURRENT USE OF INSULIN (H): ICD-10-CM

## 2022-05-25 DIAGNOSIS — E11.9 TYPE 2 DIABETES MELLITUS WITHOUT COMPLICATION (H): Primary | ICD-10-CM

## 2022-05-25 DIAGNOSIS — E11.65 TYPE 2 DIABETES MELLITUS WITH HYPERGLYCEMIA, WITH LONG-TERM CURRENT USE OF INSULIN (H): ICD-10-CM

## 2022-05-25 NOTE — TELEPHONE ENCOUNTER
Routing to Dr. Durant to advise - Ok to change to Jardiance 25mg daily? Or proceed for PA for Jardiance 10mg - 2 tabs/daily?    [facility] pharmacy calling.   Insurance only covers 1 tab/day of Jardiance.   They inquire if we proceed with a PA or ok to change to 25mg tabs.

## 2022-06-07 ENCOUNTER — OFFICE VISIT (OUTPATIENT)
Dept: PODIATRY | Facility: CLINIC | Age: 57
End: 2022-06-07
Payer: COMMERCIAL

## 2022-06-07 VITALS — SYSTOLIC BLOOD PRESSURE: 126 MMHG | BODY MASS INDEX: 44.64 KG/M2 | DIASTOLIC BLOOD PRESSURE: 82 MMHG | WEIGHT: 285 LBS

## 2022-06-07 DIAGNOSIS — G57.61 MORTON'S NEUROMA OF RIGHT FOOT: ICD-10-CM

## 2022-06-07 DIAGNOSIS — S93.401A MODERATE ANKLE SPRAIN, RIGHT, INITIAL ENCOUNTER: ICD-10-CM

## 2022-06-07 DIAGNOSIS — M21.42 BILATERAL PES PLANUS: ICD-10-CM

## 2022-06-07 DIAGNOSIS — M79.671 RIGHT FOOT PAIN: Primary | ICD-10-CM

## 2022-06-07 DIAGNOSIS — E11.42 DIABETIC POLYNEUROPATHY ASSOCIATED WITH TYPE 2 DIABETES MELLITUS (H): ICD-10-CM

## 2022-06-07 DIAGNOSIS — M19.071 OSTEOARTHRITIS OF RIGHT MIDFOOT: Primary | ICD-10-CM

## 2022-06-07 DIAGNOSIS — M21.41 BILATERAL PES PLANUS: ICD-10-CM

## 2022-06-07 DIAGNOSIS — M19.071 ARTHRITIS OF RIGHT FOOT: ICD-10-CM

## 2022-06-07 DIAGNOSIS — M76.71 PERONEAL TENDONITIS, RIGHT: ICD-10-CM

## 2022-06-07 PROCEDURE — 99213 OFFICE O/P EST LOW 20 MIN: CPT | Performed by: PODIATRIST

## 2022-06-07 NOTE — PROGRESS NOTES
Podiatry / Foot and Ankle Surgery Progress Note    June 7, 2022    Subject: Patient was seen for pain to the right foot and ankle.  She notes that she rolled it 3 weeks ago and its been sore still.  Very sore when she points to.  Pain can be 8 out of 10.    Objective:  Vitals: /82   Wt 129.3 kg (285 lb)   BMI 44.64 kg/m      A1C: 7.7 (5/2022)    General:  NAD     Dermatologic: Skin is intact to both lower extremities without significant lesions, rash or abrasion.  No paronychia or evidence of soft tissue infection is noted.     Vascular: DP & PT pulses are intact & regular bilaterally.   edema and varicosities noted.  CFT and skin temperature is normal to both lower extremities.     Neurologic: Lower extremity sensation is diminished to feet.      Musculoskeletal: Patient is ambulatory without assistive device or brace. Decrease arch height. Decrease dorsiflexion right ankle.  Pain on palpation of the left plantar heel.  Pain on palpation of the right third intermetatarsal space. new Pain on palpation along the left peroneal tendon the other joints and anterior talofibular ligaments.    Radiographs:  Right foot x-ray - I have looked at and reviewed xrays personally -degenerative changes noted to the midfoot.  No fractures are noted.     ASSESSMENT:    Right foot pain  Moderate ankle sprain, right, initial encounter  Bilateral pes planus  Diabetic polyneuropathy associated with type 2 diabetes mellitus (H)  Simental's neuroma of right foot  Peroneal tendonitis, right  Arthritis of right foot     Medical Decision Making:  Reviewed patient's chart in [facility]. Reviewed and discussed causes of tendonitis.  We discussed treatments such as immobiliation, icing, stretching, heel lifts, orthotics, physical therapy, MRI.     Talked about ankle sprains and that they can take up to 12 weeks to heal. We discussed that there can be tearing of the ligament which may require surgical repair to help re-stabilize the ankle. Non  surgically recommend an ankle brace, orthotics, and physical therapy to get range of motion and proprioception back.    At this time I recommend an ankle brace that she wear at all times when walking.  We will have her wear this for the next month.  We will have her follow-up at that time if not improved we will order an MRI of the ankle.    Talked about arthritis and treatments including orthotics, injections, icing, NSAIDS, bracing, physical therapy, compounding pain cream, or surgical intervention.    She would like an order for ultrasound-guided injection for the arthritis again today.  This was placed.  All questions were answered to patient satisfaction and she will call for the questions or concerns.    Patient Risk Factor:  Patient is a medium risk factor for infection.     Suzanna Strong DPM, Podiatry/Foot and Ankle Surgery

## 2022-06-07 NOTE — PROGRESS NOTES
Per JULIETA GrayPELVA, ordered ultrasound-guided injections of the right fourth metatarsal-cuboid joint and right navicular-cuneiform joint for Ms. Johnna Caballero.    Golden Reyes MD

## 2022-06-07 NOTE — LETTER
6/7/2022         RE: Johnna Caballero  [id number]  Counts include 234 beds at the [facility] [address]        Dear Colleague,    Thank you for referring your patient, Johnna Caballero, to the [facility]. Please see a copy of my visit note below.    Podiatry / Foot and Ankle Surgery Progress Note    June 7, 2022    Subject: Patient was seen for pain to the right foot and ankle.  She notes that she rolled it 3 weeks ago and its been sore still.  Very sore when she points to.  Pain can be 8 out of 10.    Objective:  Vitals: /82   Wt 129.3 kg (285 lb)   BMI 44.64 kg/m      A1C: 7.7 (5/2022)    General:  NAD     Dermatologic: Skin is intact to both lower extremities without significant lesions, rash or abrasion.  No paronychia or evidence of soft tissue infection is noted.     Vascular: DP & PT pulses are intact & regular bilaterally.   edema and varicosities noted.  CFT and skin temperature is normal to both lower extremities.     Neurologic: Lower extremity sensation is diminished to feet.      Musculoskeletal: Patient is ambulatory without assistive device or brace. Decrease arch height. Decrease dorsiflexion right ankle.  Pain on palpation of the left plantar heel.  Pain on palpation of the right third intermetatarsal space. new Pain on palpation along the left peroneal tendon the other joints and anterior talofibular ligaments.    Radiographs:  Right foot x-ray - I have looked at and reviewed xrays personally -degenerative changes noted to the midfoot.  No fractures are noted.     ASSESSMENT:    Right foot pain  Moderate ankle sprain, right, initial encounter  Bilateral pes planus  Diabetic polyneuropathy associated with type 2 diabetes mellitus (H)  Simental's neuroma of right foot  Peroneal tendonitis, right  Arthritis of right foot     Medical Decision Making:  Reviewed patient's chart in [facility]. Reviewed and discussed causes of tendonitis.  We discussed treatments such as immobiliation, icing,  stretching, heel lifts, orthotics, physical therapy, MRI.     Talked about ankle sprains and that they can take up to 12 weeks to heal. We discussed that there can be tearing of the ligament which may require surgical repair to help re-stabilize the ankle. Non surgically recommend an ankle brace, orthotics, and physical therapy to get range of motion and proprioception back.    At this time I recommend an ankle brace that she wear at all times when walking.  We will have her wear this for the next month.  We will have her follow-up at that time if not improved we will order an MRI of the ankle.    Talked about arthritis and treatments including orthotics, injections, icing, NSAIDS, bracing, physical therapy, compounding pain cream, or surgical intervention.    She would like an order for ultrasound-guided injection for the arthritis again today.  This was placed.  All questions were answered to patient satisfaction and she will call for the questions or concerns.    Patient Risk Factor:  Patient is a medium risk factor for infection.     Suzanna Strong DPM, Podiatry/Foot and Ankle Surgery        Again, thank you for allowing me to participate in the care of your patient.        Sincerely,        Suzanna Strong DPM, Podiatry/Foot and Ankle Surgery

## 2022-06-07 NOTE — PATIENT INSTRUCTIONS
Thank you for choosing [facility] Podiatry / Foot & Ankle Surgery!    DR WALTON'S CLINIC:  [facility]   [address]   [place] [address]      TRIAGE LINE: [phone]  APPOINTMENTS: [phone]  RADIOLOGY: [phone]  SET UP SURGERY: [phone]  FAX NUMBER: [phone]  BILLING QUESTIONS: [phone]       Follow up 1 month    ANKLE SPRAIN  An ankle sprain is an injury to one or more ligaments in the ankle, usually on the outside of the ankle. Ligaments are bands of tissue - like rubber bands - that connect one bone to another and bind the joints together. In the ankle joint, ligaments provide stability by limiting side-to-side movement.  Some ankle sprains are much worse than others. The severity of an ankle sprain depends on whether the ligament is stretched, partially torn, or completely torn, as well as on the number of ligaments involved. Ankle sprains are not the same as strains, which affect muscles rather than ligaments.  CAUSES  Sprained ankles often result from a fall, a sudden twist, or a blow that forces the ankle joint out of its normal position. Ankle sprains commonly occur while participating in sports, wearing inappropriate shoes, or walking or running on an uneven surface.  Sometimes ankle sprains occur because of a person is born with weak ankles. Previous ankle or foot injuries can also weaken the ankle and lead to sprains.  SYMPTOMS  The symptoms of ankle sprains may include: pain or soreness, swelling, bruising, difficulty walking, and stiffness in the joint.  These symptoms may vary in intensity, depending on the severity of the sprain. Sometimes pain and swelling are absent in people with previous ankle sprains. Instead, they may simply feel the ankle is wobbly and unsteady when they walk. Even if there is no pain or swelling with a sprained ankle, treatment is crucial. Any ankle sprain - whether it s your first or your fifth - requires prompt  medical attention.  DIAGNOSIS  In evaluating your injury, the foot and ankle surgeon will obtain a thorough history of your symptoms and examine your foot. X-rays or other advanced imaging studies may be ordered to help determine the severity of the injury.  MEDICAL TREATMENT  There are four key reasons why an ankle sprain should be promptly evaluated and treated by a foot and ankle surgeon:  An untreated ankle sprain may lead to chronic ankle instability, a condition marked by persistent discomfort and a  giving way  of the ankle. Weakness in the leg may also develop.   A more severe ankle injury may have occurred along with the sprain. This might include a serious bone fracture that, if left untreated, could lead to troubling complications.   An ankle sprain may be accompanied by a foot injury that causes discomfort but has gone unnoticed thus far.   Rehabilitation of a sprained ankle needs to begin right away. If rehabilitation is delayed, the injury may be less likely to heal properly.   NON-SURGICAL TREATMENT  When you have an ankle sprain, rehabilitation is crucial--and it starts the moment your treatment begins. Your foot and ankle surgeon may recommend one or more of the following treatment options:  Rest. Stay off the injured ankle. Walking may cause further injury.   Ice. Apply an ice pack to the injured area, placing a thin towel between the ice and the skin. Use ice for 20 minutes and then wait at least 40 minutes before icing again.   Compression. An elastic wrap may be recommended to control swelling.   Elevation. The ankle should be raised slightly above the level of your heart to reduce swelling.   Early physical therapy. Your doctor will start you on a rehabilitation program as soon as possible to promote healing and increase your range of motion. This includes doing prescribed exercises.   Medications. Nonsteroidal anti-inflammatory drugs (NSAIDs), such as ibuprofen, may be recommended to reduce  pain and inflammation. In some cases, prescription pain medications are needed to provide adequate relief.   SURGICAL TREATMENT  In more severe cases, surgery may be required to adequately treat an ankle sprain. Surgery often involves repairing the damaged ligament or ligaments. The foot and ankle surgeon will select the surgical procedure best suited for your case based on the type and severity of your injury as well as your activity level.  After surgery, rehabilitation is extremely important. Completing your rehabilitation program is crucial to a successful outcome. Be sure to continue to see your foot and ankle surgeon during this period to ensure that your ankle heals properly and function is restored.

## 2022-06-29 ENCOUNTER — TELEPHONE (OUTPATIENT)
Dept: GENERAL RADIOLOGY | Facility: CLINIC | Age: 57
End: 2022-06-29

## 2022-06-29 NOTE — TELEPHONE ENCOUNTER
Called to review upcoming procedure with Eric on 7/7/22, no further questions, she has had these injections done before.

## 2022-07-06 RX ORDER — TRIAMCINOLONE ACETONIDE 40 MG/ML
40 INJECTION, SUSPENSION INTRA-ARTICULAR; INTRAMUSCULAR ONCE
Status: COMPLETED | OUTPATIENT
Start: 2022-07-07 | End: 2022-07-07

## 2022-07-06 RX ORDER — ROPIVACAINE HYDROCHLORIDE 5 MG/ML
1 INJECTION, SOLUTION EPIDURAL; INFILTRATION; PERINEURAL ONCE
Status: COMPLETED | OUTPATIENT
Start: 2022-07-07 | End: 2022-07-07

## 2022-07-06 RX ORDER — LIDOCAINE HYDROCHLORIDE 10 MG/ML
5 INJECTION, SOLUTION EPIDURAL; INFILTRATION; INTRACAUDAL; PERINEURAL ONCE
Status: COMPLETED | OUTPATIENT
Start: 2022-07-07 | End: 2022-07-07

## 2022-07-07 ENCOUNTER — HOSPITAL ENCOUNTER (OUTPATIENT)
Dept: ULTRASOUND IMAGING | Facility: CLINIC | Age: 57
Discharge: HOME | End: 2022-07-07
Attending: PHYSICAL MEDICINE & REHABILITATION | Admitting: PHYSICAL MEDICINE & REHABILITATION
Payer: COMMERCIAL

## 2022-07-07 DIAGNOSIS — M19.071 OSTEOARTHRITIS OF MIDFOOT, RIGHT: Primary | ICD-10-CM

## 2022-07-07 DIAGNOSIS — M19.071 OSTEOARTHRITIS OF RIGHT MIDFOOT: ICD-10-CM

## 2022-07-07 PROCEDURE — 20606 DRAIN/INJ JOINT/BURSA W/US: CPT | Mod: RT

## 2022-07-07 PROCEDURE — 250N000011 HC RX IP 250 OP 636

## 2022-07-07 PROCEDURE — 20606 DRAIN/INJ JOINT/BURSA W/US: CPT | Mod: RT | Performed by: PHYSICAL MEDICINE & REHABILITATION

## 2022-07-07 PROCEDURE — 250N000009 HC RX 250

## 2022-07-07 RX ORDER — ROPIVACAINE HYDROCHLORIDE 5 MG/ML
INJECTION, SOLUTION EPIDURAL; INFILTRATION; PERINEURAL
Status: COMPLETED
Start: 2022-07-07 | End: 2022-07-07

## 2022-07-07 RX ORDER — LIDOCAINE HYDROCHLORIDE 10 MG/ML
INJECTION, SOLUTION EPIDURAL; INFILTRATION; INTRACAUDAL; PERINEURAL
Status: COMPLETED
Start: 2022-07-07 | End: 2022-07-07

## 2022-07-07 RX ORDER — TRIAMCINOLONE ACETONIDE 40 MG/ML
INJECTION, SUSPENSION INTRA-ARTICULAR; INTRAMUSCULAR
Status: COMPLETED
Start: 2022-07-07 | End: 2022-07-07

## 2022-07-07 RX ADMIN — ROPIVACAINE HYDROCHLORIDE 1 ML: 5 INJECTION, SOLUTION EPIDURAL; INFILTRATION; PERINEURAL at 14:32

## 2022-07-07 RX ADMIN — LIDOCAINE HYDROCHLORIDE 3 ML: 10 INJECTION, SOLUTION EPIDURAL; INFILTRATION; INTRACAUDAL; PERINEURAL at 14:32

## 2022-07-07 RX ADMIN — TRIAMCINOLONE ACETONIDE 40 MG: 40 INJECTION, SUSPENSION INTRA-ARTICULAR; INTRAMUSCULAR at 14:33

## 2022-07-07 NOTE — PROGRESS NOTES
Pt was in Radiology today for a right fourth metatarsal cuboid and right naviculocuneiform joints steroid injections. Pt tolerated ortho procedure well. Pre procedure pain 4/10  post procedure pain level 0. Procedure was completed by JOSHUA Reyes. There were no complications during this procedure. Pt verbalized understanding of written and verbal instructions and left department in stable and satisfactory condition with self. There is no evidence of bleeding or any other complications upon discharge.

## 2022-08-16 ENCOUNTER — OFFICE VISIT (OUTPATIENT)
Dept: PODIATRY | Facility: CLINIC | Age: 57
End: 2022-08-16
Payer: COMMERCIAL

## 2022-08-16 VITALS — WEIGHT: 285 LBS | SYSTOLIC BLOOD PRESSURE: 122 MMHG | BODY MASS INDEX: 44.64 KG/M2 | DIASTOLIC BLOOD PRESSURE: 78 MMHG

## 2022-08-16 DIAGNOSIS — L84 CALLUS: ICD-10-CM

## 2022-08-16 DIAGNOSIS — M21.41 PES PLANUS OF BOTH FEET: ICD-10-CM

## 2022-08-16 DIAGNOSIS — M21.42 PES PLANUS OF BOTH FEET: ICD-10-CM

## 2022-08-16 DIAGNOSIS — M79.671 RIGHT FOOT PAIN: Primary | ICD-10-CM

## 2022-08-16 DIAGNOSIS — E11.42 DIABETIC POLYNEUROPATHY ASSOCIATED WITH TYPE 2 DIABETES MELLITUS (H): ICD-10-CM

## 2022-08-16 DIAGNOSIS — M19.071 ARTHRITIS OF RIGHT FOOT: ICD-10-CM

## 2022-08-16 PROCEDURE — 99213 OFFICE O/P EST LOW 20 MIN: CPT | Performed by: PODIATRIST

## 2022-08-16 NOTE — LETTER
"    8/16/2022         RE: Johnna Caballero  [address]  [facility] [address]        Dear Colleague,    Thank you for referring your patient, Johnna Caballero, to the [facility] PODIATRY. Please see a copy of my visit note below.        Podiatry / Foot and Ankle Surgery Progress Note    August 16, 2022    Subject: Patient was seen for continued right foot pain. Ultrasound guided injection done on right foot in July.  Notes that the foot is not really painful but it is more now and feels \"weird\".  She notes that her right fourth toe is quite stiff and has been for years.  Also notes that she stepped on a piece of glass about a week or so ago.  Notes her dad for the help but she wanted to make sure there was no further glass in the right foot and that it was just a callus.  Denies fever, nausea, vomiting.    Objective:  Vitals: Wt 129.3 kg (285 lb)   BMI 44.64 kg/m    BMI= Body mass index is 44.64 kg/m .    A1C: 7.7 (5/2022)     General:  NAD     Dermatologic: Small localized hyperkeratotic lesion to the plantar aspect of the right fifth metatarsal head.  No open lesions or signs of infection noted.     Vascular: DP & PT pulses are intact & regular bilaterally.   edema and varicosities noted.  CFT and skin temperature is normal to both lower extremities.     Neurologic: Lower extremity sensation is diminished to feet.      Musculoskeletal: Patient is ambulatory without assistive device or brace. Decrease arch height. Decrease dorsiflexion right ankle.  Pain on palpation of the left plantar heel.  Pain on palpation of the right third intermetatarsal space. new Pain on palpation along the left peroneal tendon the other joints and anterior talofibular ligaments.     Radiographs:  Right foot x-ray - I have looked at and reviewed xrays personally -degenerative changes noted to the midfoot.  No fractures are noted.     ASSESSMENT:     Right foot pain  Diabetic polyneuropathy associated with type 2 " diabetes mellitus (H)  Arthritis of right foot  Pes planus of both feet  Callus     Medical Decision Making:  Reviewed patient's chart in epic.  Discussed causes of keratomas.  They are due to areas of increase friction.  Hammertoes can create these as they put more pressure to the metatarsal head.  Discussed treatments such as using foot file, pumice stone, metatarsal pads, orthotics, and not walking barefoot.     We discussed the cost structure of callus care if they were to come back and have it treated in the clinic if insurance does not cover it and explained that they would be billed. They were also provided information on places to get the callus treatment.    There does not appear to be any further glass in the area.  Recommend using a pumice stone and motioning the feet daily.    All questions were answered to patient satisfaction and she will call for the questions or concerns.     Patient Risk Factor:  Patient is a medium risk factor for infection.      Suzanna Strong DPM, Podiatry/Foot and Ankle Surgery          Again, thank you for allowing me to participate in the care of your patient.        Sincerely,        Suzanna Strong DPM, Podiatry/Foot and Ankle Surgery

## 2022-08-16 NOTE — PROGRESS NOTES
"    Podiatry / Foot and Ankle Surgery Progress Note    August 16, 2022    Subject: Patient was seen for continued right foot pain. Ultrasound guided injection done on right foot in July.  Notes that the foot is not really painful but it is more now and feels \"weird\".  She notes that her right fourth toe is quite stiff and has been for years.  Also notes that she stepped on a piece of glass about a week or so ago.  Notes her dad for the help but she wanted to make sure there was no further glass in the right foot and that it was just a callus.  Denies fever, nausea, vomiting.    Objective:  Vitals: Wt 129.3 kg (285 lb)   BMI 44.64 kg/m    BMI= Body mass index is 44.64 kg/m .    A1C: 7.7 (5/2022)     General:  NAD     Dermatologic: Small localized hyperkeratotic lesion to the plantar aspect of the right fifth metatarsal head.  No open lesions or signs of infection noted.     Vascular: DP & PT pulses are intact & regular bilaterally.   edema and varicosities noted.  CFT and skin temperature is normal to both lower extremities.     Neurologic: Lower extremity sensation is diminished to feet.      Musculoskeletal: Patient is ambulatory without assistive device or brace. Decrease arch height. Decrease dorsiflexion right ankle.  Pain on palpation of the left plantar heel.  Pain on palpation of the right third intermetatarsal space. new Pain on palpation along the left peroneal tendon the other joints and anterior talofibular ligaments.     Radiographs:  Right foot x-ray - I have looked at and reviewed xrays personally -degenerative changes noted to the midfoot.  No fractures are noted.     ASSESSMENT:     Right foot pain  Diabetic polyneuropathy associated with type 2 diabetes mellitus (H)  Arthritis of right foot  Pes planus of both feet  Callus     Medical Decision Making:  Reviewed patient's chart in epic.  Discussed causes of keratomas.  They are due to areas of increase friction.  Hammertoes can create these as they " put more pressure to the metatarsal head.  Discussed treatments such as using foot file, pumice stone, metatarsal pads, orthotics, and not walking barefoot.     We discussed the cost structure of callus care if they were to come back and have it treated in the clinic if insurance does not cover it and explained that they would be billed. They were also provided information on places to get the callus treatment.    There does not appear to be any further glass in the area.  Recommend using a pumice stone and motioning the feet daily.    All questions were answered to patient satisfaction and she will call for the questions or concerns.     Patient Risk Factor:  Patient is a medium risk factor for infection.      Suzanna Strong DPM, Podiatry/Foot and Ankle Surgery

## 2022-08-20 DIAGNOSIS — Z79.4 TYPE 2 DIABETES MELLITUS WITHOUT COMPLICATION, WITH LONG-TERM CURRENT USE OF INSULIN (H): ICD-10-CM

## 2022-08-20 DIAGNOSIS — E11.9 TYPE 2 DIABETES MELLITUS WITHOUT COMPLICATION, WITH LONG-TERM CURRENT USE OF INSULIN (H): ICD-10-CM

## 2022-08-22 RX ORDER — PEN NEEDLE, DIABETIC 32GX 5/32"
NEEDLE, DISPOSABLE MISCELLANEOUS
Qty: 180 EACH | Refills: 0 | Status: SHIPPED | OUTPATIENT
Start: 2022-08-22 | End: 2022-12-09

## 2022-09-07 ASSESSMENT — ASTHMA QUESTIONNAIRES
QUESTION_4 LAST FOUR WEEKS HOW OFTEN HAVE YOU USED YOUR RESCUE INHALER OR NEBULIZER MEDICATION (SUCH AS ALBUTEROL): NOT AT ALL
ACT_TOTALSCORE: 25
ACT_TOTALSCORE: 25
QUESTION_3 LAST FOUR WEEKS HOW OFTEN DID YOUR ASTHMA SYMPTOMS (WHEEZING, COUGHING, SHORTNESS OF BREATH, CHEST TIGHTNESS OR PAIN) WAKE YOU UP AT NIGHT OR EARLIER THAN USUAL IN THE MORNING: NOT AT ALL
QUESTION_2 LAST FOUR WEEKS HOW OFTEN HAVE YOU HAD SHORTNESS OF BREATH: NOT AT ALL
QUESTION_5 LAST FOUR WEEKS HOW WOULD YOU RATE YOUR ASTHMA CONTROL: COMPLETELY CONTROLLED
QUESTION_1 LAST FOUR WEEKS HOW MUCH OF THE TIME DID YOUR ASTHMA KEEP YOU FROM GETTING AS MUCH DONE AT WORK, SCHOOL OR AT HOME: NONE OF THE TIME

## 2022-09-13 ENCOUNTER — OFFICE VISIT (OUTPATIENT)
Dept: PEDIATRICS | Facility: CLINIC | Age: 57
End: 2022-09-13
Payer: COMMERCIAL

## 2022-09-13 VITALS
BODY MASS INDEX: 43.95 KG/M2 | HEIGHT: 67 IN | DIASTOLIC BLOOD PRESSURE: 66 MMHG | OXYGEN SATURATION: 97 % | HEART RATE: 75 BPM | SYSTOLIC BLOOD PRESSURE: 122 MMHG | WEIGHT: 280 LBS | TEMPERATURE: 97.8 F

## 2022-09-13 DIAGNOSIS — K64.4 EXTERNAL HEMORRHOIDS: ICD-10-CM

## 2022-09-13 DIAGNOSIS — I10 ESSENTIAL HYPERTENSION: ICD-10-CM

## 2022-09-13 DIAGNOSIS — M19.91 PRIMARY OSTEOARTHRITIS, UNSPECIFIED SITE: ICD-10-CM

## 2022-09-13 DIAGNOSIS — N76.0 BACTERIAL VAGINOSIS: ICD-10-CM

## 2022-09-13 DIAGNOSIS — E11.42 DIABETIC POLYNEUROPATHY ASSOCIATED WITH TYPE 2 DIABETES MELLITUS (H): ICD-10-CM

## 2022-09-13 DIAGNOSIS — B96.89 BACTERIAL VAGINOSIS: ICD-10-CM

## 2022-09-13 DIAGNOSIS — G89.4 CHRONIC PAIN SYNDROME: ICD-10-CM

## 2022-09-13 DIAGNOSIS — E11.9 TYPE 2 DIABETES MELLITUS WITHOUT COMPLICATION, WITH LONG-TERM CURRENT USE OF INSULIN (H): Primary | ICD-10-CM

## 2022-09-13 DIAGNOSIS — Z79.4 TYPE 2 DIABETES MELLITUS WITHOUT COMPLICATION, WITH LONG-TERM CURRENT USE OF INSULIN (H): Primary | ICD-10-CM

## 2022-09-13 DIAGNOSIS — J45.20 INTERMITTENT ASTHMA, UNCOMPLICATED: ICD-10-CM

## 2022-09-13 DIAGNOSIS — N76.0 VAGINITIS AND VULVOVAGINITIS: ICD-10-CM

## 2022-09-13 PROBLEM — T63.91XA ALLERGY OR TOXIC REACTION TO VENOM: Status: ACTIVE | Noted: 2017-05-22

## 2022-09-13 LAB
CLUE CELLS: ABNORMAL
HBA1C MFR BLD: 8 % (ref 0–5.6)
TRICHOMONAS, WET PREP: ABNORMAL
WBC'S/HIGH POWER FIELD, WET PREP: ABNORMAL
YEAST, WET PREP: PRESENT

## 2022-09-13 PROCEDURE — 80048 BASIC METABOLIC PNL TOTAL CA: CPT | Performed by: PEDIATRICS

## 2022-09-13 PROCEDURE — 99215 OFFICE O/P EST HI 40 MIN: CPT | Performed by: PEDIATRICS

## 2022-09-13 PROCEDURE — 83036 HEMOGLOBIN GLYCOSYLATED A1C: CPT | Performed by: PEDIATRICS

## 2022-09-13 PROCEDURE — 87210 SMEAR WET MOUNT SALINE/INK: CPT | Performed by: PEDIATRICS

## 2022-09-13 PROCEDURE — 36415 COLL VENOUS BLD VENIPUNCTURE: CPT | Performed by: PEDIATRICS

## 2022-09-13 RX ORDER — METRONIDAZOLE 7.5 MG/G
1 GEL VAGINAL AT BEDTIME
Qty: 70 G | Refills: 0 | Status: SHIPPED | OUTPATIENT
Start: 2022-09-13 | End: 2023-04-13

## 2022-09-13 RX ORDER — FLUCONAZOLE 150 MG/1
150 TABLET ORAL DAILY
Qty: 14 TABLET | Refills: 0 | Status: SHIPPED | OUTPATIENT
Start: 2022-09-13 | End: 2022-09-27

## 2022-09-13 RX ORDER — PROCHLORPERAZINE 25 MG/1
SUPPOSITORY RECTAL
Qty: 3 EACH | Refills: 11 | Status: SHIPPED | OUTPATIENT
Start: 2022-09-13 | End: 2024-04-25

## 2022-09-13 RX ORDER — OXYCODONE AND ACETAMINOPHEN 5; 325 MG/1; MG/1
1-2 TABLET ORAL EVERY 4 HOURS PRN
Qty: 90 TABLET | Refills: 0 | Status: SHIPPED | OUTPATIENT
Start: 2022-09-13 | End: 2022-12-07

## 2022-09-13 RX ORDER — PROCHLORPERAZINE 25 MG/1
SUPPOSITORY RECTAL
Qty: 1 EACH | Refills: 0 | Status: SHIPPED | OUTPATIENT
Start: 2022-09-13 | End: 2024-04-25

## 2022-09-13 RX ORDER — PROCHLORPERAZINE 25 MG/1
SUPPOSITORY RECTAL
Qty: 1 EACH | Refills: 3 | Status: SHIPPED | OUTPATIENT
Start: 2022-09-13 | End: 2023-12-04

## 2022-09-13 RX ORDER — TRIAMCINOLONE ACETONIDE 5 MG/G
OINTMENT TOPICAL
Qty: 60 G | Refills: 11 | Status: SHIPPED | OUTPATIENT
Start: 2022-09-13

## 2022-09-13 ASSESSMENT — PAIN SCALES - GENERAL: PAINLEVEL: MODERATE PAIN (4)

## 2022-09-13 NOTE — PROGRESS NOTES
Assessment & Plan       ICD-10-CM    1. Type 2 diabetes mellitus without complication, with long-term current use of insulin (H)  E11.9 Hemoglobin A1c    Z79.4 Tirzepatide 2.5 MG/0.5ML SOPN     Continuous Blood Gluc  (DEXCOM G6 ) BRANT     Continuous Blood Gluc Sensor (DEXCOM G6 SENSOR) MISC     Continuous Blood Gluc Transmit (DEXCOM G6 TRANSMITTER) MISC     Basic metabolic panel  (Ca, Cl, CO2, Creat, Gluc, K, Na, BUN)    Not optimally controlled.  Need improved monitoring - Dexcom scripts resent.  A1C recheck today of 8%.   Will stop jardiance due to recurrent yeast infections and start Mounjaro at low dose.  If tolerated, will increase over time.     2. Essential hypertension  I10 Well controlled, continue current medications     3. Chronic pain syndrome  G89.4 oxyCODONE-acetaminophen (PERCOCET) 5-325 MG tablet    Stable dosing, refills every 3-4 months.  No concerns about use      4. Intermittent asthma, uncomplicated  J45.20 mometasone-formoterol (DULERA) 100-5 MCG/ACT inhaler    With increased cough, plan switch to Dulera as patient has historically done much better on this formulation     5. Diabetic polyneuropathy associated with type 2 diabetes mellitus (H)  E11.42 Stable - continue to optimize DM management   6. Bacterial vaginosis  N76.0 metroNIDAZOLE (METROGEL) 0.75 % vaginal gel    Provided treatment to bring for upcoming travel for recurrent BV if needed      B96.89    7. Vaginitis and vulvovaginitis  N76.0 Wet prep - Clinic Collect     fluconazole (DIFLUCAN) 150 MG tablet     metroNIDAZOLE (METROGEL) 0.75 % vaginal gel    Recurrent yeast vaginitis - plan to treat for 2 weeks consistently, stop jardiance - patient to alert me if not improving.  Wet prep today + for yeast     8. External hemorrhoids  K64.4 triamcinolone (KENALOG) 0.5 % external ointment  To alert me if not improving and interested in colorectal surgery referral     9. Primary osteoarthritis, unspecified site  M19.91  oxyCODONE-acetaminophen (PERCOCET) 5-325 MG tablet            See Patient Instructions    Return in about 3 months (around 12/13/2022).   \  40 minutes spent in chart review, time in room with patient, documentation on day of service.      Linda Durant MD  [facility] ROGERIO Feng is a 56 year old, presenting for the following health issues:  Diabetes      History of Present Illness       Diabetes:   She presents for follow up of diabetes.  She is checking home blood glucose a few times a month. She checks blood glucose before meals.  Blood glucose is sometimes over 200 and never under 70. When her blood glucose is low, the patient is asymptomatic for confusion, blurred vision, lethargy and reports not feeling dizzy, shaky, or weak.  She is concerned about frequent infections. She is not experiencing numbness or burning in feet, excessive thirst, blurry vision, weight changes or redness, sores or blisters on feet. The patient has not had a diabetic eye exam in the last 12 months.         She eats 2-3 servings of fruits and vegetables daily.She consumes 0 sweetened beverage(s) daily.She exercises with enough effort to increase her heart rate 10 to 19 minutes per day.  She exercises with enough effort to increase her heart rate 3 or less days per week.   She is taking medications regularly.       HTN - controlled on current medications    DM - at last visit, increased jardiance - hasn't been checking blood sugars regularly - Dexcom has been following off a bit, but willing to restart.    Having a significant issue with recurrent yeast infections after increasing the jardiance dosing.   Has required diflucan a few times and has vaginal itching and discharge symptoms again.   No new neuropathy symptoms.    Estradiol - taking under the care of her Ob/Gyn - planning to wean off    Asthma - has been having more allergy related cough - historically, has responded better to Dulera than  "Symbicort and wishes to change back to it now.  No acute exacerbation symptoms.    Chronic pain - uses percoset #90 per 3 months - last refill was actually 4 months ago.  No concerns about use, rare refills.  Significant orthopedic history.  Working closely with Dr Strong right now for foot pain that is improving.    Will be in [place] for 3 months this winter - looking forward to it    Amy chiropractor has been really helpful for her overall wellness and pain        Objective    /66   Pulse 75   Temp 97.8  F (36.6  C) (Tympanic)   Ht 1.702 m (5' 7\")   Wt 127 kg (280 lb)   SpO2 97%   BMI 43.85 kg/m    Body mass index is 43.85 kg/m .   Wt Readings from Last 4 Encounters:   09/13/22 127 kg (280 lb)   08/16/22 129.3 kg (285 lb)   06/07/22 129.3 kg (285 lb)   05/09/22 129.5 kg (285 lb 9.6 oz)       Physical Exam   GENERAL: healthy, alert and no distress  RESP: lungs clear to auscultation - no rales, rhonchi or wheezes  CV: regular rate and rhythm, normal S1 S2, no S3 or S4, no murmur, click or rub, no peripheral edema and peripheral pulses strong  SKIN: no suspicious lesions or rashes  NEURO: Normal strength and tone, mentation intact and speech normal    Labs pending    Linda Durant MD              "

## 2022-09-13 NOTE — PATIENT INSTRUCTIONS
Start diflucan once daily for 14 days    Flagyl vaginal cream    Stop jardiance    Start mounjaro    3 months    Labs today, including wet prep    Stop symbicort and start dulera - hoang if problems with the script

## 2022-09-14 LAB
ANION GAP SERPL CALCULATED.3IONS-SCNC: 4 MMOL/L (ref 3–14)
BUN SERPL-MCNC: 16 MG/DL (ref 7–30)
CALCIUM SERPL-MCNC: 9.1 MG/DL (ref 8.5–10.1)
CHLORIDE BLD-SCNC: 105 MMOL/L (ref 94–109)
CO2 SERPL-SCNC: 30 MMOL/L (ref 20–32)
CREAT SERPL-MCNC: 0.62 MG/DL (ref 0.52–1.04)
GFR SERPL CREATININE-BSD FRML MDRD: >90 ML/MIN/1.73M2
GLUCOSE BLD-MCNC: 100 MG/DL (ref 70–99)
POTASSIUM BLD-SCNC: 3.5 MMOL/L (ref 3.4–5.3)
SODIUM SERPL-SCNC: 139 MMOL/L (ref 133–144)

## 2022-09-15 ENCOUNTER — TELEPHONE (OUTPATIENT)
Dept: PEDIATRICS | Facility: CLINIC | Age: 57
End: 2022-09-15

## 2022-09-15 NOTE — TELEPHONE ENCOUNTER
"Requested Prescriptions   Pending Prescriptions Disp Refills     celecoxib (CELEBREX) 200 MG capsule [Pharmacy Med Name: CELECOXIB 200MG CAPS]  Last Written Prescription Date:  09/29/2017  Last Fill Quantity: 90 capsule,  # refills: 1   Last office visit: 10/10/2017 with prescribing provider:  Griselda Yoder MD    Future Office Visit:   Next 5 appointments (look out 90 days)     Apr 10, 2018  9:00 AM CDT   Pre-Op physical with Griselda Yoder MD   [facility]    [address] [place] [address]  [address]  [facility] [address] [phone]                  90 capsule 1     Sig: TAKE ONE CAPSULE BY MOUTH EVERY DAY    NSAID Medications Failed    3/25/2018  3:09 PM       Failed - Normal CBC on file in past 12 months    Recent Labs   Lab Test  07/06/17   0935  10/16/16   1937   WBC   --   8.7   RBC   --   4.45   HGB  13.3  13.1   HCT   --   39.1   PLT   --   299            Passed - Blood pressure under 140/90 in past 12 months    BP Readings from Last 3 Encounters:   10/10/17 112/78   09/22/17 118/70   08/30/17 118/60                Passed - Normal ALT on file in past 12 months    Recent Labs   Lab Test  04/13/17   0816   ALT  26            Passed - Normal AST on file in past 12 months    Recent Labs   Lab Test  04/13/17   0816   AST  11            Passed - Recent (12 mo) or future (30 days) visit within the authorizing provider's specialty    Patient had office visit in the last 12 months or has a visit in the next 30 days with authorizing provider or within the authorizing provider's specialty.  See \"Patient Info\" tab in inbasket, or \"Choose Columns\" in Meds & Orders section of the refill encounter.           Passed - Patient is age 6-64 years       Passed - No active pregnancy on record       Passed - Normal serum creatinine on file in past 12 months    Recent Labs   Lab Test  08/30/17   1412   CR  0.66            Passed - No positive pregnancy test in past 12 " months           Rituxan Counseling:  I discussed with the patient the risks of Rituxan infusions. Side effects can include infusion reactions, severe drug rashes including mucocutaneous reactions, reactivation of latent hepatitis and other infections and rarely progressive multifocal leukoencephalopathy.  All of the patient's questions and concerns were addressed.

## 2022-09-15 NOTE — TELEPHONE ENCOUNTER
Prior Authorization Retail Medication Request    Medication/Dose: Mounjaro, Tirzepatide 2.5mg Pen Injectors  ICD code (if different than what is on RX):  E11.9 Z79.4  Previously Tried and Failed:  na  Rationale:  na    Insurance Name:  [email or web address]  Insurance ID:  [id number]       Pharmacy Information (if different than what is on RX)  Name:  [facility]  Phone:  [phone]

## 2022-09-15 NOTE — TELEPHONE ENCOUNTER
Central Prior Authorization Team   Phone: [phone]      PA Initiation    Medication: Tirzepatide 2.5 MG/0.5ML SOPN MOUNJARO--INITIATED  Insurance Company: [employer] - Phone [phone] Fax [phone]  [facility] [place] [address]  Filling Pharmacy Phone: [phone]  Filling Pharmacy Fax:    Start Date: 9/15/2022

## 2022-09-20 NOTE — TELEPHONE ENCOUNTER
Central Prior Authorization Team   Phone: [phone]      Prior Authorization Approval    Authorization Effective Date: 8/20/2022  Authorization Expiration Date: 9/18/2025  Medication: Tirzepatide 2.5 MG/0.5ML SOPN MOUNJARO--APPROVED  Approved Dose/Quantity:    Reference #:     Insurance Company: [employer] - Phone [phone] Fax [phone]  Expected CoPay:       CoPay Card Available:      Foundation Assistance Needed:    Which Pharmacy is filling the prescription (Not needed for infusion/clinic administered): [facility] [place] [address]  Pharmacy Notified: Yes  Patient Notified: Yes PHARMACY WILL CONTACT WHEN FILLED

## 2022-10-03 ENCOUNTER — TELEPHONE (OUTPATIENT)
Dept: PEDIATRICS | Facility: CLINIC | Age: 57
End: 2022-10-03

## 2022-10-03 ENCOUNTER — E-VISIT (OUTPATIENT)
Dept: PEDIATRICS | Facility: CLINIC | Age: 57
End: 2022-10-03
Payer: COMMERCIAL

## 2022-10-03 DIAGNOSIS — N76.0 VAGINITIS AND VULVOVAGINITIS: Primary | ICD-10-CM

## 2022-10-03 DIAGNOSIS — J01.00 ACUTE MAXILLARY SINUSITIS, RECURRENCE NOT SPECIFIED: ICD-10-CM

## 2022-10-03 DIAGNOSIS — J01.90 ACUTE SINUSITIS WITH SYMPTOMS > 10 DAYS: ICD-10-CM

## 2022-10-03 PROCEDURE — 99421 OL DIG E/M SVC 5-10 MIN: CPT | Performed by: PEDIATRICS

## 2022-10-03 RX ORDER — CEFDINIR 300 MG/1
300 CAPSULE ORAL 2 TIMES DAILY
Qty: 20 CAPSULE | Refills: 0 | Status: SHIPPED | OUTPATIENT
Start: 2022-10-03 | End: 2022-10-13

## 2022-10-03 RX ORDER — FLUCONAZOLE 150 MG/1
150 TABLET ORAL ONCE
Qty: 1 TABLET | Refills: 0 | Status: SHIPPED | OUTPATIENT
Start: 2022-10-03 | End: 2022-10-03

## 2022-10-03 NOTE — PATIENT INSTRUCTIONS
Jung Yeh is going to help me get the steroid inhaler/pharmacy situation worked out today.    So sorry about the coverage difficulties!      ?  Linda Durant MD?

## 2022-10-03 NOTE — TELEPHONE ENCOUNTER
If possible, pleas expedite. Patient is symptomatic and having hard time controlling symptoms right now without the inhaler.       Prior Authorization Retail Medication Request    Medication/Dose: mometasone-formoterol (DULERA) 100-5 MCG/ACT inhaler  ICD code (if different than what is on RX):    Previously Tried and Failed:  Symbicort  Rationale:  Patient historically has done better on Dulera. Tried Symbicort but asthma symptoms were not managed.     Insurance Name:  [employer]  Insurance ID:  [id number]       Pharmacy Information (if different than what is on RX)  Name:    Phone:

## 2022-10-04 NOTE — TELEPHONE ENCOUNTER
PA Initiation    Medication: mometasone-formoterol (DULERA) 100-5 MCG/ACT inhaler  Insurance Company: [employer] - Phone [phone] Fax [phone]  Pharmacy [place]illing the Rx: [place] [facility] [address]  Filling Pharmacy Phone: [phone]  Filling Pharmacy Fax: [phone]  Start Date: 10/4/2022

## 2022-10-04 NOTE — TELEPHONE ENCOUNTER
Prior Authorization Approval    Authorization Effective Date: 9/4/2022  Authorization Expiration Date: 10/4/2023  Medication: mometasone-formoterol (DULERA) 100-5 MCG/ACT inhaler  Approved Dose/Quantity:   Reference #: [id number]   Insurance Company: [employer] - Phone [phone] Fax [phone]  Which Pharmacy is filling the prescription (Not needed for infusion/clinic administered[facility] [place] [facility] [place] [facility] [address]  Pharmacy Notified: Yes  Patient Notified: Yes

## 2022-10-23 ENCOUNTER — HEALTH MAINTENANCE LETTER (OUTPATIENT)
Age: 57
End: 2022-10-23

## 2022-12-01 ENCOUNTER — DOCUMENTATION ONLY (OUTPATIENT)
Dept: LAB | Facility: CLINIC | Age: 57
End: 2022-12-01

## 2022-12-01 NOTE — PROGRESS NOTES
Hi-    Patient has a lab appointment on 12/07/2022. They are requesting an A1C lab. Please place necessary orders. Thank you! SALO Meade   No

## 2022-12-05 ENCOUNTER — TRANSFERRED RECORDS (OUTPATIENT)
Dept: MULTI SPECIALTY CLINIC | Facility: CLINIC | Age: 57
End: 2022-12-05

## 2022-12-05 LAB — RETINOPATHY: NORMAL

## 2022-12-07 ENCOUNTER — APPOINTMENT (OUTPATIENT)
Dept: LAB | Facility: CLINIC | Age: 57
End: 2022-12-07
Payer: COMMERCIAL

## 2022-12-07 ENCOUNTER — OFFICE VISIT (OUTPATIENT)
Dept: PEDIATRICS | Facility: CLINIC | Age: 57
End: 2022-12-07
Payer: COMMERCIAL

## 2022-12-07 VITALS
OXYGEN SATURATION: 97 % | RESPIRATION RATE: 16 BRPM | HEIGHT: 66 IN | WEIGHT: 283.8 LBS | DIASTOLIC BLOOD PRESSURE: 82 MMHG | BODY MASS INDEX: 45.61 KG/M2 | TEMPERATURE: 98.2 F | SYSTOLIC BLOOD PRESSURE: 128 MMHG | HEART RATE: 90 BPM

## 2022-12-07 DIAGNOSIS — E11.42 DIABETIC POLYNEUROPATHY ASSOCIATED WITH TYPE 2 DIABETES MELLITUS (H): ICD-10-CM

## 2022-12-07 DIAGNOSIS — D17.30 LIPOMA OF SKIN AND SUBCUTANEOUS TISSUE: ICD-10-CM

## 2022-12-07 DIAGNOSIS — H69.93 DYSFUNCTION OF BOTH EUSTACHIAN TUBES: ICD-10-CM

## 2022-12-07 DIAGNOSIS — Z79.4 TYPE 2 DIABETES MELLITUS WITHOUT COMPLICATION, WITH LONG-TERM CURRENT USE OF INSULIN (H): Primary | ICD-10-CM

## 2022-12-07 DIAGNOSIS — R10.13 EPIGASTRIC PAIN: ICD-10-CM

## 2022-12-07 DIAGNOSIS — K21.9 GASTROESOPHAGEAL REFLUX DISEASE, UNSPECIFIED WHETHER ESOPHAGITIS PRESENT: ICD-10-CM

## 2022-12-07 DIAGNOSIS — E78.5 HYPERLIPIDEMIA LDL GOAL <100: ICD-10-CM

## 2022-12-07 DIAGNOSIS — M19.91 PRIMARY OSTEOARTHRITIS, UNSPECIFIED SITE: ICD-10-CM

## 2022-12-07 DIAGNOSIS — G89.4 CHRONIC PAIN SYNDROME: ICD-10-CM

## 2022-12-07 DIAGNOSIS — E11.9 TYPE 2 DIABETES MELLITUS WITHOUT COMPLICATION, WITH LONG-TERM CURRENT USE OF INSULIN (H): Primary | ICD-10-CM

## 2022-12-07 DIAGNOSIS — Z79.4 TYPE 2 DIABETES MELLITUS WITHOUT COMPLICATION, WITH LONG-TERM CURRENT USE OF INSULIN (H): ICD-10-CM

## 2022-12-07 DIAGNOSIS — E11.9 TYPE 2 DIABETES MELLITUS WITHOUT COMPLICATION, WITH LONG-TERM CURRENT USE OF INSULIN (H): ICD-10-CM

## 2022-12-07 LAB — HBA1C MFR BLD: 9.3 % (ref 0–5.6)

## 2022-12-07 PROCEDURE — 83036 HEMOGLOBIN GLYCOSYLATED A1C: CPT | Performed by: PEDIATRICS

## 2022-12-07 PROCEDURE — 99214 OFFICE O/P EST MOD 30 MIN: CPT | Performed by: PEDIATRICS

## 2022-12-07 PROCEDURE — 36415 COLL VENOUS BLD VENIPUNCTURE: CPT | Performed by: PEDIATRICS

## 2022-12-07 RX ORDER — OXYCODONE AND ACETAMINOPHEN 5; 325 MG/1; MG/1
1-2 TABLET ORAL EVERY 4 HOURS PRN
Qty: 90 TABLET | Refills: 0 | Status: SHIPPED | OUTPATIENT
Start: 2022-12-07 | End: 2023-04-13

## 2022-12-07 RX ORDER — LANSOPRAZOLE 30 MG/1
30 CAPSULE, DELAYED RELEASE ORAL DAILY
Qty: 90 CAPSULE | Refills: 3 | Status: SHIPPED | OUTPATIENT
Start: 2022-12-07 | End: 2023-05-23

## 2022-12-07 ASSESSMENT — PATIENT HEALTH QUESTIONNAIRE - PHQ9: SUM OF ALL RESPONSES TO PHQ QUESTIONS 1-9: 0

## 2022-12-07 ASSESSMENT — PAIN SCALES - GENERAL: PAINLEVEL: NO PAIN (0)

## 2022-12-07 NOTE — PATIENT INSTRUCTIONS
Call for visit with Dr Weldon    Start taking the meal time insulin    Dexcom - hoping it stays on better in [place]    Walk in the mornings in [place]    Refill of pain meds to the pharmacy for you    Call for visit with Dr Bautista for when you get back

## 2022-12-07 NOTE — PROGRESS NOTES
"  Assessment & Plan       ICD-10-CM    1. Type 2 diabetes mellitus without complication, with long-term current use of insulin (H)  E11.9 Hemoglobin A1c    Z79.4 insulin glargine (LANTUS PEN) 100 UNIT/ML pen    Poorly controlled - didn't tolerate Mounjaro.  Plan to start using mealtime insulin and follow with repeat visit upon return from [place].  Planning daily walks in [place] too - hoping the combination of mealtime coverage and exercise helps with A1C      2. Hyperlipidemia LDL goal <100  E78.5 Continue to follow, hasn't tolerated statins      3. Diabetic polyneuropathy associated with type 2 diabetes mellitus (H)  E11.42 Trying to improve glycemic control, follow      4. Epigastric pain  R10.13 LANsoprazole (PREVACID) 30 MG DR capsule  Well controlled, continue current medications        5. Gastroesophageal reflux disease, unspecified whether esophagitis present  K21.9 LANsoprazole (PREVACID) 30 MG DR capsule  Well controlled, continue current medications        6. Dysfunction of both eustachian tubes  H69.83 Adult ENT  Referral    Hasn't improved with nasal steroid, decongestants and getting painful - plan ENT eval      7. Chronic pain syndrome  G89.4 oxyCODONE-acetaminophen (PERCOCET) 5-325 MG tablet    Using #90 per 3 months - numerous orthopedic injuries and surgeries      8. Primary osteoarthritis, unspecified site  M19.91 oxyCODONE-acetaminophen (PERCOCET) 5-325 MG tablet      9. Lipoma of skin and subcutaneous tissue  D17.30 Patient to follow up with her plastic surgeon, Dr Bautista.               BMI:   Estimated body mass index is 46.51 kg/m  as calculated from the following:    Height as of this encounter: 1.664 m (5' 5.5\").    Weight as of this encounter: 128.7 kg (283 lb 12.8 oz).   Weight management plan: Discussed healthy diet and exercise guidelines    See Patient Instructions    Return in about 4 months (around 4/7/2023) for Follow up, with me, in person.    Linda Durant MD  M " [facility] ROGERIO Feng is a 57 year old, presenting for the following health issues:  Diabetes      History of Present Illness       Diabetes:   She presents for follow up of diabetes.  She is not checking blood glucose. She is concerned about blood sugar frequently over 200. She is not experiencing numbness or burning in feet, excessive thirst, blurry vision, weight changes or redness, sores or blisters on feet. The patient has had a diabetic eye exam in the last 12 months. Eye exam performed on upcoming December 12, 2022. Location of last eye exam [facility].        She eats 2-3 servings of fruits and vegetables daily.She consumes 0 sweetened beverage(s) daily.She exercises with enough effort to increase her heart rate 10 to 19 minutes per day.  She exercises with enough effort to increase her heart rate 4 days per week.   She is taking medications regularly.       Hyperlipidemia Follow-Up      Are you regularly taking any medication or supplement to lower your cholesterol?   No    Are you having muscle aches or other side effects that you think could be caused by your cholesterol lowering medication?  No    Hypertension Follow-up      Do you check your blood pressure regularly outside of the clinic? No     Are you following a low salt diet? Yes    Are your blood pressures ever more than 140 on the top number (systolic) OR more   than 90 on the bottom number (diastolic), for example 140/90? No    BP Readings from Last 2 Encounters:   12/07/22 128/82   09/13/22 122/66     Hemoglobin A1C (%)   Date Value   12/07/2022 9.3 (H)   09/13/2022 8.0 (H)   05/17/2021 8.1 (H)   02/22/2021 8.8 (H)     LDL Cholesterol Calculated (mg/dL)   Date Value   05/09/2022 116 (H)   05/17/2021 92   11/25/2020 83     Diabetes - didn't tolerate mounjaro - made her very ill - tried a few times.  Having a hard time with CGM staying on.   Suspects blood sugars higher now - hasn't been using meal  "time insulin, but willing to start    Lipoma - upper middle back on left side - hurts - hx of numerous lipomas with difficult removals.       Sciatic notch pain - working with physical therapy team     Leaving for [place] for 3 months this winter and very much looking forward to it    White spot on tongue - wondering if related to covid - 4mm x 3mm - going to oral surgeon    Fluid in the ear - flonase didn't work, sudafed didn't work - used for weeks.  Painful, hearing decreasing.      Asthma - stable after adding Dulera - cough doing well compared to our last visit    Skin CA - following with dermatology    Eye exam - this month              Objective    /82 (BP Location: Right arm, Cuff Size: Adult Large)   Pulse 90   Temp 98.2  F (36.8  C) (Tympanic)   Resp 16   Ht 1.664 m (5' 5.5\")   Wt 128.7 kg (283 lb 12.8 oz)   LMP  (LMP Unknown)   SpO2 97%   BMI 46.51 kg/m    Body mass index is 46.51 kg/m .  Physical Exam   GENERAL: healthy, alert and no distress  HENT: normal cephalic/atraumatic, both ears: clear effusion, nose and mouth without ulcers or lesions, oropharynx clear and oral mucous membranes moist  RESP: lungs clear to auscultation - no rales, rhonchi or wheezes  CV: regular rate and rhythm, normal S1 S2, no S3 or S4, no murmur, click or rub, no peripheral edema and peripheral pulses strong  MS: tubular, freely mobile mass left upper back, under rib cage - about 6 inches in length, 1 inch diameter - no overlying skin changes, slightly tender  PSYCH: mentation appears normal, affect normal/bright    Results for orders placed or performed in visit on 12/07/22 (from the past 24 hour(s))   Hemoglobin A1c   Result Value Ref Range    Hemoglobin A1C 9.3 (H) 0.0 - 5.6 %    Narrative    Result confirmed by repeat test      *Note: Due to a large number of results and/or encounters for the requested time period, some results have not been displayed. A complete set of results can be found in Results Review. "       Linda Durant MD

## 2022-12-08 ENCOUNTER — TELEPHONE (OUTPATIENT)
Dept: PEDIATRICS | Facility: CLINIC | Age: 57
End: 2022-12-08

## 2022-12-08 NOTE — TELEPHONE ENCOUNTER
Prior Authorization Retail Medication Request    Medication/Dose: Insulin Glargine 100 unit  ICD code (if different than what is on RX):  E11.9 Z79.4  Previously Tried and Failed:  na  Rationale:  na    Insurance Name:  [employer]  Insurance ID:  [id number]       Pharmacy Information (if different than what is on RX)  Name:  [facility]  Phone:  [phone]

## 2022-12-08 NOTE — TELEPHONE ENCOUNTER
Prior Authorization Retail Medication Request    Medication/Dose: Dexcome G6 Transmitter  ICD code (if different than what is on RX):  E11.9 Z79.4  Previously Tried and Failed:  na  Rationale:  na    Insurance Name:  [employer]  Insurance ID:  [id number]       Pharmacy Information (if different than what is on RX)  Name:  [facility]  Phone:  [phone]

## 2022-12-09 ENCOUNTER — DOCUMENTATION ONLY (OUTPATIENT)
Dept: OTHER | Facility: CLINIC | Age: 57
End: 2022-12-09

## 2022-12-09 ENCOUNTER — MYC MEDICAL ADVICE (OUTPATIENT)
Dept: PEDIATRICS | Facility: CLINIC | Age: 57
End: 2022-12-09

## 2022-12-09 DIAGNOSIS — M54.50 LOW BACK PAIN: Primary | ICD-10-CM

## 2022-12-09 DIAGNOSIS — R10.2 PELVIC PAIN IN FEMALE: ICD-10-CM

## 2022-12-09 DIAGNOSIS — D17.9 LIPOMA, UNSPECIFIED SITE: ICD-10-CM

## 2022-12-09 RX ORDER — PEN NEEDLE, DIABETIC 32GX 5/32"
NEEDLE, DISPOSABLE MISCELLANEOUS
Qty: 180 EACH | Refills: 0 | Status: SHIPPED | OUTPATIENT
Start: 2022-12-09 | End: 2023-08-21

## 2022-12-09 NOTE — TELEPHONE ENCOUNTER
Central Prior Authorization Team  Phone: [phone]    PA Initiation    Medication: Dexcom G6 Transmitter  Insurance Company: [employer] - Phone [phone] Fax [phone]  [place] the [place] [facility] [place] [address]  Filling Pharmacy Phone: [phone]  Filling Pharmacy Fax:    Start Date: 12/9/2022

## 2022-12-09 NOTE — TELEPHONE ENCOUNTER
Prescription approved per [facility] Refill Protocol.    Desire GUPTA RN   Patient Advocate Liaison (PAL)  [facility]

## 2022-12-09 NOTE — TELEPHONE ENCOUNTER
Central Prior Authorization Team  Phone: [phone]    Prior Authorization Not Needed per Insurance    Medication:   Insurance Company:    Expected CoPay:      Pharmacy Filling the Rx: [facility] [place] - [place], [place] - [address]  Pharmacy Notified: Yes  Patient Notified:      Pharmacy has fillled rx.

## 2022-12-12 NOTE — TELEPHONE ENCOUNTER
Central Prior Authorization Team  Phone: [phone]    Prior Authorization Approval    Authorization Effective Date: 11/12/2022  Authorization Expiration Date: 12/11/2025  Medication:   Approved Dose/Quantity:   Reference #:     Insurance Company: [employer] - Phone [phone] Fax [phone]  Expected CoPay:       CoPay Card Available:      Foundation Assistance Needed:    Which Pharmacy is filling the prescription (Not needed for infusion/clinic administered): [facility] [place], [place] - [address]  Pharmacy Notified: Yes  Patient Notified:

## 2022-12-12 NOTE — TELEPHONE ENCOUNTER
OK for MRI orders.   Can we send to [facility]?  I'm not sure if they take orders from outside providers.   I T'd up the orders.  Linda Durant MD

## 2022-12-13 NOTE — TELEPHONE ENCOUNTER
Patient states her Chiropractor ordered the MRI's. She is getting them done at [facility].     No further action needed from us.

## 2022-12-13 NOTE — TELEPHONE ENCOUNTER
Left voicemail for [facility] in [place] to call me back.   Will check to see if they take outside orders.

## 2022-12-20 ENCOUNTER — TRANSFERRED RECORDS (OUTPATIENT)
Dept: HEALTH INFORMATION MANAGEMENT | Facility: CLINIC | Age: 57
End: 2022-12-20

## 2023-02-06 ENCOUNTER — E-VISIT (OUTPATIENT)
Dept: PEDIATRICS | Facility: CLINIC | Age: 58
End: 2023-02-06
Payer: COMMERCIAL

## 2023-02-06 DIAGNOSIS — J01.90 ACUTE BACTERIAL SINUSITIS: ICD-10-CM

## 2023-02-06 DIAGNOSIS — J01.00 ACUTE MAXILLARY SINUSITIS, RECURRENCE NOT SPECIFIED: Primary | ICD-10-CM

## 2023-02-06 DIAGNOSIS — B37.31 YEAST INFECTION OF THE VAGINA: Primary | ICD-10-CM

## 2023-02-06 DIAGNOSIS — B96.89 ACUTE BACTERIAL SINUSITIS: ICD-10-CM

## 2023-02-06 PROCEDURE — 99421 OL DIG E/M SVC 5-10 MIN: CPT | Performed by: PEDIATRICS

## 2023-02-06 RX ORDER — CEFDINIR 300 MG/1
300 CAPSULE ORAL 2 TIMES DAILY
Qty: 20 CAPSULE | Refills: 1 | Status: SHIPPED | OUTPATIENT
Start: 2023-02-06 | End: 2023-02-08

## 2023-02-06 RX ORDER — FLUCONAZOLE 150 MG/1
150 TABLET ORAL ONCE
Qty: 1 TABLET | Refills: 0 | Status: SHIPPED | OUTPATIENT
Start: 2023-02-06 | End: 2023-02-06

## 2023-02-08 DIAGNOSIS — J01.00 ACUTE MAXILLARY SINUSITIS, RECURRENCE NOT SPECIFIED: ICD-10-CM

## 2023-02-08 RX ORDER — CEFDINIR 300 MG/1
300 CAPSULE ORAL 2 TIMES DAILY
Qty: 20 CAPSULE | Refills: 1 | Status: SHIPPED | OUTPATIENT
Start: 2023-02-08 | End: 2023-04-13

## 2023-02-08 NOTE — TELEPHONE ENCOUNTER
Alternative pharmacy requested. Routing refill request to provider for review/approval because:  Drug not on the [facility] refill protocol     Sujit ESQUIVEL RN 2/8/2023 at 3:57 PM

## 2023-02-08 NOTE — TELEPHONE ENCOUNTER
Received fax from [facility], they do not have the prescription in stock.    Called Jung, she asked to have the medication sent to [address] [place] [address]

## 2023-04-02 ENCOUNTER — HEALTH MAINTENANCE LETTER (OUTPATIENT)
Age: 58
End: 2023-04-02

## 2023-04-07 ASSESSMENT — ASTHMA QUESTIONNAIRES
ACT_TOTALSCORE: 25
QUESTION_2 LAST FOUR WEEKS HOW OFTEN HAVE YOU HAD SHORTNESS OF BREATH: NOT AT ALL
QUESTION_3 LAST FOUR WEEKS HOW OFTEN DID YOUR ASTHMA SYMPTOMS (WHEEZING, COUGHING, SHORTNESS OF BREATH, CHEST TIGHTNESS OR PAIN) WAKE YOU UP AT NIGHT OR EARLIER THAN USUAL IN THE MORNING: NOT AT ALL
QUESTION_4 LAST FOUR WEEKS HOW OFTEN HAVE YOU USED YOUR RESCUE INHALER OR NEBULIZER MEDICATION (SUCH AS ALBUTEROL): NOT AT ALL
QUESTION_5 LAST FOUR WEEKS HOW WOULD YOU RATE YOUR ASTHMA CONTROL: COMPLETELY CONTROLLED
ACT_TOTALSCORE: 25
QUESTION_1 LAST FOUR WEEKS HOW MUCH OF THE TIME DID YOUR ASTHMA KEEP YOU FROM GETTING AS MUCH DONE AT WORK, SCHOOL OR AT HOME: NONE OF THE TIME

## 2023-04-08 DIAGNOSIS — E11.65 TYPE 2 DIABETES MELLITUS WITH HYPERGLYCEMIA, WITH LONG-TERM CURRENT USE OF INSULIN (H): ICD-10-CM

## 2023-04-08 DIAGNOSIS — Z79.4 TYPE 2 DIABETES MELLITUS WITH HYPERGLYCEMIA, WITH LONG-TERM CURRENT USE OF INSULIN (H): ICD-10-CM

## 2023-04-08 DIAGNOSIS — I10 ESSENTIAL HYPERTENSION: ICD-10-CM

## 2023-04-11 RX ORDER — LOSARTAN POTASSIUM 100 MG/1
TABLET ORAL
Qty: 90 TABLET | Refills: 0 | Status: SHIPPED | OUTPATIENT
Start: 2023-04-11 | End: 2023-06-08

## 2023-04-11 RX ORDER — HYDROCHLOROTHIAZIDE 25 MG/1
TABLET ORAL
Qty: 90 TABLET | Refills: 0 | Status: SHIPPED | OUTPATIENT
Start: 2023-04-11 | End: 2023-06-08

## 2023-04-11 NOTE — TELEPHONE ENCOUNTER
Medication is being filled for 1 time refill only due to:  Patient needs to be seen because need appt.   Jodi Andrews RN

## 2023-04-13 ENCOUNTER — OFFICE VISIT (OUTPATIENT)
Dept: PEDIATRICS | Facility: CLINIC | Age: 58
End: 2023-04-13
Payer: COMMERCIAL

## 2023-04-13 VITALS
SYSTOLIC BLOOD PRESSURE: 132 MMHG | BODY MASS INDEX: 47.08 KG/M2 | OXYGEN SATURATION: 98 % | WEIGHT: 287.3 LBS | TEMPERATURE: 97.5 F | DIASTOLIC BLOOD PRESSURE: 72 MMHG | RESPIRATION RATE: 16 BRPM | HEART RATE: 76 BPM

## 2023-04-13 DIAGNOSIS — F33.0 MAJOR DEPRESSIVE DISORDER, RECURRENT EPISODE, MILD (H): ICD-10-CM

## 2023-04-13 DIAGNOSIS — E11.65 TYPE 2 DIABETES MELLITUS WITH HYPERGLYCEMIA, WITH LONG-TERM CURRENT USE OF INSULIN (H): Primary | ICD-10-CM

## 2023-04-13 DIAGNOSIS — Z79.4 TYPE 2 DIABETES MELLITUS WITH HYPERGLYCEMIA, WITH LONG-TERM CURRENT USE OF INSULIN (H): Primary | ICD-10-CM

## 2023-04-13 DIAGNOSIS — I10 ESSENTIAL HYPERTENSION: ICD-10-CM

## 2023-04-13 DIAGNOSIS — Z12.31 VISIT FOR SCREENING MAMMOGRAM: ICD-10-CM

## 2023-04-13 DIAGNOSIS — M25.559 PAIN IN JOINT, PELVIC REGION AND THIGH, UNSPECIFIED LATERALITY: ICD-10-CM

## 2023-04-13 DIAGNOSIS — E66.01 MORBID OBESITY WITH BMI OF 40.0-44.9, ADULT (H): ICD-10-CM

## 2023-04-13 DIAGNOSIS — R07.89 ATYPICAL CHEST PAIN: ICD-10-CM

## 2023-04-13 DIAGNOSIS — M25.562 PAIN IN BOTH KNEES, UNSPECIFIED CHRONICITY: ICD-10-CM

## 2023-04-13 DIAGNOSIS — G89.4 CHRONIC PAIN SYNDROME: ICD-10-CM

## 2023-04-13 DIAGNOSIS — M19.91 PRIMARY OSTEOARTHRITIS, UNSPECIFIED SITE: ICD-10-CM

## 2023-04-13 DIAGNOSIS — D17.9 LIPOMA, UNSPECIFIED SITE: ICD-10-CM

## 2023-04-13 DIAGNOSIS — M25.561 PAIN IN BOTH KNEES, UNSPECIFIED CHRONICITY: ICD-10-CM

## 2023-04-13 LAB
ALBUMIN SERPL BCG-MCNC: 4 G/DL (ref 3.5–5.2)
ALP SERPL-CCNC: 94 U/L (ref 35–104)
ALT SERPL W P-5'-P-CCNC: 20 U/L (ref 10–35)
AMPHETAMINES UR QL: NOT DETECTED
ANION GAP SERPL CALCULATED.3IONS-SCNC: 14 MMOL/L (ref 7–15)
AST SERPL W P-5'-P-CCNC: 17 U/L (ref 10–35)
BARBITURATES UR QL SCN: NOT DETECTED
BENZODIAZ UR QL SCN: NOT DETECTED
BILIRUB SERPL-MCNC: 0.3 MG/DL
BUN SERPL-MCNC: 14.4 MG/DL (ref 6–20)
BUPRENORPHINE UR QL: NOT DETECTED
CALCIUM SERPL-MCNC: 9.4 MG/DL (ref 8.6–10)
CANNABINOIDS UR QL: DETECTED
CHLORIDE SERPL-SCNC: 102 MMOL/L (ref 98–107)
COCAINE UR QL SCN: NOT DETECTED
CREAT SERPL-MCNC: 0.66 MG/DL (ref 0.51–0.95)
CREAT UR-MCNC: 134 MG/DL
D-METHAMPHET UR QL: NOT DETECTED
DEPRECATED HCO3 PLAS-SCNC: 22 MMOL/L (ref 22–29)
GFR SERPL CREATININE-BSD FRML MDRD: >90 ML/MIN/1.73M2
GLUCOSE SERPL-MCNC: 168 MG/DL (ref 70–99)
HBA1C MFR BLD: 9 % (ref 0–5.6)
METHADONE UR QL SCN: NOT DETECTED
MICROALBUMIN UR-MCNC: <12 MG/L
MICROALBUMIN/CREAT UR: NORMAL MG/G{CREAT}
OPIATES UR QL SCN: NOT DETECTED
OXYCODONE UR QL SCN: DETECTED
PCP UR QL SCN: NOT DETECTED
POTASSIUM SERPL-SCNC: 4 MMOL/L (ref 3.4–5.3)
PROPOXYPH UR QL: NOT DETECTED
PROT SERPL-MCNC: 7 G/DL (ref 6.4–8.3)
SODIUM SERPL-SCNC: 138 MMOL/L (ref 136–145)
TRICYCLICS UR QL SCN: NOT DETECTED
TSH SERPL DL<=0.005 MIU/L-ACNC: 2.1 UIU/ML (ref 0.3–4.2)

## 2023-04-13 PROCEDURE — 36415 COLL VENOUS BLD VENIPUNCTURE: CPT | Performed by: PEDIATRICS

## 2023-04-13 PROCEDURE — 83036 HEMOGLOBIN GLYCOSYLATED A1C: CPT | Performed by: PEDIATRICS

## 2023-04-13 PROCEDURE — 82570 ASSAY OF URINE CREATININE: CPT | Performed by: PEDIATRICS

## 2023-04-13 PROCEDURE — 80306 DRUG TEST PRSMV INSTRMNT: CPT | Performed by: PEDIATRICS

## 2023-04-13 PROCEDURE — 84443 ASSAY THYROID STIM HORMONE: CPT | Performed by: PEDIATRICS

## 2023-04-13 PROCEDURE — 80053 COMPREHEN METABOLIC PANEL: CPT | Performed by: PEDIATRICS

## 2023-04-13 PROCEDURE — 99207 PR FOOT EXAM NO CHARGE: CPT | Performed by: PEDIATRICS

## 2023-04-13 PROCEDURE — 82043 UR ALBUMIN QUANTITATIVE: CPT | Performed by: PEDIATRICS

## 2023-04-13 PROCEDURE — 99215 OFFICE O/P EST HI 40 MIN: CPT | Performed by: PEDIATRICS

## 2023-04-13 RX ORDER — OXYCODONE AND ACETAMINOPHEN 5; 325 MG/1; MG/1
1-2 TABLET ORAL EVERY 4 HOURS PRN
Qty: 90 TABLET | Refills: 0 | Status: SHIPPED | OUTPATIENT
Start: 2023-04-13 | End: 2023-08-15

## 2023-04-13 ASSESSMENT — PAIN SCALES - GENERAL: PAINLEVEL: MODERATE PAIN (4)

## 2023-04-13 NOTE — PATIENT INSTRUCTIONS
Call for PHYSICAL THERAPY appt    Zyrtec and flonase - start again - if not improving, go back to allergy    Labs today    Expect a phone call to set up a stress test at [facility]    Mammogram order placed - due next month    Ob/Gyn here - Latanya Davis and Vin (male) and Danny (female)

## 2023-04-13 NOTE — LETTER
Opioid / Opioid Plus Controlled Substance Agreement    This is an agreement between you and your provider about the safe and appropriate use of controlled substance/opioids prescribed by your care team. Controlled substances are medicines that can cause physical and mental dependence (abuse).    There are strict laws about having and using these medicines. We here at [facility] are committing to working with you in your efforts to get better. To support you in this work, we ll help you schedule regular office appointments for medicine refills. If we must cancel or change your appointment for any reason, we ll make sure you have enough medicine to last until your next appointment.     As a Provider, I will:  Listen carefully to your concerns and treat you with respect.   Recommend a treatment plan that I believe is in your best interest. This plan may involve therapies other than opioid pain medication.   Talk with you often about the possible benefits, and the risk of harm of any medicine that we prescribe for you.   Provide a plan on how to taper (discontinue or go off) using this medicine if the decision is made to stop its use.    As a Patient, I understand that opioid(s):   Are a controlled substance prescribed by my care team to help me function or work and manage my condition(s).   Are strong medicines and can cause serious side effects such as:  Drowsiness, which can seriously affect my driving ability  A lower breathing rate, enough to cause death  Harm to my thinking ability   Depression   Abuse of and addiction to this medicine  Need to be taken exactly as prescribed. Combining opioids with certain medicines or chemicals (such as illegal drugs, sedatives, sleeping pills, and benzodiazepines) can be dangerous or even fatal. If I stop opioids suddenly, I may have severe withdrawal symptoms.  Do not work for all types of pain nor for all patients. If they re not helpful, I may be asked to stop  them.        The risks, benefits and side effects of these medicine(s) were explained to me. I agree that:  I will take part in other treatments as advised by my care team. This may be psychiatry or counseling, physical therapy, behavioral therapy, group treatment or a referral to a specialist.     I will keep all my appointments. I understand that this is part of the monitoring of opioids. My care team may require an office visit for EVERY opioid/controlled substance refill. If I miss appointments or don t follow instructions, my care team may stop my medicine.    I will take my medicines as prescribed. I will not change the dose or schedule unless my care team tells me to. There will be no refills if I run out early.     I may be asked to come to the clinic and complete a urine drug test or complete a pill count at any time. If I don t give a urine sample or participate in a pill count, the care team may stop my medicine.    I will only receive prescriptions from this clinic for chronic pain. If I am treated by another provider for acute pain issues, I will tell them that I am taking opioid pain medication for chronic pain and that I have a treatment agreement with this provider. I will inform my [facility] care team within one business day if I am given a prescription for any pain medication by another healthcare provider. My [facility] care team can contact other providers and pharmacists about my use of any medicines.    It is up to me to make sure that I don t run out of my medicines on weekends or holidays. If my care team is willing to refill my opioid prescription without a visit, I must request refills only during office hours. Refills may take up to 3 business days to process. I will use one pharmacy to fill all my opioid and other controlled substance prescriptions. I will notify the clinic about any changes to my insurance or medication availability.    I am responsible for my  prescriptions. If the medicine/prescription is lost, stolen or destroyed, it will not be replaced. I also agree not to share controlled substance medicines with anyone.    I am aware I should not use any illegal or recreational drugs. I agree not to drink alcohol unless my care team says I can.       If I enroll in the [place] Medical Cannabis program, I will tell my care team prior to my next refill.     I will tell my care team right away if I become pregnant, have a new medical problem treated outside of my regular clinic, or have a change in my medications.    I understand that this medicine can affect my thinking, judgment and reaction time. Alcohol and drugs affect the brain and body, which can affect the safety of my driving. Being under the influence of alcohol or drugs can affect my decision-making, behaviors, personal safety, and the safety of others. Driving while impaired (DWI) can occur if a person is driving, operating, or in physical control of a car, motorcycle, boat, snowmobile, ATV, motorbike, off-road vehicle, or any other motor vehicle ([place] Statute 169A.20). I understand the risk if I choose to drive or operate any vehicle or machinery.    I understand that if I do not follow any of the conditions above, my prescriptions or treatment may be stopped or changed.          Opioids  What You Need to Know    What are opioids?   Opioids are pain medicines that must be prescribed by a doctor. They are also known as narcotics.     Examples are:   morphine (MS Contin, Philomena)  oxycodone (Oxycontin)  oxycodone and acetaminophen (Percocet)  hydrocodone and acetaminophen (Vicodin, Norco)   fentanyl patch (Duragesic)   hydromorphone (Dilaudid)   methadone  codeine (Tylenol #3)     What do opioids do well?   Opioids are best for severe short-term pain such as after a surgery or injury. They may work well for cancer pain. They may help some people with long-lasting (chronic) pain.     What do opioids NOT do  well?   Opioids never get rid of pain entirely, and they don t work well for most patients with chronic pain. Opioids don t reduce swelling, one of the causes of pain.                                    Other ways to manage chronic pain and improve function include:     Treat the health problem that may be causing pain  Anti-inflammation medicines, which reduce swelling and tenderness, such as ibuprofen (Advil, Motrin) or naproxen (Aleve)  Acetaminophen (Tylenol)  Antidepressants and anti-seizure medicines, especially for nerve pain  Topical treatments such as patches or creams  Injections or nerve blocks  Chiropractic or osteopathic treatment  Acupuncture, massage, deep breathing, meditation, visual imagery, aromatherapy  Use heat or ice at the pain site  Physical therapy   Exercise  Stop smoking  Take part in therapy       Risks and side effects     Talk to your doctor before you start or decide to keep taking opioids. Possible side effects include:    Lowering your breathing rate enough to cause death  Overdose, including death, especially if taking higher than prescribed doses  Worse depression symptoms; less pleasure in things you usually enjoy  Feeling tired or sluggish  Slower thoughts or cloudy thinking  Being more sensitive to pain over time; pain is harder to control  Trouble sleeping or restless sleep  Changes in hormone levels (for example, less testosterone)  Changes in sex drive or ability to have sex  Constipation  Unsafe driving  Itching and sweating  Dizziness  Nausea, throwing up and dry mouth    What else should I know about opioids?    Opioids may lead to dependence, tolerance, or addiction.    Dependence means that if you stop or reduce the medicine too quickly, you will have withdrawal symptoms. These include loose poop (diarrhea), jitters, flu-like symptoms, nervousness and tremors. Dependence is not the same as addiction.                     Tolerance means needing higher doses over time to  get the same effect. This may increase the chance of serious side effects.    Addiction is when people improperly use a substance that harms their body, their mind or their relations with others. Use of opiates can cause a relapse of addiction if you have a history of drug or alcohol abuse.    People who have used opioids for a long time may have a lower quality of life, worse depression, higher levels of pain and more visits to doctors.    You can overdose on opioids. Take these steps to lower your risk of overdose:    Recognize the signs:  Signs of overdose include decrease or loss of consciousness (blackout), slowed breathing, trouble waking up and blue lips. If someone is worried about overdose, they should call 911.    Talk to your doctor about Narcan (naloxone).   If you are at risk for overdose, you may be given a prescription for Narcan. This medicine very quickly reverses the effects of opioids.   If you overdose, a friend or family member can give you Narcan while waiting for the ambulance. They need to know the signs of overdose and how to give Narcan.     Don't use alcohol or street drugs.   Taking them with opioids can cause death.    Do not take any of these medicines unless your doctor says it s OK. Taking these with opioids can cause death:  Benzodiazepines, such as lorazepam (Ativan), alprazolam (Xanax) or diazepam (Valium)  Muscle relaxers, such as cyclobenzaprine (Flexeril)  Sleeping pills like zolpidem (Ambien)   Other opioids      How to keep you and other people safe while taking opioids:    Never share your opioids with others.  Opioid medicines are regulated by the Drug Enforcement Agency ([employer]). Selling or sharing medications is a criminal act.    2. Be sure to store opioids in a secure place, locked up if possible. Young children can easily swallow them and overdose.    3. When you are traveling with your medicines, keep them in the original bottles. If you use a pill box, be sure you also  carry a copy of your medicine list from your clinic or pharmacy.    4. Safe disposal of opioids    Most pharmacies have places to get rid of medicine, called disposal kiosks. Medicine disposal options are also available in every [facility]. Search your county and  medication disposal  to find more options. You can find more details at:  https://www.pca.[facility].[place]./living-green/managing-unwanted-medications     I agree that my provider, clinic care team, and pharmacy may work with any city, state or federal law enforcement agency that investigates the misuse, sale, or other diversion of my controlled medicine. I will allow my provider to discuss my care with, or share a copy of, this agreement with any other treating provider, pharmacy or emergency room where I receive care.    I have read this agreement and have asked questions about anything I did not understand.    _______________________________________________________  Patient Signature - Johnna Caballero _____________________                   Date     _______________________________________________________  Provider Signature - Linda Durant MD   _____________________                   Date     _______________________________________________________  Witness Signature (required if provider not present while patient signing)   _____________________                   Date

## 2023-04-13 NOTE — PROGRESS NOTES
Assessment & Plan       ICD-10-CM    1. Type 2 diabetes mellitus with hyperglycemia, with long-term current use of insulin (H)  E11.65 Hemoglobin A1c    Z79.4 Albumin Random Urine Quantitative with Creat Ratio     Comprehensive metabolic panel     Lipid panel reflex to direct LDL Fasting     TSH with free T4 reflex     FOOT EXAM    Repeat labs today, continue insulin, metformin.  Hasn't been able to tolerate SGLT2 inhib or GLP-1 agonist.    Added mealtime insulin at last visit.   Adjust based on labs today.  Hope to also get more blood sugar readings.  Patient plans to increase activity level.         2. Essential hypertension  I10 Well controlled, continue current medications        3. Lipoma, unspecified site  D17.9 Multiple lipomas with removal required historically      4. Chronic pain syndrome  G89.4 Drug Abuse Screen Panel 13, Urine (Pain Care Package) - lab collect     oxyCODONE-acetaminophen (PERCOCET) 5-325 MG tablet    Related to OA, multiple surgeries, CSA updated today #90 percoset/3 months      5. Pain in joint, pelvic region and thigh, unspecified laterality  M25.559 Physical Therapy Referral    Start physical therapy in [place] for hip and knee pain      6. Pain in both knees, unspecified chronicity  M25.561 Physical Therapy Referral    M25.562       7. Primary osteoarthritis, unspecified site  M19.91 oxyCODONE-acetaminophen (PERCOCET) 5-325 MG tablet  See above      8. Atypical chest pain  R07.89 NM Lexiscan stress test    Recent ER visit for palpitations, dizziness, had small trop elevation.   Symptoms improved and hasn't had exertional symptoms, but does have multiple cardiac risk factors - plan stress testing      9. Major depressive disorder, recurrent episode, mild (H)  F33.0 Stable, follow      10. Morbid obesity with BMI of 40.0-44.9, adult (H)  E66.01 Encouraged in plans to be active, hasn't been able to tolerate GLP-1 agonist    Z68.41       11. Visit for screening mammogram  Z12.31 MA  Screen Bilateral w/Neil            I spent a total of 45 minutes on the day of the visit.   Time spent by me doing chart review, history and exam, documentation and further activities per the note       See Patient Instructions    Linda Durant MD  [facility] ROGERIO Feng is a 57 year old, presenting for the following health issues:  Diabetes         View : No data to display.              History of Present Illness       Diabetes:   She presents for follow up of diabetes.  She is checking home blood glucose a few times a month. She checks blood glucose before meals.  Blood glucose is sometimes over 200 and never under 70. She is aware of hypoglycemia symptoms including shakiness, dizziness, lethargy and confusion. She has no concerns regarding her diabetes at this time.  She is having numbness in feet.         She eats 2-3 servings of fruits and vegetables daily.She consumes 0 sweetened beverage(s) daily.She exercises with enough effort to increase her heart rate 9 or less minutes per day.  She exercises with enough effort to increase her heart rate 3 or less days per week.   She is taking medications regularly.     Diabetes - more sedentary this winter, but plans to get active again.  Hasn't been able to tolerate GLP-1 or SGLT2 medications.  Added Lispro - 4 to 8 for meals and has been using on average twice daily.   Neuropathy symptoms stable.    ER visit for palpitations in [place] last week - cardiac work up - over the weekend, felt heart racing, sweating, dizziness.   Happened early in the morning.  The only symptom that remains is some dizziness.   Troponin bumped minimally, then went down - discharged home.    Winter in [place] went well - joints feel much better in [place] [facility]    Right knee - causing more pain and feeling really tight/sore in medial thighs - making pain worse.    Since coming home from [place] in the last 2 weeks, has had a fall when climbing over a  snow bank and hit rib and collarbone on the right side    ENT - head seems full and ears with pressure.  No fluid in ears when at ENT in December.  Interestingly, symptoms jillian when in [place] and return when she comes back to [place].  Not currently taking her allergy medications    Looking for houses in [place], [place] - feels much better    Derm yesterday - multiple moles removed    Depression - stable on current medications.    HTN - controlled on current agents.    Chronic pain - uses # 90 percoset every 3 months.  Due for urine drug screen and CSA.        Objective    BP (!) 138/90 (BP Location: Right arm, Patient Position: Sitting, Cuff Size: Adult Large)   Pulse 76   Temp 97.5  F (36.4  C) (Tympanic)   Resp 16   Wt 130.3 kg (287 lb 4.8 oz)   SpO2 98%   BMI 47.08 kg/m    Body mass index is 47.08 kg/m .   Wt Readings from Last 4 Encounters:   04/13/23 130.3 kg (287 lb 4.8 oz)   12/07/22 128.7 kg (283 lb 12.8 oz)   09/13/22 127 kg (280 lb)   08/16/22 129.3 kg (285 lb)       Physical Exam   GENERAL: healthy, alert and no distress  HENT: normal cephalic/atraumatic, both ears: clear effusion, nose and mouth without ulcers or lesions, oropharynx clear and oral mucous membranes moist  NECK: no adenopathy, no asymmetry, masses, or scars and thyroid normal to palpation  RESP: lungs clear to auscultation - no rales, rhonchi or wheezes  CV: regular rate and rhythm, normal S1 S2, no S3 or S4, no murmur, click or rub, no peripheral edema and peripheral pulses strong  MS: well healed knee replacement scar, limited hip flexion due to discomfort  SKIN: multiple bandages from recent mole excisions at dermatology  NEURO: Normal strength and tone, mentation intact and speech normal  PSYCH: mentation appears normal, affect normal/bright  Diabetic foot exam: normal DP and PT pulses, no trophic changes or ulcerative lesions and reduced sensation over toes on right foot    Labs pending    Linda Durant MD

## 2023-04-20 ENCOUNTER — HOSPITAL ENCOUNTER (OUTPATIENT)
Dept: NUCLEAR MEDICINE | Facility: CLINIC | Age: 58
Setting detail: NUCLEAR MEDICINE
Discharge: HOME | End: 2023-04-20
Attending: PEDIATRICS
Payer: COMMERCIAL

## 2023-04-20 ENCOUNTER — PATIENT OUTREACH (OUTPATIENT)
Dept: CARE COORDINATION | Facility: CLINIC | Age: 58
End: 2023-04-20
Payer: COMMERCIAL

## 2023-04-20 DIAGNOSIS — R07.89 ATYPICAL CHEST PAIN: ICD-10-CM

## 2023-04-20 PROCEDURE — 78452 HT MUSCLE IMAGE SPECT MULT: CPT | Mod: 26 | Performed by: INTERNAL MEDICINE

## 2023-04-20 PROCEDURE — 93018 CV STRESS TEST I&R ONLY: CPT | Performed by: INTERNAL MEDICINE

## 2023-04-20 PROCEDURE — 93017 CV STRESS TEST TRACING ONLY: CPT

## 2023-04-20 PROCEDURE — 78452 HT MUSCLE IMAGE SPECT MULT: CPT

## 2023-04-20 PROCEDURE — 343N000001 HC RX 343: Performed by: PEDIATRICS

## 2023-04-20 PROCEDURE — 93016 CV STRESS TEST SUPVJ ONLY: CPT | Performed by: INTERNAL MEDICINE

## 2023-04-20 PROCEDURE — A9502 TC99M TETROFOSMIN: HCPCS | Performed by: PEDIATRICS

## 2023-04-20 RX ADMIN — Medication 34 MILLICURIE: at 10:44

## 2023-04-21 ENCOUNTER — HOSPITAL ENCOUNTER (OUTPATIENT)
Dept: CARDIOLOGY | Facility: CLINIC | Age: 58
Discharge: HOME | End: 2023-04-21
Attending: PEDIATRICS | Admitting: PEDIATRICS
Payer: COMMERCIAL

## 2023-04-21 ENCOUNTER — HOSPITAL ENCOUNTER (OUTPATIENT)
Dept: NUCLEAR MEDICINE | Facility: CLINIC | Age: 58
Setting detail: NUCLEAR MEDICINE
Discharge: HOME | End: 2023-04-21
Attending: PEDIATRICS
Payer: COMMERCIAL

## 2023-04-21 LAB
CV STRESS MAX HR HE: 123
RATE PRESSURE PRODUCT: NORMAL
STRESS ECHO BASELINE DIASTOLIC HE: 96
STRESS ECHO BASELINE HR: 95 BPM
STRESS ECHO BASELINE SYSTOLIC BP: 191
STRESS ECHO CALCULATED PERCENT HR: 75 %
STRESS ECHO LAST STRESS DIASTOLIC BP: 192
STRESS ECHO LAST STRESS SYSTOLIC BP: 99
STRESS ECHO TARGET HR: 164

## 2023-04-21 PROCEDURE — 343N000001 HC RX 343: Performed by: PEDIATRICS

## 2023-04-21 PROCEDURE — 250N000011 HC RX IP 250 OP 636

## 2023-04-21 PROCEDURE — A9502 TC99M TETROFOSMIN: HCPCS | Performed by: PEDIATRICS

## 2023-04-21 RX ORDER — REGADENOSON 0.08 MG/ML
INJECTION, SOLUTION INTRAVENOUS
Status: COMPLETED
Start: 2023-04-21 | End: 2023-04-21

## 2023-04-21 RX ORDER — CAFFEINE CITRATE 20 MG/ML
60 SOLUTION INTRAVENOUS
Status: DISCONTINUED | OUTPATIENT
Start: 2023-04-21 | End: 2023-04-22 | Stop reason: HOSPADM

## 2023-04-21 RX ORDER — REGADENOSON 0.08 MG/ML
0.4 INJECTION, SOLUTION INTRAVENOUS ONCE
Status: COMPLETED | OUTPATIENT
Start: 2023-04-21 | End: 2023-04-21

## 2023-04-21 RX ORDER — ACYCLOVIR 200 MG/1
0-1 CAPSULE ORAL
Status: DISCONTINUED | OUTPATIENT
Start: 2023-04-21 | End: 2023-04-22 | Stop reason: HOSPADM

## 2023-04-21 RX ORDER — AMINOPHYLLINE 25 MG/ML
50-100 INJECTION, SOLUTION INTRAVENOUS
Status: DISCONTINUED | OUTPATIENT
Start: 2023-04-21 | End: 2023-04-22 | Stop reason: HOSPADM

## 2023-04-21 RX ORDER — ALBUTEROL SULFATE 90 UG/1
2 AEROSOL, METERED RESPIRATORY (INHALATION) EVERY 5 MIN PRN
Status: DISCONTINUED | OUTPATIENT
Start: 2023-04-21 | End: 2023-04-22 | Stop reason: HOSPADM

## 2023-04-21 RX ADMIN — REGADENOSON 0.4 MG: 0.08 INJECTION, SOLUTION INTRAVENOUS at 11:57

## 2023-04-21 RX ADMIN — Medication 33 MILLICURIE: at 12:05

## 2023-05-13 DIAGNOSIS — R10.13 EPIGASTRIC PAIN: ICD-10-CM

## 2023-05-13 DIAGNOSIS — K21.9 GASTROESOPHAGEAL REFLUX DISEASE, UNSPECIFIED WHETHER ESOPHAGITIS PRESENT: ICD-10-CM

## 2023-05-15 RX ORDER — LANSOPRAZOLE 30 MG/1
30 CAPSULE, DELAYED RELEASE ORAL DAILY
Qty: 90 CAPSULE | Refills: 3 | OUTPATIENT
Start: 2023-05-15

## 2023-05-16 ENCOUNTER — ANCILLARY PROCEDURE (OUTPATIENT)
Dept: GENERAL RADIOLOGY | Facility: CLINIC | Age: 58
End: 2023-05-16
Attending: NURSE PRACTITIONER
Payer: COMMERCIAL

## 2023-05-16 ENCOUNTER — TELEPHONE (OUTPATIENT)
Dept: PEDIATRICS | Facility: CLINIC | Age: 58
End: 2023-05-16

## 2023-05-16 ENCOUNTER — NURSE TRIAGE (OUTPATIENT)
Dept: PEDIATRICS | Facility: CLINIC | Age: 58
End: 2023-05-16

## 2023-05-16 ENCOUNTER — OFFICE VISIT (OUTPATIENT)
Dept: PEDIATRICS | Facility: CLINIC | Age: 58
End: 2023-05-16
Payer: COMMERCIAL

## 2023-05-16 ENCOUNTER — ALLIED HEALTH/NURSE VISIT (OUTPATIENT)
Dept: PEDIATRICS | Facility: CLINIC | Age: 58
End: 2023-05-16
Payer: COMMERCIAL

## 2023-05-16 ENCOUNTER — ANCILLARY PROCEDURE (OUTPATIENT)
Dept: MAMMOGRAPHY | Facility: CLINIC | Age: 58
End: 2023-05-16
Attending: PEDIATRICS
Payer: COMMERCIAL

## 2023-05-16 ENCOUNTER — MYC MEDICAL ADVICE (OUTPATIENT)
Dept: PEDIATRICS | Facility: CLINIC | Age: 58
End: 2023-05-16

## 2023-05-16 VITALS — SYSTOLIC BLOOD PRESSURE: 164 MMHG | HEART RATE: 85 BPM | DIASTOLIC BLOOD PRESSURE: 82 MMHG | OXYGEN SATURATION: 97 %

## 2023-05-16 VITALS
BODY MASS INDEX: 46.12 KG/M2 | OXYGEN SATURATION: 97 % | TEMPERATURE: 98.4 F | WEIGHT: 287 LBS | DIASTOLIC BLOOD PRESSURE: 82 MMHG | SYSTOLIC BLOOD PRESSURE: 164 MMHG | HEIGHT: 66 IN | HEART RATE: 85 BPM

## 2023-05-16 DIAGNOSIS — R06.01 ORTHOPNEA: ICD-10-CM

## 2023-05-16 DIAGNOSIS — R51.9 ACUTE NONINTRACTABLE HEADACHE, UNSPECIFIED HEADACHE TYPE: ICD-10-CM

## 2023-05-16 DIAGNOSIS — I10 ESSENTIAL HYPERTENSION: ICD-10-CM

## 2023-05-16 DIAGNOSIS — R07.89 CHEST TIGHTNESS: ICD-10-CM

## 2023-05-16 DIAGNOSIS — R42 DIZZINESS: Primary | ICD-10-CM

## 2023-05-16 DIAGNOSIS — Z12.31 VISIT FOR SCREENING MAMMOGRAM: ICD-10-CM

## 2023-05-16 DIAGNOSIS — Z48.02 ENCOUNTER FOR REMOVAL OF SUTURES: Primary | ICD-10-CM

## 2023-05-16 LAB
ANION GAP SERPL CALCULATED.3IONS-SCNC: 13 MMOL/L (ref 7–15)
BUN SERPL-MCNC: 11.2 MG/DL (ref 6–20)
CALCIUM SERPL-MCNC: 9.3 MG/DL (ref 8.6–10)
CHLORIDE SERPL-SCNC: 100 MMOL/L (ref 98–107)
CREAT SERPL-MCNC: 0.58 MG/DL (ref 0.51–0.95)
DEPRECATED HCO3 PLAS-SCNC: 25 MMOL/L (ref 22–29)
ERYTHROCYTE [DISTWIDTH] IN BLOOD BY AUTOMATED COUNT: 12.3 % (ref 10–15)
GFR SERPL CREATININE-BSD FRML MDRD: >90 ML/MIN/1.73M2
GLUCOSE SERPL-MCNC: 244 MG/DL (ref 70–99)
HCT VFR BLD AUTO: 42.1 % (ref 35–47)
HGB BLD-MCNC: 13.8 G/DL (ref 11.7–15.7)
MCH RBC QN AUTO: 28 PG (ref 26.5–33)
MCHC RBC AUTO-ENTMCNC: 32.8 G/DL (ref 31.5–36.5)
MCV RBC AUTO: 85 FL (ref 78–100)
NT-PROBNP SERPL-MCNC: 39 PG/ML (ref 0–900)
PLATELET # BLD AUTO: 311 10E3/UL (ref 150–450)
POTASSIUM SERPL-SCNC: 4.2 MMOL/L (ref 3.4–5.3)
RBC # BLD AUTO: 4.93 10E6/UL (ref 3.8–5.2)
SODIUM SERPL-SCNC: 138 MMOL/L (ref 136–145)
WBC # BLD AUTO: 6.8 10E3/UL (ref 4–11)

## 2023-05-16 PROCEDURE — 77067 SCR MAMMO BI INCL CAD: CPT | Mod: TC | Performed by: RADIOLOGY

## 2023-05-16 PROCEDURE — 36415 COLL VENOUS BLD VENIPUNCTURE: CPT | Performed by: NURSE PRACTITIONER

## 2023-05-16 PROCEDURE — 99207 PR NO CHARGE NURSE ONLY: CPT

## 2023-05-16 PROCEDURE — 85027 COMPLETE CBC AUTOMATED: CPT | Performed by: NURSE PRACTITIONER

## 2023-05-16 PROCEDURE — 71046 X-RAY EXAM CHEST 2 VIEWS: CPT | Mod: TC | Performed by: RADIOLOGY

## 2023-05-16 PROCEDURE — 99214 OFFICE O/P EST MOD 30 MIN: CPT | Performed by: NURSE PRACTITIONER

## 2023-05-16 PROCEDURE — 80048 BASIC METABOLIC PNL TOTAL CA: CPT | Performed by: NURSE PRACTITIONER

## 2023-05-16 PROCEDURE — 77063 BREAST TOMOSYNTHESIS BI: CPT | Mod: TC | Performed by: RADIOLOGY

## 2023-05-16 PROCEDURE — 93000 ELECTROCARDIOGRAM COMPLETE: CPT | Performed by: NURSE PRACTITIONER

## 2023-05-16 PROCEDURE — 83880 ASSAY OF NATRIURETIC PEPTIDE: CPT | Performed by: NURSE PRACTITIONER

## 2023-05-16 ASSESSMENT — PAIN SCALES - GENERAL: PAINLEVEL: MODERATE PAIN (5)

## 2023-05-16 NOTE — TELEPHONE ENCOUNTER
Patient presented to clinic this morning after mammogram. Pt was triaged, see triage encounter.     Appointments in Next Year      May 16, 2023  9:15 AM  (Arrive by 9:00 AM)  MA SCREENING BILATERAL W/ CORINNA with EAMA1  [facility] ([facility] ) [phone]     May 16, 2023  9:30 AM  (Arrive by 9:10 AM)  Provider Visit with Clare Acosta NP  [facility] ([facility] ) [phone]

## 2023-05-16 NOTE — PROGRESS NOTES
Huddled with Provider PAYAL Acosta to remove sutures for patient. Patient had sutures placed on 5/02/23 [facility]. Patient had one continuous suture placed. Site was assessed by provider PAYAL Acosta since no documentation on placement, number of sutures, or when they should be removed. Okay to remove sutures per PAYAL Acosta.    Johnna Caballero presents to the clinic for removal of sutures. The patient has had sutures in place for 16 days. There has been no patient reported signs or symptoms of infection or drainage.  sutures are seen and located on the Left Shoulder. Tetanus status is up to date. All sutures were easily removed today. Routine wound care discussed by the RN or provider. The patient will follow up as needed.    Bev ALVAREZ RN, BSN

## 2023-05-16 NOTE — TELEPHONE ENCOUNTER
Situation: patient presents today following mammogram reporting SOB, chest tightness and racing heart.    Onset: last 2 days   Symptoms: SOB worse with lying flat, chest tightness like can't catch full breath, headache, pounding heart, some dizziness with position changes      Aggravating factors: lying flat, exertion   Relief: rest, sitting up/elevated in bed a bit     Background: seen in ER 1mo ago for palpitations then f/up with PCP worked up for cardiac risk factors, negative stress test.     Hx: allergies, asthma, HTN, anxiety   - denies feeling any of these are related     Assessment:    Patient tearful upon rooming. Vitals taken     BP (!) 164/82   Pulse 85   SpO2 97%    - pt has taken BP meds this morning, doesn't check at home     Patient able to hold conversation, not breathless with sentences, not clammy/sweating. Does note she's in menopause and was having a hot flash.     Meds:  - Losartan 100mg qd  -hydrochlorothiazide 25mg qd    Plan:    Huddled with PCP in clinic, reviewed notes/symptoms. Recommends OV if available.    Able to get pt in with SAVANNAH Julien NP today at 930, quick huddle and added pt to schedule.     Appointments in Next Year    May 16, 2023  9:15 AM  (Arrive by 9:00 AM)  MA SCREENING BILATERAL W/ CORINNA with EAMA1  [facility] ([facility] ) [phone]   May 16, 2023  9:30 AM  (Arrive by 9:10 AM)  Provider Visit with Clare Acosta NP  [facility] ) [phone]

## 2023-05-16 NOTE — TELEPHONE ENCOUNTER
"  Reason for Disposition    New or worsened shortness of breath with activity (dyspnea on exertion)    Patient wants to be seen    Answer Assessment - Initial Assessment Questions  1. DESCRIPTION: \"Please describe your heart rate or heartbeat that you are having\" (e.g., fast/slow, regular/irregular, skipped or extra beats, \"palpitations\")      Feels fast   2. ONSET: \"When did it start?\" (Minutes, hours or days)       2 days ago  3. DURATION: \"How long does it last\" (e.g., seconds, minutes, hours)        4. PATTERN \"Does it come and go, or has it been constant since it started?\"  \"Does it get worse with exertion?\"   \"Are you feeling it now?\"      Worsens with exertion   5. TAP: \"Using your hand, can you tap out what you are feeling on a chair or table in front of you, so that I can hear?\" (Note: not all patients can do this)        no  6. HEART RATE: \"Can you tell me your heart rate?\" \"How many beats in 15 seconds?\"  (Note: not all patients can do this)        No - in person took vitals  7. RECURRENT SYMPTOM: \"Have you ever had this before?\" If Yes, ask: \"When was the last time?\" and \"What happened that time?\"       Yes 1mo ago, work up normal, pt noted it feels different this time   8. CAUSE: \"What do you think is causing the palpitations?\"      Unsure   9. CARDIAC HISTORY: \"Do you have any history of heart disease?\" (e.g., heart attack, angina, bypass surgery, angioplasty, arrhythmia)       Yes - htn   10. OTHER SYMPTOMS: \"Do you have any other symptoms?\" (e.g., dizziness, chest pain, sweating, difficulty breathing)        Dizziness, sob  11. PREGNANCY: \"Is there any chance you are pregnant?\" \"When was your last menstrual period?\"        No    Protocols used: HEART RATE AND HEARTBEAT [id number]    "

## 2023-05-16 NOTE — PROGRESS NOTES
"  Assessment & Plan     Dizziness  Would like her to do vestibular therapy eval to see if this could be vertigo. Seems to be exacerbated by standing and position changes (describes as \"fluid in my body\" and \"on a boat\"). If not a vestibular issue to consider zio patch vs echo as she also has the below symptoms.  - BNP-N terminal pro; Future  - CBC with platelets; Future  - Basic metabolic panel  (Ca, Cl, CO2, Creat, Gluc, K, Na, BUN); Future  - Physical Therapy Referral; Future  - BNP-N terminal pro  - CBC with platelets  - Basic metabolic panel  (Ca, Cl, CO2, Creat, Gluc, K, Na, BUN)    Orthopnea  No swelling, if persists to consider echo. If worsening to go to ER.  - BNP-N terminal pro; Future  - CBC with platelets; Future  - Basic metabolic panel  (Ca, Cl, CO2, Creat, Gluc, K, Na, BUN); Future  - XR Chest 2 Views; Future  - BNP-N terminal pro  - CBC with platelets  - Basic metabolic panel  (Ca, Cl, CO2, Creat, Gluc, K, Na, BUN)    Chest tightness  No worsening symptoms. Had discussed with PCP and had normal stress test done 4/20/23. If persisting could consider cardiology referral.  - EKG 12-lead complete w/read - Clinics  - BNP-N terminal pro; Future  - CBC with platelets; Future  - Basic metabolic panel  (Ca, Cl, CO2, Creat, Gluc, K, Na, BUN); Future  - BNP-N terminal pro  - CBC with platelets  - Basic metabolic panel  (Ca, Cl, CO2, Creat, Gluc, K, Na, BUN)    Acute nonintractable headache, unspecified headache type  Headaches not new for her, monitor for now.     Essential Hypertension  BP elevated in clinic. Left clinic before recheck could be done. If remains elevated, should return for recheck as HTN could worsen headaches.     BP Readings from Last 3 Encounters:   05/16/23 (!) 164/82 05/16/23 (!) 164/82 04/13/23 132/72         Patient Instructions   Recheck Labs  Chest X-ray  See vestibular therapy  Follow-up in 2 weeks       Clare Acosta NP  [facility] ROGERIO Feng is " "a 57 year old, presenting for the following health issues:  Shortness of Breath (Chest heaviness) and Headache        5/16/2023     9:48 AM   Additional Questions   Roomed by Nadia   Accompanied by Self         5/16/2023     9:48 AM   Patient Reported Additional Medications   Patient reports taking the following new medications no     History of Present Illness       Headaches:   Since the patient's last clinic visit, headaches are: worsened  The patient is getting headaches:  Daily  She is not able to do normal daily activities when she has a migraine.  The patient is taking the following rescue/relief medications:  No rescue/relief medications   Patient states \"I get no relief\" from the rescue/relief medications.   The patient is taking the following medications to prevent migraines:  No medications to prevent migraines  In the past 4 weeks, the patient has gone to an Urgent Care or Emergency Room 0 times times due to headaches.    Reason for visit:  Chest heaviness, headaches, feels off  Symptom onset:  1-3 days ago  Symptoms include:  See above  Symptom intensity:  Moderate  Symptom progression:  Staying the same  Had these symptoms before:  Yes  Has tried/received treatment for these symptoms:  No  What makes it worse:  Moving  What makes it better:  Nothing    She eats 4 or more servings of fruits and vegetables daily.She consumes 0 sweetened beverage(s) daily.She exercises with enough effort to increase her heart rate 9 or less minutes per day.  She exercises with enough effort to increase her heart rate 3 or less days per week.   She is taking medications regularly.    Today's PHQ-9         PHQ-9 Total Score: 2    PHQ-9 Q9 Thoughts of better off dead/self-harm past 2 weeks :   Not at all    How difficult have these problems made it for you to do your work, take care of things at home, or get along with other people: Not difficult at all     Stress test 4/20/23 normal  Had some pain over gallbladder 2 nights " "ago, hx of cholecystectomy. This has since resolved.    #dizziness  Onset of dizziness about 2 weeks ago.  Notes random onset and also with position changes.  Last night she \"felt like I was on a boat\" \"fluid shifting in body with rolling over\" from neck down.  Sometimes when walking she has to stop, feels \"off\"  Has had this sensation before and resolved    #headache  Hx of headaches  Not worst than usual  Ongoing about 6-8 weeks ago, massage helped about 3-4 weeks ago    #chest pain  Normal stress test 4/20/23  Non-exertional  Not worsening  occ tightness    \"felt like I was going to die\" last night due to fluid shifting and feeling short of breath with lying flat  Swelling in ankles 2 days ago, pitting, now resolved but possibly due to inactivity      BP Readings from Last 3 Encounters:   05/16/23 (!) 164/82   05/16/23 (!) 164/82   04/13/23 132/72     Review of Systems         Objective    BP (!) 164/82 (BP Location: Right arm, Patient Position: Sitting, Cuff Size: Adult Large)   Pulse 85   Temp 98.4  F (36.9  C) (Tympanic)   Ht 1.664 m (5' 5.5\")   Wt 130.2 kg (287 lb)   SpO2 97%   BMI 47.03 kg/m    Body mass index is 47.03 kg/m .  Physical Exam   GENERAL: healthy, alert and no distress  RESP: lungs clear to auscultation - no rales, rhonchi or wheezes  CV: regular rate and rhythm, normal S1 S2, no S3 or S4, no murmur, click or rub  EXT: no swelling  NEURO: no focal deficits    EKG - Reviewed and interpreted by me appears normal, NSR, normal axis, normal intervals, no acute ST/T changes c/w ischemia, no LVH by voltage criteria, unchanged from previous tracings                "

## 2023-05-18 NOTE — TELEPHONE ENCOUNTER
I recommend starting amlodipine 5mg daily to help with blood pressure control and doing echocardiogram - ok to place order if she is in agreement    Linda Durant MD

## 2023-05-19 DIAGNOSIS — R10.13 EPIGASTRIC PAIN: ICD-10-CM

## 2023-05-19 DIAGNOSIS — K21.9 GASTROESOPHAGEAL REFLUX DISEASE, UNSPECIFIED WHETHER ESOPHAGITIS PRESENT: ICD-10-CM

## 2023-05-19 RX ORDER — LANSOPRAZOLE 30 MG/1
30 CAPSULE, DELAYED RELEASE ORAL DAILY
Qty: 90 CAPSULE | Refills: 3 | Status: CANCELLED | OUTPATIENT
Start: 2023-05-19

## 2023-05-19 RX ORDER — AMLODIPINE BESYLATE 5 MG/1
5 TABLET ORAL DAILY
Qty: 30 TABLET | Refills: 0 | Status: SHIPPED | OUTPATIENT
Start: 2023-05-19 | End: 2023-06-13

## 2023-05-22 ENCOUNTER — MYC MEDICAL ADVICE (OUTPATIENT)
Dept: PEDIATRICS | Facility: CLINIC | Age: 58
End: 2023-05-22
Payer: COMMERCIAL

## 2023-05-22 DIAGNOSIS — R10.13 EPIGASTRIC PAIN: ICD-10-CM

## 2023-05-22 DIAGNOSIS — K21.9 GASTROESOPHAGEAL REFLUX DISEASE, UNSPECIFIED WHETHER ESOPHAGITIS PRESENT: ICD-10-CM

## 2023-05-23 RX ORDER — LANSOPRAZOLE 30 MG/1
30 CAPSULE, DELAYED RELEASE ORAL DAILY
Qty: 90 CAPSULE | Refills: 1 | Status: SHIPPED | OUTPATIENT
Start: 2023-05-23 | End: 2023-11-16

## 2023-05-23 NOTE — TELEPHONE ENCOUNTER
Patient transferred the 12/7/22 lansoprazole refill to another pharmacy.   Needs new rx sent to her pharmacy.   Transferred the remaining refills from the December rx.   Updated the patient.

## 2023-06-01 ENCOUNTER — HEALTH MAINTENANCE LETTER (OUTPATIENT)
Age: 58
End: 2023-06-01

## 2023-06-04 DIAGNOSIS — M54.50 CHRONIC BILATERAL LOW BACK PAIN WITHOUT SCIATICA: ICD-10-CM

## 2023-06-04 DIAGNOSIS — I10 ESSENTIAL HYPERTENSION: ICD-10-CM

## 2023-06-04 DIAGNOSIS — E11.65 TYPE 2 DIABETES MELLITUS WITH HYPERGLYCEMIA, WITH LONG-TERM CURRENT USE OF INSULIN (H): ICD-10-CM

## 2023-06-04 DIAGNOSIS — G89.29 CHRONIC NECK PAIN: ICD-10-CM

## 2023-06-04 DIAGNOSIS — M54.2 CHRONIC NECK PAIN: ICD-10-CM

## 2023-06-04 DIAGNOSIS — Z79.4 TYPE 2 DIABETES MELLITUS WITH HYPERGLYCEMIA, WITH LONG-TERM CURRENT USE OF INSULIN (H): ICD-10-CM

## 2023-06-04 DIAGNOSIS — E11.9 TYPE 2 DIABETES MELLITUS WITHOUT COMPLICATION, WITH LONG-TERM CURRENT USE OF INSULIN (H): ICD-10-CM

## 2023-06-04 DIAGNOSIS — Z79.4 TYPE 2 DIABETES MELLITUS WITHOUT COMPLICATION, WITH LONG-TERM CURRENT USE OF INSULIN (H): ICD-10-CM

## 2023-06-04 DIAGNOSIS — G89.29 CHRONIC BILATERAL LOW BACK PAIN WITHOUT SCIATICA: ICD-10-CM

## 2023-06-07 DIAGNOSIS — E11.9 TYPE 2 DIABETES MELLITUS WITHOUT COMPLICATION, WITH LONG-TERM CURRENT USE OF INSULIN (H): ICD-10-CM

## 2023-06-07 DIAGNOSIS — Z79.4 TYPE 2 DIABETES MELLITUS WITHOUT COMPLICATION, WITH LONG-TERM CURRENT USE OF INSULIN (H): ICD-10-CM

## 2023-06-07 DIAGNOSIS — I10 ESSENTIAL HYPERTENSION: ICD-10-CM

## 2023-06-07 DIAGNOSIS — Z79.4 TYPE 2 DIABETES MELLITUS WITH HYPERGLYCEMIA, WITH LONG-TERM CURRENT USE OF INSULIN (H): ICD-10-CM

## 2023-06-07 DIAGNOSIS — E11.65 TYPE 2 DIABETES MELLITUS WITH HYPERGLYCEMIA, WITH LONG-TERM CURRENT USE OF INSULIN (H): ICD-10-CM

## 2023-06-07 RX ORDER — CELECOXIB 200 MG/1
CAPSULE ORAL
Qty: 90 CAPSULE | Refills: 2 | Status: SHIPPED | OUTPATIENT
Start: 2023-06-07 | End: 2024-02-22

## 2023-06-07 RX ORDER — HYDROCHLOROTHIAZIDE 25 MG/1
TABLET ORAL
Qty: 90 TABLET | Refills: 0 | OUTPATIENT
Start: 2023-06-07

## 2023-06-07 RX ORDER — LOSARTAN POTASSIUM 100 MG/1
TABLET ORAL
Qty: 90 TABLET | Refills: 0 | OUTPATIENT
Start: 2023-06-07

## 2023-06-07 RX ORDER — INSULIN GLARGINE 100 [IU]/ML
INJECTION, SOLUTION SUBCUTANEOUS
Qty: 45 ML | Refills: 0 | OUTPATIENT
Start: 2023-06-07

## 2023-06-07 NOTE — TELEPHONE ENCOUNTER
Prescription approved per [facility] Refill Protocol.  Madeleine Lawrence RN on 6/7/2023 at 10:22 AM

## 2023-06-08 RX ORDER — LOSARTAN POTASSIUM 100 MG/1
TABLET ORAL
Qty: 90 TABLET | Refills: 3 | Status: SHIPPED | OUTPATIENT
Start: 2023-06-08 | End: 2024-04-25

## 2023-06-08 RX ORDER — INSULIN GLARGINE 100 [IU]/ML
INJECTION, SOLUTION SUBCUTANEOUS
Qty: 45 ML | Refills: 11 | Status: SHIPPED | OUTPATIENT
Start: 2023-06-08 | End: 2024-01-06

## 2023-06-08 RX ORDER — HYDROCHLOROTHIAZIDE 25 MG/1
TABLET ORAL
Qty: 90 TABLET | Refills: 3 | Status: SHIPPED | OUTPATIENT
Start: 2023-06-08 | End: 2023-06-13

## 2023-06-12 ENCOUNTER — HOSPITAL ENCOUNTER (OUTPATIENT)
Dept: CARDIOLOGY | Facility: CLINIC | Age: 58
Discharge: HOME | End: 2023-06-12
Attending: NURSE PRACTITIONER | Admitting: NURSE PRACTITIONER
Payer: COMMERCIAL

## 2023-06-12 DIAGNOSIS — R06.01 ORTHOPNEA: ICD-10-CM

## 2023-06-12 DIAGNOSIS — R42 DIZZINESS: ICD-10-CM

## 2023-06-12 DIAGNOSIS — R07.89 CHEST TIGHTNESS: ICD-10-CM

## 2023-06-12 DIAGNOSIS — I10 ESSENTIAL HYPERTENSION: ICD-10-CM

## 2023-06-12 PROCEDURE — 93306 TTE W/DOPPLER COMPLETE: CPT | Mod: 26 | Performed by: INTERNAL MEDICINE

## 2023-06-12 PROCEDURE — 255N000002 HC RX 255 OP 636: Performed by: NURSE PRACTITIONER

## 2023-06-12 RX ADMIN — HUMAN ALBUMIN MICROSPHERES AND PERFLUTREN 4 ML: 10; .22 INJECTION, SOLUTION INTRAVENOUS at 11:25

## 2023-06-13 ENCOUNTER — OFFICE VISIT (OUTPATIENT)
Dept: CARDIOLOGY | Facility: CLINIC | Age: 58
End: 2023-06-13
Attending: NURSE PRACTITIONER
Payer: COMMERCIAL

## 2023-06-13 VITALS
WEIGHT: 293 LBS | BODY MASS INDEX: 47.09 KG/M2 | HEART RATE: 68 BPM | SYSTOLIC BLOOD PRESSURE: 172 MMHG | HEIGHT: 66 IN | DIASTOLIC BLOOD PRESSURE: 100 MMHG | OXYGEN SATURATION: 97 %

## 2023-06-13 DIAGNOSIS — R07.89 CHEST TIGHTNESS: ICD-10-CM

## 2023-06-13 DIAGNOSIS — R42 DIZZINESS: ICD-10-CM

## 2023-06-13 DIAGNOSIS — I10 ESSENTIAL HYPERTENSION: ICD-10-CM

## 2023-06-13 DIAGNOSIS — R06.01 ORTHOPNEA: ICD-10-CM

## 2023-06-13 PROCEDURE — 99204 OFFICE O/P NEW MOD 45 MIN: CPT | Performed by: INTERNAL MEDICINE

## 2023-06-13 PROCEDURE — 93000 ELECTROCARDIOGRAM COMPLETE: CPT | Performed by: INTERNAL MEDICINE

## 2023-06-13 RX ORDER — TRIAMTERENE/HYDROCHLOROTHIAZID 37.5-25 MG
1 TABLET ORAL DAILY
Qty: 90 TABLET | Refills: 3 | Status: SHIPPED | OUTPATIENT
Start: 2023-06-13 | End: 2024-04-25

## 2023-06-13 NOTE — LETTER
6/13/2023    Linda Durant MD  [address] [place] [address]    RE: Johnna Caballero       Dear Colleague,     I had the pleasure of seeing Johnna Caballero in the [facility].      Cardiology Clinic Consultation:    June 13, 2023   Patient Name: Johnna Caballero  Patient MRN: [id number]    Consult indication: palpitations    HPI:    I had the opportunity to see patient Johnna Caballero in cardiology clinic for a consultation. Patient is followed by our colleague Linda Durant MD with Primary Care.     Patient is a pleasant 57-year-old female with a past medical history significant for hypertension, dyslipidemia, diabetes, anxiety/depression, asthma, chronic back pain, obesity, who presents for further evaluation/management of dizziness and palpitations.    Patient reports that at baseline she is not as physically active as she would like to be primarily due to chronic pain related to osteoarthritis in her foot as well as lower back pain.  She was seen in the emergency department 4/8/2023 with acute onset dizziness, palpitations, flushing and diaphoresis.  She was also experiencing chest discomfort with the palpitations.  She denies any recent episodes of exertional chest pain/discomfort.  In the ED initial blood pressure was elevated at 196/86 mmHg.  ECG through care everywhere not available for review, however report noted sinus tachycardia with PACs, as well as some nonspecific T wave abnormalities.  Chest x-ray was reported as normal.  High-sensitivity troponin normal x3.  Potassium 3.3.  Potassium was replaced.  ED staff spoke with cardiology with [facility], nonurgent follow-up was recommended.  Since then patient was assessed with a Lexiscan stress test 4/21/2023 which was negative for inducible myocardial ischemia or infarction, LV function was hyperdynamic.  TTE 6/12/2023 demonstrated normal cardiac structure and function, trace to mild aortic insufficiency, blood  pressure is elevated during the study at 169/92 mmHg.  Study was reviewed personally and I concur with the reported findings.  Following this, patient reports that her symptoms have improved significantly, she has not had recurrence of the palpitations or chest discomfort.  She feels that her symptoms likely were related to menopause.  She was also using cannabis at that time, which she has since stopped, on the advice of an [facility] provider.  With this provider she has also been taking natural supplements and an herbal tea, which she reports has resulted in significant improvement in hot flashes which have been attributed to menopause symptoms.  TSH was recently checked and was normal.  NT proBNP was also recently checked and was normal.      ECG today in clinic demonstrates normal sinus rhythm at 70 bpm, nonspecific T wave abnormality in lead III, Q-wave in lead III, QTc 381 ms.    Assessment and Plan/Recommendations:    In summary, patient is a pleasant 57-year-old female with a past medical history significant for hypertension, dyslipidemia, diabetes, anxiety/depression, asthma, chronic back pain, obesity, who presents for further evaluation/management of dizziness and palpitations.  This was an isolated event for which she was seen in the ED, symptoms have since resolved spontaneously.  She does note a relationship with improvement of her symptoms and therapies with an [facility] provider who has been treating her for menopause symptoms with herbal supplement/tea.  Cardiac work-up with an echocardiogram was reassuring, demonstrating normal cardiac function, only trace to mild AI in the setting of hypertension.  Lexiscan stress test 4/21/2023 was negative for myocardial ischemia or infarction.  No recurrence of symptoms.    Etiology of her symptoms not entirely clear, certainly could be related to menopause symptoms, also cannabis use, possible contribution from mild hypokalemia.  Agree with  abstinence from cannabis.  Counseled on vagal maneuvers for possible paroxysmal SVT episode.  If she does have recurrence, would recommend Holter/Zio patch monitor.  Will change hydrochlorothiazide to hydrochlorothiazide-triamterene given mild hypokalemia and hypertension.  Will check a BMP in 2 weeks.  Recommend continuing losartan.  Recommend PCP repeat a TTE in 3 years for reassessment of aortic regurgitation.    Discussed that per ACC/AHA guidelines, patient would benefit from statin therapy, patient would like to hold off for now, and also declined  additional changes to her antihypertensive regimen, in favor of following up with her Eastern Medicine provider.  Advised that we would be glad to see her back as needed if blood pressure remains challenging to control or for any other concerns.    Thank you for allowing our team to participate in the care of Johnna Caballero.  Please do not hesitate to call or page me with any questions or concerns.    Sincerely,     German Romero MD, [facility]  Cardiology  June 14, 2023      Clare Acosta, NP  [address] [place] [address]      Total time spent on this encounter today: 41 minutes, providing care in this encounter including, but not limited to, reviewing prior medical records, laboratory data, imaging studies, diagnostic studies, procedure notes, formulating an assessment and plan, recommendations, discussion and counseling with patient face to face, dictation.    Past Medical History:     Patient Active Problem List   Diagnosis    Allergic rhinitis    Esophageal reflux    Hyperlipidemia LDL goal <100    Long term current use of insulin (H)    Morbid obesity with BMI of 40.0-44.9, adult (H)    Vitamin D deficiency disease    S/P total knee arthroplasty    Type 2 diabetes mellitus without complication (H)    Essential hypertension    Bilateral low back pain without sciatica    Major depressive disorder, recurrent episode, mild (H)     Anxiety    Latex allergy status    Chronic pain syndrome    Intermittent asthma, uncomplicated    History of melanoma    Cervical radiculopathy    Diabetic polyneuropathy associated with type 2 diabetes mellitus (H)    Allergy or toxic reaction to venom       Past Surgical History:   Past Surgical History:   Procedure Laterality Date    ARTHROPLASTY KNEE  7/15/2014    Procedure: ARTHROPLASTY KNEE;  Surgeon: Chapin Paul MD;  Location:  OR    BACK SURGERY      CHOLECYSTECTOMY      COLONOSCOPY N/A 4/26/2017    Procedure: COLONOSCOPY;  COLONOSCOPY;  Surgeon: Chapin Leal MD;  Location:  GI    ESOPHAGOSCOPY, GASTROSCOPY, DUODENOSCOPY (EGD), COMBINED N/A 6/29/2020    Procedure: ESOPHAGOGASTRODUODENOSCOPY (EGD);  Surgeon: Devon Jacinto MD;  Location:  GI    EXCISE LESION LOWER EXTREMITY  11/20/2012    Procedure: EXCISE LESION LOWER EXTREMITY;  EXCISION LIPOMA RIGHT HIP ;  Surgeon: Jazmin Bautista MD;  Location:  [facility]    EXCISE LESION LOWER EXTREMITY  11/23/2012    Procedure: EXCISE LESION LOWER EXTREMITY;  EXCISE LESION LOWER EXTREMITY  EVACUATE RIGHT HIP HEMATOMA;  Surgeon: Jazmin Bautista MD;  Location:  OR    EXCISE MASS HIP Left 11/9/2018    Procedure: 1.  Excision left chest wall mass with excised diameter of greater than 4 cm 2.  Excision left hip mass with excised diameter of more than 20 x 20 cm  ;  Surgeon: Jazmin Bautista MD;  Location: [facility] OR    EXCISE MASS TRUNK Left 11/9/2018    Procedure: EXCISE MASS TRUNK;  Surgeon: Jazmin Bautista MD;  Location:  OR    EXCISE MASS TRUNK N/A 12/16/2019    Procedure: REVISION LEFT HIP SCAR WITH SEROMA EVACUATION; REMOVAL LEFT CHEST WALL MASS   (MRSA);  Surgeon: Jazmin Bautista MD;  Location:  OR    GYN SURGERY      HC EXPLORATION ANKLE JOINT  1/2006    HYSTEROSCOPY DIAGNOSTIC N/A 12/16/2019    Procedure: DIAGNOSTIC HYSTEROSCOPY;  Surgeon: Jodi Perea MD;  Location:  OR    IRRIGATION AND  DEBRIDEMENT HIP, COMBINED  12/15/2012    Procedure: COMBINED IRRIGATION AND DEBRIDEMENT HIP;   evacuation hematoma, scar revision right hip;  Surgeon: Jazmin Bautista MD;  Location: [facility] OR    REPLACE INTRAUTERINE DEVICE N/A 12/16/2019    Procedure: REPLACEMENTOF MIRENA CONTRACEPTIVE INTRAUTERINE DEVICE;  Surgeon: Jodi Perea MD;  Location: [facility] OR    wisdom teeth[      [facility] NONSPECIFIC PROCEDURE      laparoscopy x6    ZZ NONSPECIFIC PROCEDURE  93    laparotomy x2 ovarian cyst    Z NONSPECIFIC PROCEDURE  0293    oophorectomy lt side - cyst    Z NONSPECIFIC PROCEDURE  0395    laminectomy L4-5. S1 herniated disc    Z NONSPECIFIC PROCEDURE  96    cholecystectomy    Z NONSPECIFIC PROCEDURE      ganglion cysts l wrist, rt palm    Z NONSPECIFIC PROCEDURE      cyst removal back x2    Z NONSPECIFIC PROCEDURE  10/02    rt thumb fusion, ganglion cyst removal    [facility] NONSPECIFIC PROCEDURE  1/08    remove heel spur, excise exostosis    [facility] PART EXCIS PLANTAR FASCIA  12/2006       Medications (outpatient):  Current Outpatient Medications   Medication Sig Dispense Refill    aspirin 81 MG tablet Take 1 tablet (81 mg) by mouth daily 30 tablet     BD PEN NEEDLE VALDO 2ND GEN 32G X 4 MM miscellaneous USE TWO PEN NEEDLES DAILY OR AS DIRECTED. 180 each 0    blood glucose (ACCU-CHEK GABI PLUS) test strip TEST ONE TO TWO TIMES DAILY OR AS DIRECTED 200 strip 0    blood glucose monitoring (ACCU-CHEK MULTICLIX) lancets Use to test blood sugar 1-2 times daily or as directed. 2 Box 3    celecoxib (CELEBREX) 200 MG capsule TAKE 1 CAPSULE DAILY 90 capsule 2    COMPOUNDED NON-CONTROLLED SUBSTANCE (CMPD RX) - PHARMACY TO MIX COMPOUNDED MEDICATION Apply 1 gm to feet as needed for pain. 30 g 1    Continuous Blood Gluc  (DEXCOM G6 ) BRANT Use to read blood sugars as per 's instructions. 1 each 0    Continuous Blood Gluc Sensor (DEXCOM G6 SENSOR) MISC Change every 10 days. 3 each 11    Continuous  Blood Gluc Transmit (DEXCOM G6 TRANSMITTER) MISC Change every 3 months. 1 each 3    hydrOXYzine (ATARAX) 25 MG tablet Take 0.5 tablets (12.5 mg) by mouth 3 times daily as needed for other (pain) 90 tablet 11    insulin lispro (HUMALOG KWIKPEN) 100 UNIT/ML (1 unit dial) KWIKPEN INJECT 8 UNITS SUB-Q THREE TIMES A DAY BEFORE MEALS 15 mL 4    LANsoprazole (PREVACID) 30 MG DR capsule Take 1 capsule (30 mg) by mouth daily 90 capsule 1    LANTUS SOLOSTAR 100 UNIT/ML soln INJECT 48 UNITS SUB-Q DAILY 45 mL 11    losartan (COZAAR) 100 MG tablet TAKE ONE TABLET BY MOUTH ALONG WITH ONE HYDROCHLOROTHIAZIDE 25MG TABLET ONCE DAILY 90 tablet 3    metFORMIN (GLUCOPHAGE) 1000 MG tablet TAKE ONE TABLET BY MOUTH TWICE A DAY WITH MEALS 180 tablet 3    mometasone-formoterol (DULERA) 100-5 MCG/ACT inhaler Inhale 2 puffs into the lungs 2 times daily 13 g 11    oxyCODONE-acetaminophen (PERCOCET) 5-325 MG tablet Take 1-2 tablets by mouth every 4 hours as needed for severe pain 90 tablet 0    triamcinolone (KENALOG) 0.5 % external ointment Topically twice daily as needed for 14 days 60 g 11    triamterene-HCTZ (MAXZIDE-25) 37.5-25 MG tablet Take 1 tablet by mouth daily 90 tablet 3    Vitamin D3 (CHOLECALCIFEROL) 125 MCG (5000 UT) tablet Take by mouth daily      STATIN NOT PRESCRIBED (INTENTIONAL) Please choose reason not prescribed from choices below.         Allergies:  Allergies   Allergen Reactions    Eggs      Allergy found in testing    Hornets      wasps    Latex Anaphylaxis     hives  -  able to use BP cuff    Lipitor [Statins] Cramps     Muscle cramps with statin    Metoclopramide Hcl Difficulty breathing    Neurontin [Gabapentin] Hives       Social History:   History   Drug Use No      History   Smoking Status    Never   Smokeless Tobacco    Never     Social History    Substance and Sexual Activity      Alcohol use: Yes        Alcohol/week: 0.0 standard drinks of alcohol        Comment: 1 drink per year or less       Family  "History:  Family History   Adopted: Yes   Problem Relation Age of Onset    Respiratory Brother         1/2 sib    Psychotic Disorder Mother         depression, chronic fatigue    Psychotic Disorder Brother         bipolar    C.A.D. Maternal Grandfather     Breast Cancer Maternal Grandmother     Diabetes Maternal Uncle        Review of Systems:   A comprehensive 12 system review of systems was carried out.  Pertinent positives and negatives are noted above. Otherwise negative for contributory information.    Objective & Physical Exam:  BP (!) 172/100 (BP Location: Right arm, Patient Position: Supine, Cuff Size: Adult Regular)   Pulse 68   Ht 1.664 m (5' 5.5\")   Wt 134 kg (295 lb 8 oz)   SpO2 97%   BMI 48.43 kg/m    Wt Readings from Last 2 Encounters:   06/13/23 134 kg (295 lb 8 oz)   05/16/23 130.2 kg (287 lb)     Body mass index is 48.43 kg/m .   Body surface area is 2.49 meters squared.    Constitutional: appears stated age, in no apparent distress, appears to be well nourished  Head: normocephalic, atraumatic  Neck: supple, trachea midline  Pulmonary: clear to auscultation bilaterally, no wheezes, no rales, no increased work of breathing  Cardiovascular: JVP normal, regular rate, regular rhythm, normal S1 and S2, 1/6 MARITO at the RUSB, no lower extremity edema  Gastrointestinal: no guarding, non-rigid   Neurologic: awake, alert, moves all extremities  Skin: no jaundice, warm on limited exam  Psychiatric: affect is normal, answers questions appropriately, oriented to self and place    Data reviewed:  Lab Results   Component Value Date    WBC 6.8 05/16/2023    WBC 7.7 02/22/2021    RBC 4.93 05/16/2023    RBC 5.18 02/22/2021    HGB 13.8 05/16/2023    HGB 14.3 02/22/2021    HCT 42.1 05/16/2023    HCT 44.8 02/22/2021    MCV 85 05/16/2023    MCV 87 02/22/2021    MCH 28.0 05/16/2023    MCH 27.6 02/22/2021    MCHC 32.8 05/16/2023    MCHC 31.9 02/22/2021    RDW 12.3 05/16/2023    RDW 13.5 02/22/2021     " 05/16/2023     02/22/2021     Sodium   Date Value Ref Range Status   05/16/2023 138 136 - 145 mmol/L Final   05/17/2021 137 133 - 144 mmol/L Final     Potassium   Date Value Ref Range Status   05/16/2023 4.2 3.4 - 5.3 mmol/L Final   09/13/2022 3.5 3.4 - 5.3 mmol/L Final   05/17/2021 3.7 3.4 - 5.3 mmol/L Final     Chloride   Date Value Ref Range Status   05/16/2023 100 98 - 107 mmol/L Final   09/13/2022 105 94 - 109 mmol/L Final   05/17/2021 105 94 - 109 mmol/L Final     Carbon Dioxide   Date Value Ref Range Status   05/17/2021 26 20 - 32 mmol/L Final     Carbon Dioxide (CO2)   Date Value Ref Range Status   05/16/2023 25 22 - 29 mmol/L Final   09/13/2022 30 20 - 32 mmol/L Final     Anion Gap   Date Value Ref Range Status   05/16/2023 13 7 - 15 mmol/L Final   09/13/2022 4 3 - 14 mmol/L Final   05/17/2021 6 3 - 14 mmol/L Final     Glucose   Date Value Ref Range Status   05/16/2023 244 (H) 70 - 99 mg/dL Final   09/13/2022 100 (H) 70 - 99 mg/dL Final   05/17/2021 102 (H) 70 - 99 mg/dL Final     Urea Nitrogen   Date Value Ref Range Status   05/16/2023 11.2 6.0 - 20.0 mg/dL Final   09/13/2022 16 7 - 30 mg/dL Final   05/17/2021 18 7 - 30 mg/dL Final     Creatinine   Date Value Ref Range Status   05/16/2023 0.58 0.51 - 0.95 mg/dL Final   05/17/2021 0.62 0.52 - 1.04 mg/dL Final     GFR Estimate   Date Value Ref Range Status   05/16/2023 >90 >60 mL/min/1.73m2 Final     Comment:     eGFR calculated using 2021 CKD-EPI equation.   05/17/2021 >90 >60 mL/min/[1.73_m2] Final     Comment:     Non  GFR Calc  Starting 12/18/2018, serum creatinine based estimated GFR (eGFR) will be   calculated using the Chronic Kidney Disease Epidemiology Collaboration   (CKD-EPI) equation.       Calcium   Date Value Ref Range Status   05/16/2023 9.3 8.6 - 10.0 mg/dL Final   05/17/2021 8.6 8.5 - 10.1 mg/dL Final     Bilirubin Total   Date Value Ref Range Status   04/13/2023 0.3 <=1.2 mg/dL Final   05/17/2021 0.3 0.2 - 1.3 mg/dL  Final     Alkaline Phosphatase   Date Value Ref Range Status   2023 94 35 - 104 U/L Final   2021 73 40 - 150 U/L Final     ALT   Date Value Ref Range Status   2023 20 10 - 35 U/L Final   2021 20 0 - 50 U/L Final     AST   Date Value Ref Range Status   2023 17 10 - 35 U/L Final   2021 15 0 - 45 U/L Final     Recent Labs   Lab Test 22  0857 21  0945 16  0717 04/13/15  0803   CHOL 186 158   < > 160   HDL 48* 43*   < > 37*   * 92   < > 88   TRIG 112 113   < > 173*   CHOLHDLRATIO  --   --   --  4.3    < > = values in this interval not displayed.      Lab Results   Component Value Date    A1C 9.0 2023    A1C 9.3 2022    A1C 8.0 2022    A1C 7.7 2022    A1C 7.5 2021    A1C 8.1 2021    A1C 8.8 2021    A1C 10.7 2020    A1C 9.0 2020    A1C 7.9 2019        Recent Results (from the past 4320 hour(s))   Echocardiogram Complete    Narrative    [id number]  [id number]  [id number]  [id number]^[email or web address]^[email or web address]^JOSHUA     [facility]  Echocardiography Laboratory  [address] [place] [address]     Name: JEANNA JOHNSON  MRN: [id number]  : 1965  Study Date: 2023 11:05 AM  Age: 57 yrs  Gender: Female  Patient Location: [facility]  Reason For Study: Dizziness, Orthopnea, Chest tightness, Essential  hypertension  Ordering Physician: AIDE LEOS  Referring Physician: AIDE LEOS  Performed By: Crystal Quezada     BSA: 2.3 m2  Height: 66 in  Weight: 280 lb  HR: 68  BP: 169/92 mmHg  ______________________________________________________________________________  Procedure  Complete Echo Adult. Optison (NDC #[id number]) given intravenously.  ______________________________________________________________________________  Interpretation Summary     The study was technically difficult. Contrast was used without apparent  complications. Normal transthoracic  echocardiogram.  ______________________________________________________________________________  Left Ventricle  The left ventricle is normal in structure, function and size. Left ventricular  diastolic function is normal.     Right Ventricle  The right ventricle is normal in structure, function and size.     Atria  Normal left atrial size. Right atrial size is normal.     Mitral Valve  The mitral valve is normal in structure and function.     Tricuspid Valve  Normal tricuspid valve.     Aortic Valve  There is trivial trileaflet aortic sclerosis. There is trace to mild aortic  regurgitation.     Pulmonic Valve  Normal pulmonic valve.     Vessels  The aortic root is normal size. Normal size ascending aorta.     Pericardium  There is no pericardial effusion.     ______________________________________________________________________________  MMode/2D Measurements & Calculations  IVSd: 1.1 cm  LVIDd: 4.5 cm  LVIDs: 2.9 cm  LVPWd: 1.0 cm  FS: 36.2 %  LV mass(C)d: 168.5 grams  LV mass(C)dI: 73.0 grams/m2     Ao root diam: 3.4 cm  asc Aorta Diam: 3.5 cm  LVOT diam: 2.0 cm  LVOT area: 3.1 cm2  LA Volume (BP): 50.0 ml  LA Volume Index (BP): 21.6 ml/m2  RWT: 0.46     Doppler Measurements & Calculations  MV E max rolando: 84.4 cm/sec  MV A max rolando: 101.7 cm/sec  MV E/A: 0.83  MV max P.0 mmHg  MV mean P.9 mmHg  MV V2 VTI: 31.9 cm     MV dec time: 0.17 sec  PA acc time: 0.14 sec  E/E' av.9  Lateral E/e': 12.1  Medial E/e': 11.6     ______________________________________________________________________________  Report approved by: Rocky Dixon 2023 02:04 PM                  Thank you for allowing me to participate in the care of your patient.      Sincerely,     eGrman Romero MD     [facility]  cc:   Clare Acosta, NP  [address] [address]

## 2023-06-13 NOTE — PATIENT INSTRUCTIONS
June 13, 2023    Thank you for allowing our Cardiology team to participate in your care.     Please note the following changes to your heart treatment plan:     Medication changes:   - change hydrochlorothiazide to hydrochlorothiazide-triamterene     Tests to be done:  - NON-fasting labs in 2 weeks    Follow up:  - Follow up as needed      For scheduling, please call [phone].      Please contact our team through [employer] or our Nurse Team Voicemail service [phone], or the General Clinic [phone] for any questions or concerns.     If you are having a medical emergency, please call [phone].     Sincerely,    German Romero MD, FACC  Cardiology    [facility]

## 2023-06-14 NOTE — PROGRESS NOTES
Cardiology Clinic Consultation:    June 13, 2023   Patient Name: Johnna Caballero  Patient MRN: [id number]    Consult indication: palpitations    HPI:    I had the opportunity to see patient Johnna Caballero in cardiology clinic for a consultation. Patient is followed by our colleague Linda Durant MD with Primary Care.     Patient is a pleasant 57-year-old female with a past medical history significant for hypertension, dyslipidemia, diabetes, anxiety/depression, asthma, chronic back pain, obesity, who presents for further evaluation/management of dizziness and palpitations.    Patient reports that at baseline she is not as physically active as she would like to be primarily due to chronic pain related to osteoarthritis in her foot as well as lower back pain.  She was seen in the emergency department 4/8/2023 with acute onset dizziness, palpitations, flushing and diaphoresis.  She was also experiencing chest discomfort with the palpitations.  She denies any recent episodes of exertional chest pain/discomfort.  In the ED initial blood pressure was elevated at 196/86 mmHg.  ECG through care everywhere not available for review, however report noted sinus tachycardia with PACs, as well as some nonspecific T wave abnormalities.  Chest x-ray was reported as normal.  High-sensitivity troponin normal x3.  Potassium 3.3.  Potassium was replaced.  ED staff spoke with cardiology with [facility], nonurgent follow-up was recommended.  Since then patient was assessed with a Lexiscan stress test 4/21/2023 which was negative for inducible myocardial ischemia or infarction, LV function was hyperdynamic.  TTE 6/12/2023 demonstrated normal cardiac structure and function, trace to mild aortic insufficiency, blood pressure is elevated during the study at 169/92 mmHg.  Study was reviewed personally and I concur with the reported findings.  Following this, patient reports that her symptoms have improved significantly, she has not  had recurrence of the palpitations or chest discomfort.  She feels that her symptoms likely were related to menopause.  She was also using cannabis at that time, which she has since stopped, on the advice of an [facility] provider.  With this provider she has also been taking natural supplements and an herbal tea, which she reports has resulted in significant improvement in hot flashes which have been attributed to menopause symptoms.  TSH was recently checked and was normal.  NT proBNP was also recently checked and was normal.      ECG today in clinic demonstrates normal sinus rhythm at 70 bpm, nonspecific T wave abnormality in lead III, Q-wave in lead III, QTc 381 ms.    Assessment and Plan/Recommendations:    In summary, patient is a pleasant 57-year-old female with a past medical history significant for hypertension, dyslipidemia, diabetes, anxiety/depression, asthma, chronic back pain, obesity, who presents for further evaluation/management of dizziness and palpitations.  This was an isolated event for which she was seen in the ED, symptoms have since resolved spontaneously.  She does note a relationship with improvement of her symptoms and therapies with an [facility] provider who has been treating her for menopause symptoms with herbal supplement/tea.  Cardiac work-up with an echocardiogram was reassuring, demonstrating normal cardiac function, only trace to mild AI in the setting of hypertension.  Lexiscan stress test 4/21/2023 was negative for myocardial ischemia or infarction.  No recurrence of symptoms.    Etiology of her symptoms not entirely clear, certainly could be related to menopause symptoms, also cannabis use, possible contribution from mild hypokalemia.  Agree with abstinence from cannabis.  Counseled on vagal maneuvers for possible paroxysmal SVT episode.  If she does have recurrence, would recommend Holter/Zio patch monitor.  Will change hydrochlorothiazide to  hydrochlorothiazide-triamterene given mild hypokalemia and hypertension.  Will check a BMP in 2 weeks.  Recommend continuing losartan.  Recommend PCP repeat a TTE in 3 years for reassessment of aortic regurgitation.    Discussed that per ACC/AHA guidelines, patient would benefit from statin therapy, patient would like to hold off for now, and also declined  additional changes to her antihypertensive regimen, in favor of following up with her [facility] provider.  Advised that we would be glad to see her back as needed if blood pressure remains challenging to control or for any other concerns.    Thank you for allowing our team to participate in the care of Johnna Caballero.  Please do not hesitate to call or page me with any questions or concerns.    Sincerely,     German Romero MD, [facility]  Cardiology  June 14, 2023      Clare Acosta, NP  [address] [place] [address]      Total time spent on this encounter today: 41 minutes, providing care in this encounter including, but not limited to, reviewing prior medical records, laboratory data, imaging studies, diagnostic studies, procedure notes, formulating an assessment and plan, recommendations, discussion and counseling with patient face to face, dictation.    Past Medical History:     Patient Active Problem List   Diagnosis     Allergic rhinitis     Esophageal reflux     Hyperlipidemia LDL goal <100     Long term current use of insulin (H)     Morbid obesity with BMI of 40.0-44.9, adult (H)     Vitamin D deficiency disease     S/P total knee arthroplasty     Type 2 diabetes mellitus without complication (H)     Essential hypertension     Bilateral low back pain without sciatica     Major depressive disorder, recurrent episode, mild (H)     Anxiety     Latex allergy status     Chronic pain syndrome     Intermittent asthma, uncomplicated     History of melanoma     Cervical radiculopathy     Diabetic polyneuropathy associated with  type 2 diabetes mellitus (H)     Allergy or toxic reaction to venom       Past Surgical History:   Past Surgical History:   Procedure Laterality Date     ARTHROPLASTY KNEE  7/15/2014    Procedure: ARTHROPLASTY KNEE;  Surgeon: Chapin Paul MD;  Location:  OR     BACK SURGERY       CHOLECYSTECTOMY       COLONOSCOPY N/A 4/26/2017    Procedure: COLONOSCOPY;  COLONOSCOPY;  Surgeon: Chapin Leal MD;  Location:  GI     ESOPHAGOSCOPY, GASTROSCOPY, DUODENOSCOPY (EGD), COMBINED N/A 6/29/2020    Procedure: ESOPHAGOGASTRODUODENOSCOPY (EGD);  Surgeon: Devon Jacinto MD;  Location:  GI     EXCISE LESION LOWER EXTREMITY  11/20/2012    Procedure: EXCISE LESION LOWER EXTREMITY;  EXCISION LIPOMA RIGHT HIP ;  Surgeon: Jazmin Bautista MD;  Location:  [facility]     EXCISE LESION LOWER EXTREMITY  11/23/2012    Procedure: EXCISE LESION LOWER EXTREMITY;  EXCISE LESION LOWER EXTREMITY  EVACUATE RIGHT HIP HEMATOMA;  Surgeon: Jazmin Bautista MD;  Location:  OR     EXCISE MASS HIP Left 11/9/2018    Procedure: 1.  Excision left chest wall mass with excised diameter of greater than 4 cm 2.  Excision left hip mass with excised diameter of more than 20 x 20 cm  ;  Surgeon: Jazmin Bautista MD;  Location: [facility] OR     EXCISE MASS TRUNK Left 11/9/2018    Procedure: EXCISE MASS TRUNK;  Surgeon: Jazmin Bautista MD;  Location: [facility] OR     EXCISE MASS TRUNK N/A 12/16/2019    Procedure: REVISION LEFT HIP SCAR WITH SEROMA EVACUATION; REMOVAL LEFT CHEST WALL MASS   (MRSA);  Surgeon: Jazmin Bautista MD;  Location:  OR     GYN SURGERY       HC EXPLORATION ANKLE JOINT  1/2006     HYSTEROSCOPY DIAGNOSTIC N/A 12/16/2019    Procedure: DIAGNOSTIC HYSTEROSCOPY;  Surgeon: Jodi Perea MD;  Location:  OR     IRRIGATION AND DEBRIDEMENT HIP, COMBINED  12/15/2012    Procedure: COMBINED IRRIGATION AND DEBRIDEMENT HIP;   evacuation hematoma, scar revision right hip;  Surgeon: Jazmin Bautista  MD;  Location: [facility] OR     REPLACE INTRAUTERINE DEVICE N/A 12/16/2019    Procedure: REPLACEMENTOF MIRENA CONTRACEPTIVE INTRAUTERINE DEVICE;  Surgeon: Jodi Perea MD;  Location: [facility] OR     wisdom teeth[       [facility] NONSPECIFIC PROCEDURE      laparoscopy x6     Z NONSPECIFIC PROCEDURE  93    laparotomy x2 ovarian cyst     Z NONSPECIFIC PROCEDURE  0293    oophorectomy lt side - cyst     [facility] NONSPECIFIC PROCEDURE  0395    laminectomy L4-5. S1 herniated disc     Z NONSPECIFIC PROCEDURE  96    cholecystectomy     [facility] NONSPECIFIC PROCEDURE      ganglion cysts l wrist, rt palm     Z NONSPECIFIC PROCEDURE      cyst removal back x2     ZZC NONSPECIFIC PROCEDURE  10/02    rt thumb fusion, ganglion cyst removal     [facility] NONSPECIFIC PROCEDURE  1/08    remove heel spur, excise exostosis     [facility] PART EXCIS PLANTAR FASCIA  12/2006       Medications (outpatient):  Current Outpatient Medications   Medication Sig Dispense Refill     aspirin 81 MG tablet Take 1 tablet (81 mg) by mouth daily 30 tablet      BD PEN NEEDLE VALDO 2ND GEN 32G X 4 MM miscellaneous USE TWO PEN NEEDLES DAILY OR AS DIRECTED. 180 each 0     blood glucose (ACCU-CHEK GABI PLUS) test strip TEST ONE TO TWO TIMES DAILY OR AS DIRECTED 200 strip 0     blood glucose monitoring (ACCU-CHEK MULTICLIX) lancets Use to test blood sugar 1-2 times daily or as directed. 2 Box 3     celecoxib (CELEBREX) 200 MG capsule TAKE 1 CAPSULE DAILY 90 capsule 2     COMPOUNDED NON-CONTROLLED SUBSTANCE (CMPD RX) - PHARMACY TO MIX COMPOUNDED MEDICATION Apply 1 gm to feet as needed for pain. 30 g 1     Continuous Blood Gluc  (DEXCOM G6 ) BRANT Use to read blood sugars as per 's instructions. 1 each 0     Continuous Blood Gluc Sensor (DEXCOM G6 SENSOR) MISC Change every 10 days. 3 each 11     Continuous Blood Gluc Transmit (DEXCOM G6 TRANSMITTER) MISC Change every 3 months. 1 each 3     hydrOXYzine (ATARAX) 25 MG tablet Take 0.5 tablets (12.5 mg) by mouth 3  times daily as needed for other (pain) 90 tablet 11     insulin lispro (HUMALOG KWIKPEN) 100 UNIT/ML (1 unit dial) KWIKPEN INJECT 8 UNITS SUB-Q THREE TIMES A DAY BEFORE MEALS 15 mL 4     LANsoprazole (PREVACID) 30 MG DR capsule Take 1 capsule (30 mg) by mouth daily 90 capsule 1     LANTUS SOLOSTAR 100 UNIT/ML soln INJECT 48 UNITS SUB-Q DAILY 45 mL 11     losartan (COZAAR) 100 MG tablet TAKE ONE TABLET BY MOUTH ALONG WITH ONE HYDROCHLOROTHIAZIDE 25MG TABLET ONCE DAILY 90 tablet 3     metFORMIN (GLUCOPHAGE) 1000 MG tablet TAKE ONE TABLET BY MOUTH TWICE A DAY WITH MEALS 180 tablet 3     mometasone-formoterol (DULERA) 100-5 MCG/ACT inhaler Inhale 2 puffs into the lungs 2 times daily 13 g 11     oxyCODONE-acetaminophen (PERCOCET) 5-325 MG tablet Take 1-2 tablets by mouth every 4 hours as needed for severe pain 90 tablet 0     triamcinolone (KENALOG) 0.5 % external ointment Topically twice daily as needed for 14 days 60 g 11     triamterene-HCTZ (MAXZIDE-25) 37.5-25 MG tablet Take 1 tablet by mouth daily 90 tablet 3     Vitamin D3 (CHOLECALCIFEROL) 125 MCG (5000 UT) tablet Take by mouth daily       STATIN NOT PRESCRIBED (INTENTIONAL) Please choose reason not prescribed from choices below.         Allergies:  Allergies   Allergen Reactions     Eggs      Allergy found in testing     Hornets      wasps     Latex Anaphylaxis     hives  -  able to use BP cuff     Lipitor [Statins] Cramps     Muscle cramps with statin     Metoclopramide Hcl Difficulty breathing     Neurontin [Gabapentin] Hives       Social History:   History   Drug Use No      History   Smoking Status     Never   Smokeless Tobacco     Never     Social History    Substance and Sexual Activity      Alcohol use: Yes        Alcohol/week: 0.0 standard drinks of alcohol        Comment: 1 drink per year or less       Family History:  Family History   Adopted: Yes   Problem Relation Age of Onset     Respiratory Brother         1/2 sib     Psychotic Disorder Mother       "   depression, chronic fatigue     Psychotic Disorder Brother         bipolar     C.A.D. Maternal Grandfather      Breast Cancer Maternal Grandmother      Diabetes Maternal Uncle        Review of Systems:   A comprehensive 12 system review of systems was carried out.  Pertinent positives and negatives are noted above. Otherwise negative for contributory information.    Objective & Physical Exam:  BP (!) 172/100 (BP Location: Right arm, Patient Position: Supine, Cuff Size: Adult Regular)   Pulse 68   Ht 1.664 m (5' 5.5\")   Wt 134 kg (295 lb 8 oz)   SpO2 97%   BMI 48.43 kg/m    Wt Readings from Last 2 Encounters:   06/13/23 134 kg (295 lb 8 oz)   05/16/23 130.2 kg (287 lb)     Body mass index is 48.43 kg/m .   Body surface area is 2.49 meters squared.    Constitutional: appears stated age, in no apparent distress, appears to be well nourished  Head: normocephalic, atraumatic  Neck: supple, trachea midline  Pulmonary: clear to auscultation bilaterally, no wheezes, no rales, no increased work of breathing  Cardiovascular: JVP normal, regular rate, regular rhythm, normal S1 and S2, 1/6 MARITO at the RUSB, no lower extremity edema  Gastrointestinal: no guarding, non-rigid   Neurologic: awake, alert, moves all extremities  Skin: no jaundice, warm on limited exam  Psychiatric: affect is normal, answers questions appropriately, oriented to self and place    Data reviewed:  Lab Results   Component Value Date    WBC 6.8 05/16/2023    WBC 7.7 02/22/2021    RBC 4.93 05/16/2023    RBC 5.18 02/22/2021    HGB 13.8 05/16/2023    HGB 14.3 02/22/2021    HCT 42.1 05/16/2023    HCT 44.8 02/22/2021    MCV 85 05/16/2023    MCV 87 02/22/2021    MCH 28.0 05/16/2023    MCH 27.6 02/22/2021    MCHC 32.8 05/16/2023    MCHC 31.9 02/22/2021    RDW 12.3 05/16/2023    RDW 13.5 02/22/2021     05/16/2023     02/22/2021     Sodium   Date Value Ref Range Status   05/16/2023 138 136 - 145 mmol/L Final   05/17/2021 137 133 - 144 mmol/L " Final     Potassium   Date Value Ref Range Status   05/16/2023 4.2 3.4 - 5.3 mmol/L Final   09/13/2022 3.5 3.4 - 5.3 mmol/L Final   05/17/2021 3.7 3.4 - 5.3 mmol/L Final     Chloride   Date Value Ref Range Status   05/16/2023 100 98 - 107 mmol/L Final   09/13/2022 105 94 - 109 mmol/L Final   05/17/2021 105 94 - 109 mmol/L Final     Carbon Dioxide   Date Value Ref Range Status   05/17/2021 26 20 - 32 mmol/L Final     Carbon Dioxide (CO2)   Date Value Ref Range Status   05/16/2023 25 22 - 29 mmol/L Final   09/13/2022 30 20 - 32 mmol/L Final     Anion Gap   Date Value Ref Range Status   05/16/2023 13 7 - 15 mmol/L Final   09/13/2022 4 3 - 14 mmol/L Final   05/17/2021 6 3 - 14 mmol/L Final     Glucose   Date Value Ref Range Status   05/16/2023 244 (H) 70 - 99 mg/dL Final   09/13/2022 100 (H) 70 - 99 mg/dL Final   05/17/2021 102 (H) 70 - 99 mg/dL Final     Urea Nitrogen   Date Value Ref Range Status   05/16/2023 11.2 6.0 - 20.0 mg/dL Final   09/13/2022 16 7 - 30 mg/dL Final   05/17/2021 18 7 - 30 mg/dL Final     Creatinine   Date Value Ref Range Status   05/16/2023 0.58 0.51 - 0.95 mg/dL Final   05/17/2021 0.62 0.52 - 1.04 mg/dL Final     GFR Estimate   Date Value Ref Range Status   05/16/2023 >90 >60 mL/min/1.73m2 Final     Comment:     eGFR calculated using 2021 CKD-EPI equation.   05/17/2021 >90 >60 mL/min/[1.73_m2] Final     Comment:     Non  GFR Calc  Starting 12/18/2018, serum creatinine based estimated GFR (eGFR) will be   calculated using the Chronic Kidney Disease Epidemiology Collaboration   (CKD-EPI) equation.       Calcium   Date Value Ref Range Status   05/16/2023 9.3 8.6 - 10.0 mg/dL Final   05/17/2021 8.6 8.5 - 10.1 mg/dL Final     Bilirubin Total   Date Value Ref Range Status   04/13/2023 0.3 <=1.2 mg/dL Final   05/17/2021 0.3 0.2 - 1.3 mg/dL Final     Alkaline Phosphatase   Date Value Ref Range Status   04/13/2023 94 35 - 104 U/L Final   05/17/2021 73 40 - 150 U/L Final     ALT   Date  Value Ref Range Status   2023 20 10 - 35 U/L Final   2021 20 0 - 50 U/L Final     AST   Date Value Ref Range Status   2023 17 10 - 35 U/L Final   2021 15 0 - 45 U/L Final     Recent Labs   Lab Test 22  0857 21  0945 16  0717 04/13/15  0803   CHOL 186 158   < > 160   HDL 48* 43*   < > 37*   * 92   < > 88   TRIG 112 113   < > 173*   CHOLHDLRATIO  --   --   --  4.3    < > = values in this interval not displayed.      Lab Results   Component Value Date    A1C 9.0 2023    A1C 9.3 2022    A1C 8.0 2022    A1C 7.7 2022    A1C 7.5 2021    A1C 8.1 2021    A1C 8.8 2021    A1C 10.7 2020    A1C 9.0 2020    A1C 7.9 2019        Recent Results (from the past 4320 hour(s))   Echocardiogram Complete    Narrative    [id number]  [id number]  [id number]  [id number]^DAFNE^AIDE^JOSHUA     [facility]  Echocardiography Laboratory  [address] [place] [address]     Name: DELFINO JOHNSONISTIN JOSHUA  MRN: [id number]  : 1965  Study Date: 2023 11:05 AM  Age: 57 yrs  Gender: Female  Patient Location: [facility]  Reason For Study: Dizziness, Orthopnea, Chest tightness, Essential  hypertension  Ordering Physician: AIDE LEOS  Referring Physician: AIDE LEOS  Performed By: Crystal Quezada     BSA: 2.3 m2  Height: 66 in  Weight: 280 lb  HR: 68  BP: 169/92 mmHg  ______________________________________________________________________________  Procedure  Complete Echo Adult. Optison (NDC #4953-3990) given intravenously.  ______________________________________________________________________________  Interpretation Summary     The study was technically difficult. Contrast was used without apparent  complications. Normal transthoracic echocardiogram.  ______________________________________________________________________________  Left Ventricle  The left ventricle is normal in structure, function and size. Left  ventricular  diastolic function is normal.     Right Ventricle  The right ventricle is normal in structure, function and size.     Atria  Normal left atrial size. Right atrial size is normal.     Mitral Valve  The mitral valve is normal in structure and function.     Tricuspid Valve  Normal tricuspid valve.     Aortic Valve  There is trivial trileaflet aortic sclerosis. There is trace to mild aortic  regurgitation.     Pulmonic Valve  Normal pulmonic valve.     Vessels  The aortic root is normal size. Normal size ascending aorta.     Pericardium  There is no pericardial effusion.     ______________________________________________________________________________  MMode/2D Measurements & Calculations  IVSd: 1.1 cm  LVIDd: 4.5 cm  LVIDs: 2.9 cm  LVPWd: 1.0 cm  FS: 36.2 %  LV mass(C)d: 168.5 grams  LV mass(C)dI: 73.0 grams/m2     Ao root diam: 3.4 cm  asc Aorta Diam: 3.5 cm  LVOT diam: 2.0 cm  LVOT area: 3.1 cm2  LA Volume (BP): 50.0 ml  LA Volume Index (BP): 21.6 ml/m2  RWT: 0.46     Doppler Measurements & Calculations  MV E max rolando: 84.4 cm/sec  MV A max rolando: 101.7 cm/sec  MV E/A: 0.83  MV max P.0 mmHg  MV mean P.9 mmHg  MV V2 VTI: 31.9 cm     MV dec time: 0.17 sec  PA acc time: 0.14 sec  E/E' av.9  Lateral E/e': 12.1  Medial E/e': 11.6     ______________________________________________________________________________  Report approved by: Rocky Dixon 2023 02:04 PM

## 2023-06-20 ENCOUNTER — OFFICE VISIT (OUTPATIENT)
Dept: OBGYN | Facility: CLINIC | Age: 58
End: 2023-06-20
Payer: COMMERCIAL

## 2023-06-20 VITALS — WEIGHT: 290 LBS | DIASTOLIC BLOOD PRESSURE: 82 MMHG | SYSTOLIC BLOOD PRESSURE: 138 MMHG | BODY MASS INDEX: 47.52 KG/M2

## 2023-06-20 DIAGNOSIS — T83.39XA RETAINED INTRAUTERINE CONTRACEPTIVE DEVICE (IUD): Primary | ICD-10-CM

## 2023-06-20 PROCEDURE — 58301 REMOVE INTRAUTERINE DEVICE: CPT | Mod: 53 | Performed by: OBSTETRICS & GYNECOLOGY

## 2023-06-20 PROCEDURE — 99202 OFFICE O/P NEW SF 15 MIN: CPT | Mod: 25 | Performed by: OBSTETRICS & GYNECOLOGY

## 2023-06-20 NOTE — PROGRESS NOTES
Chief Complaint   Patient presents with     IUD     Removal of IUD       Subjective:  57-year-old nulliparous patient presents for IUD removal.  The IUD has been in for 7 years and she is beyond her menopausal symptoms.  She desired to be removed.  She indicates that her last IUD had missing IUD strings so this 1 was placed in the operating room after the last was removed.  She has been amenorrheic.      REVIEW OF SYSTEMS:  General: as above    Health Maintenance   Topic Date Due     Pneumococcal Vaccine: Pediatrics (0 to 5 Years) and At-Risk Patients (6 to 64 Years) (2 - PPSV23 if available, else PCV20) 03/11/2015     ZOSTER IMMUNIZATION (1 of 2) Never done     ASTHMA ACTION PLAN  12/12/2020     YEARLY PREVENTIVE VISIT  04/14/2023     LIPID  05/09/2023     A1C  07/13/2023     ASTHMA CONTROL TEST  10/13/2023     PHQ-9  11/16/2023     EYE EXAM  12/05/2023     PAP  03/01/2024     CMP  04/13/2024     MICROALBUMIN  04/13/2024     DIABETIC FOOT EXAM  04/13/2024     URINE DRUG SCREEN  04/13/2024     BMP  05/16/2024     MAMMO SCREENING  05/16/2024     COLORECTAL CANCER SCREENING  04/26/2027     ADVANCE CARE PLANNING  12/09/2027     DTAP/TDAP/TD IMMUNIZATION (3 - Td or Tdap) 03/02/2030     DEPRESSION ACTION PLAN  Completed     MIGRAINE ACTION PLAN  Completed     IPV IMMUNIZATION  Aged Out     MENINGITIS IMMUNIZATION  Aged Out     HEPATITIS C SCREENING  Discontinued     HIV SCREENING  Discontinued     INFLUENZA VACCINE  Discontinued     HEPATITIS B IMMUNIZATION  Discontinued     COVID-19 Vaccine  Discontinued       Allergies   Allergen Reactions     Eggs      Allergy found in testing     Hornets      wasps     Latex Anaphylaxis     hives  -  able to use BP cuff     Lipitor [Statins] Cramps     Muscle cramps with statin     Metoclopramide Hcl Difficulty breathing     Neurontin [Gabapentin] Hives       Objective:  Vitals: /82   Wt 131.5 kg (290 lb)   BMI 47.52 kg/m    BMI= Body mass index is 47.52 kg/m .  External  genitalia without lesions:    Vagina negative throughout course:    Cervix nulliparous and IUD strings present at os, grasped with ring forceps and steady traction applied no descent of the IUD and patient uncomfortable.  Allowed slow steady pressure in a downward fashion but no descent of the IUD.  The distal strings broke from the pressure but the more proximal strings could still be identified in the endocervical canal.    At this point I suggested that the IUD be removed under anesthesia in the OR.  This will be scheduled in the near future.    Of note a CT scan of the abdomen and pelvis from 2020 at [facility] was reviewed and shows a Mirena IUD in the central uterus.    Assessment/Plan:  Attempted IUD removal, unsuccessful despite strings being easily identified IUD would not descend with pressure    Plan: IUD removal under anesthesia via hysteroscopy      Quan Davis MD

## 2023-06-20 NOTE — NURSING NOTE
"Chief Complaint   Patient presents with     IUD     Removal of IUD     initial /82   Wt 131.5 kg (290 lb)   BMI 47.52 kg/m   Estimated body mass index is 47.52 kg/m  as calculated from the following:    Height as of 6/13/23: 1.664 m (5' 5.5\").    Weight as of this encounter: 131.5 kg (290 lb).  BP completed using cuff size large.  Brenda Myers CMA    "

## 2023-06-21 ENCOUNTER — TELEPHONE (OUTPATIENT)
Dept: OBGYN | Facility: CLINIC | Age: 58
End: 2023-06-21

## 2023-06-21 ENCOUNTER — OFFICE VISIT (OUTPATIENT)
Dept: PODIATRY | Facility: CLINIC | Age: 58
End: 2023-06-21
Payer: COMMERCIAL

## 2023-06-21 VITALS
SYSTOLIC BLOOD PRESSURE: 134 MMHG | DIASTOLIC BLOOD PRESSURE: 76 MMHG | BODY MASS INDEX: 43.95 KG/M2 | HEIGHT: 67 IN | WEIGHT: 280 LBS

## 2023-06-21 DIAGNOSIS — M21.41 PES PLANUS OF BOTH FEET: ICD-10-CM

## 2023-06-21 DIAGNOSIS — E11.42 DIABETIC POLYNEUROPATHY ASSOCIATED WITH TYPE 2 DIABETES MELLITUS (H): Primary | ICD-10-CM

## 2023-06-21 DIAGNOSIS — M79.671 RIGHT FOOT PAIN: ICD-10-CM

## 2023-06-21 DIAGNOSIS — L84 CALLUS: ICD-10-CM

## 2023-06-21 DIAGNOSIS — M21.42 PES PLANUS OF BOTH FEET: ICD-10-CM

## 2023-06-21 PROCEDURE — 99213 OFFICE O/P EST LOW 20 MIN: CPT | Performed by: PODIATRIST

## 2023-06-21 SDOH — ECONOMIC STABILITY: FOOD INSECURITY: WITHIN THE PAST 12 MONTHS, YOU WORRIED THAT YOUR FOOD WOULD RUN OUT BEFORE YOU GOT MONEY TO BUY MORE.: PATIENT DECLINED

## 2023-06-21 SDOH — ECONOMIC STABILITY: INCOME INSECURITY: HOW HARD IS IT FOR YOU TO PAY FOR THE VERY BASICS LIKE FOOD, HOUSING, MEDICAL CARE, AND HEATING?: PATIENT DECLINED

## 2023-06-21 SDOH — ECONOMIC STABILITY: FOOD INSECURITY: WITHIN THE PAST 12 MONTHS, THE FOOD YOU BOUGHT JUST DIDN'T LAST AND YOU DIDN'T HAVE MONEY TO GET MORE.: PATIENT DECLINED

## 2023-06-21 SDOH — ECONOMIC STABILITY: TRANSPORTATION INSECURITY
IN THE PAST 12 MONTHS, HAS THE LACK OF TRANSPORTATION KEPT YOU FROM MEDICAL APPOINTMENTS OR FROM GETTING MEDICATIONS?: PATIENT DECLINED

## 2023-06-21 SDOH — HEALTH STABILITY: PHYSICAL HEALTH: ON AVERAGE, HOW MANY DAYS PER WEEK DO YOU ENGAGE IN MODERATE TO STRENUOUS EXERCISE (LIKE A BRISK WALK)?: 3 DAYS

## 2023-06-21 SDOH — HEALTH STABILITY: PHYSICAL HEALTH: ON AVERAGE, HOW MANY MINUTES DO YOU ENGAGE IN EXERCISE AT THIS LEVEL?: 30 MIN

## 2023-06-21 SDOH — ECONOMIC STABILITY: INCOME INSECURITY: IN THE LAST 12 MONTHS, WAS THERE A TIME WHEN YOU WERE NOT ABLE TO PAY THE MORTGAGE OR RENT ON TIME?: PATIENT REFUSED

## 2023-06-21 SDOH — ECONOMIC STABILITY: TRANSPORTATION INSECURITY
IN THE PAST 12 MONTHS, HAS LACK OF TRANSPORTATION KEPT YOU FROM MEETINGS, WORK, OR FROM GETTING THINGS NEEDED FOR DAILY LIVING?: PATIENT DECLINED

## 2023-06-21 ASSESSMENT — PAIN SCALES - GENERAL: PAINLEVEL: MODERATE PAIN (5)

## 2023-06-21 ASSESSMENT — SOCIAL DETERMINANTS OF HEALTH (SDOH)
HOW OFTEN DO YOU ATTEND CHURCH OR RELIGIOUS SERVICES?: NEVER
ARE YOU MARRIED, WIDOWED, DIVORCED, SEPARATED, NEVER MARRIED, OR LIVING WITH A PARTNER?: NEVER MARRIED
IN A TYPICAL WEEK, HOW MANY TIMES DO YOU TALK ON THE PHONE WITH FAMILY, FRIENDS, OR NEIGHBORS?: MORE THAN THREE TIMES A WEEK
DO YOU BELONG TO ANY CLUBS OR ORGANIZATIONS SUCH AS CHURCH GROUPS UNIONS, FRATERNAL OR ATHLETIC GROUPS, OR SCHOOL GROUPS?: NO
HOW OFTEN DO YOU GET TOGETHER WITH FRIENDS OR RELATIVES?: TWICE A WEEK

## 2023-06-21 ASSESSMENT — LIFESTYLE VARIABLES
SKIP TO QUESTIONS 9-10: 1
HOW OFTEN DO YOU HAVE SIX OR MORE DRINKS ON ONE OCCASION: NEVER
HOW OFTEN DO YOU HAVE A DRINK CONTAINING ALCOHOL: NEVER
AUDIT-C TOTAL SCORE: 0
HOW MANY STANDARD DRINKS CONTAINING ALCOHOL DO YOU HAVE ON A TYPICAL DAY: PATIENT DOES NOT DRINK

## 2023-06-21 NOTE — TELEPHONE ENCOUNTER
Type of surgery: hysteroscopy, iud removl  Location of surgery: [facility]  Date and time of surgery: 6/27/23 @ 10:00 am  Surgeon: Dr. Davis  Pre-Op Appt Date: Patient advised to schedule.  Post-Op Appt Date:    Packet sent out: Yes  Pre-cert/Authorization completed:  No  Date: 6/21/23

## 2023-06-21 NOTE — TELEPHONE ENCOUNTER
Patient Name: Johnna Caballero   MRN: [id number]   Case#: [id number]   Surgeon(s) and Role:      * Andrea Davis MD - Primary   Date requested: * No dates entered *   Location: [facility] OR   Procedure(s):   REMOVAL, INTRAUTERINE DEVICE (N/A)   HYSTEROSCOPY, THERAPEUTIC

## 2023-06-21 NOTE — LETTER
"    6/21/2023         RE: Johnna Caballero  [id number]  [facility] [address]        Dear Colleague,    Thank you for referring your patient, Johnna Caballero, to the [facility]. Please see a copy of my visit note below.    Podiatry / Foot and Ankle Surgery Progress Note    June 21, 2023    Subject: Patient was seen for pain to the right foot where her callus was previously trimmed.  She notes that its back.  Very sore to walk on.  Pain can be 7 out of 10 at its worst.  Wondering about getting it trimmed down again today.  Denies specific injury.    Objective:  Vitals: /76   Ht 1.702 m (5' 7\")   Wt 127 kg (280 lb)   BMI 43.85 kg/m    BMI= Body mass index is 43.85 kg/m .    A1C: 9.0 (4/13/2023)     General:  NAD     Dermatologic: Small localized hyperkeratotic lesion to the plantar aspect of the right fifth metatarsal head.  No open lesions or signs of infection noted.     Vascular: DP & PT pulses are intact & regular bilaterally.   edema and varicosities noted.  CFT and skin temperature is normal to both lower extremities.     Neurologic: Lower extremity sensation is diminished to feet.      Musculoskeletal: Patient is ambulatory without assistive device or brace. Decrease arch height. Decrease dorsiflexion right ankle.  Pain on palpation of the left plantar heel.  Pain on palpation of the right third intermetatarsal space. new Pain on palpation along the left peroneal tendon the other joints and anterior talofibular ligaments.     Radiographs:  Right foot x-ray - I have looked at and reviewed xrays personally -degenerative changes noted to the midfoot.  No fractures are noted.     ASSESSMENT:      Diabetic polyneuropathy associated with type 2 diabetes mellitus (H)  Right foot pain  Callus  Pes planus of both feet     Medical Decision Making:  Reviewed patient's chart in epic.  Discussed causes of keratomas.  They are due to areas of increase friction.  Hammertoes can create " these as they put more pressure to the metatarsal head.  Discussed treatments such as using foot file, pumice stone, metatarsal pads, orthotics, and not walking barefoot.      We discussed the cost structure of callus care if they were to come back and have it treated in the clinic if insurance does not cover it and explained that they would be billed. They were also provided information on places to get the callus treatment.    We discussed given her neuropathy this could be a preulcerative lesion.  This was debrided down today.  She will use over-the-counter urea cream and a pumice stone to this area.  Also recommended pads to the shoes.      All questions were answered to patient satisfaction and she will call for the questions or concerns.     Patient Risk Factor:  Patient is a medium risk factor for infection.       Suzanna Strong DPM, Podiatry/Foot and Ankle Surgery        Again, thank you for allowing me to participate in the care of your patient.        Sincerely,        Suzanna Strong DPM, Podiatry/Foot and Ankle Surgery

## 2023-06-21 NOTE — PROGRESS NOTES
"Podiatry / Foot and Ankle Surgery Progress Note    June 21, 2023    Subject: Patient was seen for pain to the right foot where her callus was previously trimmed.  She notes that its back.  Very sore to walk on.  Pain can be 7 out of 10 at its worst.  Wondering about getting it trimmed down again today.  Denies specific injury.    Objective:  Vitals: /76   Ht 1.702 m (5' 7\")   Wt 127 kg (280 lb)   BMI 43.85 kg/m    BMI= Body mass index is 43.85 kg/m .    A1C: 9.0 (4/13/2023)     General:  NAD     Dermatologic: Small localized hyperkeratotic lesion to the plantar aspect of the right fifth metatarsal head.  No open lesions or signs of infection noted.     Vascular: DP & PT pulses are intact & regular bilaterally.   edema and varicosities noted.  CFT and skin temperature is normal to both lower extremities.     Neurologic: Lower extremity sensation is diminished to feet.      Musculoskeletal: Patient is ambulatory without assistive device or brace. Decrease arch height. Decrease dorsiflexion right ankle.  Pain on palpation of the left plantar heel.  Pain on palpation of the right third intermetatarsal space. new Pain on palpation along the left peroneal tendon the other joints and anterior talofibular ligaments.     Radiographs:  Right foot x-ray - I have looked at and reviewed xrays personally -degenerative changes noted to the midfoot.  No fractures are noted.     ASSESSMENT:      Diabetic polyneuropathy associated with type 2 diabetes mellitus (H)  Right foot pain  Callus  Pes planus of both feet     Medical Decision Making:  Reviewed patient's chart in epic.  Discussed causes of keratomas.  They are due to areas of increase friction.  Hammertoes can create these as they put more pressure to the metatarsal head.  Discussed treatments such as using foot file, pumice stone, metatarsal pads, orthotics, and not walking barefoot.      We discussed the cost structure of callus care if they were to come back and " have it treated in the clinic if insurance does not cover it and explained that they would be billed. They were also provided information on places to get the callus treatment.    We discussed given her neuropathy this could be a preulcerative lesion.  This was debrided down today.  She will use over-the-counter urea cream and a pumice stone to this area.  Also recommended pads to the shoes.      All questions were answered to patient satisfaction and she will call for the questions or concerns.     Patient Risk Factor:  Patient is a medium risk factor for infection.       Suzanna Strong DPM, Podiatry/Foot and Ankle Surgery

## 2023-06-22 ENCOUNTER — OFFICE VISIT (OUTPATIENT)
Dept: PEDIATRICS | Facility: CLINIC | Age: 58
End: 2023-06-22
Payer: COMMERCIAL

## 2023-06-22 VITALS
SYSTOLIC BLOOD PRESSURE: 122 MMHG | RESPIRATION RATE: 16 BRPM | BODY MASS INDEX: 45.52 KG/M2 | WEIGHT: 290 LBS | TEMPERATURE: 97.2 F | OXYGEN SATURATION: 97 % | HEART RATE: 87 BPM | HEIGHT: 67 IN | DIASTOLIC BLOOD PRESSURE: 66 MMHG

## 2023-06-22 DIAGNOSIS — K21.9 GASTROESOPHAGEAL REFLUX DISEASE, UNSPECIFIED WHETHER ESOPHAGITIS PRESENT: ICD-10-CM

## 2023-06-22 DIAGNOSIS — J45.20 INTERMITTENT ASTHMA, UNCOMPLICATED: ICD-10-CM

## 2023-06-22 DIAGNOSIS — Z79.4 TYPE 2 DIABETES MELLITUS WITH HYPERGLYCEMIA, WITH LONG-TERM CURRENT USE OF INSULIN (H): ICD-10-CM

## 2023-06-22 DIAGNOSIS — G89.4 CHRONIC PAIN SYNDROME: ICD-10-CM

## 2023-06-22 DIAGNOSIS — E11.65 TYPE 2 DIABETES MELLITUS WITH HYPERGLYCEMIA, WITH LONG-TERM CURRENT USE OF INSULIN (H): ICD-10-CM

## 2023-06-22 DIAGNOSIS — T83.39XA RETAINED INTRAUTERINE CONTRACEPTIVE DEVICE (IUD): ICD-10-CM

## 2023-06-22 DIAGNOSIS — I10 ESSENTIAL HYPERTENSION: ICD-10-CM

## 2023-06-22 DIAGNOSIS — Z01.818 PREOP GENERAL PHYSICAL EXAM: Primary | ICD-10-CM

## 2023-06-22 LAB
ANION GAP SERPL CALCULATED.3IONS-SCNC: 14 MMOL/L (ref 7–15)
BUN SERPL-MCNC: 16.9 MG/DL (ref 6–20)
CALCIUM SERPL-MCNC: 9.4 MG/DL (ref 8.6–10)
CHLORIDE SERPL-SCNC: 101 MMOL/L (ref 98–107)
CREAT SERPL-MCNC: 0.87 MG/DL (ref 0.51–0.95)
DEPRECATED HCO3 PLAS-SCNC: 23 MMOL/L (ref 22–29)
GFR SERPL CREATININE-BSD FRML MDRD: 78 ML/MIN/1.73M2
GLUCOSE SERPL-MCNC: 134 MG/DL (ref 70–99)
HBA1C MFR BLD: 9.8 % (ref 0–5.6)
POTASSIUM SERPL-SCNC: 3.8 MMOL/L (ref 3.4–5.3)
SODIUM SERPL-SCNC: 138 MMOL/L (ref 136–145)

## 2023-06-22 PROCEDURE — 99214 OFFICE O/P EST MOD 30 MIN: CPT | Performed by: NURSE PRACTITIONER

## 2023-06-22 PROCEDURE — 83036 HEMOGLOBIN GLYCOSYLATED A1C: CPT | Performed by: NURSE PRACTITIONER

## 2023-06-22 PROCEDURE — 36415 COLL VENOUS BLD VENIPUNCTURE: CPT | Performed by: NURSE PRACTITIONER

## 2023-06-22 PROCEDURE — 80048 BASIC METABOLIC PNL TOTAL CA: CPT | Performed by: NURSE PRACTITIONER

## 2023-06-22 ASSESSMENT — PAIN SCALES - GENERAL: PAINLEVEL: NO PAIN (0)

## 2023-06-22 NOTE — PATIENT INSTRUCTIONS
For informational purposes only. Not to replace the advice of your health care provider. Copyright   2003,  [employer] [facility] [employer] [facility]. All rights reserved. Clinically reviewed by Flori Baum MD. [facility] [id number] - REV .  Preparing for Your Surgery  Getting started  A nurse will call you to review your health history and instructions. They will give you an arrival time based on your scheduled surgery time. Please be ready to share:  Your doctor's clinic name and phone number  Your medical, surgical, and anesthesia history  A list of allergies and sensitivities  A list of medicines, including herbal treatments and over-the-counter drugs  Whether the patient has a legal guardian (ask how to send us the papers in advance)  Please tell us if you're pregnant--or if there's any chance you might be pregnant. Some surgeries may injure a fetus (unborn baby), so they require a pregnancy test. Surgeries that are safe for a fetus don't always need a test, and you can choose whether to have one.   If you have a child who's having surgery, please ask for a copy of Preparing for Your Child's Surgery.    Preparing for surgery  Within 10 to 30 days of surgery: Have a pre-op exam (sometimes called an H&P, or History and Physical). This can be done at a clinic or pre-operative center.  If you're having a , you may not need this exam. Talk to your care team.  At your pre-op exam, talk to your care team about all medicines you take. If you need to stop any medicines before surgery, ask when to start taking them again.  We do this for your safety. Many medicines can make you bleed too much during surgery. Some change how well surgery (anesthesia) drugs work.  Call your insurance company to let them know you're having surgery. (If you don't have insurance, call [phone].)  Call your clinic if there's any change in your health. This includes signs of a cold or flu (sore throat, runny nose, cough, rash, fever). It  also includes a scrape or scratch near the surgery site.  If you have questions on the day of surgery, call your hospital or surgery center.  Eating and drinking guidelines  For your safety: Unless your surgeon tells you otherwise, follow the guidelines below.  Eat and drink as usual until 8 hours before you arrive for surgery. After that, no food or milk.  Drink clear liquids until 2 hours before you arrive. These are liquids you can see through, like water, Gatorade, and Propel Water. They also include plain black coffee and tea (no cream or milk), candy, and breath mints. You can spit out gum when you arrive.  If you drink alcohol: Stop drinking it the night before surgery.  If your care team tells you to take medicine on the morning of surgery, it's okay to take it with a sip of water.  Preventing infection  Shower or bathe the night before and morning of your surgery. Follow the instructions your clinic gave you. (If no instructions, use regular soap.)  Don't shave or clip hair near your surgery site. We'll remove the hair if needed.  Don't smoke or vape the morning of surgery. You may chew nicotine gum up to 2 hours before surgery. A nicotine patch is okay.  Note: Some surgeries require you to completely quit smoking and nicotine. Check with your surgeon.  Your care team will make every effort to keep you safe from infection. We will:  Clean our hands often with soap and water (or an alcohol-based hand rub).  Clean the skin at your surgery site with a special soap that kills germs.  Give you a special gown to keep you warm. (Cold raises the risk of infection.)  Wear special hair covers, masks, gowns and gloves during surgery.  Give antibiotic medicine, if prescribed. Not all surgeries need antibiotics.  What to bring on the day of surgery  Photo ID and insurance card  Copy of your health care directive, if you have one  Glasses and hearing aids (bring cases)  You can't wear contacts during surgery  Inhaler and  eye drops, if you use them (tell us about these when you arrive)  CPAP machine or breathing device, if you use them  A few personal items, if spending the night  If you have . . .  A pacemaker, ICD (cardiac defibrillator) or other implant: Bring the ID card.  An implanted stimulator: Bring the remote control.  A legal guardian: Bring a copy of the certified (court-stamped) guardianship papers.  Please remove any jewelry, including body piercings. Leave jewelry and other valuables at home.  If you're going home the day of surgery  You must have a responsible adult drive you home. They should stay with you overnight as well.  If you don't have someone to stay with you, and you aren't safe to go home alone, we may keep you overnight. Insurance often won't pay for this.  After surgery  If it's hard to control your pain or you need more pain medicine, please call your surgeon's office.  Questions?   If you have any questions for your care team, list them here: _________________________________________________________________________________________________________________________________________________________________________ ____________________________________ ____________________________________ ____________________________________    How to Take Your Medication Before Surgery   - ACE/ARB: HOLD on day of surgery (minimum 11 hours for general anesthesia).   - Diuretics: HOLD on the day of surgery.   - Intermediate acting insulin (NPH): Take 50% of the usual dose on the morning of surgery.    - short acting insulin (e.g. regular, lispro, aspart): HOLD on the morning of surgery.    - metformin: HOLD day of surgery.   - continue with Prevacid   - pregabalin, gabapentin: Continue without modification.   - LABA, inhaled corticosteroid, long-acting anticholinergics: Continue without modification.   - Herbal medications and vitamins: HOLD 14 days prior to surgery.     IV discontinued, cath removed intact

## 2023-06-22 NOTE — PROGRESS NOTES
[facility] [place]  [address]  [address]  [place] [address]  Phone: [phone]  Fax: [phone]  Primary Provider: Linda Durant  Pre-op Performing Provider: SHAUNNA CORONEL      PREOPERATIVE EVALUATION:  Today's date: 6/22/2023    Johnna Caballero is a 57 year old female who presents for a preoperative evaluation.      6/22/2023     2:06 PM   Additional Questions   Roomed by Yi CEDEÑO   Accompanied by Self     Forms 6/22/2023   Any forms needing to be completed Yes         6/22/2023     2:06 PM   Patient Reported Additional Medications   Patient reports taking the following new medications n/a     Surgical Information:  Surgery/Procedure: IUD removal   Surgery Location: [facility]   Surgeon: Dr. Nazario   Surgery Date: 6/27/23  Time of Surgery: 10:00am  Where patient plans to recover: At home with family  Fax number for surgical facility: Note does not need to be faxed, will be available electronically in Epic.    Assessment & Plan     The proposed surgical procedure is considered INTERMEDIATE risk.    Preop general physical exam  Cleared for procedure.     Retained intrauterine contraceptive device (IUD)  Followed by Dr. Davis.    A1C is elevated.  Message sent to Dr. Davis regarding being cleared for procedure due to A1C >9%  Not cleared until Dr. Davis approves.    Type 2 diabetes mellitus with hyperglycemia, with long-term current use of insulin (H)  Uncontrolled.  Follow-up with PCP.  See hold times re: medications below.  - Hemoglobin A1c    Essential hypertension  Stable.  Hold losartan 100mg and triamterene hydrochlorothiazide on the day of surgery.  - Basic metabolic panel  (Ca, Cl, CO2, Creat, Gluc, K, Na, BUN)    Gastroesophageal reflux disease, unspecified whether esophagitis present  Stable.  Continue with current medication.    Chronic pain syndrome  Stable.  Followed by PCP    Intermittent asthma, uncomplicated  Stable.  Continue with Dulera  and albuterol.      Risks and Recommendations:  The patient has the following additional risks and recommendations for perioperative complications:   - Morbid obesity (BMI >40)  Diabetes:  - Patient is on insulin therapy; diabetic NPO guidelines provided and discussed.  Pulmonary:    - Well controlled asthma.    Antiplatelet or Anticoagulation Medication Instructions:   - Patient is on no antiplatelet or anticoagulation medications.    Additional Medication Instructions:   - ACE/ARB: HOLD on day of surgery (minimum 11 hours for general anesthesia).   - Diuretics: HOLD on the day of surgery.   - Intermediate acting insulin (NPH): Take 50% of the usual dose on the morning of surgery.    - short acting insulin (e.g. regular, lispro, aspart): HOLD on the morning of surgery.    - metformin: HOLD day of surgery.   - pregabalin, gabapentin: Continue without modification.   - LABA, inhaled corticosteroid, long-acting anticholinergics: Continue without modification.   - Herbal medications and vitamins: HOLD 14 days prior to surgery.    RECOMMENDATION:  APPROVAL not GIVEN to proceed with proposed procedure due to elevated A1C.      Subjective       HPI related to upcoming procedure: IUD removal due to expiration date and retained IUD.        6/21/2023     6:17 PM   Preop Questions   1. Have you ever had a heart attack or stroke? No   2. Have you ever had surgery on your heart or blood vessels, such as a stent placement, a coronary artery bypass, or surgery on an artery in your head, neck, heart, or legs? No   3. Do you have chest pain with activity? No   4. Do you have a history of  heart failure? No   5. Do you currently have a cold, bronchitis or symptoms of other infection? No   6. Do you have a cough, shortness of breath, or wheezing? No   7. Do you or anyone in your family have previous history of blood clots? No   8. Do you or does anyone in your family have a serious bleeding problem such as prolonged bleeding  following surgeries or cuts? UNKNOWN    9. Have you ever had problems with anemia or been told to take iron pills? No   10. Have you had any abnormal blood loss such as black, tarry or bloody stools, or abnormal vaginal bleeding? No   11. Have you ever had a blood transfusion? No   12. Are you willing to have a blood transfusion if it is medically needed before, during, or after your surgery? NO; does not want blood if patient's blood has had the COVID vaccine   13. Have you or any of your relatives ever had problems with anesthesia? No   14. Do you have sleep apnea, excessive snoring or daytime drowsiness? No   15. Do you have any artifical heart valves or other implanted medical devices like a pacemaker, defibrillator, or continuous glucose monitor? No   16. Do you have artificial joints? YES - Bilateral knees   17. Are you allergic to latex? YES   18. Is there any chance that you may be pregnant? No       Health Care Directive:  Patient has a Health Care Directive on file      Preoperative Review of :   reviewed - controlled substances reflected in medication list.      Status of Chronic Conditions:  ASTHMA - Patient has a longstanding history of moderate-severe Asthma . Patient has been doing well overall noting NO SYMPTOMS and continues on medication regimen consisting of Dulera as needed without adverse reactions or side effects.     DIABETES - Patient has a longstanding history of DiabetesType Type II . Patient is being treated with oral agents and insulin injections and denies significant side effects. Control has been fair. Complicating factors include but are not limited to: hypertension, hyperlipidemia and morbid obesity .     HYPERLIPIDEMIA - Patient has a long history of significant Hyperlipidemia requiring medication for treatment with recent good control. Patient reports no problems or side effects with the medication.     HYPERTENSION - Patient has longstanding history of HTN , currently denies  any symptoms referable to elevated blood pressure. Specifically denies chest pain, palpitations, dyspnea, orthopnea, PND or peripheral edema. Blood pressure readings have been in normal range. Current medication regimen is as listed below. Patient denies any side effects of medication.       Review of Systems  CONSTITUTIONAL: NEGATIVE for fever, chills, change in weight  INTEGUMENTARY/SKIN: NEGATIVE for worrisome rashes, moles or lesions  EYES: NEGATIVE for vision changes or irritation  ENT/MOUTH: NEGATIVE for ear, mouth and throat problems  RESP: NEGATIVE for significant cough or SOB  CV: NEGATIVE for chest pain, palpitations or peripheral edema  GI: NEGATIVE for nausea, abdominal pain, heartburn, or change in bowel habits  : NEGATIVE for frequency, dysuria, or hematuria  MUSCULOSKELETAL: NEGATIVE for significant arthralgias or myalgia  NEURO: NEGATIVE for weakness, dizziness or paresthesias  ENDOCRINE: NEGATIVE for temperature intolerance, skin/hair changes  HEME: NEGATIVE for bleeding problems  PSYCHIATRIC: NEGATIVE for changes in mood or affect    Patient Active Problem List    Diagnosis Date Noted     Chronic pain syndrome 11/23/2016     Priority: High     Patient is followed by Linda Durant MD for ongoing prescription of pain medication.  All refills should be approved by this provider, or covering partner.    Medication(s): percocet 5/325.   Maximum quantity per month: 30 - gets 90 per 3 month supply  Clinic visit frequency required: Q 6  months     Controlled substance agreement:  Encounter-Level CSA - 12/28/2016:    Controlled Substance Agreement - Scan on 1/6/2017  7:45 AM: CONTROLLED SUBSTANCE AGREEMENT     Patient-Level CSA:    Controlled Substance Agreement - Opioid - Scan on 5/17/2021 11:24 AM  Controlled Substance Agreement - Opioid - Scan on 4/15/2019 12:34 PM: CONTROLLED SUBSTANCE AGREEMENT - OPIOID       Pain Clinic evaluation in the past: No    DIRE Total Score(s):  No flowsheet data  found.    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 As pt [place] location with recurrent exposure to venomous insects and positive IgE to honeybee, wasp and yellow jacket, she wants to pursue allergen desensitization.     P  - History is consistent with venom hypersensitivity. Reviewed with patient allergy immunotherapy for venom is % effective in reducing the chance of death from insect stings. Pt is instructed to carry two EpiPens when outdoors, particularly at the end of summer when stings are more common.  If stung, Pt is instructed to use epinephrine immediately, as the early signs of a mild reaction are the same as the early signs of a severe reaction.  Reviewed with patient 30% of anaphylactic reactions require a second dose of epinephrine.  Also reviewed with patient previous mild reactions do not preclude future reactions to be more serious and life-threatening.  Reviewed common nesting locations and avoidance behaviors.  Written information provided.  -Rx for AuviQ sent to pharmacy due to patient's preference for slimmer profile of autoinjector.       Intermittent asthma, uncomplicated 12/28/2016     Priority: Medium     Latex allergy status 10/03/2016     Priority: Medium     Anxiety 05/10/2016     Priority: Medium     Major depressive disorder, recurrent episode, mild (H) 01/05/2016      Priority: Medium     Typically seasonal     celexa- low libido  wellbutrin- didn't work   paxil- didn't work        Essential hypertension 10/04/2015     Priority: Medium     S/P total knee arthroplasty 07/15/2014     Priority: Medium     Morbid obesity with BMI of 40.0-44.9, adult (H) 09/18/2012     Priority: Medium     Allergic rhinitis 03/25/2003     Priority: Medium     Problem list name updated by automated process. Provider to review       Bilateral low back pain without sciatica 10/25/2015     Priority: Low     Vitamin D deficiency disease 03/06/2013     Priority: Low     Long term current use of insulin (H) 09/18/2012     Priority: Low     Esophageal reflux 03/25/2003     Priority: Low      Past Medical History:   Diagnosis Date     Actinic keratosis      Allergic rhinitis, cause unspecified      Anemia      Arthritis      Asthma     no meds x2 yrs     chronic back pain      Chronic infection     MRSA-nasal     Chronic pain     back     Esophageal reflux      fibrocystic breast changes      Gastro-oesophageal reflux disease      Heart murmur     mitral insuffiency     Hematoma - postoperative 11/23/2012     Problem list name updated by automated process. Provider to review and confirm     Hx of Fen-Phen use      Hypertension      migraines      Mild intermittent asthma     rare, with dog or exercise     Moderate major depression (H) 03/28/2012     MRSA (methicillin resistant Staphylococcus aureus) 06/27/2014    Nares     Need for desensitization to allergens      Obesity, unspecified      Other and unspecified hyperlipidemia      Temporomandibular joint disorders, unspecified      Thrombosis of leg     incisional area right hip     Type II or unspecified type diabetes mellitus without mention of complication, not stated as uncontrolled      Past Surgical History:   Procedure Laterality Date     ARTHROPLASTY KNEE  07/15/2014    Procedure: ARTHROPLASTY KNEE;  Surgeon: Chapin Paul MD;  Location:  OR      BACK SURGERY       BIOPSY  2008 2012     CHOLECYSTECTOMY       COLONOSCOPY N/A 04/26/2017    Procedure: COLONOSCOPY;  COLONOSCOPY;  Surgeon: Chapin Leal MD;  Location:  GI     COSMETIC SURGERY  2012     ESOPHAGOSCOPY, GASTROSCOPY, DUODENOSCOPY (EGD), COMBINED N/A 06/29/2020    Procedure: ESOPHAGOGASTRODUODENOSCOPY (EGD);  Surgeon: Devon Jacinto MD;  Location:  GI     EXCISE LESION LOWER EXTREMITY  11/20/2012    Procedure: EXCISE LESION LOWER EXTREMITY;  EXCISION LIPOMA RIGHT HIP ;  Surgeon: Jazmin Bautista MD;  Location:  SD     EXCISE LESION LOWER EXTREMITY  11/23/2012    Procedure: EXCISE LESION LOWER EXTREMITY;  EXCISE LESION LOWER EXTREMITY  EVACUATE RIGHT HIP HEMATOMA;  Surgeon: Jazmin Bautista MD;  Location:  OR     EXCISE MASS HIP Left 11/09/2018    Procedure: 1.  Excision left chest wall mass with excised diameter of greater than 4 cm 2.  Excision left hip mass with excised diameter of more than 20 x 20 cm  ;  Surgeon: Jazmin Bautista MD;  Location: [facility] OR     EXCISE MASS TRUNK Left 11/09/2018    Procedure: EXCISE MASS TRUNK;  Surgeon: Jazmin Bautista MD;  Location: [facility] OR     EXCISE MASS TRUNK N/A 12/16/2019    Procedure: REVISION LEFT HIP SCAR WITH SEROMA EVACUATION; REMOVAL LEFT CHEST WALL MASS   (MRSA);  Surgeon: Jazmin Bautista MD;  Location:  OR     GYN SURGERY       HC EXPLORATION ANKLE JOINT  01/2006     HYSTEROSCOPY DIAGNOSTIC N/A 12/16/2019    Procedure: DIAGNOSTIC HYSTEROSCOPY;  Surgeon: Jodi Perea MD;  Location:  OR     IRRIGATION AND DEBRIDEMENT HIP, COMBINED  12/15/2012    Procedure: COMBINED IRRIGATION AND DEBRIDEMENT HIP;   evacuation hematoma, scar revision right hip;  Surgeon: Jazmin Bautista MD;  Location:  OR     REPLACE INTRAUTERINE DEVICE N/A 12/16/2019    Procedure: REPLACEMENTOF MIRENA CONTRACEPTIVE INTRAUTERINE DEVICE;  Surgeon: Jodi Perea MD;  Location:  OR     wisdom teeth[        [facility] NONSPECIFIC PROCEDURE      laparoscopy x6     [facility] NONSPECIFIC PROCEDURE  1993    laparotomy x2 ovarian cyst     [facility] NONSPECIFIC PROCEDURE  02/1993    oophorectomy lt side - cyst     [facility] NONSPECIFIC PROCEDURE  03/1995    laminectomy L4-5. S1 herniated disc     [facility] NONSPECIFIC PROCEDURE  1996    cholecystectomy     [facility] NONSPECIFIC PROCEDURE      ganglion cysts l wrist, rt palm     [facility] NONSPECIFIC PROCEDURE      cyst removal back x2     [facility] NONSPECIFIC PROCEDURE  10/2002    rt thumb fusion, ganglion cyst removal     [facility] NONSPECIFIC PROCEDURE  01/2008    remove heel spur, excise exostosis     [facility] PART EXCIS PLANTAR FASCIA  12/2006     Current Outpatient Medications   Medication Sig Dispense Refill     aspirin 81 MG tablet Take 1 tablet (81 mg) by mouth daily 30 tablet      BD PEN NEEDLE VALDO 2ND GEN 32G X 4 MM miscellaneous USE TWO PEN NEEDLES DAILY OR AS DIRECTED. 180 each 0     blood glucose (ACCU-CHEK GABI PLUS) test strip TEST ONE TO TWO TIMES DAILY OR AS DIRECTED 200 strip 0     blood glucose monitoring (ACCU-CHEK MULTICLIX) lancets Use to test blood sugar 1-2 times daily or as directed. 2 Box 3     celecoxib (CELEBREX) 200 MG capsule TAKE 1 CAPSULE DAILY 90 capsule 2     COMPOUNDED NON-CONTROLLED SUBSTANCE (CMPD RX) - PHARMACY TO MIX COMPOUNDED MEDICATION Apply 1 gm to feet as needed for pain. 30 g 1     Continuous Blood Gluc  (DEXCOM G6 ) BRANT Use to read blood sugars as per 's instructions. 1 each 0     Continuous Blood Gluc Sensor (DEXCOM G6 SENSOR) MISC Change every 10 days. 3 each 11     Continuous Blood Gluc Transmit (DEXCOM G6 TRANSMITTER) MISC Change every 3 months. 1 each 3     insulin lispro (HUMALOG KWIKPEN) 100 UNIT/ML (1 unit dial) KWIKPEN INJECT 8 UNITS SUB-Q THREE TIMES A DAY BEFORE MEALS 15 mL 4     LANsoprazole (PREVACID) 30 MG DR capsule Take 1 capsule (30 mg) by mouth daily 90 capsule 1     LANTUS SOLOSTAR 100 UNIT/ML soln INJECT 48 UNITS SUB-Q DAILY 45 mL 11  "    losartan (COZAAR) 100 MG tablet TAKE ONE TABLET BY MOUTH ALONG WITH ONE HYDROCHLOROTHIAZIDE 25MG TABLET ONCE DAILY 90 tablet 3     metFORMIN (GLUCOPHAGE) 1000 MG tablet TAKE ONE TABLET BY MOUTH TWICE A DAY WITH MEALS 180 tablet 3     mometasone-formoterol (DULERA) 100-5 MCG/ACT inhaler Inhale 2 puffs into the lungs 2 times daily (Patient taking differently: Inhale 2 puffs into the lungs 2 times daily as needed) 13 g 11     oxyCODONE-acetaminophen (PERCOCET) 5-325 MG tablet Take 1-2 tablets by mouth every 4 hours as needed for severe pain 90 tablet 0     STATIN NOT PRESCRIBED (INTENTIONAL) Please choose reason not prescribed from choices below.       triamcinolone (KENALOG) 0.5 % external ointment Topically twice daily as needed for 14 days 60 g 11     triamterene-HCTZ (MAXZIDE-25) 37.5-25 MG tablet Take 1 tablet by mouth daily 90 tablet 3     Vitamin D3 (CHOLECALCIFEROL) 125 MCG (5000 UT) tablet Take by mouth daily         Allergies   Allergen Reactions     Eggs      Allergy found in testing     Hornets      wasps     Latex Anaphylaxis     hives  -  able to use BP cuff     Lipitor [Statins] Cramps     Muscle cramps with statin     Metoclopramide Hcl Difficulty breathing     Neurontin [Gabapentin] Hives        Social History     Tobacco Use     Smoking status: Never     Smokeless tobacco: Never   Substance Use Topics     Alcohol use: Yes     Alcohol/week: 0.0 standard drinks of alcohol     Comment: 1 drink per year or less     Family History   Adopted: Yes   Problem Relation Age of Onset     Respiratory Brother         1/2 sib     Psychotic Disorder Mother         depression, chronic fatigue     Psychotic Disorder Brother         bipolar     C.A.D. Maternal Grandfather      Breast Cancer Maternal Grandmother      Diabetes Maternal Uncle      History   Drug Use No         Objective     /66   Pulse 87   Temp 97.2  F (36.2  C) (Tympanic)   Resp 16   Ht 1.702 m (5' 7\")   Wt 131.5 kg (290 lb)   SpO2 97%   " BMI 45.42 kg/m      Physical Exam    GENERAL APPEARANCE: healthy, alert and no distress     EYES: EOMI, PERRL     HENT: ear canals and TM's normal and nose and mouth without ulcers or lesions     NECK: no adenopathy, no asymmetry, masses, or scars and thyroid normal to palpation     RESP: lungs clear to auscultation - no rales, rhonchi or wheezes     CV: regular rates and rhythm, normal S1 S2, no S3 or S4 and no murmur, click or rub     ABDOMEN:  soft, nontender, no HSM or masses and bowel sounds normal     MS: extremities normal- no gross deformities noted, no evidence of inflammation in joints, FROM in all extremities.     SKIN: no suspicious lesions or rashes     NEURO: Normal strength and tone, sensory exam grossly normal, mentation intact and speech normal     PSYCH: mentation appears normal. and affect normal/bright     LYMPHATICS: No cervical adenopathy    Recent Labs   Lab Test 05/16/23  1038 04/13/23  1134 12/07/22  1205 05/09/22  0857 01/07/22  1430   HGB 13.8  --   --   --  15.6     --   --   --  312    138  --    < > 137   POTASSIUM 4.2 4.0  --    < > 3.7   CR 0.58 0.66  --    < > 0.67   A1C  --  9.0* 9.3*   < >  --     < > = values in this interval not displayed.        Diagnostics:  Recent Results (from the past 168 hour(s))   Basic metabolic panel  (Ca, Cl, CO2, Creat, Gluc, K, Na, BUN)    Collection Time: 06/22/23  2:54 PM   Result Value Ref Range    Sodium 138 136 - 145 mmol/L    Potassium 3.8 3.4 - 5.3 mmol/L    Chloride 101 98 - 107 mmol/L    Carbon Dioxide (CO2) 23 22 - 29 mmol/L    Anion Gap 14 7 - 15 mmol/L    Urea Nitrogen 16.9 6.0 - 20.0 mg/dL    Creatinine 0.87 0.51 - 0.95 mg/dL    Calcium 9.4 8.6 - 10.0 mg/dL    Glucose 134 (H) 70 - 99 mg/dL    GFR Estimate 78 >60 mL/min/1.73m2   Hemoglobin A1c    Collection Time: 06/22/23  2:54 PM   Result Value Ref Range    Hemoglobin A1C 9.8 (H) 0.0 - 5.6 %      No EKG required for low risk surgery (cataract, skin procedure, breast biopsy,  etc).  No EKG required, no history of coronary heart disease, significant arrhythmia, peripheral arterial disease or other structural heart disease.    Revised Cardiac Risk Index (RCRI):  The patient has the following serious cardiovascular risks for perioperative complications:   - No serious cardiac risks = 0 points     RCRI Interpretation: 0 points: Class I (very low risk - 0.4% complication rate)           Signed Electronically by: Tessie Villarreal NP  Copy of this evaluation report is provided to requesting physician.

## 2023-06-23 NOTE — TELEPHONE ENCOUNTER
Dr. Davis would prefer surgery to be delayed until diabetes is better controlled.  Patient will call back when ready to reschedule.    Surgery canceled with Bev.

## 2023-07-20 ENCOUNTER — TRANSFERRED RECORDS (OUTPATIENT)
Dept: HEALTH INFORMATION MANAGEMENT | Facility: CLINIC | Age: 58
End: 2023-07-20
Payer: COMMERCIAL

## 2023-08-02 ENCOUNTER — TRANSFERRED RECORDS (OUTPATIENT)
Dept: HEALTH INFORMATION MANAGEMENT | Facility: CLINIC | Age: 58
End: 2023-08-02
Payer: COMMERCIAL

## 2023-08-15 ENCOUNTER — OFFICE VISIT (OUTPATIENT)
Dept: PEDIATRICS | Facility: CLINIC | Age: 58
End: 2023-08-15
Payer: COMMERCIAL

## 2023-08-15 VITALS
DIASTOLIC BLOOD PRESSURE: 70 MMHG | WEIGHT: 290.8 LBS | BODY MASS INDEX: 46.73 KG/M2 | TEMPERATURE: 97.2 F | OXYGEN SATURATION: 96 % | RESPIRATION RATE: 20 BRPM | SYSTOLIC BLOOD PRESSURE: 128 MMHG | HEIGHT: 66 IN | HEART RATE: 68 BPM

## 2023-08-15 DIAGNOSIS — M19.91 PRIMARY OSTEOARTHRITIS, UNSPECIFIED SITE: ICD-10-CM

## 2023-08-15 DIAGNOSIS — I35.1 NONRHEUMATIC AORTIC VALVE INSUFFICIENCY: ICD-10-CM

## 2023-08-15 DIAGNOSIS — Z79.4 TYPE 2 DIABETES MELLITUS WITHOUT COMPLICATION, WITH LONG-TERM CURRENT USE OF INSULIN (H): Primary | ICD-10-CM

## 2023-08-15 DIAGNOSIS — G89.4 CHRONIC PAIN SYNDROME: ICD-10-CM

## 2023-08-15 DIAGNOSIS — E11.9 TYPE 2 DIABETES MELLITUS WITHOUT COMPLICATION, WITH LONG-TERM CURRENT USE OF INSULIN (H): Primary | ICD-10-CM

## 2023-08-15 DIAGNOSIS — K64.4 EXTERNAL HEMORRHOIDS: ICD-10-CM

## 2023-08-15 DIAGNOSIS — E11.42 DIABETIC POLYNEUROPATHY ASSOCIATED WITH TYPE 2 DIABETES MELLITUS (H): ICD-10-CM

## 2023-08-15 LAB
ANION GAP SERPL CALCULATED.3IONS-SCNC: 12 MMOL/L (ref 7–15)
BUN SERPL-MCNC: 15.9 MG/DL (ref 6–20)
CALCIUM SERPL-MCNC: 9.6 MG/DL (ref 8.6–10)
CHLORIDE SERPL-SCNC: 103 MMOL/L (ref 98–107)
CHOLEST SERPL-MCNC: 190 MG/DL
CREAT SERPL-MCNC: 0.77 MG/DL (ref 0.51–0.95)
DEPRECATED HCO3 PLAS-SCNC: 25 MMOL/L (ref 22–29)
GFR SERPL CREATININE-BSD FRML MDRD: 90 ML/MIN/1.73M2
GLUCOSE SERPL-MCNC: 139 MG/DL (ref 70–99)
HBA1C MFR BLD: 9.5 % (ref 0–5.6)
HDLC SERPL-MCNC: 38 MG/DL
LDLC SERPL CALC-MCNC: 126 MG/DL
NONHDLC SERPL-MCNC: 152 MG/DL
POTASSIUM SERPL-SCNC: 4.3 MMOL/L (ref 3.4–5.3)
SODIUM SERPL-SCNC: 140 MMOL/L (ref 136–145)
TRIGL SERPL-MCNC: 130 MG/DL

## 2023-08-15 PROCEDURE — 80048 BASIC METABOLIC PNL TOTAL CA: CPT | Performed by: PEDIATRICS

## 2023-08-15 PROCEDURE — 80061 LIPID PANEL: CPT | Performed by: PEDIATRICS

## 2023-08-15 PROCEDURE — 99214 OFFICE O/P EST MOD 30 MIN: CPT | Performed by: PEDIATRICS

## 2023-08-15 PROCEDURE — 36415 COLL VENOUS BLD VENIPUNCTURE: CPT | Performed by: PEDIATRICS

## 2023-08-15 PROCEDURE — 83036 HEMOGLOBIN GLYCOSYLATED A1C: CPT | Performed by: PEDIATRICS

## 2023-08-15 RX ORDER — OXYCODONE AND ACETAMINOPHEN 5; 325 MG/1; MG/1
1-2 TABLET ORAL EVERY 4 HOURS PRN
Qty: 90 TABLET | Refills: 0 | Status: SHIPPED | OUTPATIENT
Start: 2023-08-15 | End: 2023-11-16

## 2023-08-15 ASSESSMENT — PAIN SCALES - GENERAL: PAINLEVEL: NO PAIN (0)

## 2023-08-15 NOTE — PATIENT INSTRUCTIONS
Protein -  gm per day    Labs today    Call for visit with Dr Hammer:  Perri Hammer MD   [facility]   [address] [facility] [address] [address]   Phone: [phone]   Fax: [phone]       Keep up your incredible changes - especially walking     Come back in 3 months - we'll do labs before

## 2023-08-15 NOTE — LETTER
August 15, 2023      Johnna Caballero  [address]   [facility] [address]        To Whom It May Concern,       Johnna Caballero is under my professional care. She was seen by me on 5/9/2022, 9/13/22, & 12/7/2022.   We discussed continued use of Celebrex and Percocet for pain in which she has bilateral hand pain.         Sincerely,        Linda Durant MD

## 2023-08-15 NOTE — PROGRESS NOTES
"  Assessment & Plan       ICD-10-CM    1. Type 2 diabetes mellitus without complication, with long-term current use of insulin (H)  E11.9 Hemoglobin A1c    Z79.4 Basic metabolic panel  (Ca, Cl, CO2, Creat, Gluc, K, Na, BUN)       Encouraged in dietary changes and increased activity.  Difficult to adjust medications without blood sugar data.  Await A1C and can make some recommendations thereafter    Has had side effects with SGL2 inhibitors and GLP-1 agonists that prevent using again in the future      2. Diabetic polyneuropathy associated with type 2 diabetes mellitus (H)  E11.42 See above      3. Nonrheumatic aortic valve insufficiency - due for repeat echo 2026  I35.1       4. External hemorrhoids  K64.4 Adult Colorectal Surgery  Referral      5. Chronic pain syndrome  G89.4 oxyCODONE-acetaminophen (PERCOCET) 5-325 MG tablet  Using sparingly - #90 per 3 months - refilled today      6. Primary osteoarthritis, unspecified site  M19.91 oxyCODONE-acetaminophen (PERCOCET) 5-325 MG tablet             BMI:   Estimated body mass index is 46.62 kg/m  as calculated from the following:    Height as of this encounter: 1.682 m (5' 6.22\").    Weight as of this encounter: 131.9 kg (290 lb 12.8 oz).   Weight management plan: Discussed healthy diet and exercise guidelines    See Patient Instructions    Linda Durant MD  [facility] ROGERIO Feng is a 57 year old, presenting for the following health issues:  Diabetes (F/U) and Recheck Medication        8/15/2023     8:34 AM   Additional Questions   Roomed by Haris Livingston   Accompanied by N/A         8/15/2023     8:34 AM   Patient Reported Additional Medications   Patient reports taking the following new medications No       History of Present Illness       Diabetes:   She presents for follow up of diabetes.    She is not checking blood glucose.         She has no concerns regarding her diabetes at this time.  She is having weight gain.    " "        Reason for visit:  Diabetes check up medications refills    She eats 2-3 servings of fruits and vegetables daily.She consumes 0 sweetened beverage(s) daily.She exercises with enough effort to increase her heart rate 10 to 19 minutes per day.  She exercises with enough effort to increase her heart rate 3 or less days per week.   She is taking medications regularly.       Recent cardiology work up was negative - heart symptoms much improved.    Changed HTN medications, added more potassium and this has helped.    Traditional chinese med doctor in [place] - using supplements and helping with hot flashes.  Acupuncture and herbal tea.   Seeing her twice weekly.  Feeling the best that she has felt in a long time.    Diabetes - working on nutrition - salads and movement now that joint pain is better after working with her traditional Chinese medicine practitioner, Dr Peg Donahue L.Ac.  Not interested in SGL2 inhibitor or GLP-1 agonist due to previous severe side effects.  Not currently checking blood sugars due to frustrations with CGM.   No severe lows.  Added mealtime insulin at last visit and has been using 8-10 units with meals in addition to lantus.    GERD - working to decrease PPI - every other day    Needs to have IUD surgically removed.  Also struggling with non bleeding hemorrhoids - considerable itch and irritation.  Wondering about referral for removal and possibility of doing both procedures under anesthesia at the same time    Leaving for [place] for the winter in December    Chronic msk pain - using percoset intermittently, #90 per 3 months        Objective    /70 (BP Location: Right arm, Patient Position: Sitting, Cuff Size: Adult Large)   Pulse 68   Temp 97.2  F (36.2  C) (Tympanic)   Resp 20   Ht 1.682 m (5' 6.22\")   Wt 131.9 kg (290 lb 12.8 oz)   SpO2 96%   BMI 46.62 kg/m    Body mass index is 46.62 kg/m .  Physical Exam   GENERAL: healthy, alert and no distress  RESP: lungs clear to " auscultation - no rales, rhonchi or wheezes  CV: regular rate and rhythm, normal S1 S2, no S3 or S4, no murmur, click or rub, no peripheral edema and peripheral pulses strong  PSYCH: mentation appears normal, affect normal/bright    Labs pending    Linda Durant MD

## 2023-08-15 NOTE — TELEPHONE ENCOUNTER
Diagnosis, Referred by & from: External Hemorrhoids   Appt date: 11/3/2023   NOTES STATUS DETAILS   OFFICE NOTE from referring provider [facility]:  8/15/23 - PCC OV with Dr. Durant   OFFICE NOTE from other specialist [facility] Every[facility] / [facility]:  6/22/23 - PCC OV with Tessie Villarreal NP    [facility]:  6/20/23 - OBGYN OV with Dr. Davis  6/2/16 - URO OV with Dr. Sally Garsia:  2/22/20 - UC OV with Griselda Lopez, NP   DISCHARGE SUMMARY from hospital N/A    DISCHARGE REPORT from the ER N/A    OPERATIVE REPORT N/A    MEDICATION LIST Internal    LABS N/A    DIAGNOSTIC PROCEDURES     COLONOSCOPY [facility]:  4/26/17 - Colonoscopy   UPPER ENDOSCOPY (EGD) [facility]:  6/29/20 - EGD   IMAGING (DISC & REPORT)      CT [facility]:  3/24/20 - CT Abd/Pelvis   ULTRASOUND  (ENDOANAL/ENDORECTAL) [facility]:  8/9/19 - US Abdomen

## 2023-08-19 DIAGNOSIS — E11.9 TYPE 2 DIABETES MELLITUS WITHOUT COMPLICATION, WITH LONG-TERM CURRENT USE OF INSULIN (H): ICD-10-CM

## 2023-08-19 DIAGNOSIS — Z79.4 TYPE 2 DIABETES MELLITUS WITHOUT COMPLICATION, WITH LONG-TERM CURRENT USE OF INSULIN (H): ICD-10-CM

## 2023-08-21 RX ORDER — PEN NEEDLE, DIABETIC 32GX 5/32"
NEEDLE, DISPOSABLE MISCELLANEOUS
Qty: 200 EACH | Refills: 3 | Status: SHIPPED | OUTPATIENT
Start: 2023-08-21

## 2023-08-21 NOTE — TELEPHONE ENCOUNTER
Routing refill request to provider for review/approval because:  See recent A1C, confirm ongoing pen needle, add 1 more if lantus changed to twice daily?  Loni Wellington RN, BSN  [facility]

## 2023-09-02 DIAGNOSIS — I10 ESSENTIAL HYPERTENSION: ICD-10-CM

## 2023-09-06 RX ORDER — HYDROCHLOROTHIAZIDE 25 MG/1
TABLET ORAL
Qty: 90 TABLET | Refills: 3 | Status: SHIPPED | OUTPATIENT
Start: 2023-09-06 | End: 2023-11-16

## 2023-09-06 NOTE — TELEPHONE ENCOUNTER
Routing refill request to provider for review/approval because:  Drug not active on patient's medication list    Padma Roman RN on 9/6/2023 at 2:31 PM

## 2023-10-03 ENCOUNTER — OFFICE VISIT (OUTPATIENT)
Dept: PODIATRY | Facility: CLINIC | Age: 58
End: 2023-10-03
Payer: COMMERCIAL

## 2023-10-03 VITALS
HEART RATE: 79 BPM | DIASTOLIC BLOOD PRESSURE: 81 MMHG | WEIGHT: 285 LBS | HEIGHT: 67 IN | SYSTOLIC BLOOD PRESSURE: 141 MMHG | BODY MASS INDEX: 44.73 KG/M2

## 2023-10-03 DIAGNOSIS — E11.42 DIABETIC POLYNEUROPATHY ASSOCIATED WITH TYPE 2 DIABETES MELLITUS (H): Primary | ICD-10-CM

## 2023-10-03 DIAGNOSIS — L84 CALLUS: ICD-10-CM

## 2023-10-03 DIAGNOSIS — G89.4 CHRONIC PAIN SYNDROME: ICD-10-CM

## 2023-10-03 DIAGNOSIS — M79.671 RIGHT FOOT PAIN: ICD-10-CM

## 2023-10-03 PROCEDURE — 99213 OFFICE O/P EST LOW 20 MIN: CPT | Performed by: PODIATRIST

## 2023-10-03 NOTE — PROGRESS NOTES
"Podiatry / Foot and Ankle Surgery Progress Note    October 3, 2023    Subject: Patient was seen for recurrent pain to the side of the right foot.  This is where she had the callus previously.  Notes that it has recurred.  Very sore when she walks.  Can be 7 out of 10.  Has been using a pumice stone and a foot file to try to keep it down.    Objective:  Vitals: BP (!) 141/81   Pulse 79   Ht 1.702 m (5' 7\")   Wt 129.3 kg (285 lb)   BMI 44.64 kg/m    BMI= Body mass index is 44.64 kg/m .    A1C: 9.5 (8/15/2023)     General:  NAD     Dermatologic: Small localized hyperkeratotic lesion to the plantar aspect of the right fifth metatarsal head.  No open lesions or signs of infection noted.     Vascular: DP & PT pulses are intact & regular bilaterally.   edema and varicosities noted.  CFT and skin temperature is normal to both lower extremities.     Neurologic: Lower extremity sensation is diminished to feet.      Musculoskeletal: Patient is ambulatory without assistive device or brace. Decrease arch height. Decrease dorsiflexion right ankle.  Pain on palpation of the left plantar heel.  Pain on palpation of the right third intermetatarsal space. new Pain on palpation along the left peroneal tendon the other joints and anterior talofibular ligaments.     Radiographs:  Right foot x-ray - I have looked at and reviewed xrays personally -degenerative changes noted to the midfoot.  No fractures are noted.     ASSESSMENT:      Diabetic polyneuropathy associated with type 2 diabetes mellitus (H)  Right foot pain  Callus  Pes planus of both feet     Medical Decision Making:  Reviewed patient's chart in epic.  Discussed causes of keratomas.  They are due to areas of increase friction.  Hammertoes can create these as they put more pressure to the metatarsal head.  Discussed treatments such as using foot file, pumice stone, metatarsal pads, orthotics, and not walking barefoot.      We discussed the cost structure of callus care if " they were to come back and have it treated in the clinic if insurance does not cover it and explained that they would be billed. They were also provided information on places to get the callus treatment.     We discussed given her neuropathy this could be a preulcerative lesion.  This was debrided down today.  She will use over-the-counter urea cream and a pumice stone to this area.  Also recommended pads to the shoes.  Also recommend nonmedicated corn pad or U-shaped metatarsal pad to help keep pressure off of this area when she is walking.  Talked about diabetic shoes and inserts but she declined those at this time.     All questions were answered to patient satisfaction and she will call for the questions or concerns.     Patient Risk Factor:  Patient is a medium risk factor for infection.       Suzanna Strong DPM, Podiatry/Foot and Ankle Surgery

## 2023-10-03 NOTE — LETTER
"    10/3/2023         RE: Johnna Caballero  [id number]  [facility] [address]        Dear Colleague,    Thank you for referring your patient, Johnna Caballero, to the [facility]. Please see a copy of my visit note below.    Podiatry / Foot and Ankle Surgery Progress Note    October 3, 2023    Subject: Patient was seen for recurrent pain to the side of the right foot.  This is where she had the callus previously.  Notes that it has recurred.  Very sore when she walks.  Can be 7 out of 10.  Has been using a pumice stone and a foot file to try to keep it down.    Objective:  Vitals: BP (!) 141/81   Pulse 79   Ht 1.702 m (5' 7\")   Wt 129.3 kg (285 lb)   BMI 44.64 kg/m    BMI= Body mass index is 44.64 kg/m .    A1C: 9.5 (8/15/2023)     General:  NAD     Dermatologic: Small localized hyperkeratotic lesion to the plantar aspect of the right fifth metatarsal head.  No open lesions or signs of infection noted.     Vascular: DP & PT pulses are intact & regular bilaterally.   edema and varicosities noted.  CFT and skin temperature is normal to both lower extremities.     Neurologic: Lower extremity sensation is diminished to feet.      Musculoskeletal: Patient is ambulatory without assistive device or brace. Decrease arch height. Decrease dorsiflexion right ankle.  Pain on palpation of the left plantar heel.  Pain on palpation of the right third intermetatarsal space. new Pain on palpation along the left peroneal tendon the other joints and anterior talofibular ligaments.     Radiographs:  Right foot x-ray - I have looked at and reviewed xrays personally -degenerative changes noted to the midfoot.  No fractures are noted.     ASSESSMENT:      Diabetic polyneuropathy associated with type 2 diabetes mellitus (H)  Right foot pain  Callus  Pes planus of both feet     Medical Decision Making:  Reviewed patient's chart in epic.  Discussed causes of keratomas.  They are due to areas of increase " friction.  Hammertoes can create these as they put more pressure to the metatarsal head.  Discussed treatments such as using foot file, pumice stone, metatarsal pads, orthotics, and not walking barefoot.      We discussed the cost structure of callus care if they were to come back and have it treated in the clinic if insurance does not cover it and explained that they would be billed. They were also provided information on places to get the callus treatment.     We discussed given her neuropathy this could be a preulcerative lesion.  This was debrided down today.  She will use over-the-counter urea cream and a pumice stone to this area.  Also recommended pads to the shoes.  Also recommend nonmedicated corn pad or U-shaped metatarsal pad to help keep pressure off of this area when she is walking.  Talked about diabetic shoes and inserts but she declined those at this time.     All questions were answered to patient satisfaction and she will call for the questions or concerns.     Patient Risk Factor:  Patient is a medium risk factor for infection.       Suzanna Strong DPM, Podiatry/Foot and Ankle Surgery      Again, thank you for allowing me to participate in the care of your patient.        Sincerely,        Suzanna Strong DPM, Podiatry/Foot and Ankle Surgery

## 2023-10-13 ENCOUNTER — TELEPHONE (OUTPATIENT)
Dept: OBGYN | Facility: CLINIC | Age: 58
End: 2023-10-13
Payer: COMMERCIAL

## 2023-10-24 ENCOUNTER — TELEPHONE (OUTPATIENT)
Dept: SURGERY | Facility: CLINIC | Age: 58
End: 2023-10-24
Payer: COMMERCIAL

## 2023-10-24 NOTE — TELEPHONE ENCOUNTER
Called and spoke with the pt to reschedule appt on 11/3 with Hattie Byrd due to the provider not being available. Pt decided to just cancel. Writer let pt know they can call back to reschedule anytime.

## 2023-11-03 ENCOUNTER — PRE VISIT (OUTPATIENT)
Dept: SURGERY | Facility: CLINIC | Age: 58
End: 2023-11-03

## 2023-11-08 ENCOUNTER — PREP FOR PROCEDURE (OUTPATIENT)
Dept: OBGYN | Facility: CLINIC | Age: 58
End: 2023-11-08
Payer: COMMERCIAL

## 2023-11-08 ENCOUNTER — TELEPHONE (OUTPATIENT)
Dept: PEDIATRICS | Facility: CLINIC | Age: 58
End: 2023-11-08
Payer: COMMERCIAL

## 2023-11-08 NOTE — TELEPHONE ENCOUNTER
Reason for Call:  Appointment Request    Patient requesting this type of appt: Pre-op    Requested provider: Linda Durant    Reason patient unable to be scheduled:  11/16    When does patient want to be seen/preferred time:  11/16    Comments: patient has an upcoming apt on 11/16 and she would like to do a pre op instead will not allow me to change apt type    Could we send this information to you in [facility] or would you prefer to receive a phone call?:   Patient would prefer a phone call   Okay to leave a detailed message?: No at Home number on file [phone] (home)    Call taken on 11/8/2023 at 3:44 PM by Bev Barton

## 2023-11-09 ASSESSMENT — ASTHMA QUESTIONNAIRES: ACT_TOTALSCORE: 25

## 2023-11-16 ENCOUNTER — OFFICE VISIT (OUTPATIENT)
Dept: PEDIATRICS | Facility: CLINIC | Age: 58
End: 2023-11-16
Payer: COMMERCIAL

## 2023-11-16 ENCOUNTER — LAB (OUTPATIENT)
Dept: LAB | Facility: CLINIC | Age: 58
End: 2023-11-16
Payer: COMMERCIAL

## 2023-11-16 VITALS
WEIGHT: 288.4 LBS | TEMPERATURE: 97.3 F | BODY MASS INDEX: 46.35 KG/M2 | HEIGHT: 66 IN | HEART RATE: 72 BPM | OXYGEN SATURATION: 97 % | SYSTOLIC BLOOD PRESSURE: 134 MMHG | DIASTOLIC BLOOD PRESSURE: 69 MMHG

## 2023-11-16 DIAGNOSIS — T83.39XA RETAINED INTRAUTERINE CONTRACEPTIVE DEVICE (IUD): ICD-10-CM

## 2023-11-16 DIAGNOSIS — J45.20 INTERMITTENT ASTHMA, UNCOMPLICATED: ICD-10-CM

## 2023-11-16 DIAGNOSIS — Z79.4 TYPE 2 DIABETES MELLITUS WITHOUT COMPLICATION, WITH LONG-TERM CURRENT USE OF INSULIN (H): ICD-10-CM

## 2023-11-16 DIAGNOSIS — Z01.818 PREOP GENERAL PHYSICAL EXAM: Primary | ICD-10-CM

## 2023-11-16 DIAGNOSIS — I10 ESSENTIAL HYPERTENSION: ICD-10-CM

## 2023-11-16 DIAGNOSIS — E11.9 TYPE 2 DIABETES MELLITUS WITHOUT COMPLICATION, WITH LONG-TERM CURRENT USE OF INSULIN (H): ICD-10-CM

## 2023-11-16 DIAGNOSIS — E11.42 DIABETIC POLYNEUROPATHY ASSOCIATED WITH TYPE 2 DIABETES MELLITUS (H): ICD-10-CM

## 2023-11-16 DIAGNOSIS — M19.91 PRIMARY OSTEOARTHRITIS, UNSPECIFIED SITE: ICD-10-CM

## 2023-11-16 DIAGNOSIS — Z12.4 CERVICAL CANCER SCREENING: ICD-10-CM

## 2023-11-16 DIAGNOSIS — G89.4 CHRONIC PAIN SYNDROME: ICD-10-CM

## 2023-11-16 LAB
ALBUMIN SERPL BCG-MCNC: 4.1 G/DL (ref 3.5–5.2)
ALP SERPL-CCNC: 95 U/L (ref 40–150)
ALT SERPL W P-5'-P-CCNC: 14 U/L (ref 0–50)
ANION GAP SERPL CALCULATED.3IONS-SCNC: 12 MMOL/L (ref 7–15)
AST SERPL W P-5'-P-CCNC: 14 U/L (ref 0–45)
BILIRUB SERPL-MCNC: 0.2 MG/DL
BUN SERPL-MCNC: 14.2 MG/DL (ref 6–20)
CALCIUM SERPL-MCNC: 9.5 MG/DL (ref 8.6–10)
CHLORIDE SERPL-SCNC: 104 MMOL/L (ref 98–107)
CHOLEST SERPL-MCNC: 175 MG/DL
CREAT SERPL-MCNC: 0.79 MG/DL (ref 0.51–0.95)
DEPRECATED HCO3 PLAS-SCNC: 25 MMOL/L (ref 22–29)
EGFRCR SERPLBLD CKD-EPI 2021: 87 ML/MIN/1.73M2
GLUCOSE SERPL-MCNC: 181 MG/DL (ref 70–99)
HBA1C MFR BLD: 7.9 % (ref 0–5.6)
HDLC SERPL-MCNC: 34 MG/DL
LDLC SERPL CALC-MCNC: 108 MG/DL
NONHDLC SERPL-MCNC: 141 MG/DL
POTASSIUM SERPL-SCNC: 4.4 MMOL/L (ref 3.4–5.3)
PROT SERPL-MCNC: 7 G/DL (ref 6.4–8.3)
SODIUM SERPL-SCNC: 141 MMOL/L (ref 135–145)
TRIGL SERPL-MCNC: 165 MG/DL
TSH SERPL DL<=0.005 MIU/L-ACNC: 1.86 UIU/ML (ref 0.3–4.2)

## 2023-11-16 PROCEDURE — 84443 ASSAY THYROID STIM HORMONE: CPT

## 2023-11-16 PROCEDURE — 99214 OFFICE O/P EST MOD 30 MIN: CPT | Performed by: PEDIATRICS

## 2023-11-16 PROCEDURE — 36415 COLL VENOUS BLD VENIPUNCTURE: CPT

## 2023-11-16 PROCEDURE — 80061 LIPID PANEL: CPT

## 2023-11-16 PROCEDURE — 83036 HEMOGLOBIN GLYCOSYLATED A1C: CPT

## 2023-11-16 PROCEDURE — 80053 COMPREHEN METABOLIC PANEL: CPT

## 2023-11-16 RX ORDER — OXYCODONE AND ACETAMINOPHEN 5; 325 MG/1; MG/1
1-2 TABLET ORAL EVERY 4 HOURS PRN
Qty: 90 TABLET | Refills: 0 | Status: SHIPPED | OUTPATIENT
Start: 2023-11-16 | End: 2024-04-25

## 2023-11-16 NOTE — PROGRESS NOTES
Answers submitted by the patient for this visit:  Patient Health Questionnaire (Submitted on 11/16/2023)  If you checked off any problems, how difficult have these problems made it for you to do your work, take care of things at home, or get along with other people?: Not difficult at all  PHQ9 TOTAL SCORE: 0  General Questionnaire (Submitted on 11/9/2023)  Chief Complaint: Chronic problems general questions HPI Form  What is the reason for your visit today? : pre op  How many servings of fruits and vegetables do you eat daily?: 2-3  On average, how many sweetened beverages do you drink each day (Examples: soda, juice, sweet tea, etc.  Do NOT count diet or artificially sweetened beverages)?: 0  How many minutes a day do you exercise enough to make your heart beat faster?: 10 to 19  How many days a week do you exercise enough to make your heart beat faster?: 3 or less  How many days per week do you miss taking your medication?: 0  [address]  [address] [place] [id number]  Phone: [phone]  Fax: [phone]  Primary Provider: Hermann Paulson  Pre-op Performing Provider: HERMANN PAULSON      PREOPERATIVE EVALUATION:  Today's date: 11/16/2023    Jung is a 58 year old, presenting for the following:  Pre-Op Exam        11/16/2023    10:19 AM   Additional Questions   Roomed by MIS   Accompanied by SEELF         11/16/2023    10:19 AM   Patient Reported Additional Medications   Patient reports taking the following new medications NO       Surgical Information:  Surgery/Procedure: IUD removal  Surgery Location: [facility]  Surgeon: Dr mckeon  Surgery Date: 11/21/23  Time of Surgery: 7.40 AM  Where patient plans to recover: At home with family  Fax number for surgical facility: [phone]    Assessment & Plan     The proposed surgical procedure is considered LOW risk.      ICD-10-CM    1. Preop general physical exam  Z01.818       2. Retained intrauterine  contraceptive device (IUD)  T83.39XA       3. Type 2 diabetes mellitus without complication, with long-term current use of insulin (H)  E11.9     Z79.4       4. Essential hypertension  I10       5. Intermittent asthma, uncomplicated  J45.20       6. Diabetic polyneuropathy associated with type 2 diabetes mellitus (H)  E11.42       7. Cervical cancer screening  Z12.4       8. Chronic pain syndrome  G89.4 oxyCODONE-acetaminophen (PERCOCET) 5-325 MG tablet      9. Primary osteoarthritis, unspecified site  M19.91 oxyCODONE-acetaminophen (PERCOCET) 5-325 MG tablet                   Risks and Recommendations:  The patient has the following additional risks and recommendations for perioperative complications:   - Morbid obesity (BMI >40)  Diabetes -   - on insulin therapy, NPO guidelines/meds reviewed  Social and Substance:    - High tolerance to opioid analgesics due to chronic use  Pulmonary  - well controlled asthma with no current evidence of exacerbation    Antiplatelet or Anticoagulation Medication Instructions:   - Patient is on no antiplatelet or anticoagulation medications.    Additional Medication Instructions:   - Diuretics: HOLD on the day of surgery.  -  ARB - will take day of surgery   - Long acting insulin (e.g. glargine, detemir): Take 50% of the usual evening or morning dose before surgery.    - metformin: HOLD day of surgery.   - pregabalin, gabapentin: Continue without modification.   - LABA, inhaled corticosteroid, long-acting anticholinergics: Continue without modification.    RECOMMENDATION:  APPROVAL GIVEN to proceed with proposed procedure, without further diagnostic evaluation.        Subjective       HPI related to upcoming procedure: retained IUD - unable to be removed in clinic, now with plans for hysteroscopy and IUD removal under anesthesia        11/9/2023    11:21 AM   Preop Questions   1. Have you ever had a heart attack or stroke? No   2. Have you ever had surgery on your heart or blood  vessels, such as a stent placement, a coronary artery bypass, or surgery on an artery in your head, neck, heart, or legs? No   3. Do you have chest pain with activity? No   4. Do you have a history of  heart failure? No   5. Do you currently have a cold, bronchitis or symptoms of other infection? No   6. Do you have a cough, shortness of breath, or wheezing? No   7. Do you or anyone in your family have previous history of blood clots? No   8. Do you or does anyone in your family have a serious bleeding problem such as prolonged bleeding following surgeries or cuts? UNKNOWN -    9. Have you ever had problems with anemia or been told to take iron pills? No   10. Have you had any abnormal blood loss such as black, tarry or bloody stools, or abnormal vaginal bleeding? No   11. Have you ever had a blood transfusion? No   12. Are you willing to have a blood transfusion if it is medically needed before, during, or after your surgery? Yes   13. Have you or any of your relatives ever had problems with anesthesia? No   14. Do you have sleep apnea, excessive snoring or daytime drowsiness? No   15. Do you have any artifical heart valves or other implanted medical devices like a pacemaker, defibrillator, or continuous glucose monitor? No   16. Do you have artificial joints? YES - bilateral knees   17. Are you allergic to latex? YES:    18. Is there any chance that you may be pregnant? No       Health Care Directive:  Patient has a Health Care Directive on file      Preoperative Review of :   reviewed - controlled substances reflected in medication list.    ASTHMA - Patient has a longstanding history of moderate Asthma . Patient has been doing well overall noting NO SYMPTOMS and continues on medication regimen consisting of Dulera as needed without adverse reactions or side effects.      DIABETES - Patient has a longstanding history of DiabetesType Type II . Patient is being treated with oral agents and insulin injections  and denies significant side effects. Control has been fair - much improved today to 7.9%.  Complicating factors include but are not limited to: hypertension, hyperlipidemia and morbid obesity .      HYPERTENSION - Patient has longstanding history of HTN , currently denies any symptoms referable to elevated blood pressure. Specifically denies chest pain, palpitations, dyspnea, orthopnea, PND or peripheral edema. Blood pressure readings have been in normal range. Current medication regimen is as listed below. Patient denies any side effects of medication.      Review of Systems  Constitutional, neuro, ENT, endocrine, pulmonary, cardiac, gastrointestinal, genitourinary, musculoskeletal, integument and psychiatric systems are negative, except as otherwise noted.    Patient Active Problem List    Diagnosis Date Noted    Chronic pain syndrome 11/23/2016     Priority: High     Patient is followed by Linda Durant MD for ongoing prescription of pain medication.  All refills should be approved by this provider, or covering partner.    Medication(s): percocet 5/325.   Maximum quantity per month: 30 - gets 90 per 3 month supply  Clinic visit frequency required: Q 6  months     Controlled substance agreement:  Encounter-Level CSA - 12/28/2016:    Controlled Substance Agreement - Scan on 1/6/2017  7:45 AM: CONTROLLED SUBSTANCE AGREEMENT       Patient-Level CSA:    Controlled Substance Agreement - Opioid - Scan on 5/17/2021 11:24 AM  Controlled Substance Agreement - Opioid - Scan on 4/15/2019 12:34 PM: CONTROLLED SUBSTANCE AGREEMENT - OPIOID     Pain Clinic evaluation in the past: No    DIRE Total Score(s):  No flowsheet data found.    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 As pt lives in [place] location with recurrent exposure to venomous insects and positive IgE to honeybee, wasp and yellow jacket, she wants to pursue allergen desensitization.     P  - History is consistent with venom hypersensitivity. Reviewed with patient allergy immunotherapy for venom is % effective in reducing the chance of death from insect stings. Pt is instructed to carry two EpiPens when outdoors, particularly at the end of summer when stings are more common.  If stung, Pt is instructed to use epinephrine immediately, as the early signs of a mild reaction are the same as the early signs of a severe reaction.  Reviewed with patient 30% of anaphylactic reactions require a second dose of epinephrine.  Also reviewed with patient previous mild reactions do not preclude future reactions to be more serious and life-threatening.  Reviewed common nesting locations and avoidance behaviors.  Written information provided.  -Rx for AuviQ sent to pharmacy due to patient's preference for slimmer profile of autoinjector.      Intermittent asthma, uncomplicated 12/28/2016     Priority: Medium    Latex allergy status 10/03/2016     Priority: Medium    Anxiety 05/10/2016     Priority: Medium    Major depressive disorder, recurrent episode, mild (H24) 01/05/2016     Priority: Medium     Typically seasonal     celexa- low libido  wellbutrin- didn't work   paxil- didn't work       Essential hypertension 10/04/2015     Priority: Medium    S/P total knee arthroplasty 07/15/2014     Priority: Medium    Morbid obesity with BMI of 40.0-44.9, adult (H) 09/18/2012     Priority:  Medium    Allergic rhinitis 03/25/2003     Priority: Medium     Problem list name updated by automated process. Provider to review      Bilateral low back pain without sciatica 10/25/2015     Priority: Low    Vitamin D deficiency disease 03/06/2013     Priority: Low    Long term current use of insulin (H) 09/18/2012     Priority: Low    Esophageal reflux 03/25/2003     Priority: Low      Past Medical History:   Diagnosis Date    Actinic keratosis     Allergic rhinitis, cause unspecified     Anemia     Arthritis     Asthma     no meds x2 yrs    chronic back pain     Chronic infection     MRSA-nasal    Chronic pain     back    Esophageal reflux     fibrocystic breast changes     Gastro-oesophageal reflux disease     Heart murmur     mitral insuffiency    Hematoma - postoperative 11/23/2012     Problem list name updated by automated process. Provider to review and confirm    Hx of Fen-Phen use     Hypertension     migraines     Mild intermittent asthma     rare, with dog or exercise    Moderate major depression (H) 03/28/2012    MRSA (methicillin resistant Staphylococcus aureus) 06/27/2014    Nares    Need for desensitization to allergens     Obesity, unspecified     Other and unspecified hyperlipidemia     Temporomandibular joint disorders, unspecified     Thrombosis of leg     incisional area right hip    Type II or unspecified type diabetes mellitus without mention of complication, not stated as uncontrolled      Past Surgical History:   Procedure Laterality Date    ARTHROPLASTY KNEE  07/15/2014    Procedure: ARTHROPLASTY KNEE;  Surgeon: Chapin Paul MD;  Location: [facility] OR    BACK SURGERY      BIOPSY  2008 2012    CHOLECYSTECTOMY      COLONOSCOPY N/A 04/26/2017    Procedure: COLONOSCOPY;  COLONOSCOPY;  Surgeon: Chapin Leal MD;  Location:  GI    COSMETIC SURGERY  2012    ESOPHAGOSCOPY, GASTROSCOPY, DUODENOSCOPY (EGD), COMBINED N/A 06/29/2020    Procedure: ESOPHAGOGASTRODUODENOSCOPY (EGD);  Surgeon:  Devon Jacinto MD;  Location: [facility] GI    EXCISE LESION LOWER EXTREMITY  11/20/2012    Procedure: EXCISE LESION LOWER EXTREMITY;  EXCISION LIPOMA RIGHT HIP ;  Surgeon: Jazmin Bautista MD;  Location:  [facility]    EXCISE LESION LOWER EXTREMITY  11/23/2012    Procedure: EXCISE LESION LOWER EXTREMITY;  EXCISE LESION LOWER EXTREMITY  EVACUATE RIGHT HIP HEMATOMA;  Surgeon: Jazmin Bautista MD;  Location: [facility] OR    EXCISE MASS HIP Left 11/09/2018    Procedure: 1.  Excision left chest wall mass with excised diameter of greater than 4 cm 2.  Excision left hip mass with excised diameter of more than 20 x 20 cm  ;  Surgeon: Jazmin Bautista MD;  Location: [facility] OR    EXCISE MASS TRUNK Left 11/09/2018    Procedure: EXCISE MASS TRUNK;  Surgeon: Jazmin Bautista MD;  Location: [facility] OR    EXCISE MASS TRUNK N/A 12/16/2019    Procedure: REVISION LEFT HIP SCAR WITH SEROMA EVACUATION; REMOVAL LEFT CHEST WALL MASS   (MRSA);  Surgeon: Jazmin Bautista MD;  Location:  OR    GYN SURGERY      HC EXPLORATION ANKLE JOINT  01/2006    HYSTEROSCOPY DIAGNOSTIC N/A 12/16/2019    Procedure: DIAGNOSTIC HYSTEROSCOPY;  Surgeon: Jodi Perea MD;  Location:  OR    IRRIGATION AND DEBRIDEMENT HIP, COMBINED  12/15/2012    Procedure: COMBINED IRRIGATION AND DEBRIDEMENT HIP;   evacuation hematoma, scar revision right hip;  Surgeon: Jazmin Bautista MD;  Location:  OR    REPLACE INTRAUTERINE DEVICE N/A 12/16/2019    Procedure: REPLACEMENTOF MIRENA CONTRACEPTIVE INTRAUTERINE DEVICE;  Surgeon: Jodi Perea MD;  Location:  OR    wisdom teeth[      ZZC NONSPECIFIC PROCEDURE      laparoscopy x6    ZZC NONSPECIFIC PROCEDURE  1993    laparotomy x2 ovarian cyst    ZZC NONSPECIFIC PROCEDURE  02/1993    oophorectomy lt side - cyst    ZZC NONSPECIFIC PROCEDURE  03/1995    laminectomy L4-5. S1 herniated disc    ZZC NONSPECIFIC PROCEDURE  1996    cholecystectomy    ZZC NONSPECIFIC PROCEDURE      ganglion cysts  l wrist, rt palm    [facility] NONSPECIFIC PROCEDURE      cyst removal back x2    [facility] NONSPECIFIC PROCEDURE  10/2002    rt thumb fusion, ganglion cyst removal    [facility] NONSPECIFIC PROCEDURE  01/2008    remove heel spur, excise exostosis    [facility] PART EXCIS PLANTAR FASCIA  12/2006     Current Outpatient Medications   Medication Sig Dispense Refill    aspirin 81 MG tablet Take 1 tablet (81 mg) by mouth daily 30 tablet     blood glucose (ACCU-CHEK GABI PLUS) test strip TEST ONE TO TWO TIMES DAILY OR AS DIRECTED 200 strip 0    blood glucose monitoring (ACCU-CHEK MULTICLIX) lancets Use to test blood sugar 1-2 times daily or as directed. 2 Box 3    celecoxib (CELEBREX) 200 MG capsule TAKE 1 CAPSULE DAILY 90 capsule 2    COMPOUNDED NON-CONTROLLED SUBSTANCE (CMPD RX) - PHARMACY TO MIX COMPOUNDED MEDICATION Apply 1 gm to feet as needed for pain. 30 g 1    Continuous Blood Gluc  (DEXCOM G6 ) BRANT Use to read blood sugars as per 's instructions. 1 each 0    Continuous Blood Gluc Sensor (DEXCOM G6 SENSOR) MISC Change every 10 days. 3 each 11    Continuous Blood Gluc Transmit (DEXCOM G6 TRANSMITTER) MISC Change every 3 months. 1 each 3    insulin lispro (HUMALOG KWIKPEN) 100 UNIT/ML (1 unit dial) KWIKPEN INJECT 8 UNITS SUB-Q THREE TIMES A DAY BEFORE MEALS 15 mL 4    insulin pen needle (BD PEN NEEDLE VALDO 2ND GEN) 32G X 4 MM miscellaneous USE TWO PEN NEEDLES DAILY OR AS DIRECTED. 200 each 3    LANTUS SOLOSTAR 100 UNIT/ML soln INJECT 48 UNITS SUB-Q DAILY (Patient taking differently: Inject 48 Units Subcutaneous daily Takes 38 units in the AM, 10 units at night) 45 mL 11    losartan (COZAAR) 100 MG tablet TAKE ONE TABLET BY MOUTH ALONG WITH ONE HYDROCHLOROTHIAZIDE 25MG TABLET ONCE DAILY 90 tablet 3    metFORMIN (GLUCOPHAGE) 1000 MG tablet TAKE ONE TABLET BY MOUTH TWICE A DAY WITH MEALS 180 tablet 3    oxyCODONE-acetaminophen (PERCOCET) 5-325 MG tablet Take 1-2 tablets by mouth every 4 hours as needed for severe  "pain 90 tablet 0    triamterene-HCTZ (MAXZIDE-25) 37.5-25 MG tablet Take 1 tablet by mouth daily 90 tablet 3    Vitamin D3 (CHOLECALCIFEROL) 125 MCG (5000 UT) tablet Take by mouth daily      hydrochlorothiazide (HYDRODIURIL) 25 MG tablet TAKE ONE TABLET BY MOUTH EVERY DAY; TAKE ALONG WITH LOSARTAN 100MG TABLET (Patient not taking: Reported on 11/16/2023) 90 tablet 3    LANsoprazole (PREVACID) 30 MG DR capsule Take 1 capsule (30 mg) by mouth daily (Patient not taking: Reported on 11/16/2023) 90 capsule 1    mometasone-formoterol (DULERA) 100-5 MCG/ACT inhaler Inhale 2 puffs into the lungs 2 times daily (Patient not taking: Reported on 11/16/2023) 13 g 11    STATIN NOT PRESCRIBED (INTENTIONAL) Please choose reason not prescribed from choices below. (Patient not taking: Reported on 11/16/2023)      triamcinolone (KENALOG) 0.5 % external ointment Topically twice daily as needed for 14 days (Patient not taking: Reported on 11/16/2023) 60 g 11       Allergies   Allergen Reactions    Eggs      Allergy found in testing    Hornets      wasps    Latex Anaphylaxis     hives  -  able to use BP cuff    Lipitor [Statins] Cramps     Muscle cramps with statin    Metoclopramide Hcl Difficulty breathing    Neurontin [Gabapentin] Hives        Social History     Tobacco Use    Smoking status: Never    Smokeless tobacco: Never   Substance Use Topics    Alcohol use: Not Currently     Comment: 1 drink per year or less       History   Drug Use No         Objective     /69 (BP Location: Right arm, Patient Position: Sitting, Cuff Size: Adult Regular)   Pulse 72   Temp 97.3  F (36.3  C) (Tympanic)   Ht 1.676 m (5' 6\")   Wt 130.8 kg (288 lb 6.4 oz)   SpO2 97%   BMI 46.55 kg/m      Physical Exam    GENERAL APPEARANCE: healthy, alert and no distress     EYES: EOMI, PERRL     HENT: ear canals and TM's normal and nose and mouth without ulcers or lesions     NECK: no adenopathy, no asymmetry, masses, or scars and thyroid normal to " palpation     RESP: lungs clear to auscultation - no rales, rhonchi or wheezes     CV: regular rates and rhythm, normal S1 S2, no S3 or S4 and no murmur, click or rub     ABDOMEN:  soft, nontender, no HSM or masses and bowel sounds normal     MS: extremities normal- no gross deformities noted, no evidence of inflammation in joints, FROM in all extremities.     SKIN: no suspicious lesions or rashes     NEURO: Normal strength and tone, sensory exam grossly normal, mentation intact and speech normal     PSYCH: mentation appears normal. and affect normal/bright     LYMPHATICS: No cervical adenopathy    Recent Labs   Lab Test 11/16/23  0935 08/15/23  0951 06/22/23  1454 05/16/23  1038 05/09/22  0857 01/07/22  1430   HGB  --   --   --  13.8  --  15.6   PLT  --   --   --  311  --  312   NA  --  140 138 138   < > 137   POTASSIUM  --  4.3 3.8 4.2   < > 3.7   CR  --  0.77 0.87 0.58   < > 0.67   A1C 7.9* 9.5* 9.8*  --    < >  --     < > = values in this interval not displayed.        Diagnostics:  Labs pending at this time.  Results will be reviewed when available.   No EKG required for low risk surgery (cataract, skin procedure, breast biopsy, etc).    Revised Cardiac Risk Index (RCRI):  The patient has the following serious cardiovascular risks for perioperative complications:   - Diabetes Mellitus (on Insulin) = 1 point     RCRI Interpretation: 1 point: Class II (low risk - 0.9% complication rate)         Signed Electronically by: Linda Durant MD  Copy of this evaluation report is provided to requesting physician.

## 2023-11-21 ENCOUNTER — HOSPITAL ENCOUNTER (OUTPATIENT)
Facility: CLINIC | Age: 58
Discharge: HOME | End: 2023-11-21
Attending: OBSTETRICS & GYNECOLOGY | Admitting: OBSTETRICS & GYNECOLOGY
Payer: COMMERCIAL

## 2023-11-21 ENCOUNTER — ANESTHESIA EVENT (OUTPATIENT)
Dept: SURGERY | Facility: CLINIC | Age: 58
End: 2023-11-21
Payer: COMMERCIAL

## 2023-11-21 ENCOUNTER — ANESTHESIA (OUTPATIENT)
Dept: SURGERY | Facility: CLINIC | Age: 58
End: 2023-11-21
Payer: COMMERCIAL

## 2023-11-21 VITALS
OXYGEN SATURATION: 97 % | SYSTOLIC BLOOD PRESSURE: 141 MMHG | DIASTOLIC BLOOD PRESSURE: 85 MMHG | BODY MASS INDEX: 46.66 KG/M2 | TEMPERATURE: 97.1 F | RESPIRATION RATE: 12 BRPM | WEIGHT: 289.1 LBS | HEART RATE: 79 BPM

## 2023-11-21 DIAGNOSIS — T83.39XA RETAINED INTRAUTERINE CONTRACEPTIVE DEVICE (IUD): Primary | ICD-10-CM

## 2023-11-21 LAB
GLUCOSE BLDC GLUCOMTR-MCNC: 229 MG/DL (ref 70–99)
GLUCOSE BLDC GLUCOMTR-MCNC: 237 MG/DL (ref 70–99)
HCG UR QL: NEGATIVE

## 2023-11-21 PROCEDURE — 272N000001 HC OR GENERAL SUPPLY STERILE: Performed by: OBSTETRICS & GYNECOLOGY

## 2023-11-21 PROCEDURE — 58562 HYSTEROSCOPY REMOVE FB: CPT | Performed by: OBSTETRICS & GYNECOLOGY

## 2023-11-21 PROCEDURE — 272N000002 HC OR SUPPLY OTHER OPNP: Performed by: OBSTETRICS & GYNECOLOGY

## 2023-11-21 PROCEDURE — 258N000003 HC RX IP 258 OP 636: Performed by: ANESTHESIOLOGY

## 2023-11-21 PROCEDURE — 82962 GLUCOSE BLOOD TEST: CPT

## 2023-11-21 PROCEDURE — 250N000025 HC SEVOFLURANE, PER MIN: Performed by: OBSTETRICS & GYNECOLOGY

## 2023-11-21 PROCEDURE — 710N000012 HC RECOVERY PHASE 2, PER MINUTE: Performed by: OBSTETRICS & GYNECOLOGY

## 2023-11-21 PROCEDURE — 999N000141 HC STATISTIC PRE-PROCEDURE NURSING ASSESSMENT: Performed by: OBSTETRICS & GYNECOLOGY

## 2023-11-21 PROCEDURE — 250N000009 HC RX 250: Performed by: NURSE ANESTHETIST, CERTIFIED REGISTERED

## 2023-11-21 PROCEDURE — 250N000011 HC RX IP 250 OP 636: Performed by: OBSTETRICS & GYNECOLOGY

## 2023-11-21 PROCEDURE — 250N000013 HC RX MED GY IP 250 OP 250 PS 637: Performed by: OBSTETRICS & GYNECOLOGY

## 2023-11-21 PROCEDURE — 360N000076 HC SURGERY LEVEL 3, PER MIN: Performed by: OBSTETRICS & GYNECOLOGY

## 2023-11-21 PROCEDURE — 250N000011 HC RX IP 250 OP 636: Mod: JZ | Performed by: NURSE ANESTHETIST, CERTIFIED REGISTERED

## 2023-11-21 PROCEDURE — 81025 URINE PREGNANCY TEST: CPT | Performed by: ANESTHESIOLOGY

## 2023-11-21 PROCEDURE — 370N000017 HC ANESTHESIA TECHNICAL FEE, PER MIN: Performed by: OBSTETRICS & GYNECOLOGY

## 2023-11-21 PROCEDURE — G0145 SCR C/V CYTO,THINLAYER,RESCR: HCPCS | Performed by: OBSTETRICS & GYNECOLOGY

## 2023-11-21 PROCEDURE — 710N000009 HC RECOVERY PHASE 1, LEVEL 1, PER MIN: Performed by: OBSTETRICS & GYNECOLOGY

## 2023-11-21 RX ORDER — DEXAMETHASONE SODIUM PHOSPHATE 4 MG/ML
INJECTION, SOLUTION INTRA-ARTICULAR; INTRALESIONAL; INTRAMUSCULAR; INTRAVENOUS; SOFT TISSUE PRN
Status: DISCONTINUED | OUTPATIENT
Start: 2023-11-21 | End: 2023-11-21

## 2023-11-21 RX ORDER — ONDANSETRON 4 MG/1
4 TABLET, ORALLY DISINTEGRATING ORAL EVERY 30 MIN PRN
Status: DISCONTINUED | OUTPATIENT
Start: 2023-11-21 | End: 2023-11-21 | Stop reason: HOSPADM

## 2023-11-21 RX ORDER — GLYCOPYRROLATE 0.2 MG/ML
INJECTION, SOLUTION INTRAMUSCULAR; INTRAVENOUS PRN
Status: DISCONTINUED | OUTPATIENT
Start: 2023-11-21 | End: 2023-11-21

## 2023-11-21 RX ORDER — HYDROMORPHONE HCL IN WATER/PF 6 MG/30 ML
0.2 PATIENT CONTROLLED ANALGESIA SYRINGE INTRAVENOUS EVERY 5 MIN PRN
Status: DISCONTINUED | OUTPATIENT
Start: 2023-11-21 | End: 2023-11-21 | Stop reason: HOSPADM

## 2023-11-21 RX ORDER — FENTANYL CITRATE 50 UG/ML
25 INJECTION, SOLUTION INTRAMUSCULAR; INTRAVENOUS EVERY 5 MIN PRN
Status: DISCONTINUED | OUTPATIENT
Start: 2023-11-21 | End: 2023-11-21 | Stop reason: HOSPADM

## 2023-11-21 RX ORDER — CEFAZOLIN SODIUM/WATER 3 G/30 ML
3 SYRINGE (ML) INTRAVENOUS
Status: COMPLETED | OUTPATIENT
Start: 2023-11-21 | End: 2023-11-21

## 2023-11-21 RX ORDER — CEFAZOLIN SODIUM/WATER 3 G/30 ML
3 SYRINGE (ML) INTRAVENOUS SEE ADMIN INSTRUCTIONS
Status: DISCONTINUED | OUTPATIENT
Start: 2023-11-21 | End: 2023-11-21 | Stop reason: HOSPADM

## 2023-11-21 RX ORDER — ACETAMINOPHEN 325 MG/1
975 TABLET ORAL ONCE
Status: COMPLETED | OUTPATIENT
Start: 2023-11-21 | End: 2023-11-21

## 2023-11-21 RX ORDER — LIDOCAINE 40 MG/G
CREAM TOPICAL
Status: DISCONTINUED | OUTPATIENT
Start: 2023-11-21 | End: 2023-11-21 | Stop reason: HOSPADM

## 2023-11-21 RX ORDER — LIDOCAINE HYDROCHLORIDE 20 MG/ML
INJECTION, SOLUTION INFILTRATION; PERINEURAL PRN
Status: DISCONTINUED | OUTPATIENT
Start: 2023-11-21 | End: 2023-11-21

## 2023-11-21 RX ORDER — ONDANSETRON 2 MG/ML
4 INJECTION INTRAMUSCULAR; INTRAVENOUS EVERY 30 MIN PRN
Status: DISCONTINUED | OUTPATIENT
Start: 2023-11-21 | End: 2023-11-21 | Stop reason: HOSPADM

## 2023-11-21 RX ORDER — SODIUM CHLORIDE, SODIUM LACTATE, POTASSIUM CHLORIDE, CALCIUM CHLORIDE 600; 310; 30; 20 MG/100ML; MG/100ML; MG/100ML; MG/100ML
INJECTION, SOLUTION INTRAVENOUS CONTINUOUS
Status: DISCONTINUED | OUTPATIENT
Start: 2023-11-21 | End: 2023-11-21 | Stop reason: HOSPADM

## 2023-11-21 RX ORDER — HYDROMORPHONE HCL IN WATER/PF 6 MG/30 ML
0.4 PATIENT CONTROLLED ANALGESIA SYRINGE INTRAVENOUS EVERY 5 MIN PRN
Status: DISCONTINUED | OUTPATIENT
Start: 2023-11-21 | End: 2023-11-21 | Stop reason: HOSPADM

## 2023-11-21 RX ORDER — OXYCODONE HYDROCHLORIDE 5 MG/1
5 TABLET ORAL
Status: DISCONTINUED | OUTPATIENT
Start: 2023-11-21 | End: 2023-11-21 | Stop reason: HOSPADM

## 2023-11-21 RX ORDER — ONDANSETRON 2 MG/ML
INJECTION INTRAMUSCULAR; INTRAVENOUS PRN
Status: DISCONTINUED | OUTPATIENT
Start: 2023-11-21 | End: 2023-11-21

## 2023-11-21 RX ORDER — FENTANYL CITRATE 50 UG/ML
50 INJECTION, SOLUTION INTRAMUSCULAR; INTRAVENOUS EVERY 5 MIN PRN
Status: DISCONTINUED | OUTPATIENT
Start: 2023-11-21 | End: 2023-11-21 | Stop reason: HOSPADM

## 2023-11-21 RX ORDER — FENTANYL CITRATE 50 UG/ML
INJECTION, SOLUTION INTRAMUSCULAR; INTRAVENOUS PRN
Status: DISCONTINUED | OUTPATIENT
Start: 2023-11-21 | End: 2023-11-21

## 2023-11-21 RX ORDER — OXYCODONE HYDROCHLORIDE 5 MG/1
10 TABLET ORAL
Status: DISCONTINUED | OUTPATIENT
Start: 2023-11-21 | End: 2023-11-21 | Stop reason: HOSPADM

## 2023-11-21 RX ORDER — PROPOFOL 10 MG/ML
INJECTION, EMULSION INTRAVENOUS PRN
Status: DISCONTINUED | OUTPATIENT
Start: 2023-11-21 | End: 2023-11-21

## 2023-11-21 RX ORDER — ACETAMINOPHEN 325 MG/1
975 TABLET ORAL ONCE
Status: DISCONTINUED | OUTPATIENT
Start: 2023-11-21 | End: 2023-11-21 | Stop reason: HOSPADM

## 2023-11-21 RX ORDER — IBUPROFEN 800 MG/1
800 TABLET, FILM COATED ORAL ONCE
Status: DISCONTINUED | OUTPATIENT
Start: 2023-11-21 | End: 2023-11-21 | Stop reason: HOSPADM

## 2023-11-21 RX ADMIN — SODIUM CHLORIDE, POTASSIUM CHLORIDE, SODIUM LACTATE AND CALCIUM CHLORIDE: 600; 310; 30; 20 INJECTION, SOLUTION INTRAVENOUS at 07:11

## 2023-11-21 RX ADMIN — ONDANSETRON 4 MG: 2 INJECTION INTRAMUSCULAR; INTRAVENOUS at 07:52

## 2023-11-21 RX ADMIN — DEXAMETHASONE SODIUM PHOSPHATE 8 MG: 4 INJECTION, SOLUTION INTRA-ARTICULAR; INTRALESIONAL; INTRAMUSCULAR; INTRAVENOUS; SOFT TISSUE at 07:49

## 2023-11-21 RX ADMIN — PROPOFOL 200 MG: 10 INJECTION, EMULSION INTRAVENOUS at 07:49

## 2023-11-21 RX ADMIN — GLYCOPYRROLATE 0.2 MG: 0.2 INJECTION, SOLUTION INTRAMUSCULAR; INTRAVENOUS at 07:49

## 2023-11-21 RX ADMIN — Medication 3 G: at 07:40

## 2023-11-21 RX ADMIN — ACETAMINOPHEN 975 MG: 325 TABLET, FILM COATED ORAL at 07:18

## 2023-11-21 RX ADMIN — MIDAZOLAM 2 MG: 1 INJECTION INTRAMUSCULAR; INTRAVENOUS at 07:39

## 2023-11-21 RX ADMIN — FENTANYL CITRATE 100 MCG: 50 INJECTION INTRAMUSCULAR; INTRAVENOUS at 07:49

## 2023-11-21 RX ADMIN — LIDOCAINE HYDROCHLORIDE 30 MG: 20 INJECTION, SOLUTION INFILTRATION; PERINEURAL at 07:49

## 2023-11-21 ASSESSMENT — ACTIVITIES OF DAILY LIVING (ADL)
ADLS_ACUITY_SCORE: 37
ADLS_ACUITY_SCORE: 37

## 2023-11-21 NOTE — ANESTHESIA PROCEDURE NOTES
Airway       Patient location during procedure: OR  Staff -        CRNA: Mirna Limon APRN CRNA       Performed By: CRNA  Consent for Airway        Urgency: elective  Indications and Patient Condition       Indications for airway management: arthur-procedural       Induction type:intravenous       Mask difficulty assessment: 1 - vent by mask    Final Airway Details       Final airway type: supraglottic airway    Supraglottic Airway Details        Type: LMA       Brand: I-Gel       LMA size: 4    Post intubation assessment        Placement verified by: capnometry, equal breath sounds and chest rise        Number of attempts at approach: 1       Number of other approaches attempted: 0       Secured with: commercial tube peterson       Ease of procedure: easy       Dentition: Unchanged and Intact

## 2023-11-21 NOTE — DISCHARGE INSTRUCTIONS
GENERAL ANESTHESIA OR SEDATION ADULT DISCHARGE INSTRUCTIONS   SPECIAL PRECAUTIONS FOR 24 HOURS AFTER SURGERY    IT IS NOT UNUSUAL TO FEEL LIGHT-HEADED OR FAINT, UP TO 24 HOURS AFTER SURGERY OR WHILE TAKING PAIN MEDICATION.  IF YOU HAVE THESE SYMPTOMS; SIT FOR A FEW MINUTES BEFORE STANDING AND HAVE SOMEONE ASSIST YOU WHEN YOU GET UP TO WALK OR USE THE BATHROOM.    YOU SHOULD REST AND RELAX FOR THE NEXT 24 HOURS AND YOU MUST MAKE ARRANGEMENTS TO HAVE SOMEONE STAY WITH YOU FOR AT LEAST 24 HOURS AFTER YOUR DISCHARGE.  AVOID HAZARDOUS AND STRENUOUS ACTIVITIES.  DO NOT MAKE IMPORTANT DECISIONS FOR 24 HOURS.    DO NOT DRIVE ANY VEHICLE OR OPERATE MECHANICAL EQUIPMENT FOR 24 HOURS FOLLOWING THE END OF YOUR SURGERY.  EVEN THOUGH YOU MAY FEEL NORMAL, YOUR REACTIONS MAY BE AFFECTED BY THE MEDICATION YOU HAVE RECEIVED.    DO NOT DRINK ALCOHOLIC BEVERAGES FOR 24 HOURS FOLLOWING YOUR SURGERY.    DRINK CLEAR LIQUIDS (APPLE JUICE, GINGER ALE, 7-UP, BROTH, ETC.).  PROGRESS TO YOUR REGULAR DIET AS YOU FEEL ABLE.    YOU MAY HAVE A DRY MOUTH, A SORE THROAT, MUSCLES ACHES OR TROUBLE SLEEPING.  THESE SHOULD GO AWAY AFTER 24 HOURS.    CALL YOUR DOCTOR FOR ANY OF THE FOLLOWING:  SIGNS OF INFECTION (FEVER, GROWING TENDERNESS AT THE SURGERY SITE, A LARGE AMOUNT OF DRAINAGE OR BLEEDING, SEVERE PAIN, FOUL-SMELLING DRAINAGE, REDNESS OR SWELLING.    IT HAS BEEN OVER 8 TO 10 HOURS SINCE SURGERY AND YOU ARE STILL NOT ABLE TO URINATE (PASS WATER).       DR. STEPHANIE DAS M.D.   CLINIC PHONE NUMBER:  [phone].  [facility]

## 2023-11-21 NOTE — BRIEF OP NOTE
[facility] Brief Operative Note    Pre-operative diagnosis: Retained intrauterine contraceptive device (IUD) [T83.39XA]   Post-operative diagnosis retained IUD     Procedure: Procedure(s):  hysteroscopy with removal of Intrauterine Device, PAP SMEAR   Surgeon(s): Surgeon(s) and Role:     * Andrea Davis MD - Primary   Estimated blood loss: 10 mL    Specimens: ID Type Source Tests Collected by Time Destination   1 : PAP SMEAR Brushing Cervix GYNECOLOGIC CYTOLOGY Andrea Davis MD 11/21/2023  7:55 AM       Findings: Mirena IUD retained within uterine cavity.  Following cervical dilation the IUD was removed with polyp forceps.  Hysteroscopy suggested the right wing was partially embedded in the myometrium.  No myometrial bleeding post removal

## 2023-11-21 NOTE — ANESTHESIA POSTPROCEDURE EVALUATION
Patient: Johnna Caballero    Procedure: Procedure(s):  hysteroscopy with removal of Intrauterine Device, PAP SMEAR       Anesthesia Type:  General    Note:  Disposition: Outpatient   Postop Pain Control: Uneventful            Sign Out: Well controlled pain   PONV: No   Neuro/Psych: Uneventful            Sign Out: Acceptable/Baseline neuro status   Airway/Respiratory: Uneventful            Sign Out: Acceptable/Baseline resp. status   CV/Hemodynamics: Uneventful            Sign Out: Acceptable CV status; No obvious hypovolemia; No obvious fluid overload   Other NRE: NONE   DID A NON-ROUTINE EVENT OCCUR? No           Last vitals:  Vitals Value Taken Time   /78 11/21/23 0855   Temp 97.1  F (36.2  C) 11/21/23 0855   Pulse 68 11/21/23 0855   Resp 12 11/21/23 0855   SpO2 94 % 11/21/23 0855       Electronically Signed By: Quinten Dominguez MD  November 21, 2023  11:23 AM

## 2023-11-21 NOTE — PROGRESS NOTES
Blood sugar 229. Dr mtaos notified and instructed to tell pt to continue usual regimen and check blood sugars at home.

## 2023-11-21 NOTE — ANESTHESIA CARE TRANSFER NOTE
Patient: Johnna Caballero    Procedure: Procedure(s):  hysteroscopy with removal of Intrauterine Device, PAP SMEAR       Diagnosis: Retained intrauterine contraceptive device (IUD) [T83.39XA]  Diagnosis Additional Information: No value filed.    Anesthesia Type:   General     Note:    Oropharynx: spontaneously breathing and oropharynx clear of all foreign objects  Level of Consciousness: awake  Oxygen Supplementation: face mask  Level of Supplemental Oxygen (L/min / FiO2): 6  Independent Airway: airway patency satisfactory and stable  Dentition: dentition unchanged  Vital Signs Stable: post-procedure vital signs reviewed and stable  Report to RN Given: handoff report given  Patient transferred to: PACU    Handoff Report: Identifed the Patient, Identified the Reponsible Provider, Reviewed the pertinent medical history, Discussed the surgical course, Reviewed Intra-OP anesthesia mangement and issues during anesthesia, Set expectations for post-procedure period and Allowed opportunity for questions and acknowledgement of understanding      Vitals:  Vitals Value Taken Time   /72 11/21/23 0825   Temp 96.7  F (35.9  C) 11/21/23 0820   Pulse 70 11/21/23 0827   Resp 21 11/21/23 0827   SpO2 100 % 11/21/23 0827   Vitals shown include unfiled device data.    Electronically Signed By: LUISANA Hicks CRNA  November 21, 2023  8:29 AM

## 2023-11-21 NOTE — OP NOTE
Operative note    Preoperative diagnosis: Retained Mirena IUD    Postoperative diagnosis: Same    Procedure: Cervical dilation, hysteroscopy and removal of Mirena IUD    Surgeon: Susan    Anesthesia: General    Indications: Quan Caballero is a 58-year-old nulliparous female with a retained Mirena IUD.  She had presented for removal and an outpatient setting and the IUD was unable to be removed despite the strings visible.  The IUD has been placed 7 years previously under anesthesia at [facility].  We discussed the need for removal in a surgical setting with the assumption that there was some degree of embedment of the IUD.    Findings: The cervix had a nulliparous appearance and a Pap smear was obtained.  Bimanual examination revealed a small midline mobile uterus.  The IUD strings were not initially visible.  Following cervical dilation I was able to grasped the IUD with a polyp forceps and remove it without difficulty.  Hysteroscopy suggested there had been some partial embedment in the right wing into the myometrium.  There is minimal myometrial bleeding following the removal.  The IUD was removed intact.    Operative procedure: Patient was placed in a dorsal lithotomy position following initiation of general anesthesia.  A surgical timeout was conducted.  A speculum was inserted and the cervix visualized.  A Pap smear was obtained.    Patient was then prepped and draped in usual sterile fashion.    The bladder was catheterized for 50 cc of clear urine.  A speculum was inserted and the cervix visualized.  The anterior lip of the cervix was grasped with a single-tooth tenaculum.  A series of dilators up to 6 mm were then passed sequentially through the cervix.  I attempted to insert the operative hysteroscope at this point but had difficulty passing I believe due to the presence of the IUD within the cavity and this nulliparous postmenopausal patient.    I then grasped the IUD with a polyp forceps and removed without  difficulty.  There was a bit of resistance with removal but not remarkably so.  The hysteroscope was then advanced into the uterine cavity and the uterine cavity visualized.  It appeared normal.  There was no bleeding.  Was a suggestion based on some irregularity of the endometrium that there had been in some and embedment in the right fundal myometrium.    The hysteroscope was then removed the tenaculum removed from the anterior lip of the cervix the patient recalled from general anesthesia and taken recovery room in satisfactory condition.  There were no intraoperative complications estimated blood loss was 10 cc.      Quan Davis MD

## 2023-11-21 NOTE — ANESTHESIA PREPROCEDURE EVALUATION
Anesthesia Pre-Procedure Evaluation    Patient: Johnna Caballero   MRN: [id number] : 1965        Procedure : Procedure(s):  Dilation and curettage, operative hysteroscopy with removal of Intrauterine Device          Past Medical History:   Diagnosis Date    Actinic keratosis     Allergic rhinitis, cause unspecified     Anemia     Arthritis     Asthma     no meds x2 yrs    chronic back pain     Chronic infection     MRSA-nasal    Chronic pain     back    Esophageal reflux     fibrocystic breast changes     Gastro-oesophageal reflux disease     Heart murmur     mitral insuffiency    Hematoma - postoperative 2012     Problem list name updated by automated process. Provider to review and confirm    Hx of Fen-Phen use     Hypertension     migraines     Mild intermittent asthma     rare, with dog or exercise    Moderate major depression (H) 2012    MRSA (methicillin resistant Staphylococcus aureus) 2014    Nares    Need for desensitization to allergens     Obesity, unspecified     Other and unspecified hyperlipidemia     Temporomandibular joint disorders, unspecified     Thrombosis of leg     incisional area right hip    Type II or unspecified type diabetes mellitus without mention of complication, not stated as uncontrolled       Past Surgical History:   Procedure Laterality Date    ARTHROPLASTY KNEE  07/15/2014    Procedure: ARTHROPLASTY KNEE;  Surgeon: Chapin Paul MD;  Location:  OR    BACK SURGERY      BIOPSY  2008    CHOLECYSTECTOMY      COLONOSCOPY N/A 2017    Procedure: COLONOSCOPY;  COLONOSCOPY;  Surgeon: Chapin Leal MD;  Location:  GI    COSMETIC SURGERY      ESOPHAGOSCOPY, GASTROSCOPY, DUODENOSCOPY (EGD), COMBINED N/A 2020    Procedure: ESOPHAGOGASTRODUODENOSCOPY (EGD);  Surgeon: Devon Jacinto MD;  Location:  GI    EXCISE LESION LOWER EXTREMITY  2012    Procedure: EXCISE LESION LOWER EXTREMITY;  EXCISION LIPOMA RIGHT HIP ;   Surgeon: Jazmin Bautista MD;  Location:  [facility]    EXCISE LESION LOWER EXTREMITY  11/23/2012    Procedure: EXCISE LESION LOWER EXTREMITY;  EXCISE LESION LOWER EXTREMITY  EVACUATE RIGHT HIP HEMATOMA;  Surgeon: Jazmin Bautista MD;  Location: [facility] OR    EXCISE MASS HIP Left 11/09/2018    Procedure: 1.  Excision left chest wall mass with excised diameter of greater than 4 cm 2.  Excision left hip mass with excised diameter of more than 20 x 20 cm  ;  Surgeon: Jazmin Bautista MD;  Location: [facility] OR    EXCISE MASS TRUNK Left 11/09/2018    Procedure: EXCISE MASS TRUNK;  Surgeon: Jazmin Bautista MD;  Location: [facility] OR    EXCISE MASS TRUNK N/A 12/16/2019    Procedure: REVISION LEFT HIP SCAR WITH SEROMA EVACUATION; REMOVAL LEFT CHEST WALL MASS   (MRSA);  Surgeon: Jazmin Bautista MD;  Location:  OR    GYN SURGERY      HC EXPLORATION ANKLE JOINT  01/2006    HYSTEROSCOPY DIAGNOSTIC N/A 12/16/2019    Procedure: DIAGNOSTIC HYSTEROSCOPY;  Surgeon: Jodi Perea MD;  Location:  OR    IRRIGATION AND DEBRIDEMENT HIP, COMBINED  12/15/2012    Procedure: COMBINED IRRIGATION AND DEBRIDEMENT HIP;   evacuation hematoma, scar revision right hip;  Surgeon: Jazmin Bautista MD;  Location:  OR    REPLACE INTRAUTERINE DEVICE N/A 12/16/2019    Procedure: REPLACEMENTOF MIRENA CONTRACEPTIVE INTRAUTERINE DEVICE;  Surgeon: Jodi Perea MD;  Location:  OR    wisdom teeth[      ZZC NONSPECIFIC PROCEDURE      laparoscopy x6    ZZC NONSPECIFIC PROCEDURE  1993    laparotomy x2 ovarian cyst    ZZC NONSPECIFIC PROCEDURE  02/1993    oophorectomy lt side - cyst    ZZC NONSPECIFIC PROCEDURE  03/1995    laminectomy L4-5. S1 herniated disc    ZZC NONSPECIFIC PROCEDURE  1996    cholecystectomy    ZZC NONSPECIFIC PROCEDURE      ganglion cysts l wrist, rt palm    ZZC NONSPECIFIC PROCEDURE      cyst removal back x2    ZZC NONSPECIFIC PROCEDURE  10/2002    rt thumb fusion, ganglion cyst removal    ZZC  NONSPECIFIC PROCEDURE  01/2008    remove heel spur, excise exostosis    ZZHC PART EXCIS PLANTAR FASCIA  12/2006      Allergies   Allergen Reactions    Eggs      Allergy found in testing    Hornets      wasps    Latex Anaphylaxis     hives  -  able to use BP cuff    Lipitor [Statins] Cramps     Muscle cramps with statin    Metoclopramide Hcl Difficulty breathing    Neurontin [Gabapentin] Hives      Social History     Tobacco Use    Smoking status: Never    Smokeless tobacco: Never   Substance Use Topics    Alcohol use: Not Currently     Comment: 1 drink per year or less      Wt Readings from Last 1 Encounters:   11/21/23 131.1 kg (289 lb 1.6 oz)        Anesthesia Evaluation   Pt has had prior anesthetic. Type: General.    No history of anesthetic complications       ROS/MED HX  ENT/Pulmonary:     (+)                    Intermittent, asthma                  Neurologic:  - neg neurologic ROS     Cardiovascular:     (+)  hypertension- -   -  - -                                      METS/Exercise Tolerance:     Hematologic:     (+) History of blood clots,               Musculoskeletal:  - neg musculoskeletal ROS     GI/Hepatic:     (+) GERD,                   Renal/Genitourinary:  - neg Renal ROS     Endo:     (+)  type II DM,             Obesity,       Psychiatric/Substance Use:     (+) psychiatric history depression       Infectious Disease:       Malignancy:       Other:            Physical Exam    Airway        Mallampati: II   TM distance: > 3 FB   Neck ROM: full   Mouth opening: > 3 cm    Respiratory Devices and Support         Dental       (+) Minor Abnormalities - some fillings, tiny chips      Cardiovascular   cardiovascular exam normal          Pulmonary   pulmonary exam normal                OUTSIDE LABS:  CBC:   Lab Results   Component Value Date    WBC 6.8 05/16/2023    WBC 8.8 01/07/2022    HGB 13.8 05/16/2023    HGB 15.6 01/07/2022    HCT 42.1 05/16/2023    HCT 48.4 (H) 01/07/2022     05/16/2023      01/07/2022     BMP:   Lab Results   Component Value Date     11/16/2023     08/15/2023    POTASSIUM 4.4 11/16/2023    POTASSIUM 4.3 08/15/2023    CHLORIDE 104 11/16/2023    CHLORIDE 103 08/15/2023    CO2 25 11/16/2023    CO2 25 08/15/2023    BUN 14.2 11/16/2023    BUN 15.9 08/15/2023    CR 0.79 11/16/2023    CR 0.77 08/15/2023     (H) 11/21/2023     (H) 11/16/2023     COAGS:   Lab Results   Component Value Date    PTT 23 11/23/2012    INR 1.59 (H) 07/18/2014     POC:   Lab Results   Component Value Date     (H) 06/29/2020    HCG Negative 11/21/2023     HEPATIC:   Lab Results   Component Value Date    ALBUMIN 4.1 11/16/2023    PROTTOTAL 7.0 11/16/2023    ALT 14 11/16/2023    AST 14 11/16/2023    ALKPHOS 95 11/16/2023    BILITOTAL 0.2 11/16/2023     OTHER:   Lab Results   Component Value Date    LACT 1.9 11/23/2012    A1C 7.9 (H) 11/16/2023    JACQUE 9.5 11/16/2023    PHOS 3.7 11/23/2012    MAG 1.6 11/23/2012    LIPASE 124 01/07/2022    AMYLASE 38 03/24/2020    TSH 1.86 11/16/2023    T4 0.80 06/11/2013    CRP 9.2 (H) 03/24/2020    SED 10 03/24/2020       Anesthesia Plan    ASA Status:  3       Anesthesia Type: General.   Induction: Intravenous.   Maintenance: Balanced.        Consents    Anesthesia Plan(s) and associated risks, benefits, and realistic alternatives discussed. Questions answered and patient/representative(s) expressed understanding.     - Discussed:     - Discussed with:  Patient      - Extended Intubation/Ventilatory Support Discussed: No.      - Patient is DNR/DNI Status: No     Use of blood products discussed: No .     Postoperative Care    Pain management: IV analgesics, Oral pain medications, Multi-modal analgesia.   PONV prophylaxis: Ondansetron (or other 5HT-3), Dexamethasone or Solumedrol     Comments:                Quinten Dominguez MD

## 2023-11-27 LAB
BKR LAB AP GYN ADEQUACY: NORMAL
BKR LAB AP GYN INTERPRETATION: NORMAL
PATH REPORT.COMMENTS IMP SPEC: NORMAL
PATH REPORT.COMMENTS IMP SPEC: NORMAL

## 2023-12-03 ENCOUNTER — MYC MEDICAL ADVICE (OUTPATIENT)
Dept: PEDIATRICS | Facility: CLINIC | Age: 58
End: 2023-12-03
Payer: COMMERCIAL

## 2023-12-03 DIAGNOSIS — Z79.4 TYPE 2 DIABETES MELLITUS WITHOUT COMPLICATION, WITH LONG-TERM CURRENT USE OF INSULIN (H): ICD-10-CM

## 2023-12-03 DIAGNOSIS — E11.9 TYPE 2 DIABETES MELLITUS WITHOUT COMPLICATION, WITH LONG-TERM CURRENT USE OF INSULIN (H): ICD-10-CM

## 2023-12-03 NOTE — LETTER
2023      RE: Johnna Caballero  : 1965  [address]   [facility] [address]        To Whom It May Concern,     Johnna Caballero is under my professional care. She was seen on 2023 for an office visit. She received a refill of both her Celebrex & Percocet for her bilateral hand pain.       Sincerely,        Linda Durant MD

## 2023-12-04 RX ORDER — PROCHLORPERAZINE 25 MG/1
SUPPOSITORY RECTAL
Qty: 1 EACH | Refills: 1 | Status: SHIPPED | OUTPATIENT
Start: 2023-12-04 | End: 2024-04-25

## 2023-12-07 ENCOUNTER — TRANSFERRED RECORDS (OUTPATIENT)
Dept: HEALTH INFORMATION MANAGEMENT | Facility: CLINIC | Age: 58
End: 2023-12-07
Payer: COMMERCIAL

## 2023-12-13 ENCOUNTER — TRANSFERRED RECORDS (OUTPATIENT)
Dept: MULTI SPECIALTY CLINIC | Facility: CLINIC | Age: 58
End: 2023-12-13

## 2023-12-13 LAB — RETINOPATHY: NORMAL

## 2023-12-18 ENCOUNTER — TELEPHONE (OUTPATIENT)
Dept: PODIATRY | Facility: CLINIC | Age: 58
End: 2023-12-18
Payer: COMMERCIAL

## 2023-12-18 NOTE — TELEPHONE ENCOUNTER
Patient Returning Call    Reason for call:  pt thinks she may have an infected toe, wanted to talk to RN before setting up apt, requesting callback     Information relayed to patient:  te sent to clinic     Patient has additional questions:  No      Could we send this information to you in MyChart or would you prefer to receive a phone call?:   Patient would prefer a phone call   Okay to leave a detailed message?: Yes at Cell number on file:    Telephone Information:   Mobile [phone]

## 2023-12-18 NOTE — TELEPHONE ENCOUNTER
Phone call to patient. She didn't realize a nurse would be calling her back. She has an appointment scheduled with Dr. Strong tomorrow at 11:15am.   She states her left great toe is reddened, painful, and she thinks it is infected. The nailbed is reddened and there is a white area that looks like pus underneath. She denies drainage. Patient is diabetic.   Both great toes had turned purple in August and she states Dr. Strong had thought it was from her shoes rubbing.   Recommended she follow up at her appointment tomorrow. She verbalized understanding.     Per review of the schedule, patient was scheduled for a 15 minute appointment only. Routing to provider as an MIKEY.         KIM Fernandez RN

## 2023-12-19 ENCOUNTER — OFFICE VISIT (OUTPATIENT)
Dept: PODIATRY | Facility: CLINIC | Age: 58
End: 2023-12-19
Payer: COMMERCIAL

## 2023-12-19 VITALS — WEIGHT: 289 LBS | BODY MASS INDEX: 46.65 KG/M2 | DIASTOLIC BLOOD PRESSURE: 78 MMHG | SYSTOLIC BLOOD PRESSURE: 124 MMHG

## 2023-12-19 DIAGNOSIS — M79.672 LEFT FOOT PAIN: ICD-10-CM

## 2023-12-19 DIAGNOSIS — E11.42 DIABETIC POLYNEUROPATHY ASSOCIATED WITH TYPE 2 DIABETES MELLITUS (H): Primary | ICD-10-CM

## 2023-12-19 DIAGNOSIS — L03.032 PARONYCHIA OF GREAT TOE OF LEFT FOOT: ICD-10-CM

## 2023-12-19 PROCEDURE — 99213 OFFICE O/P EST LOW 20 MIN: CPT | Mod: 25 | Performed by: PODIATRIST

## 2023-12-19 PROCEDURE — 11730 AVULSION NAIL PLATE SIMPLE 1: CPT | Mod: TA | Performed by: PODIATRIST

## 2023-12-19 RX ORDER — SILVER SULFADIAZINE 10 MG/G
CREAM TOPICAL 2 TIMES DAILY
Qty: 25 G | Refills: 1 | Status: SHIPPED | OUTPATIENT
Start: 2023-12-19

## 2023-12-19 RX ORDER — SULFAMETHOXAZOLE/TRIMETHOPRIM 800-160 MG
1 TABLET ORAL 2 TIMES DAILY
Qty: 20 TABLET | Refills: 0 | Status: SHIPPED | OUTPATIENT
Start: 2023-12-19 | End: 2023-12-29

## 2023-12-19 NOTE — PATIENT INSTRUCTIONS
Thank you for choosing [facility] Podiatry / Foot & Ankle Surgery!    DR WALTON'S CLINIC:  [facility]   [address]   [place] [address]      TRIAGE LINE: [phone]  APPOINTMENTS: [phone]  RADIOLOGY: [phone]  SET UP SURGERY: [phone]  PHYSICAL THERAPY: [phone]   FAX NUMBER: [phone]  BILLING QUESTIONS: [phone]       Follow up: As needed      INGROWN TOENAILS  When a toenail is ingrown, it is curved and grows into the skin, usually at the nail borders (the sides of the nail). This  digging in  of the nail irritates the skin, often creating pain, redness, swelling, and warmth in the toe.  If an ingrown nail causes a break in the skin, bacteria may enter and cause an infection in the area, which is often marked by drainage and a foul odor. However, even if the toe isn t painful, red, swollen, or warm, a nail that curves downward into the skin can progress to an infection.  CAUSES:  Heredity: In many people, the tendency for ingrown toenails is inherited.   Trauma: Sometimes an ingrown toenail is the result of trauma, such as stubbing your toe, having an object fall on your toe, or engaging in activities that involve repeated pressure on the toes, such as kicking or running.   Improper Trimming:  The most common cause of ingrown toenails is cutting your nails too short. This encourages the skin next to the nail to fold over the nail.   Improperly Sized Footwear: Ingrown toenails can result from wearing socks and shoes that are tight or short.   Nail Conditions: Ingrown toenails can be caused by nail problems, such as fungal infections or losing a nail due to trauma.   TREATMENT: Sometimes initial treatment for ingrown toenails can be safely performed at home. However, home treatment is strongly discouraged if an infection is suspected, or for those who have medical conditions that put feet at high risk, such as diabetes, nerve damage in the foot, or poor  circulation.  Home care: If you don t have an infection or any of the above medical conditions, you can soak your foot in room-temperature water (adding Epsom s salt may be recommended by your doctor), and gently massage the side of the nail fold to help reduce the inflammation.  Avoid attempting  bathroom surgery.  Repeated cutting of the nail can cause the condition to worsen over time. If your symptoms fail to improve, it s time to see a foot and ankle surgeon.  Physician care: After examining the toe, the foot and ankle surgeon will select the treatment best suited for you. If an infection is present, an oral antibiotic may be prescribed.  Sometimes a minor surgical procedure, often performed in the office, will ease the pain and remove the offending nail. After applying a local anesthetic, the doctor removes part of the nail s side border. Some nails may become ingrown again, requiring removal of the nail root.  Following the nail procedure, a light bandage will be applied. Most people experience very little pain after surgery and may resume normal activity the next day. If your surgeon has prescribed an oral antibiotic, be sure to take all the medication, even if your symptoms have improved.  PREVENTION:  Proper Trimming: Cut toenails in a fairly straight line, and don t cut them too short. You should be able to get your fingernail under the sides and end of the nail.   Well-fitting Footwear: Don t wear shoes that are short or tight in the toe area. Avoid shoes that are loose, because they too cause pressure on the toes, especially when running or walking briskly.     INGROWN TOENAIL REMOVAL AFTERCARE   Go directly home and elevate the affected foot on one or two pillows for the remainder of the day/evening if possible. Your toe may stay numb anywhere from 2-8 hours.   Take Tylenol, ibuprofen or another anti-inflammatory as needed for pain.   Take antibiotic if that has been prescribed. Finish the entire  prescribed antibiotic even if your symptoms have improved.   The evening of the procedure, soak/wash the affected area in warm water (you may add Epsom salt) for 5 to 10 minutes. Do this twice a day for 2-4 weeks (6-8 weeks if you had phenol) (you may count showering/bathing as one soak).  After soaks, pat the area dry and then allow to airdry for a few minutes. Apply antibiotic ointment to the area and cover with 2 X 2 gauze and paper tape or band-aid.  You may pursue everyday activities as tolerated with either an open toe shoe or cut-out shoe as needed or you may wear regular shoes if no pain is noted.  Watch for any signs and symptoms of infection such as: redness, red streaks going up the foot/leg, swelling, pus or foul odor. Those that have had the phenol procedure, the toe will drain longer and will look like it is infected because it is a chemical burn.   Please call with questions.

## 2023-12-19 NOTE — LETTER
12/19/2023         RE: Johnna Caballero  [address]  [facility] [address]        Dear Colleague,    Thank you for referring your patient, Johnna Caballero, to the [facility] PODIATRY. Please see a copy of my visit note below.    Podiatry / Foot and Ankle Surgery Progress Note    December 19, 2023    Subject: Patient was seen for redness of the left great toe.  She noticed it a few days ago.  Has slowly been getting worse.  Denies fever, nausea, vomiting.  Notes pain is 7 out of 10.  Wondering what is causing it.    Objective:  Vitals: /78   Wt 131.1 kg (289 lb)   BMI 46.65 kg/m    BMI= Body mass index is 46.65 kg/m .    A1C: 7.9 (11/16/2023)    General:  Patient is alert and orientated.  NAD.    Vascular:  DP and PT pulses are palpable.  No edema or varicosities noted.  CFT's < 3secs.  Skin temp is normal.    Neuro:  Light and gross touch sensation intact to digits, dorsum, and plantar aspects of the feet.    Derm: Redness noted to the base of the left great toenail with purulent drainage underneath.    Musculoskeletal:  No foot deformity noted.      Assessment:    Diabetic polyneuropathy associated with type 2 diabetes mellitus (H)  Left foot pain  Paronychia of great toe of left foot    Medical Decision Making/Plan: Discussed removing the nail at this time to allow the area to drain.  We will have her soak the foot twice a day for 2 weeks and apply antibiotic ointment.  She will also after the 2 weeks apply antifungal cream daily to the toe as the new nail grows out.  We will start her on Bactrim at this time.    All questions were answered to patient satisfaction and she will call further questions or concerns.    Procedure: After verbal consent, the left big toe was anesthetized with 5cc's of 1% lidocaine plain. A tourniquet was applied to the toe. The medial border of the nail was released from the nailbed with an elevator and removed with a hemostat.  Bacitracin was applied  to the nail bed.  The tourniquet was removed.  Bandage was applied to the toe.  The patient tolerated the procedure and anesthesia well.        Patient Risk Factor:  Patient is a medium risk factor for infection.     Suzanna Strong DPM, Podiatry/Foot and Ankle Surgery              Again, thank you for allowing me to participate in the care of your patient.        Sincerely,        Suzanna Strong DPM, Podiatry/Foot and Ankle Surgery

## 2023-12-19 NOTE — PROGRESS NOTES
Podiatry / Foot and Ankle Surgery Progress Note    December 19, 2023    Subject: Patient was seen for redness of the left great toe.  She noticed it a few days ago.  Has slowly been getting worse.  Denies fever, nausea, vomiting.  Notes pain is 7 out of 10.  Wondering what is causing it.    Objective:  Vitals: /78   Wt 131.1 kg (289 lb)   BMI 46.65 kg/m    BMI= Body mass index is 46.65 kg/m .    A1C: 7.9 (11/16/2023)    General:  Patient is alert and orientated.  NAD.    Vascular:  DP and PT pulses are palpable.  No edema or varicosities noted.  CFT's < 3secs.  Skin temp is normal.    Neuro:  Light and gross touch sensation intact to digits, dorsum, and plantar aspects of the feet.    Derm: Redness noted to the base of the left great toenail with purulent drainage underneath.    Musculoskeletal:  No foot deformity noted.      Assessment:    Diabetic polyneuropathy associated with type 2 diabetes mellitus (H)  Left foot pain  Paronychia of great toe of left foot    Medical Decision Making/Plan: Discussed removing the nail at this time to allow the area to drain.  We will have her soak the foot twice a day for 2 weeks and apply antibiotic ointment.  She will also after the 2 weeks apply antifungal cream daily to the toe as the new nail grows out.  We will start her on Bactrim at this time.    All questions were answered to patient satisfaction and she will call further questions or concerns.    Procedure: After verbal consent, the left big toe was anesthetized with 5cc's of 1% lidocaine plain. A tourniquet was applied to the toe. The medial border of the nail was released from the nailbed with an elevator and removed with a hemostat.  Bacitracin was applied to the nail bed.  The tourniquet was removed.  Bandage was applied to the toe.  The patient tolerated the procedure and anesthesia well.        Patient Risk Factor:  Patient is a medium risk factor for infection.     Suzanna Strong DPM, Podiatry/Foot and  Ankle Surgery

## 2023-12-27 ENCOUNTER — E-VISIT (OUTPATIENT)
Dept: PEDIATRICS | Facility: CLINIC | Age: 58
End: 2023-12-27
Payer: COMMERCIAL

## 2023-12-27 DIAGNOSIS — R50.9 RESPIRATORY ILLNESS WITH FEVER: ICD-10-CM

## 2023-12-27 DIAGNOSIS — J01.00 ACUTE NON-RECURRENT MAXILLARY SINUSITIS: ICD-10-CM

## 2023-12-27 DIAGNOSIS — J45.20 INTERMITTENT ASTHMA, UNCOMPLICATED: Primary | ICD-10-CM

## 2023-12-27 DIAGNOSIS — B37.31 YEAST INFECTION OF THE VAGINA: ICD-10-CM

## 2023-12-27 DIAGNOSIS — Z79.4 TYPE 2 DIABETES MELLITUS WITHOUT COMPLICATION, WITH LONG-TERM CURRENT USE OF INSULIN (H): ICD-10-CM

## 2023-12-27 DIAGNOSIS — E11.9 TYPE 2 DIABETES MELLITUS WITHOUT COMPLICATION, WITH LONG-TERM CURRENT USE OF INSULIN (H): ICD-10-CM

## 2023-12-27 DIAGNOSIS — J98.9 RESPIRATORY ILLNESS WITH FEVER: ICD-10-CM

## 2023-12-27 PROCEDURE — 99421 OL DIG E/M SVC 5-10 MIN: CPT | Performed by: PEDIATRICS

## 2023-12-28 RX ORDER — ALBUTEROL SULFATE 90 UG/1
2 AEROSOL, METERED RESPIRATORY (INHALATION) EVERY 4 HOURS PRN
Qty: 18 G | Refills: 1 | Status: SHIPPED | OUTPATIENT
Start: 2023-12-28 | End: 2024-08-06

## 2023-12-28 RX ORDER — CEFDINIR 300 MG/1
300 CAPSULE ORAL 2 TIMES DAILY
Qty: 20 CAPSULE | Refills: 0 | Status: SHIPPED | OUTPATIENT
Start: 2023-12-28 | End: 2024-01-07

## 2023-12-28 RX ORDER — FLUCONAZOLE 150 MG/1
150 TABLET ORAL
Qty: 3 TABLET | Refills: 0 | Status: SHIPPED | OUTPATIENT
Start: 2023-12-28 | End: 2024-01-04

## 2024-01-04 ENCOUNTER — MYC MEDICAL ADVICE (OUTPATIENT)
Dept: PEDIATRICS | Facility: CLINIC | Age: 59
End: 2024-01-04
Payer: COMMERCIAL

## 2024-01-04 DIAGNOSIS — Z79.4 TYPE 2 DIABETES MELLITUS WITHOUT COMPLICATION, WITH LONG-TERM CURRENT USE OF INSULIN (H): Primary | ICD-10-CM

## 2024-01-04 DIAGNOSIS — E11.9 TYPE 2 DIABETES MELLITUS WITHOUT COMPLICATION, WITH LONG-TERM CURRENT USE OF INSULIN (H): Primary | ICD-10-CM

## 2024-01-04 NOTE — TELEPHONE ENCOUNTER
Please see [employer] message. Patient states that the Lantus will no longer be covered by her insurance. Insurance will cover:   Toujeo Solostar 300ml   Semglee YFGN 100 ml  or  Insulin Glargine YFGN 100 ML     Please advise. Pended pharmacy.  Thanks!  Bev ALVAREZ RN, BSN

## 2024-01-26 NOTE — TELEPHONE ENCOUNTER
Patient has upcoming appointment 9/13/22. Prescription approved per [facility] Refill Protocol.  Sujit ESQUIVEL RN    
none

## 2024-02-22 DIAGNOSIS — M54.2 CHRONIC NECK PAIN: ICD-10-CM

## 2024-02-22 DIAGNOSIS — M54.50 CHRONIC BILATERAL LOW BACK PAIN WITHOUT SCIATICA: ICD-10-CM

## 2024-02-22 DIAGNOSIS — G89.29 CHRONIC NECK PAIN: ICD-10-CM

## 2024-02-22 DIAGNOSIS — G89.29 CHRONIC BILATERAL LOW BACK PAIN WITHOUT SCIATICA: ICD-10-CM

## 2024-02-22 RX ORDER — CELECOXIB 200 MG/1
CAPSULE ORAL
Qty: 90 CAPSULE | Refills: 3 | Status: SHIPPED | OUTPATIENT
Start: 2024-02-22

## 2024-03-24 ENCOUNTER — HEALTH MAINTENANCE LETTER (OUTPATIENT)
Age: 59
End: 2024-03-24

## 2024-04-24 ENCOUNTER — DOCUMENTATION ONLY (OUTPATIENT)
Dept: PEDIATRICS | Facility: CLINIC | Age: 59
End: 2024-04-24
Payer: COMMERCIAL

## 2024-04-24 DIAGNOSIS — F11.90 CHRONIC, CONTINUOUS USE OF OPIOIDS: ICD-10-CM

## 2024-04-24 DIAGNOSIS — Z79.4 TYPE 2 DIABETES MELLITUS WITHOUT COMPLICATION, WITH LONG-TERM CURRENT USE OF INSULIN (H): Primary | ICD-10-CM

## 2024-04-24 DIAGNOSIS — E11.9 TYPE 2 DIABETES MELLITUS WITHOUT COMPLICATION, WITH LONG-TERM CURRENT USE OF INSULIN (H): Primary | ICD-10-CM

## 2024-04-24 NOTE — PROGRESS NOTES
Johnna Caballero has an upcoming lab appointment:    Future Appointments   Date Time Provider Department Center   4/25/2024  9:30 AM [facility]   4/25/2024  2:30 PM Linda Durant MD [facility]      Patient is scheduled for the following lab(s): a1C    There is no order available. Please review and place either future orders or HMPO (Review of Health Maintenance Protocol Orders), as appropriate.    Health Maintenance Due   Topic    ANNUAL REVIEW OF HM ORDERS     A1C     MICROALBUMIN      Christine Gillis

## 2024-04-25 ENCOUNTER — OFFICE VISIT (OUTPATIENT)
Dept: PEDIATRICS | Facility: CLINIC | Age: 59
End: 2024-04-25
Payer: COMMERCIAL

## 2024-04-25 ENCOUNTER — LAB (OUTPATIENT)
Dept: LAB | Facility: CLINIC | Age: 59
End: 2024-04-25
Payer: COMMERCIAL

## 2024-04-25 VITALS
BODY MASS INDEX: 47.61 KG/M2 | TEMPERATURE: 98.2 F | RESPIRATION RATE: 16 BRPM | WEIGHT: 293 LBS | SYSTOLIC BLOOD PRESSURE: 138 MMHG | OXYGEN SATURATION: 98 % | DIASTOLIC BLOOD PRESSURE: 78 MMHG | HEART RATE: 89 BPM

## 2024-04-25 DIAGNOSIS — I10 ESSENTIAL HYPERTENSION: ICD-10-CM

## 2024-04-25 DIAGNOSIS — E11.9 TYPE 2 DIABETES MELLITUS WITHOUT COMPLICATION, WITH LONG-TERM CURRENT USE OF INSULIN (H): ICD-10-CM

## 2024-04-25 DIAGNOSIS — E11.42 DIABETIC POLYNEUROPATHY ASSOCIATED WITH TYPE 2 DIABETES MELLITUS (H): ICD-10-CM

## 2024-04-25 DIAGNOSIS — Z79.4 TYPE 2 DIABETES MELLITUS WITHOUT COMPLICATION, WITH LONG-TERM CURRENT USE OF INSULIN (H): ICD-10-CM

## 2024-04-25 DIAGNOSIS — M19.91 PRIMARY OSTEOARTHRITIS, UNSPECIFIED SITE: ICD-10-CM

## 2024-04-25 DIAGNOSIS — E11.65 TYPE 2 DIABETES MELLITUS WITH HYPERGLYCEMIA, WITH LONG-TERM CURRENT USE OF INSULIN (H): Primary | ICD-10-CM

## 2024-04-25 DIAGNOSIS — F11.90 CHRONIC, CONTINUOUS USE OF OPIOIDS: ICD-10-CM

## 2024-04-25 DIAGNOSIS — G89.4 CHRONIC PAIN SYNDROME: ICD-10-CM

## 2024-04-25 DIAGNOSIS — Z79.4 TYPE 2 DIABETES MELLITUS WITH HYPERGLYCEMIA, WITH LONG-TERM CURRENT USE OF INSULIN (H): Primary | ICD-10-CM

## 2024-04-25 DIAGNOSIS — E66.01 MORBID OBESITY WITH BMI OF 40.0-44.9, ADULT (H): ICD-10-CM

## 2024-04-25 DIAGNOSIS — J45.20 INTERMITTENT ASTHMA, UNCOMPLICATED: ICD-10-CM

## 2024-04-25 LAB — HBA1C MFR BLD: 10.1 % (ref 0–5.6)

## 2024-04-25 PROCEDURE — 82570 ASSAY OF URINE CREATININE: CPT

## 2024-04-25 PROCEDURE — 82043 UR ALBUMIN QUANTITATIVE: CPT

## 2024-04-25 PROCEDURE — 84443 ASSAY THYROID STIM HORMONE: CPT

## 2024-04-25 PROCEDURE — 80053 COMPREHEN METABOLIC PANEL: CPT

## 2024-04-25 PROCEDURE — 80307 DRUG TEST PRSMV CHEM ANLYZR: CPT

## 2024-04-25 PROCEDURE — 99207 PR FOOT EXAM NO CHARGE: CPT | Performed by: PEDIATRICS

## 2024-04-25 PROCEDURE — 83036 HEMOGLOBIN GLYCOSYLATED A1C: CPT

## 2024-04-25 PROCEDURE — 36415 COLL VENOUS BLD VENIPUNCTURE: CPT

## 2024-04-25 PROCEDURE — 99214 OFFICE O/P EST MOD 30 MIN: CPT | Performed by: PEDIATRICS

## 2024-04-25 RX ORDER — LOSARTAN POTASSIUM 100 MG/1
TABLET ORAL
Qty: 90 TABLET | Refills: 3 | Status: SHIPPED | OUTPATIENT
Start: 2024-04-25

## 2024-04-25 RX ORDER — PROCHLORPERAZINE 25 MG/1
SUPPOSITORY RECTAL
Qty: 3 EACH | Refills: 11 | Status: SHIPPED | OUTPATIENT
Start: 2024-04-25

## 2024-04-25 RX ORDER — TRIAMTERENE/HYDROCHLOROTHIAZID 37.5-25 MG
1 TABLET ORAL DAILY
Qty: 90 TABLET | Refills: 3 | Status: SHIPPED | OUTPATIENT
Start: 2024-04-25

## 2024-04-25 RX ORDER — PROCHLORPERAZINE 25 MG/1
SUPPOSITORY RECTAL
Qty: 1 EACH | Refills: 1 | Status: SHIPPED | OUTPATIENT
Start: 2024-04-25

## 2024-04-25 RX ORDER — PROCHLORPERAZINE 25 MG/1
SUPPOSITORY RECTAL
Qty: 1 EACH | Refills: 0 | Status: SHIPPED | OUTPATIENT
Start: 2024-04-25

## 2024-04-25 ASSESSMENT — PAIN SCALES - GENERAL: PAINLEVEL: NO PAIN (0)

## 2024-04-25 NOTE — PROGRESS NOTES
Assessment & Plan       ICD-10-CM    1. Type 2 diabetes mellitus with hyperglycemia, with long-term current use of insulin (H)  E11.65 FOOT EXAM    Z79.4 Continuous Glucose  (DEXCOM G6 ) BRANT     Continuous Glucose Sensor (DEXCOM G6 SENSOR) MISC     Continuous Glucose Transmitter (DEXCOM G6 TRANSMITTER) MISC     insulin glargine (LANTUS PEN) 100 UNIT/ML pen     losartan (COZAAR) 100 MG tablet     metFORMIN (GLUCOPHAGE) 1000 MG tablet     triamterene-HCTZ (MAXZIDE-25) 37.5-25 MG tablet     Hemoglobin A1c     Basic metabolic panel  (Ca, Cl, CO2, Creat, Gluc, K, Na, BUN)    Control worsened over the winter due to changes in eating and stressors - wishes to keep medications the same for now, focus on lifestyle habits, and follow.   Will adjust lantus up slightly - currently taking 52 unit(s) - will push to 56.    Encouraged to use dexcom again    Has not tolerated SGLT2 inhib or GLP1 agonists historically      2. Essential hypertension  I10 losartan (COZAAR) 100 MG tablet     triamterene-HCTZ (MAXZIDE-25) 37.5-25 MG tablet     Hemoglobin A1c     Basic metabolic panel  (Ca, Cl, CO2, Creat, Gluc, K, Na, BUN)    Well controlled, continue current medications        3. Diabetic polyneuropathy associated with type 2 diabetes mellitus (H)  E11.42 See above      4. Chronic pain syndrome  G89.4 oxyCODONE-acetaminophen (PERCOCET) 5-325 MG tablet    Diffuse OA - uses percoset intermittently - filling on appropriate intervals.  Drug screen collected.  CSA UTD      5. Primary osteoarthritis, unspecified site  M19.91 oxyCODONE-acetaminophen (PERCOCET) 5-325 MG tablet      6. Morbid obesity with BMI of 40.0-44.9, adult (H)  E66.01 Complicating management of diabetes - patient back to focusing on lifestyle measures now that home for the summer    Z68.41       7. Intermittent asthma, uncomplicated  J45.20 Well controlled, continue current medications        8. Chronic, continuous use of opioids  F11.90 See above     "            BMI  Estimated body mass index is 47.61 kg/m  as calculated from the following:    Height as of 11/16/23: 1.676 m (5' 6\").    Weight as of this encounter: 133.8 kg (295 lb).   Weight management plan: Discussed healthy diet and exercise guidelines      See Patient Instructions    Subjective   Jung is a 58 year old, presenting for the following health issues:  Follow Up        4/25/2024     2:12 PM   Additional Questions   Roomed by Paige Gannon   Accompanied by BIMAL     Via the Health Maintenance questionnaire, the patient has reported the following services have been completed -Eye Exam-Mammogram, this information has been sent to the abstraction team.  History of Present Illness       Diabetes:   She presents for follow up of diabetes.    She is not checking blood glucose.         She has no concerns regarding her diabetes at this time.   She is not experiencing numbness or burning in feet, excessive thirst, blurry vision, weight changes or redness, sores or blisters on feet. The patient has had a diabetic eye exam in the last 12 months. Eye exam performed on December 2023. Location of last eye exam [facility] [place].        She eats 0-1 servings of fruits and vegetables daily.She consumes 0 sweetened beverage(s) daily.She exercises with enough effort to increase her heart rate 10 to 19 minutes per day.  She exercises with enough effort to increase her heart rate 4 days per week.   She is taking medications regularly.       Diabetes - control worsening over last few months - very stressful time with father in the hospital and travel to [place].  No new cardiac symptoms.    Dr Donahue - seeing for TCM and this is very helpful for her    Pain - stable on current regimen    BP - controlled on current agents              Objective    /78 (BP Location: Right arm, Patient Position: Sitting, Cuff Size: Adult Large)   Pulse 89   Temp 98.2  F (36.8  C) (Tympanic)   Resp 16   Wt 133.8 kg (295 lb)   " LMP  (LMP Unknown)   SpO2 98%   BMI 47.61 kg/m    Body mass index is 47.61 kg/m .  Wt Readings from Last 4 Encounters:   04/25/24 133.8 kg (295 lb)   12/19/23 131.1 kg (289 lb)   11/21/23 131.1 kg (289 lb 1.6 oz)   11/16/23 130.8 kg (288 lb 6.4 oz)       Physical Exam   GENERAL: alert and no distress  EYES: Eyes grossly normal to inspection, PERRL and conjunctivae and sclerae normal  HENT: ear canals and TM's normal, nose and mouth without ulcers or lesions  RESP: lungs clear to auscultation - no rales, rhonchi or wheezes  CV: regular rate and rhythm, normal S1 S2, no S3 or S4, no murmur, click or rub, no peripheral edema   PSYCH: mentation appears normal, affect normal/bright  Diabetic foot exam: normal DP and PT pulses, no trophic changes or ulcerative lesions, and normal sensory exam    Labs pending, A1C back at 10.1% today        Signed Electronically by: Linda Durant MD

## 2024-04-26 ENCOUNTER — TELEPHONE (OUTPATIENT)
Dept: PEDIATRICS | Facility: CLINIC | Age: 59
End: 2024-04-26
Payer: COMMERCIAL

## 2024-04-26 LAB
ALBUMIN SERPL BCG-MCNC: 4 G/DL (ref 3.5–5.2)
ALP SERPL-CCNC: 103 U/L (ref 40–150)
ALT SERPL W P-5'-P-CCNC: 19 U/L (ref 0–50)
AMPHETAMINES UR QL SCN: NORMAL
ANION GAP SERPL CALCULATED.3IONS-SCNC: 13 MMOL/L (ref 7–15)
AST SERPL W P-5'-P-CCNC: 19 U/L (ref 0–45)
BARBITURATES UR QL SCN: NORMAL
BENZODIAZ UR QL SCN: NORMAL
BILIRUB SERPL-MCNC: 0.3 MG/DL
BUN SERPL-MCNC: 16.8 MG/DL (ref 6–20)
BZE UR QL SCN: NORMAL
CALCIUM SERPL-MCNC: 9.6 MG/DL (ref 8.6–10)
CANNABINOIDS UR QL SCN: NORMAL
CHLORIDE SERPL-SCNC: 104 MMOL/L (ref 98–107)
CREAT SERPL-MCNC: 0.76 MG/DL (ref 0.51–0.95)
CREAT UR-MCNC: 68.7 MG/DL
DEPRECATED HCO3 PLAS-SCNC: 24 MMOL/L (ref 22–29)
EGFRCR SERPLBLD CKD-EPI 2021: >90 ML/MIN/1.73M2
FENTANYL UR QL: NORMAL
GLUCOSE SERPL-MCNC: 206 MG/DL (ref 70–99)
MICROALBUMIN UR-MCNC: <12 MG/L
MICROALBUMIN/CREAT UR: NORMAL MG/G{CREAT}
OPIATES UR QL SCN: NORMAL
PCP QUAL URINE (ROCHE): NORMAL
POTASSIUM SERPL-SCNC: 4.3 MMOL/L (ref 3.4–5.3)
PROT SERPL-MCNC: 7 G/DL (ref 6.4–8.3)
SODIUM SERPL-SCNC: 141 MMOL/L (ref 135–145)
TSH SERPL DL<=0.005 MIU/L-ACNC: 1.78 UIU/ML (ref 0.3–4.2)

## 2024-04-26 RX ORDER — OXYCODONE AND ACETAMINOPHEN 5; 325 MG/1; MG/1
1-2 TABLET ORAL EVERY 4 HOURS PRN
Qty: 90 TABLET | Refills: 0 | Status: SHIPPED | OUTPATIENT
Start: 2024-04-26 | End: 2024-08-06

## 2024-04-26 NOTE — TELEPHONE ENCOUNTER
Called Lior with [facility] at [phone]   - Informed Lior that per Dr. Durant the Percocet continues to be used for pain in hands and wrists post injury   - Lior verbalized understanding and states that she will let the  know     oxyCODONE-acetaminophen (PERCOCET) 5-325 MG tablet 90 tablet 0 4/26/2024 -- No   Sig - Route: Take 1-2 tablets by mouth every 4 hours as needed for severe pain - Oral     Desire GUPTA RN   [facility]

## 2024-04-26 NOTE — TELEPHONE ENCOUNTER
Lior Lopez,  with [facility], calls. She states that a  is contacting her in regards to the Percocet script. She would like confirmation on whether the Percocet is prescribed in relation to the 2001 workers comp claim. She states the 2001 claim was an injury to the right and left hands. Please review and advise, thanks!    Lior with [facility]  Callback #: [phone]    Sav Rausch RN on 4/26/2024 at 9:25 AM

## 2024-04-26 NOTE — TELEPHONE ENCOUNTER
Prior Authorization Retail Medication Request    Medication/Dose: Dexcom G6  Device  Diagnosis and ICD code (if different than what is on RX):  Type 2 diabetes mellitus with hyperglycemia, with long-term current use of insulin (H) [E11.65, Z79.4]    New/renewal/insurance change PA/secondary ins. PA:  Previously Tried and Failed:    Rationale:      Insurance   Primary: [facility]  Insurance ID:  [id number]    Secondary (if applicable):  Insurance ID:      Pharmacy Information (if different than what is on RX)  [facility]  [facility]   Phone:  [phone]  Fax:[phone]    Yi CEDEÑO CMA

## 2024-05-13 NOTE — TELEPHONE ENCOUNTER
PA Initiation    Medication: DEXCOM G6  BRANT  Insurance Company: [employer] - Phone [phone] Fax [phone]  [place] [place] [facility] [facility] [place] [facility] [place] [address]  Filling Pharmacy Phone: [phone]  Filling Pharmacy Fax: [phone]  Start Date: 5/12/2024

## 2024-05-13 NOTE — TELEPHONE ENCOUNTER
Prior Authorization Not Needed per Insurance    Medication: DEXCOM G6  BRANT  Insurance Company: [employer] - Phone [phone] Fax [phone]  Expected CoPay: $    [facility] [place] [place] [address]  Pharmacy Notified: YES  Patient Notified: **Instructed pharmacy to notify patient when script is ready to /ship.**

## 2024-05-29 ENCOUNTER — ANCILLARY PROCEDURE (OUTPATIENT)
Dept: MAMMOGRAPHY | Facility: CLINIC | Age: 59
End: 2024-05-29
Payer: COMMERCIAL

## 2024-05-29 DIAGNOSIS — R92.0 MAMMOGRAPHIC MICROCALCIFICATION: ICD-10-CM

## 2024-05-29 PROCEDURE — 77063 BREAST TOMOSYNTHESIS BI: CPT | Mod: TC | Performed by: RADIOLOGY

## 2024-05-29 PROCEDURE — 77067 SCR MAMMO BI INCL CAD: CPT | Mod: TC | Performed by: RADIOLOGY

## 2024-06-02 ENCOUNTER — HEALTH MAINTENANCE LETTER (OUTPATIENT)
Age: 59
End: 2024-06-02

## 2024-06-11 ENCOUNTER — TRANSFERRED RECORDS (OUTPATIENT)
Dept: HEALTH INFORMATION MANAGEMENT | Facility: CLINIC | Age: 59
End: 2024-06-11
Payer: COMMERCIAL

## 2024-06-17 ENCOUNTER — MYC MEDICAL ADVICE (OUTPATIENT)
Dept: PEDIATRICS | Facility: CLINIC | Age: 59
End: 2024-06-17
Payer: COMMERCIAL

## 2024-06-17 DIAGNOSIS — M76.892 ADDUCTOR TENDINITIS OF BOTH HIPS: ICD-10-CM

## 2024-06-17 DIAGNOSIS — M25.551 BILATERAL HIP PAIN: ICD-10-CM

## 2024-06-17 DIAGNOSIS — M54.50 CHRONIC BILATERAL LOW BACK PAIN WITHOUT SCIATICA: Primary | ICD-10-CM

## 2024-06-17 DIAGNOSIS — G89.29 CHRONIC BILATERAL LOW BACK PAIN WITHOUT SCIATICA: Primary | ICD-10-CM

## 2024-06-17 DIAGNOSIS — M25.552 BILATERAL HIP PAIN: ICD-10-CM

## 2024-06-17 DIAGNOSIS — M76.891 ADDUCTOR TENDINITIS OF BOTH HIPS: ICD-10-CM

## 2024-06-18 NOTE — TELEPHONE ENCOUNTER
Forwarded to station - please fax PHYSICAL THERAPY order to :    [facility]  [address]   [place] [address]  Phone: [phone]  Fax: [phone]

## 2024-07-09 ENCOUNTER — TRANSFERRED RECORDS (OUTPATIENT)
Dept: HEALTH INFORMATION MANAGEMENT | Facility: CLINIC | Age: 59
End: 2024-07-09
Payer: COMMERCIAL

## 2024-07-23 ENCOUNTER — TRANSFERRED RECORDS (OUTPATIENT)
Dept: HEALTH INFORMATION MANAGEMENT | Facility: CLINIC | Age: 59
End: 2024-07-23
Payer: COMMERCIAL

## 2024-07-24 ENCOUNTER — OFFICE VISIT (OUTPATIENT)
Dept: PODIATRY | Facility: CLINIC | Age: 59
End: 2024-07-24
Payer: COMMERCIAL

## 2024-07-24 VITALS — WEIGHT: 293 LBS | SYSTOLIC BLOOD PRESSURE: 126 MMHG | BODY MASS INDEX: 47.61 KG/M2 | DIASTOLIC BLOOD PRESSURE: 78 MMHG

## 2024-07-24 DIAGNOSIS — G57.91 NEURITIS OF RIGHT FOOT: ICD-10-CM

## 2024-07-24 DIAGNOSIS — M21.41 BILATERAL PES PLANUS: ICD-10-CM

## 2024-07-24 DIAGNOSIS — E11.42 DIABETIC POLYNEUROPATHY ASSOCIATED WITH TYPE 2 DIABETES MELLITUS (H): Primary | ICD-10-CM

## 2024-07-24 DIAGNOSIS — M21.42 BILATERAL PES PLANUS: ICD-10-CM

## 2024-07-24 DIAGNOSIS — M79.671 RIGHT FOOT PAIN: ICD-10-CM

## 2024-07-24 PROCEDURE — 99213 OFFICE O/P EST LOW 20 MIN: CPT | Performed by: PODIATRIST

## 2024-07-24 NOTE — LETTER
7/24/2024      Johnna Caballero  [id number]  [facility] [address]      Dear Colleague,    Thank you for referring your patient, Johnna Caballero, to the [facility]. Please see a copy of my visit note below.    Podiatry / Foot and Ankle Surgery Progress Note    July 24, 2024    Subject: Patient was seen for pain to the right foot under the fifth toe.  Notes that it started a few months ago.  Notes it is really sore at night and will wake her up.  She does not notice it as much when she is walking.  She is wondering if it is from her callus and has been trimming that down which has helped but she still gets the pain at night.  Wondering what can be done for it.    Objective:  Vitals: /78   Wt 133.8 kg (295 lb)   BMI 47.61 kg/m    BMI= Body mass index is 47.61 kg/m .    A1C: 10.1 (4/25/2024)     General:  Patient is alert and orientated.  NAD.     Dermatologic: Small localized hyperkeratotic lesion to the plantar aspect of the right fifth metatarsal head.  No open lesions or signs of infection noted.     Vascular: DP & PT pulses are intact & regular bilaterally.   edema and varicosities noted.  CFT and skin temperature is normal to both lower extremities.     Neurologic: Lower extremity sensation is diminished to feet.      Musculoskeletal: Patient is ambulatory without assistive device or brace. Decrease arch height. Decrease dorsiflexion right ankle.  Pain on palpation of the plantar aspect of the right fifth metatarsal head.      ASSESSMENT:     Diabetic polyneuropathy associated with type 2 diabetes mellitus (H)  Right foot pain  Neuritis of right foot  Bilateral pes planus     Medical Decision Making:  Reviewed patient's chart in [facility].  Discussed with patient that I do not see anything clinically as far as the callus at this time.  I think that her nerve might be irritated what we called neuritis.  We talked about a cortisone injection but she declined at this time.   Recommend using a lidocaine patch to the area at night and especially if it is painful at night.  Also talked about a metatarsal pad which she notes that she has had before.  Discussed that this would be to absorb some shock to the foot to try to help decrease irritation to the nerve when walking.    If pain continues could possibly get an MRI to make sure there is no stress fracture or any further pathology.     All questions were answered to patient satisfaction and she will call for the questions or concerns.     Patient Risk Factor:  Patient is a medium risk factor for infection.       Suzanna Strong DPM, Podiatry/Foot and Ankle Surgery      Again, thank you for allowing me to participate in the care of your patient.        Sincerely,        Suzanna Strong DPM, Podiatry/Foot and Ankle Surgery

## 2024-07-24 NOTE — PROGRESS NOTES
Podiatry / Foot and Ankle Surgery Progress Note    July 24, 2024    Subject: Patient was seen for pain to the right foot under the fifth toe.  Notes that it started a few months ago.  Notes it is really sore at night and will wake her up.  She does not notice it as much when she is walking.  She is wondering if it is from her callus and has been trimming that down which has helped but she still gets the pain at night.  Wondering what can be done for it.    Objective:  Vitals: /78   Wt 133.8 kg (295 lb)   BMI 47.61 kg/m    BMI= Body mass index is 47.61 kg/m .    A1C: 10.1 (4/25/2024)     General:  Patient is alert and orientated.  NAD.     Dermatologic: Small localized hyperkeratotic lesion to the plantar aspect of the right fifth metatarsal head.  No open lesions or signs of infection noted.     Vascular: DP & PT pulses are intact & regular bilaterally.   edema and varicosities noted.  CFT and skin temperature is normal to both lower extremities.     Neurologic: Lower extremity sensation is diminished to feet.      Musculoskeletal: Patient is ambulatory without assistive device or brace. Decrease arch height. Decrease dorsiflexion right ankle.  Pain on palpation of the plantar aspect of the right fifth metatarsal head.      ASSESSMENT:     Diabetic polyneuropathy associated with type 2 diabetes mellitus (H)  Right foot pain  Neuritis of right foot  Bilateral pes planus     Medical Decision Making:  Reviewed patient's chart in [facility].  Discussed with patient that I do not see anything clinically as far as the callus at this time.  I think that her nerve might be irritated what we called neuritis.  We talked about a cortisone injection but she declined at this time.  Recommend using a lidocaine patch to the area at night and especially if it is painful at night.  Also talked about a metatarsal pad which she notes that she has had before.  Discussed that this would be to absorb some shock to the foot to try to  help decrease irritation to the nerve when walking.    If pain continues could possibly get an MRI to make sure there is no stress fracture or any further pathology.     All questions were answered to patient satisfaction and she will call for the questions or concerns.     Patient Risk Factor:  Patient is a medium risk factor for infection.       Suzanna Strong DPM, Podiatry/Foot and Ankle Surgery

## 2024-08-02 ASSESSMENT — ASTHMA QUESTIONNAIRES
QUESTION_1 LAST FOUR WEEKS HOW MUCH OF THE TIME DID YOUR ASTHMA KEEP YOU FROM GETTING AS MUCH DONE AT WORK, SCHOOL OR AT HOME: NONE OF THE TIME
ACT_TOTALSCORE: 25
QUESTION_5 LAST FOUR WEEKS HOW WOULD YOU RATE YOUR ASTHMA CONTROL: COMPLETELY CONTROLLED
QUESTION_3 LAST FOUR WEEKS HOW OFTEN DID YOUR ASTHMA SYMPTOMS (WHEEZING, COUGHING, SHORTNESS OF BREATH, CHEST TIGHTNESS OR PAIN) WAKE YOU UP AT NIGHT OR EARLIER THAN USUAL IN THE MORNING: NOT AT ALL
QUESTION_2 LAST FOUR WEEKS HOW OFTEN HAVE YOU HAD SHORTNESS OF BREATH: NOT AT ALL
QUESTION_4 LAST FOUR WEEKS HOW OFTEN HAVE YOU USED YOUR RESCUE INHALER OR NEBULIZER MEDICATION (SUCH AS ALBUTEROL): NOT AT ALL
ACT_TOTALSCORE: 25

## 2024-08-06 ENCOUNTER — OFFICE VISIT (OUTPATIENT)
Dept: PEDIATRICS | Facility: CLINIC | Age: 59
End: 2024-08-06
Payer: COMMERCIAL

## 2024-08-06 VITALS
DIASTOLIC BLOOD PRESSURE: 70 MMHG | TEMPERATURE: 97 F | SYSTOLIC BLOOD PRESSURE: 132 MMHG | HEART RATE: 72 BPM | WEIGHT: 293 LBS | OXYGEN SATURATION: 98 % | BODY MASS INDEX: 47.52 KG/M2 | RESPIRATION RATE: 16 BRPM

## 2024-08-06 DIAGNOSIS — G89.4 CHRONIC PAIN SYNDROME: ICD-10-CM

## 2024-08-06 DIAGNOSIS — E11.9 TYPE 2 DIABETES MELLITUS WITHOUT COMPLICATION, WITH LONG-TERM CURRENT USE OF INSULIN (H): Primary | ICD-10-CM

## 2024-08-06 DIAGNOSIS — M19.91 PRIMARY OSTEOARTHRITIS, UNSPECIFIED SITE: ICD-10-CM

## 2024-08-06 DIAGNOSIS — E11.9 TYPE 2 DIABETES MELLITUS WITHOUT COMPLICATION, WITHOUT LONG-TERM CURRENT USE OF INSULIN (H): ICD-10-CM

## 2024-08-06 DIAGNOSIS — Z79.4 TYPE 2 DIABETES MELLITUS WITHOUT COMPLICATION, WITH LONG-TERM CURRENT USE OF INSULIN (H): Primary | ICD-10-CM

## 2024-08-06 DIAGNOSIS — F11.90 CHRONIC, CONTINUOUS USE OF OPIOIDS: ICD-10-CM

## 2024-08-06 LAB
ANION GAP SERPL CALCULATED.3IONS-SCNC: 11 MMOL/L (ref 7–15)
BUN SERPL-MCNC: 15.8 MG/DL (ref 6–20)
CALCIUM SERPL-MCNC: 9.7 MG/DL (ref 8.8–10.4)
CHLORIDE SERPL-SCNC: 102 MMOL/L (ref 98–107)
CREAT SERPL-MCNC: 0.72 MG/DL (ref 0.51–0.95)
EGFRCR SERPLBLD CKD-EPI 2021: >90 ML/MIN/1.73M2
GLUCOSE SERPL-MCNC: 164 MG/DL (ref 70–99)
HBA1C MFR BLD: 9.4 % (ref 0–5.6)
HCO3 SERPL-SCNC: 26 MMOL/L (ref 22–29)
POTASSIUM SERPL-SCNC: 4.4 MMOL/L (ref 3.4–5.3)
SODIUM SERPL-SCNC: 139 MMOL/L (ref 135–145)

## 2024-08-06 PROCEDURE — 80048 BASIC METABOLIC PNL TOTAL CA: CPT | Performed by: PEDIATRICS

## 2024-08-06 PROCEDURE — 83036 HEMOGLOBIN GLYCOSYLATED A1C: CPT | Performed by: PEDIATRICS

## 2024-08-06 PROCEDURE — G2211 COMPLEX E/M VISIT ADD ON: HCPCS | Performed by: PEDIATRICS

## 2024-08-06 PROCEDURE — 99214 OFFICE O/P EST MOD 30 MIN: CPT | Performed by: PEDIATRICS

## 2024-08-06 PROCEDURE — 36415 COLL VENOUS BLD VENIPUNCTURE: CPT | Performed by: PEDIATRICS

## 2024-08-06 RX ORDER — INSULIN ASPART 100 [IU]/ML
INJECTION, SOLUTION INTRAVENOUS; SUBCUTANEOUS
Qty: 45 ML | Refills: 11 | Status: SHIPPED | OUTPATIENT
Start: 2024-08-06

## 2024-08-06 RX ORDER — OXYCODONE AND ACETAMINOPHEN 5; 325 MG/1; MG/1
1-2 TABLET ORAL EVERY 4 HOURS PRN
Qty: 90 TABLET | Refills: 0 | Status: SHIPPED | OUTPATIENT
Start: 2024-08-06

## 2024-08-06 ASSESSMENT — PATIENT HEALTH QUESTIONNAIRE - PHQ9
SUM OF ALL RESPONSES TO PHQ QUESTIONS 1-9: 0
10. IF YOU CHECKED OFF ANY PROBLEMS, HOW DIFFICULT HAVE THESE PROBLEMS MADE IT FOR YOU TO DO YOUR WORK, TAKE CARE OF THINGS AT HOME, OR GET ALONG WITH OTHER PEOPLE: NOT DIFFICULT AT ALL
SUM OF ALL RESPONSES TO PHQ QUESTIONS 1-9: 0

## 2024-08-06 ASSESSMENT — PAIN SCALES - GENERAL: PAINLEVEL: NO PAIN (0)

## 2024-08-06 NOTE — LETTER
My Asthma Action Plan    Name: Johnna Caballero   YOB: 1965  Date: 8/6/2024   My doctor: Linda Durant MD   My clinic: [facility]        My Rescue Medicine:   Albuterol inhaler (Proair/Ventolin/Proventil HFA)  2-4 puffs EVERY 4 HOURS as needed. Use a spacer if recommended by your provider.   My Asthma Severity:   Intermittent / Exercise Induced  Know your asthma triggers: upper respiratory infections             GREEN ZONE   Good Control  I feel good  No cough or wheeze  Can work, sleep and play without asthma symptoms       Take your asthma control medicine every day.     If exercise triggers your asthma, take your rescue medication  15 minutes before exercise or sports, and  During exercise if you have asthma symptoms  Spacer to use with inhaler: If you have a spacer, make sure to use it with your inhaler             YELLOW ZONE Getting Worse  I have ANY of these:  I do not feel good  Cough or wheeze  Chest feels tight  Wake up at night   Keep taking your Green Zone medications  Start taking your rescue medicine:  every 20 minutes for up to 1 hour. Then every 4 hours for 24-48 hours.  If you stay in the Yellow Zone for more than 12-24 hours, contact your doctor.  If you do not return to the Green Zone in 12-24 hours or you get worse, start taking your oral steroid medicine if prescribed by your provider.           RED ZONE Medical Alert - Get Help  I have ANY of these:  I feel awful  Medicine is not helping  Breathing getting harder  Trouble walking or talking  Nose opens wide to breathe       Take your rescue medicine NOW  If your provider has prescribed an oral steroid medicine, start taking it NOW  Call your doctor NOW  If you are still in the Red Zone after 20 minutes and you have not reached your doctor:  Take your rescue medicine again and  Call 911 or go to the emergency room right away    See your regular doctor within 2 weeks of an Emergency Room or Urgent Care  visit for follow-up treatment.          Annual Reminders:  Meet with Asthma Educator,  Flu Shot in the Fall, consider Pneumonia Vaccination for patients with asthma (aged 19 and older).    Pharmacy:    [facility] [address]  WRITTEN PRESCRIPTION REQUESTED  [facility] - [place] - [address]  [facility] - [place] - [address] [facility] AT PORTAL TO REGISTERED [facility] SITES  SAFEWAY # - [facility], [place] - [address] [id number] - [place], [place] - [address] AT [facility] [address] [facility]    Electronically signed by Linda Durant MD   Date: 08/06/24                    Asthma Triggers  How To Control Things That Make Your Asthma Worse    Triggers are things that make your asthma worse.  Look at the list below to help you find your triggers and   what you can do about them. You can help prevent asthma flare-ups by staying away from your triggers.      Trigger                                                          What you can do   Cigarette Smoke  Tobacco smoke can make asthma worse. Do not allow smoking in your home, car or around you.  Be sure no one smokes at a child s day care or school.  If you smoke, ask your health care provider for ways to help you quit.  Ask family members to quit too.  Ask your health care provider for a referral to Quit Plan to help you quit smoking, or call 2-836-151-PLAN.     Colds, Flu, Bronchitis  These are common triggers of asthma. Wash your hands often.  Don t touch your eyes, nose or mouth.  Get a flu shot every year.     Dust Mites  These are tiny bugs that live in cloth or carpet. They are too small to see. Wash sheets and blankets in hot water every week.   Encase pillows and mattress in dust mite proof covers.  Avoid having carpet if you can. If you have carpet, vacuum weekly.   Use a dust mask and HEPA  vacuum.   Pollen and Outdoor Mold  Some people are allergic to trees, grass, or weed pollen, or molds. Try to keep your windows closed.  Limit time out doors when pollen count is high.   Ask you health care provider about taking medicine during allergy season.     Animal Dander  Some people are allergic to skin flakes, urine or saliva from pets with fur or feathers. Keep pets with fur or feathers out of your home.    If you can t keep the pet outdoors, then keep the pet out of your bedroom.  Keep the bedroom door closed.  Keep pets off cloth furniture and away from stuffed toys.     Mice, Rats, and Cockroaches  Some people are allergic to the waste from these pests.   Cover food and garbage.  Clean up spills and food crumbs.  Store grease in the refrigerator.   Keep food out of the bedroom.   Indoor Mold  This can be a trigger if your home has high moisture. Fix leaking faucets, pipes, or other sources of water.   Clean moldy surfaces.  Dehumidify basement if it is damp and smelly.   Smoke, Strong Odors, and Sprays  These can reduce air quality. Stay away from strong odors and sprays, such as perfume, powder, hair spray, paints, smoke incense, paint, cleaning products, candles and new carpet.   Exercise or Sports  Some people with asthma have this trigger. Be active!  Ask your doctor about taking medicine before sports or exercise to prevent symptoms.    Warm up for 5-10 minutes before and after sports or exercise.     Other Triggers of Asthma  Cold air:  Cover your nose and mouth with a scarf.  Sometimes laughing or crying can be a trigger.  Some medicines and food can trigger asthma.

## 2024-08-06 NOTE — PROGRESS NOTES
Assessment & Plan       ICD-10-CM    1. Type 2 diabetes mellitus without complication, with long-term current use of insulin (H)  E11.9 Basic metabolic panel  (Ca, Cl, CO2, Creat, Gluc, K, Na, BUN)    Z79.4 Hemoglobin A1c     insulin aspart (NOVOLOG FLEXPEN) 100 UNIT/ML pen    Repeat labs today, restart short acting insulin, continue long acting at current dosing schedule    Repeat labs in 3 months, use Dexcom, increase exercise      2. Chronic pain syndrome  G89.4 oxyCODONE-acetaminophen (PERCOCET) 5-325 MG tablet  Well controlled, continue current medications        3. Primary osteoarthritis, unspecified site  M19.91 oxyCODONE-acetaminophen (PERCOCET) 5-325 MG tablet      4. Type 2 diabetes mellitus without complication, without long-term current use of insulin (H)  E11.9 insulin aspart (NOVOLOG FLEXPEN) 100 UNIT/ML pen      5. Chronic, continuous use of opioids  F11.90 Continue with script q 3 months          The longitudinal plan of care for the diagnosis(es)/condition(s) as documented were addressed during this visit. Due to the added complexity in care, I will continue to support Jung in the subsequent management and with ongoing continuity of care.        Keiry Feng is a 58 year old, presenting for the following health issues:  Follow Up        8/6/2024    11:09 AM   Additional Questions   Roomed by Paige Gannon   Accompanied by BIMAL     History of Present Illness       Diabetes:   She presents for follow up of diabetes.    She is not checking blood glucose.         She has no concerns regarding her diabetes at this time.   She is not experiencing numbness or burning in feet, excessive thirst, blurry vision, weight changes or redness, sores or blisters on feet.           She eats 0-1 servings of fruits and vegetables daily.She consumes 0 sweetened beverage(s) daily.She exercises with enough effort to increase her heart rate 20 to 29 minutes per day.  She exercises with enough effort to increase her  heart rate 4 days per week.   She is taking medications regularly.       Continues to try Dexcom -  tends to fall off after a few days or get knocked off.   Good tool when needed.  No serious lows.       Severe side effects from Victoza and Jardiance - prefers not to restart anything in this category    Historically on short acting insulin also and willing to restart    Physical therapy - just completed in [place] - feeling better and more ready to exercise    Mood in a good place - prefers depression/anxiety to be taken off problem list - no longer an issue    Pain meds - using #90 over about 3 months    Busy taking care of parents    Recovering well from right trigger finger release - plans to do left soon        Objective    /70   Pulse 72   Temp 97  F (36.1  C) (Tympanic)   Resp 16   Wt 133.5 kg (294 lb 6.4 oz)   SpO2 98%   BMI 47.52 kg/m    Body mass index is 47.52 kg/m .  Wt Readings from Last 4 Encounters:   08/06/24 133.5 kg (294 lb 6.4 oz)   07/24/24 133.8 kg (295 lb)   04/25/24 133.8 kg (295 lb)   12/19/23 131.1 kg (289 lb)       Physical Exam   GENERAL: alert and no distress  RESP: lungs clear to auscultation - no rales, rhonchi or wheezes  CV: regular rate and rhythm, normal S1 S2, no S3 or S4, no murmur, click or rub, no peripheral edema   PSYCH: mentation appears normal, affect normal/bright    Labs pending        Signed Electronically by: Linda Durant MD

## 2024-08-06 NOTE — PATIENT INSTRUCTIONS
Walking - 30 minutes 5 times per week    Keep up your Dexcom as much you can    Keep meds the same for now - hoang if you are wanting to try something new

## 2024-09-04 ENCOUNTER — TRANSFERRED RECORDS (OUTPATIENT)
Dept: HEALTH INFORMATION MANAGEMENT | Facility: CLINIC | Age: 59
End: 2024-09-04
Payer: COMMERCIAL

## 2024-10-23 ENCOUNTER — OFFICE VISIT (OUTPATIENT)
Dept: PODIATRY | Facility: CLINIC | Age: 59
End: 2024-10-23
Payer: COMMERCIAL

## 2024-10-23 VITALS — DIASTOLIC BLOOD PRESSURE: 78 MMHG | WEIGHT: 293 LBS | BODY MASS INDEX: 47.45 KG/M2 | SYSTOLIC BLOOD PRESSURE: 118 MMHG

## 2024-10-23 DIAGNOSIS — E11.42 DIABETIC POLYNEUROPATHY ASSOCIATED WITH TYPE 2 DIABETES MELLITUS (H): Primary | ICD-10-CM

## 2024-10-23 DIAGNOSIS — M79.672 LEFT FOOT PAIN: ICD-10-CM

## 2024-10-23 DIAGNOSIS — L60.0 INGROWN NAIL OF GREAT TOE OF LEFT FOOT: ICD-10-CM

## 2024-10-23 PROCEDURE — 11730 AVULSION NAIL PLATE SIMPLE 1: CPT | Mod: TA | Performed by: PODIATRIST

## 2024-10-23 PROCEDURE — 99213 OFFICE O/P EST LOW 20 MIN: CPT | Mod: 25 | Performed by: PODIATRIST

## 2024-10-23 RX ORDER — SILVER SULFADIAZINE 10 MG/G
CREAM TOPICAL 2 TIMES DAILY
Qty: 25 G | Refills: 1 | Status: SHIPPED | OUTPATIENT
Start: 2024-10-23

## 2024-10-23 NOTE — LETTER
10/23/2024      Johnna Caballero  [address] [facility] [address]  [place] [address]      Dear Colleague,    Thank you for referring your patient, Johnna Caballero, to the [facility]. Please see a copy of my visit note below.    Podiatry / Foot and Ankle Surgery Progress Note    October 23, 2024    Subject: Patient was seen for painful ingrown nail to the left great toe.  Notes has been going on for few weeks.  Very sore in close toed shoes or with pressure.  Can be 6 out of 10 at its worst.  Denies fever, nausea, vomiting.  Wondering what can be done for it.    Objective:  Vitals: /78   Wt 133.4 kg (294 lb)   BMI 47.45 kg/m    BMI= Body mass index is 47.45 kg/m .    A1C: 9.4  (8/6/2024)     General:  Patient is alert and orientated.  NAD.     Dermatologic: Medial border of left great toenail is incurvated.  Localized redness and pain on palpation.     Vascular: DP & PT pulses are intact & regular bilaterally.   edema and varicosities noted.  CFT and skin temperature is normal to both lower extremities.     Neurologic: Lower extremity sensation is diminished to feet.      Musculoskeletal: Patient is ambulatory without assistive device or brace. Decrease arch height. Decrease dorsiflexion right ankle.  Pain on palpation of the plantar aspect of the right fifth metatarsal head.      ASSESSMENT:      Diabetic polyneuropathy associated with type 2 diabetes mellitus (H)  Ingrown nail of great toe of left foot  Left foot pain     Plan: The potential causes and nature of an ingrown toenail were discussed with the patient.  We reviewed the natural history/prognosis of the condition and potential risks if no treatment is provided.      Treatment options discussed included conservative management (oral antibiotics, soaking of foot, adequate width shoes)  as well as surgical management (partial or total nail removal).  The pros and cons of both forms of treatment were reviewed.      Like to  have the border removed today.  Please see procedure note below.  Did not do the phenol procedure given that she is a diabetic and her A1c is 9.4.  I was concerned that phenol would cause an open draining wound and leave her more open to infection or possibly deeper infection.  She will soak the foot twice a day for 2 weeks and apply antibiotic ointment and a bandage.        All questions were answered to patient satisfaction and she will call for the questions or concerns.    Procedure: After verbal consent, the left big toe was anesthetized with 5cc's of 1% lidocaine plain. A tourniquet was applied to the toe. The medial border was then raised from the nail bed and then cut the length of the nail.  The offending nail border was then removed.   Bacitracin was applied to the nail bed.  The tourniquet was removed.  Bandage was applied to the toe.  The patient tolerated the procedure and anesthesia well.       Patient Risk Factor:  Patient is a medium risk factor for infection..     Suzanna Strong DPM, Podiatry/Foot and Ankle Surgery      Again, thank you for allowing me to participate in the care of your patient.        Sincerely,        Suzanna Strong DPM, Podiatry/Foot and Ankle Surgery

## 2024-10-23 NOTE — PATIENT INSTRUCTIONS
Thank you for choosing [facility] Podiatry / Foot & Ankle Surgery!    DR WALTON'S CLINIC:  [facility]   [address]   [place] [address]  [phone]    (Tues, Wed, Thur am, Fri pm)     [facility]  [address], [place] [address]  [phone]  (Every other Friday morning)    [facility]  [address] [place] [id number] [phone]  (Every other Friday)     TRIAGE LINE: [phone]  APPOINTMENTS: [phone]  RADIOLOGY: [phone]  SET UP SURGERY: [phone]  PHYSICAL THERAPY: [phone]   FAX NUMBER: [phone]  BILLING QUESTIONS: [phone]       Follow up: as needed.       INGROWN TOENAILS  When a toenail is ingrown, it is curved and grows into the skin, usually at the nail borders (the sides of the nail). This  digging in  of the nail irritates the skin, often creating pain, redness, swelling, and warmth in the toe.  If an ingrown nail causes a break in the skin, bacteria may enter and cause an infection in the area, which is often marked by drainage and a foul odor. However, even if the toe isn t painful, red, swollen, or warm, a nail that curves downward into the skin can progress to an infection.  CAUSES:  Heredity: In many people, the tendency for ingrown toenails is inherited.   Trauma: Sometimes an ingrown toenail is the result of trauma, such as stubbing your toe, having an object fall on your toe, or engaging in activities that involve repeated pressure on the toes, such as kicking or running.   Improper Trimming:  The most common cause of ingrown toenails is cutting your nails too short. This encourages the skin next to the nail to fold over the nail.   Improperly Sized Footwear: Ingrown toenails can result from wearing socks and shoes that are tight or short.   Nail Conditions: Ingrown toenails can be caused by nail problems, such as fungal infections or losing a nail due to trauma.    TREATMENT: Sometimes initial treatment for ingrown toenails can be safely performed at home. However, home treatment is strongly discouraged if an infection is suspected, or for those who have medical conditions that put feet at high risk, such as diabetes, nerve damage in the foot, or poor circulation.  Home care: If you don t have an infection or any of the above medical conditions, you can soak your foot in room-temperature water (adding Epsom s salt may be recommended by your doctor), and gently massage the side of the nail fold to help reduce the inflammation.  Avoid attempting  bathroom surgery.  Repeated cutting of the nail can cause the condition to worsen over time. If your symptoms fail to improve, it s time to see a foot and ankle surgeon.  Physician care: After examining the toe, the foot and ankle surgeon will select the treatment best suited for you. If an infection is present, an oral antibiotic may be prescribed.  Sometimes a minor surgical procedure, often performed in the office, will ease the pain and remove the offending nail. After applying a local anesthetic, the doctor removes part of the nail s side border. Some nails may become ingrown again, requiring removal of the nail root.  Following the nail procedure, a light bandage will be applied. Most people experience very little pain after surgery and may resume normal activity the next day. If your surgeon has prescribed an oral antibiotic, be sure to take all the medication, even if your symptoms have improved.  PREVENTION:  Proper Trimming: Cut toenails in a fairly straight line, and don t cut them too short. You should be able to get your fingernail under the sides and end of the nail.   Well-fitting Footwear: Don t wear shoes that are short or tight in the toe area. Avoid shoes that are loose, because they too cause pressure on the toes, especially when running or walking briskly.     INGROWN TOENAIL REMOVAL AFTERCARE   Go directly home  and elevate the affected foot on one or two pillows for the remainder of the day/evening if possible. Your toe may stay numb anywhere from 2-8 hours.   Take Tylenol, ibuprofen or another anti-inflammatory as needed for pain.   Take antibiotic if that has been prescribed. Finish the entire prescribed antibiotic even if your symptoms have improved.   The evening of the procedure, soak/wash the affected area in warm water (you may add Epsom salt) for 5 to 10 minutes. Do this twice a day for 2-4 weeks (6-8 weeks if you had phenol) (you may count showering/bathing as one soak).  After soaks, pat the area dry and then allow to airdry for a few minutes. Apply antibiotic ointment to the area and cover with 2 X 2 gauze and paper tape or band-aid.  You may pursue everyday activities as tolerated with either an open toe shoe or cut-out shoe as needed or you may wear regular shoes if no pain is noted.  Watch for any signs and symptoms of infection such as: redness, red streaks going up the foot/leg, swelling, pus or foul odor. Those that have had the phenol procedure, the toe will drain longer and will look like it is infected because it is a chemical burn.   Please call with questions.

## 2024-10-23 NOTE — PROGRESS NOTES
Podiatry / Foot and Ankle Surgery Progress Note    October 23, 2024    Subject: Patient was seen for painful ingrown nail to the left great toe.  Notes has been going on for few weeks.  Very sore in close toed shoes or with pressure.  Can be 6 out of 10 at its worst.  Denies fever, nausea, vomiting.  Wondering what can be done for it.    Objective:  Vitals: /78   Wt 133.4 kg (294 lb)   BMI 47.45 kg/m    BMI= Body mass index is 47.45 kg/m .    A1C: 9.4  (8/6/2024)     General:  Patient is alert and orientated.  NAD.     Dermatologic: Medial border of left great toenail is incurvated.  Localized redness and pain on palpation.     Vascular: DP & PT pulses are intact & regular bilaterally.   edema and varicosities noted.  CFT and skin temperature is normal to both lower extremities.     Neurologic: Lower extremity sensation is diminished to feet.      Musculoskeletal: Patient is ambulatory without assistive device or brace. Decrease arch height. Decrease dorsiflexion right ankle.  Pain on palpation of the plantar aspect of the right fifth metatarsal head.      ASSESSMENT:      Diabetic polyneuropathy associated with type 2 diabetes mellitus (H)  Ingrown nail of great toe of left foot  Left foot pain     Plan: The potential causes and nature of an ingrown toenail were discussed with the patient.  We reviewed the natural history/prognosis of the condition and potential risks if no treatment is provided.      Treatment options discussed included conservative management (oral antibiotics, soaking of foot, adequate width shoes)  as well as surgical management (partial or total nail removal).  The pros and cons of both forms of treatment were reviewed.      Like to have the border removed today.  Please see procedure note below.  Did not do the phenol procedure given that she is a diabetic and her A1c is 9.4.  I was concerned that phenol would cause an open draining wound and leave her more open to infection or  possibly deeper infection.  She will soak the foot twice a day for 2 weeks and apply antibiotic ointment and a bandage.        All questions were answered to patient satisfaction and she will call for the questions or concerns.    Procedure: After verbal consent, the left big toe was anesthetized with 5cc's of 1% lidocaine plain. A tourniquet was applied to the toe. The medial border was then raised from the nail bed and then cut the length of the nail.  The offending nail border was then removed.   Bacitracin was applied to the nail bed.  The tourniquet was removed.  Bandage was applied to the toe.  The patient tolerated the procedure and anesthesia well.       Patient Risk Factor:  Patient is a medium risk factor for infection..     Suzanna Strong DPM, Podiatry/Foot and Ankle Surgery

## 2024-10-31 ENCOUNTER — TRANSFERRED RECORDS (OUTPATIENT)
Dept: HEALTH INFORMATION MANAGEMENT | Facility: CLINIC | Age: 59
End: 2024-10-31
Payer: COMMERCIAL

## 2024-11-26 ENCOUNTER — LAB (OUTPATIENT)
Dept: LAB | Facility: CLINIC | Age: 59
End: 2024-11-26
Payer: COMMERCIAL

## 2024-11-26 ENCOUNTER — OFFICE VISIT (OUTPATIENT)
Dept: PEDIATRICS | Facility: CLINIC | Age: 59
End: 2024-11-26
Payer: COMMERCIAL

## 2024-11-26 VITALS
TEMPERATURE: 97.7 F | OXYGEN SATURATION: 98 % | SYSTOLIC BLOOD PRESSURE: 134 MMHG | BODY MASS INDEX: 46.66 KG/M2 | DIASTOLIC BLOOD PRESSURE: 68 MMHG | RESPIRATION RATE: 18 BRPM | WEIGHT: 290.3 LBS | HEIGHT: 66 IN | HEART RATE: 86 BPM

## 2024-11-26 DIAGNOSIS — G89.4 CHRONIC PAIN SYNDROME: ICD-10-CM

## 2024-11-26 DIAGNOSIS — M19.91 PRIMARY OSTEOARTHRITIS, UNSPECIFIED SITE: ICD-10-CM

## 2024-11-26 DIAGNOSIS — E11.65 TYPE 2 DIABETES MELLITUS WITH HYPERGLYCEMIA, WITH LONG-TERM CURRENT USE OF INSULIN (H): ICD-10-CM

## 2024-11-26 DIAGNOSIS — E11.9 TYPE 2 DIABETES MELLITUS WITHOUT COMPLICATION, WITH LONG-TERM CURRENT USE OF INSULIN (H): Primary | ICD-10-CM

## 2024-11-26 DIAGNOSIS — E11.42 DIABETIC POLYNEUROPATHY ASSOCIATED WITH TYPE 2 DIABETES MELLITUS (H): ICD-10-CM

## 2024-11-26 DIAGNOSIS — Z79.4 TYPE 2 DIABETES MELLITUS WITH HYPERGLYCEMIA, WITH LONG-TERM CURRENT USE OF INSULIN (H): ICD-10-CM

## 2024-11-26 DIAGNOSIS — I10 ESSENTIAL HYPERTENSION: ICD-10-CM

## 2024-11-26 DIAGNOSIS — Z79.4 TYPE 2 DIABETES MELLITUS WITHOUT COMPLICATION, WITH LONG-TERM CURRENT USE OF INSULIN (H): Primary | ICD-10-CM

## 2024-11-26 LAB
ALBUMIN SERPL BCG-MCNC: 4.1 G/DL (ref 3.5–5.2)
ALP SERPL-CCNC: 117 U/L (ref 40–150)
ALT SERPL W P-5'-P-CCNC: 17 U/L (ref 0–50)
ANION GAP SERPL CALCULATED.3IONS-SCNC: 14 MMOL/L (ref 7–15)
ANION GAP SERPL CALCULATED.3IONS-SCNC: 14 MMOL/L (ref 7–15)
AST SERPL W P-5'-P-CCNC: 19 U/L (ref 0–45)
BILIRUB SERPL-MCNC: 0.2 MG/DL
BUN SERPL-MCNC: 17.5 MG/DL (ref 6–20)
BUN SERPL-MCNC: 17.5 MG/DL (ref 6–20)
CALCIUM SERPL-MCNC: 9.8 MG/DL (ref 8.8–10.4)
CALCIUM SERPL-MCNC: 9.8 MG/DL (ref 8.8–10.4)
CHLORIDE SERPL-SCNC: 99 MMOL/L (ref 98–107)
CHLORIDE SERPL-SCNC: 99 MMOL/L (ref 98–107)
CHOLEST SERPL-MCNC: 212 MG/DL
CREAT SERPL-MCNC: 0.8 MG/DL (ref 0.51–0.95)
CREAT SERPL-MCNC: 0.8 MG/DL (ref 0.51–0.95)
EGFRCR SERPLBLD CKD-EPI 2021: 85 ML/MIN/1.73M2
EGFRCR SERPLBLD CKD-EPI 2021: 85 ML/MIN/1.73M2
EST. AVERAGE GLUCOSE BLD GHB EST-MCNC: 240 MG/DL
GLUCOSE SERPL-MCNC: 231 MG/DL (ref 70–99)
GLUCOSE SERPL-MCNC: 231 MG/DL (ref 70–99)
HBA1C MFR BLD: 10 % (ref 0–5.6)
HCO3 SERPL-SCNC: 23 MMOL/L (ref 22–29)
HCO3 SERPL-SCNC: 23 MMOL/L (ref 22–29)
HDLC SERPL-MCNC: 37 MG/DL
LDLC SERPL CALC-MCNC: 106 MG/DL
NONHDLC SERPL-MCNC: 175 MG/DL
POTASSIUM SERPL-SCNC: 4.3 MMOL/L (ref 3.4–5.3)
POTASSIUM SERPL-SCNC: 4.3 MMOL/L (ref 3.4–5.3)
PROT SERPL-MCNC: 7.4 G/DL (ref 6.4–8.3)
SODIUM SERPL-SCNC: 136 MMOL/L (ref 135–145)
SODIUM SERPL-SCNC: 136 MMOL/L (ref 135–145)
TRIGL SERPL-MCNC: 347 MG/DL
TSH SERPL DL<=0.005 MIU/L-ACNC: 2.53 UIU/ML (ref 0.3–4.2)

## 2024-11-26 PROCEDURE — 80053 COMPREHEN METABOLIC PANEL: CPT | Mod: 59 | Performed by: PEDIATRICS

## 2024-11-26 PROCEDURE — 84443 ASSAY THYROID STIM HORMONE: CPT | Performed by: PEDIATRICS

## 2024-11-26 PROCEDURE — G2211 COMPLEX E/M VISIT ADD ON: HCPCS | Performed by: PEDIATRICS

## 2024-11-26 PROCEDURE — 99214 OFFICE O/P EST MOD 30 MIN: CPT | Performed by: PEDIATRICS

## 2024-11-26 PROCEDURE — 84295 ASSAY OF SERUM SODIUM: CPT | Mod: 59

## 2024-11-26 PROCEDURE — 36415 COLL VENOUS BLD VENIPUNCTURE: CPT

## 2024-11-26 PROCEDURE — 83036 HEMOGLOBIN GLYCOSYLATED A1C: CPT

## 2024-11-26 PROCEDURE — 80061 LIPID PANEL: CPT | Performed by: PEDIATRICS

## 2024-11-26 RX ORDER — OXYCODONE AND ACETAMINOPHEN 5; 325 MG/1; MG/1
1-2 TABLET ORAL EVERY 4 HOURS PRN
Qty: 90 TABLET | Refills: 0 | Status: SHIPPED | OUTPATIENT
Start: 2024-11-26

## 2024-11-26 ASSESSMENT — PAIN SCALES - GENERAL: PAINLEVEL_OUTOF10: NO PAIN (0)

## 2024-11-26 NOTE — PROGRESS NOTES
"  Assessment & Plan       ICD-10-CM    1. Type 2 diabetes mellitus without complication, with long-term current use of insulin (H)  E11.9 Albumin Random Urine Quantitative with Creat Ratio    Z79.4 Comprehensive metabolic panel     Lipid panel reflex to direct LDL Non-fasting     TSH with free T4 reflex    A1C up to 10% today - plan to increase lantus to 60 units and focus on more regular use of short acting insulin.  Walking on new treadmill plan.   Follow up in 4 months.      2. Essential hypertension  I10 Well controlled, continue current medications        3. Diabetic polyneuropathy associated with type 2 diabetes mellitus (H)  E11.42 OPTOMETRY REFERRAL      4. Type 2 diabetes mellitus with hyperglycemia, with long-term current use of insulin (H)  E11.65 insulin glargine (LANTUS PEN) 100 UNIT/ML pen    Z79.4       5. Chronic pain syndrome  G89.4 oxyCODONE-acetaminophen (PERCOCET) 5-325 MG tablet    Stable pain med regimen - refill placed for next 3 months      6. Primary osteoarthritis, unspecified site  M19.91 oxyCODONE-acetaminophen (PERCOCET) 5-325 MG tablet                      The longitudinal plan of care for the diagnosis(es)/condition(s) as documented were addressed during this visit. Due to the added complexity in care, I will continue to support Jung in the subsequent management and with ongoing continuity of care.           BMI  Estimated body mass index is 46.86 kg/m  as calculated from the following:    Height as of this encounter: 1.676 m (5' 6\").    Weight as of this encounter: 131.7 kg (290 lb 4.8 oz).   Weight management plan: Discussed healthy diet and exercise guidelines          Subjective   Jung is a 59 year old, presenting for the following health issues:  Follow Up        11/26/2024     1:07 PM   Additional Questions   Roomed by Paige Gannon   Accompanied by BIMAL RUSS     Diabetes - A1C today a bit higher - has been a very stressful few months with father in and out of the hospital.   " "Just got a treadmill and plans to start using it soon    Chronic pain - stable on current regimen - working with orthopedics after recent trigger finger surgery    HTN - controlled on current medications    Diabetes polyneuropathy - no new foot infection/ulcer concerns - had ingrown toenail removed at visit with podiatry in October            Objective    /68 (BP Location: Right arm, Patient Position: Sitting, Cuff Size: Adult Large)   Pulse 86   Temp 97.7  F (36.5  C) (Temporal)   Resp 18   Ht 1.676 m (5' 6\")   Wt 131.7 kg (290 lb 4.8 oz)   SpO2 98%   BMI 46.86 kg/m    Body mass index is 46.86 kg/m .  Wt Readings from Last 4 Encounters:   11/26/24 131.7 kg (290 lb 4.8 oz)   10/23/24 133.4 kg (294 lb)   08/06/24 133.5 kg (294 lb 6.4 oz)   07/24/24 133.8 kg (295 lb)       Physical Exam   GENERAL: alert and no distress  RESP: lungs clear to auscultation - no rales, rhonchi or wheezes  CV: regular rate and rhythm, normal S1 S2, no S3 or S4, no murmur, click or rub, no peripheral edema   PSYCH: mentation appears normal, affect normal/bright    Labs pending        Signed Electronically by: Linda Durant MD    "

## 2024-12-04 ENCOUNTER — MYC MEDICAL ADVICE (OUTPATIENT)
Dept: PEDIATRICS | Facility: CLINIC | Age: 59
End: 2024-12-04
Payer: COMMERCIAL

## 2024-12-04 DIAGNOSIS — R42 DIZZINESS: Primary | ICD-10-CM

## 2024-12-12 ENCOUNTER — THERAPY VISIT (OUTPATIENT)
Dept: PHYSICAL THERAPY | Facility: CLINIC | Age: 59
End: 2024-12-12
Attending: PEDIATRICS
Payer: COMMERCIAL

## 2024-12-12 DIAGNOSIS — H81.11 BENIGN PAROXYSMAL POSITIONAL VERTIGO, RIGHT: Primary | ICD-10-CM

## 2024-12-12 PROCEDURE — 95992 CANALITH REPOSITIONING PROC: CPT | Mod: GP | Performed by: PHYSICAL THERAPIST

## 2024-12-12 PROCEDURE — 97161 PT EVAL LOW COMPLEX 20 MIN: CPT | Mod: GP | Performed by: PHYSICAL THERAPIST

## 2024-12-12 NOTE — PROGRESS NOTES
PHYSICAL THERAPY EVALUATION  Type of Visit: Evaluation        Fall Risk Screen:  Fall screen completed by: PT  Have you fallen 2 or more times in the past year?: (Patient-Rptd) No  Have you fallen and had an injury in the past year?: (Patient-Rptd) No  Is patient a fall risk?: No    Subjective         Presenting condition or subjective complaint: (Patient-Rptd) dizziness when lying down and getting up.  Date of onset: 08/01/24    Relevant medical history: (Patient-Rptd) Anemia; Arthritis; Asthma; Diabetes; High blood pressure; Menopause; Migraines or headaches; Osteoarthritis; Overweight; buldged disk C5-6   Dates & types of surgery:      Prior diagnostic imaging/testing results:       Prior therapy history for the same diagnosis, illness or injury: (Patient-Rptd) No      Prior Level of Function: Independent    Living Environment  Social support: (Patient-Rptd) Alone   Type of home: (Patient-Rptd) House   Stairs to enter the home: (Patient-Rptd) No       Ramp: (Patient-Rptd) No   Stairs inside the home: (Patient-Rptd) No       Help at home: (Patient-Rptd) None  Equipment owned:       Employment: (Patient-Rptd) No    Hobbies/Interests:      Patient goals for therapy: (Patient-Rptd) Stop the room from spinning    Pain assessment: Pain present- cervical spine; hx of C5-6 disc involvement     Objective      GAIT: WNL  BALANCE: WFL  SENSATION:  WNL       VESTIBULAR EVALUATION  ADDITIONAL HISTORY:  Description of symptoms: (Patient-Rptd) Off balance; Nausea or vomiting; I feel like I am spinning; I feel like the room is spinning especially when bending down  Dizzy attacks:   Start: (Patient-Rptd) couple months ago   Last attack: (Patient-Rptd) daily   Frequency of occurrences: (Patient-Rptd) daily   Length of attack: (Patient-Rptd) 15 to 30 seconds  Difficulty hearing: (Patient-Rptd) Left ear- mild decrease but hearing is fine overall  Noise in ears? (Patient-Rptd) Yes (Patient-Rptd) bubbles or a bug crawling  out  Alleviates symptoms: (Patient-Rptd) Closing eyes  Worsens symptoms: (Patient-Rptd) getting up from a position especially getting in and out of bed  Activities that bring on symptoms: (Patient-Rptd) Bending over; Reaching up; Turning while walking; Other  (Patient-Rptd) changing positions  Pertinent visual history: wears glasses when driving or reading  Pertinent history of current vestibular problem: Migraines  DHI: Total Score: (Patient-Rptd) 26    Cervicogenic Screen    Neck ROM WFL for positional testing   Vertebral Artery Test    Alar Ligament Test    Transverse Ligament Test    Distraction    Neck Torsion Test (head still, body rotating)    Neck Torsion Test (head and body rotating)         Oculomotor Screen    Ocular ROM Normal   Smooth Pursuit Normal   Saccades Normal   VOR Normal   VOR Cancellation Normal   Head Impulse Test    Convergence Testing Normal        Infrared Goggle Exam Vestibular Suppressant in Last 24 Hours? No  Exam Completed With: Infrared goggles   Spontaneous Nystagmus Negative   Gaze Evoked Nystagmus Negative- some tendency for eyes to abduct with elevation   Head Shake Horizontal Nystagmus    Positional Testing Upbeating R torsional   Supine Head-Hanging Test     Left Right   [place]-Hallpike Negative Upbeating R torsional   Sidelying Test     [facility] Supine Roll Test     HS[facility] Forward Roll Test     [place] and Lean Test -  Sitting Erect    [place] and Lean Test - Seated, Head Bent 60 Degrees Forward    [place] and Lean Test - Seated, Head Bent Backwards       BPPV Canal(s): R Posterior  BPPV Type: Canalithasis    Assessment & Plan   CLINICAL IMPRESSIONS  Medical Diagnosis: Dizziness    Treatment Diagnosis: s/s of BPPV   Impression/Assessment: Patient is a 59 year old female with dizziness complaints.  The following significant findings have been identified: Decreased activity tolerance and Dizziness. These impairments interfere with their ability to perform self care tasks, work tasks, recreational  activities, household chores, household mobility, and community mobility as compared to previous level of function.     Clinical Decision Making (Complexity):  Clinical Presentation: Stable/Uncomplicated  Clinical Presentation Rationale: based on medical and personal factors listed in PT evaluation  Clinical Decision Making (Complexity): Low complexity    PLAN OF CARE  Treatment Interventions:  Interventions: Gait Training, Manual Therapy, Neuromuscular Re-education, Therapeutic Activity, Therapeutic Exercise, Self-Care/Home Management, Canalith Repositioning    Long Term Goals     PT Goal 1  Goal Identifier: Dizziness  Goal Description: Pt will demonstrate (-) s/s of BPPV throughout positional testing of all canals without limitations in bed mobility and transfers d/t dizziness.  Rationale: to maximize safety and independence with performance of ADLs and functional tasks;to maximize safety and independence within the home;to maximize safety and independence within the community  Target Date: 01/23/25      Frequency of Treatment: 1x/week  Duration of Treatment: 6 weeks    Recommended Referrals to Other Professionals:   Education Assessment:   Learner/Method: Patient;Reading  Education Comments: POC and tx for BPPV, reading from ANPT    Risks and benefits of evaluation/treatment have been explained.   Patient/Family/caregiver agrees with Plan of Care.     Evaluation Time:     PT Eval, Low Complexity Minutes (72456): 25       Signing Clinician: Nadira Saravia, PT

## 2025-01-02 ENCOUNTER — E-VISIT (OUTPATIENT)
Dept: PEDIATRICS | Facility: CLINIC | Age: 60
End: 2025-01-02
Payer: COMMERCIAL

## 2025-01-02 DIAGNOSIS — B37.31 YEAST INFECTION OF THE VAGINA: ICD-10-CM

## 2025-01-02 DIAGNOSIS — J01.00 ACUTE NON-RECURRENT MAXILLARY SINUSITIS: Primary | ICD-10-CM

## 2025-01-02 RX ORDER — FLUCONAZOLE 150 MG/1
150 TABLET ORAL
Qty: 3 TABLET | Refills: 0 | Status: SHIPPED | OUTPATIENT
Start: 2025-01-02 | End: 2025-01-09

## 2025-01-02 RX ORDER — CEFDINIR 300 MG/1
300 CAPSULE ORAL 2 TIMES DAILY
Qty: 20 CAPSULE | Refills: 0 | Status: SHIPPED | OUTPATIENT
Start: 2025-01-02 | End: 2025-01-12

## 2025-01-06 ENCOUNTER — TRANSFERRED RECORDS (OUTPATIENT)
Dept: HEALTH INFORMATION MANAGEMENT | Facility: CLINIC | Age: 60
End: 2025-01-06
Payer: COMMERCIAL

## 2025-02-09 DIAGNOSIS — G89.29 CHRONIC NECK PAIN: ICD-10-CM

## 2025-02-09 DIAGNOSIS — M54.50 CHRONIC BILATERAL LOW BACK PAIN WITHOUT SCIATICA: ICD-10-CM

## 2025-02-09 DIAGNOSIS — G89.29 CHRONIC BILATERAL LOW BACK PAIN WITHOUT SCIATICA: ICD-10-CM

## 2025-02-09 DIAGNOSIS — M54.2 CHRONIC NECK PAIN: ICD-10-CM

## 2025-02-10 RX ORDER — CELECOXIB 200 MG/1
CAPSULE ORAL
Qty: 90 CAPSULE | Refills: 3 | Status: SHIPPED | OUTPATIENT
Start: 2025-02-10

## 2025-03-10 ENCOUNTER — APPOINTMENT (OUTPATIENT)
Age: 60
Setting detail: DERMATOLOGY
End: 2025-03-10

## 2025-03-10 DIAGNOSIS — D22 MELANOCYTIC NEVI: ICD-10-CM

## 2025-03-10 DIAGNOSIS — Z86.006 PERSONAL HISTORY OF MELANOMA IN-SITU: ICD-10-CM

## 2025-03-10 DIAGNOSIS — Z87.2 PERSONAL HISTORY OF DISEASES OF THE SKIN AND SUBCUTANEOUS TISSUE: ICD-10-CM

## 2025-03-10 DIAGNOSIS — Z71.89 OTHER SPECIFIED COUNSELING: ICD-10-CM

## 2025-03-10 DIAGNOSIS — D18.0 HEMANGIOMA: ICD-10-CM

## 2025-03-10 DIAGNOSIS — L82.1 OTHER SEBORRHEIC KERATOSIS: ICD-10-CM

## 2025-03-10 PROBLEM — D18.01 HEMANGIOMA OF SKIN AND SUBCUTANEOUS TISSUE: Status: ACTIVE | Noted: 2025-03-10

## 2025-03-10 PROBLEM — D22.5 MELANOCYTIC NEVI OF TRUNK: Status: ACTIVE | Noted: 2025-03-10

## 2025-03-10 PROCEDURE — ? SUNSCREEN RECOMMENDATIONS

## 2025-03-10 PROCEDURE — 99203 OFFICE O/P NEW LOW 30 MIN: CPT

## 2025-03-10 PROCEDURE — ? OBSERVATION

## 2025-03-10 PROCEDURE — ? COUNSELING

## 2025-03-10 ASSESSMENT — LOCATION ZONE DERM
LOCATION ZONE: TRUNK
LOCATION ZONE: LEG
LOCATION ZONE: FACE

## 2025-03-10 ASSESSMENT — LOCATION DETAILED DESCRIPTION DERM
LOCATION DETAILED: EPIGASTRIC SKIN
LOCATION DETAILED: LEFT INFERIOR MEDIAL FOREHEAD
LOCATION DETAILED: LEFT DISTAL POSTERIOR THIGH
LOCATION DETAILED: SUPERIOR THORACIC SPINE
LOCATION DETAILED: RIGHT SUPERIOR MEDIAL MIDBACK
LOCATION DETAILED: MIDDLE STERNUM
LOCATION DETAILED: INFERIOR THORACIC SPINE
LOCATION DETAILED: RIGHT INFERIOR LATERAL MALAR CHEEK

## 2025-03-10 ASSESSMENT — LOCATION SIMPLE DESCRIPTION DERM
LOCATION SIMPLE: UPPER BACK
LOCATION SIMPLE: LEFT POSTERIOR THIGH
LOCATION SIMPLE: RIGHT LOWER BACK
LOCATION SIMPLE: RIGHT CHEEK
LOCATION SIMPLE: ABDOMEN
LOCATION SIMPLE: LEFT FOREHEAD
LOCATION SIMPLE: CHEST

## 2025-03-10 NOTE — HPI: MELANOMA F/U (HISTORY OF MALIGNANT MELANOMA)
What Is The Reason For Today's Visit?: History of Melanoma
Year Excised?: 5/2021 at [facility]

## 2025-03-10 NOTE — HPI: SKIN LESION
[facility]                                                         Operative Note    Sundra Cellar Location: ASC Perioperative   CSN [id number] MRN [id number]   Admission Date 7/20/2019 Procedure Date 7/20/2019   Attending Physician Genet Amaya MD Pr
What Type Of Note Output Would You Prefer (Optional)?: Bullet Format
How Severe Is Your Skin Lesion?: mild
Is This A New Presentation, Or A Follow-Up?: Skin Lesion

## 2025-03-10 NOTE — PROCEDURE: OBSERVATION
Body Location Override (Optional - Billing Will Still Be Based On Selected Body Map Location If Applicable): left posterior thigh
Detail Level: Detailed
Size Of Lesion In Cm (Optional): 0
Body Location Override (Optional - Billing Will Still Be Based On Selected Body Map Location If Applicable): left superior helix, the right shoulder, the right lower lateral shin, and the left posterior shoulder

## 2025-03-23 ENCOUNTER — HEALTH MAINTENANCE LETTER (OUTPATIENT)
Age: 60
End: 2025-03-23

## 2025-04-10 NOTE — TELEPHONE ENCOUNTER
Type of surgery: hysteroscopic iud removal  Location of surgery: [facility]  Date and time of surgery: 11/21/23 @ 7:40 am  Surgeon: Dr. Davis  Pre-Op Appt Date: 11/16/23  Post-Op Appt Date:    Packet sent out: Yes  Pre-cert/Authorization completed:  No  Date: 10/13/23     10-Apr-2025 09:00

## (undated) DEVICE — PACK MINOR CUSTOM RIDGES SBA32RMRMA

## (undated) DEVICE — DRAIN JACKSON PRATT CHANNEL 10FR RND SIL W/TROCAR JP-2227

## (undated) DEVICE — GLOVE BIOGEL PI MICRO SZ 7.5 48575

## (undated) DEVICE — PREP CHLORHEXIDINE 4% 4OZ (HIBICLENS) 57504

## (undated) DEVICE — KIT ENDO TURNOVER/PROCEDURE W/CLEAN A SCOPE LINERS 103888

## (undated) DEVICE — PREP SKIN SCRUB TRAY 4461A

## (undated) DEVICE — BASIN SET MINOR DISP

## (undated) DEVICE — SU MONOCRYL 3-0 PS-2 27" Y427H

## (undated) DEVICE — SU ETHILON 3-0 FS-1 18" 669H

## (undated) DEVICE — LINEN HALF SHEET 5512

## (undated) DEVICE — TUBING SUCTION 12"X1/4" N612

## (undated) DEVICE — TUBING IRRIG TUR Y TYPE 96" LF 6543-01

## (undated) DEVICE — PREP SCRUB SOL EXIDINE 4% CHG 4OZ 29002-404

## (undated) DEVICE — DRAIN JACKSON PRATT 15FR ROUND SIL LF JP-2229

## (undated) DEVICE — LINEN FULL SHEET 5511

## (undated) DEVICE — BAG CLEAR TRASH 1.3M 39X33" P4040C

## (undated) DEVICE — GLOVE PROTEXIS BLUE W/NEU-THERA 6.5  2D73EB65

## (undated) DEVICE — GLOVE BIOGEL PI MICRO INDICATOR UNDERGLOVE SZ 6.5 48965

## (undated) DEVICE — SPECIMEN BAG BEMIS HI FLOW SUCTION WHITE SOCK 533810

## (undated) DEVICE — TUBING SYS AQUILEX BLUE INFLOW AQL-110 YLW OUTFLOW AQL-111

## (undated) DEVICE — Device

## (undated) DEVICE — GLOVE BIOGEL PI MICRO SZ 6.5 48565

## (undated) DEVICE — SYR BULB IRRIG DOVER 60 ML LATEX FREE 67000

## (undated) DEVICE — LINEN TOWEL PACK X5 5464

## (undated) DEVICE — STPL SKIN 35W APPOSE 8886803712

## (undated) DEVICE — ESU GROUND PAD ADULT W/CORD E7507

## (undated) DEVICE — SU PDS II 2-0 CT-2 27"  Z333H

## (undated) DEVICE — SUCTION CANISTER BEMIS HI FLOW 006772-901

## (undated) DEVICE — PEN MARKING SKIN

## (undated) DEVICE — SU ETHILON 3-0 PS-2 18" 1669H

## (undated) DEVICE — PACK TVT HYSTEROSCOPY SMA15HYFSE

## (undated) DEVICE — STPL SKIN PROXIMATE 35 WIDE PMW35

## (undated) DEVICE — SU MONOCRYL 4-0 PS-2 18" UND Y496G

## (undated) DEVICE — ESU PENCIL W/HOLSTER E2350H

## (undated) DEVICE — LINEN TOWEL PACK X10 5473

## (undated) DEVICE — SOL NACL 0.9% IRRIG 3000ML BAG 2B7477

## (undated) DEVICE — DRAIN JACKSON PRATT 15FR ROUND SU130-1323

## (undated) DEVICE — NDL 30GA 1" 305128

## (undated) DEVICE — CATH TRAY URETHRAL 14FR LF 772417

## (undated) DEVICE — SUCTION CANISTER MEDIVAC LINER 3000ML W/LID 65651-530

## (undated) DEVICE — SU MONOCRYL 4-0 P-3 18" UND Y494G

## (undated) DEVICE — GLOVE PROTEXIS W/NEU-THERA 7.5  2D73TE75

## (undated) DEVICE — DRSG XEROFORM 5X9" 8884431605

## (undated) DEVICE — DRAPE BREAST/CHEST 29420

## (undated) DEVICE — DRAIN JACKSON PRATT RESERVOIR 100ML SU130-1305

## (undated) DEVICE — PITCHER STERILE 1000ML  SSK9004A

## (undated) DEVICE — SU PDS II 2-0 CT-1 27" Z339H

## (undated) DEVICE — PACK MINOR LITHOTOMY RIDGES

## (undated) DEVICE — GOWN IMPERVIOUS SPECIALTY XLG/XLONG 32474

## (undated) DEVICE — SOL WATER IRRIG 1000ML BOTTLE 2F7114

## (undated) DEVICE — NEPTUNE SMOKE EVACUATION PENCIL

## (undated) DEVICE — DRSG GAUZE 4X4" 8044

## (undated) DEVICE — SEAL SET MYOSURE ROD LENS SCOPE SINGLE USE 40-902

## (undated) RX ORDER — FENTANYL CITRATE 50 UG/ML
INJECTION, SOLUTION INTRAMUSCULAR; INTRAVENOUS
Status: DISPENSED
Start: 2018-11-09

## (undated) RX ORDER — DEXAMETHASONE SODIUM PHOSPHATE 4 MG/ML
INJECTION, SOLUTION INTRA-ARTICULAR; INTRALESIONAL; INTRAMUSCULAR; INTRAVENOUS; SOFT TISSUE
Status: DISPENSED
Start: 2023-11-21

## (undated) RX ORDER — PROPOFOL 10 MG/ML
INJECTION, EMULSION INTRAVENOUS
Status: DISPENSED
Start: 2018-11-09

## (undated) RX ORDER — GLYCOPYRROLATE 0.2 MG/ML
INJECTION INTRAMUSCULAR; INTRAVENOUS
Status: DISPENSED
Start: 2018-11-09

## (undated) RX ORDER — OXYCODONE HYDROCHLORIDE 5 MG/1
TABLET ORAL
Status: DISPENSED
Start: 2019-12-16

## (undated) RX ORDER — GINSENG 100 MG
CAPSULE ORAL
Status: DISPENSED
Start: 2018-11-09

## (undated) RX ORDER — FENTANYL CITRATE 0.05 MG/ML
INJECTION, SOLUTION INTRAMUSCULAR; INTRAVENOUS
Status: DISPENSED
Start: 2019-12-16

## (undated) RX ORDER — DEXAMETHASONE SODIUM PHOSPHATE 4 MG/ML
INJECTION, SOLUTION INTRA-ARTICULAR; INTRALESIONAL; INTRAMUSCULAR; INTRAVENOUS; SOFT TISSUE
Status: DISPENSED
Start: 2018-11-09

## (undated) RX ORDER — CEFAZOLIN SODIUM IN 0.9 % NACL 3 G/100 ML
INTRAVENOUS SOLUTION, PIGGYBACK (ML) INTRAVENOUS
Status: DISPENSED
Start: 2018-11-09

## (undated) RX ORDER — LIDOCAINE HYDROCHLORIDE 10 MG/ML
INJECTION, SOLUTION EPIDURAL; INFILTRATION; INTRACAUDAL; PERINEURAL
Status: DISPENSED
Start: 2023-11-21

## (undated) RX ORDER — DIAZEPAM 5 MG
TABLET ORAL
Status: DISPENSED
Start: 2018-11-09

## (undated) RX ORDER — NEOSTIGMINE METHYLSULFATE 1 MG/ML
VIAL (ML) INJECTION
Status: DISPENSED
Start: 2018-11-09

## (undated) RX ORDER — ONDANSETRON 2 MG/ML
INJECTION INTRAMUSCULAR; INTRAVENOUS
Status: DISPENSED
Start: 2018-11-09

## (undated) RX ORDER — HYDROMORPHONE HYDROCHLORIDE 1 MG/ML
INJECTION, SOLUTION INTRAMUSCULAR; INTRAVENOUS; SUBCUTANEOUS
Status: DISPENSED
Start: 2019-12-16

## (undated) RX ORDER — PROPOFOL 10 MG/ML
INJECTION, EMULSION INTRAVENOUS
Status: DISPENSED
Start: 2023-11-21

## (undated) RX ORDER — FENTANYL CITRATE 50 UG/ML
INJECTION, SOLUTION INTRAMUSCULAR; INTRAVENOUS
Status: DISPENSED
Start: 2019-12-16

## (undated) RX ORDER — ONDANSETRON 2 MG/ML
INJECTION INTRAMUSCULAR; INTRAVENOUS
Status: DISPENSED
Start: 2023-11-21

## (undated) RX ORDER — LIDOCAINE HYDROCHLORIDE AND EPINEPHRINE 10; 10 MG/ML; UG/ML
INJECTION, SOLUTION INFILTRATION; PERINEURAL
Status: DISPENSED
Start: 2018-11-09

## (undated) RX ORDER — GLYCOPYRROLATE 0.2 MG/ML
INJECTION INTRAMUSCULAR; INTRAVENOUS
Status: DISPENSED
Start: 2023-11-21

## (undated) RX ORDER — LIDOCAINE HYDROCHLORIDE 10 MG/ML
INJECTION, SOLUTION EPIDURAL; INFILTRATION; INTRACAUDAL; PERINEURAL
Status: DISPENSED
Start: 2019-12-12

## (undated) RX ORDER — ACETAMINOPHEN 500 MG
TABLET ORAL
Status: DISPENSED
Start: 2019-12-16

## (undated) RX ORDER — FENTANYL CITRATE 50 UG/ML
INJECTION, SOLUTION INTRAMUSCULAR; INTRAVENOUS
Status: DISPENSED
Start: 2017-04-26

## (undated) RX ORDER — KETOROLAC TROMETHAMINE 30 MG/ML
INJECTION, SOLUTION INTRAMUSCULAR; INTRAVENOUS
Status: DISPENSED
Start: 2018-11-09

## (undated) RX ORDER — CEFAZOLIN SODIUM IN 0.9 % NACL 3 G/100 ML
INTRAVENOUS SOLUTION, PIGGYBACK (ML) INTRAVENOUS
Status: DISPENSED
Start: 2019-12-16

## (undated) RX ORDER — PROPOFOL 10 MG/ML
INJECTION, EMULSION INTRAVENOUS
Status: DISPENSED
Start: 2019-12-16

## (undated) RX ORDER — ACETAMINOPHEN 500 MG
TABLET ORAL
Status: DISPENSED
Start: 2018-11-09

## (undated) RX ORDER — FENTANYL CITRATE 50 UG/ML
INJECTION, SOLUTION INTRAMUSCULAR; INTRAVENOUS
Status: DISPENSED
Start: 2020-06-29

## (undated) RX ORDER — CEFAZOLIN SODIUM/WATER 3 G/30 ML
SYRINGE (ML) INTRAVENOUS
Status: DISPENSED
Start: 2023-11-21

## (undated) RX ORDER — HYDRALAZINE HYDROCHLORIDE 20 MG/ML
INJECTION INTRAMUSCULAR; INTRAVENOUS
Status: DISPENSED
Start: 2018-11-09

## (undated) RX ORDER — TRIAMCINOLONE ACETONIDE 40 MG/ML
INJECTION, SUSPENSION INTRA-ARTICULAR; INTRAMUSCULAR
Status: DISPENSED
Start: 2019-12-12

## (undated) RX ORDER — LIDOCAINE HYDROCHLORIDE 10 MG/ML
INJECTION, SOLUTION EPIDURAL; INFILTRATION; INTRACAUDAL; PERINEURAL
Status: DISPENSED
Start: 2018-11-09

## (undated) RX ORDER — CEFAZOLIN SODIUM 2 G/100ML
INJECTION, SOLUTION INTRAVENOUS
Status: DISPENSED
Start: 2019-12-16

## (undated) RX ORDER — FENTANYL CITRATE 50 UG/ML
INJECTION, SOLUTION INTRAMUSCULAR; INTRAVENOUS
Status: DISPENSED
Start: 2023-11-21

## (undated) RX ORDER — ACETAMINOPHEN 325 MG/1
TABLET ORAL
Status: DISPENSED
Start: 2023-11-21